# Patient Record
Sex: FEMALE | Race: WHITE | Employment: OTHER | URBAN - METROPOLITAN AREA
[De-identification: names, ages, dates, MRNs, and addresses within clinical notes are randomized per-mention and may not be internally consistent; named-entity substitution may affect disease eponyms.]

---

## 2017-01-04 DIAGNOSIS — I35.0 NONRHEUMATIC AORTIC VALVE STENOSIS: Primary | ICD-10-CM

## 2017-02-16 ENCOUNTER — HOSPITAL ENCOUNTER (OUTPATIENT)
Dept: LAB | Age: 77
Discharge: HOME | End: 2017-02-16
Attending: INTERNAL MEDICINE
Payer: MEDICARE

## 2017-02-16 DIAGNOSIS — I35.0 NONRHEUMATIC AORTIC VALVE STENOSIS: ICD-10-CM

## 2017-02-16 LAB
ANION GAP BLD CALC-SCNC: 7 MMOL/L
BUN SERPL-MCNC: 18 MG/DL (ref 8–23)
CALCIUM SERPL-MCNC: 9.3 MG/DL (ref 8.3–10.4)
CHLORIDE SERPL-SCNC: 102 MMOL/L (ref 98–107)
CO2 SERPL-SCNC: 30 MMOL/L (ref 23–32)
CREAT SERPL-MCNC: 0.8 MG/DL (ref 0.6–1)
ERYTHROCYTE [DISTWIDTH] IN BLOOD BY AUTOMATED COUNT: 12.1 % (ref 11.9–14.6)
GLUCOSE SERPL-MCNC: 245 MG/DL (ref 65–100)
HCT VFR BLD AUTO: 39.5 % (ref 35.8–46.3)
HGB BLD-MCNC: 13.2 G/DL (ref 11.7–15.4)
MCH RBC QN AUTO: 32.5 PG (ref 26.1–32.9)
MCHC RBC AUTO-ENTMCNC: 33.4 G/DL (ref 31.4–35)
MCV RBC AUTO: 97.3 FL (ref 79.6–97.8)
PLATELET # BLD AUTO: 220 K/UL (ref 150–450)
PMV BLD AUTO: 8.8 FL (ref 10.8–14.1)
POTASSIUM SERPL-SCNC: 4.6 MMOL/L (ref 3.5–5.1)
RBC # BLD AUTO: 4.06 M/UL (ref 4.05–5.25)
SODIUM SERPL-SCNC: 139 MMOL/L (ref 136–145)
WBC # BLD AUTO: 8.6 K/UL (ref 4.3–11.1)

## 2017-02-16 PROCEDURE — 85027 COMPLETE CBC AUTOMATED: CPT | Performed by: INTERNAL MEDICINE

## 2017-02-16 PROCEDURE — 80048 BASIC METABOLIC PNL TOTAL CA: CPT | Performed by: INTERNAL MEDICINE

## 2017-02-16 PROCEDURE — 36415 COLL VENOUS BLD VENIPUNCTURE: CPT | Performed by: INTERNAL MEDICINE

## 2017-12-05 ENCOUNTER — HOSPITAL ENCOUNTER (INPATIENT)
Age: 77
LOS: 2 days | Discharge: HOME HEALTH | DRG: 638 | End: 2017-12-07
Attending: INTERNAL MEDICINE | Admitting: FAMILY MEDICINE
Payer: MEDICARE

## 2017-12-05 ENCOUNTER — APPOINTMENT (OUTPATIENT)
Dept: GENERAL RADIOLOGY | Age: 77
DRG: 638 | End: 2017-12-05
Attending: FAMILY MEDICINE
Payer: MEDICARE

## 2017-12-05 PROBLEM — L97.509 FOOT ULCER (HCC): Status: ACTIVE | Noted: 2017-12-05

## 2017-12-05 LAB
ALBUMIN SERPL-MCNC: 2.9 G/DL (ref 3.2–4.6)
ALBUMIN/GLOB SERPL: 0.7 {RATIO} (ref 1.2–3.5)
ALP SERPL-CCNC: 95 U/L (ref 50–136)
ALT SERPL-CCNC: 20 U/L (ref 12–65)
ANION GAP SERPL CALC-SCNC: 10 MMOL/L (ref 7–16)
AST SERPL-CCNC: 18 U/L (ref 15–37)
BASOPHILS # BLD: 0 K/UL (ref 0–0.2)
BASOPHILS NFR BLD: 0 % (ref 0–2)
BILIRUB SERPL-MCNC: 0.5 MG/DL (ref 0.2–1.1)
BUN SERPL-MCNC: 19 MG/DL (ref 8–23)
CALCIUM SERPL-MCNC: 9 MG/DL (ref 8.3–10.4)
CHLORIDE SERPL-SCNC: 102 MMOL/L (ref 98–107)
CO2 SERPL-SCNC: 28 MMOL/L (ref 21–32)
CREAT SERPL-MCNC: 0.75 MG/DL (ref 0.6–1)
DIFFERENTIAL METHOD BLD: ABNORMAL
EOSINOPHIL # BLD: 0.1 K/UL (ref 0–0.8)
EOSINOPHIL NFR BLD: 1 % (ref 0.5–7.8)
ERYTHROCYTE [DISTWIDTH] IN BLOOD BY AUTOMATED COUNT: 12.9 % (ref 11.9–14.6)
GLOBULIN SER CALC-MCNC: 4.4 G/DL (ref 2.3–3.5)
GLUCOSE BLD STRIP.AUTO-MCNC: 326 MG/DL (ref 65–100)
GLUCOSE SERPL-MCNC: 271 MG/DL (ref 65–100)
HCT VFR BLD AUTO: 37.1 % (ref 35.8–46.3)
HGB BLD-MCNC: 12.5 G/DL (ref 11.7–15.4)
IMM GRANULOCYTES # BLD: 0 K/UL (ref 0–0.5)
IMM GRANULOCYTES NFR BLD AUTO: 0 % (ref 0–5)
INR PPP: 1.3
LYMPHOCYTES # BLD: 1.7 K/UL (ref 0.5–4.6)
LYMPHOCYTES NFR BLD: 17 % (ref 13–44)
MCH RBC QN AUTO: 32.9 PG (ref 26.1–32.9)
MCHC RBC AUTO-ENTMCNC: 33.7 G/DL (ref 31.4–35)
MCV RBC AUTO: 97.6 FL (ref 79.6–97.8)
MONOCYTES # BLD: 0.9 K/UL (ref 0.1–1.3)
MONOCYTES NFR BLD: 9 % (ref 4–12)
NEUTS SEG # BLD: 7.2 K/UL (ref 1.7–8.2)
NEUTS SEG NFR BLD: 73 % (ref 43–78)
PLATELET # BLD AUTO: 241 K/UL (ref 150–450)
PMV BLD AUTO: 9.3 FL (ref 10.8–14.1)
POTASSIUM SERPL-SCNC: 3.9 MMOL/L (ref 3.5–5.1)
PROT SERPL-MCNC: 7.3 G/DL (ref 6.3–8.2)
PROTHROMBIN TIME: 16 SEC (ref 11.5–14.5)
RBC # BLD AUTO: 3.8 M/UL (ref 4.05–5.25)
SODIUM SERPL-SCNC: 140 MMOL/L (ref 136–145)
WBC # BLD AUTO: 9.9 K/UL (ref 4.3–11.1)

## 2017-12-05 PROCEDURE — 36415 COLL VENOUS BLD VENIPUNCTURE: CPT | Performed by: FAMILY MEDICINE

## 2017-12-05 PROCEDURE — 74011250637 HC RX REV CODE- 250/637: Performed by: FAMILY MEDICINE

## 2017-12-05 PROCEDURE — 82962 GLUCOSE BLOOD TEST: CPT

## 2017-12-05 PROCEDURE — 85025 COMPLETE CBC W/AUTO DIFF WBC: CPT | Performed by: FAMILY MEDICINE

## 2017-12-05 PROCEDURE — 85610 PROTHROMBIN TIME: CPT | Performed by: FAMILY MEDICINE

## 2017-12-05 PROCEDURE — 65270000029 HC RM PRIVATE

## 2017-12-05 PROCEDURE — 74011250636 HC RX REV CODE- 250/636: Performed by: FAMILY MEDICINE

## 2017-12-05 PROCEDURE — 93005 ELECTROCARDIOGRAM TRACING: CPT | Performed by: FAMILY MEDICINE

## 2017-12-05 PROCEDURE — 77030027138 HC INCENT SPIROMETER -A

## 2017-12-05 PROCEDURE — 87040 BLOOD CULTURE FOR BACTERIA: CPT | Performed by: FAMILY MEDICINE

## 2017-12-05 PROCEDURE — 74011636637 HC RX REV CODE- 636/637: Performed by: FAMILY MEDICINE

## 2017-12-05 PROCEDURE — 77030032490 HC SLV COMPR SCD KNE COVD -B

## 2017-12-05 PROCEDURE — 80053 COMPREHEN METABOLIC PANEL: CPT | Performed by: FAMILY MEDICINE

## 2017-12-05 PROCEDURE — 74011000258 HC RX REV CODE- 258: Performed by: FAMILY MEDICINE

## 2017-12-05 PROCEDURE — 73630 X-RAY EXAM OF FOOT: CPT

## 2017-12-05 RX ORDER — LANOLIN ALCOHOL/MO/W.PET/CERES
500 CREAM (GRAM) TOPICAL
Status: DISCONTINUED | OUTPATIENT
Start: 2017-12-06 | End: 2017-12-07 | Stop reason: HOSPADM

## 2017-12-05 RX ORDER — MORPHINE SULFATE 2 MG/ML
1 INJECTION, SOLUTION INTRAMUSCULAR; INTRAVENOUS
Status: DISCONTINUED | OUTPATIENT
Start: 2017-12-05 | End: 2017-12-05 | Stop reason: SDUPTHER

## 2017-12-05 RX ORDER — METHIMAZOLE 5 MG/1
5 TABLET ORAL
Status: DISCONTINUED | OUTPATIENT
Start: 2017-12-06 | End: 2017-12-07 | Stop reason: HOSPADM

## 2017-12-05 RX ORDER — LANOLIN ALCOHOL/MO/W.PET/CERES
1 CREAM (GRAM) TOPICAL
Status: DISCONTINUED | OUTPATIENT
Start: 2017-12-06 | End: 2017-12-07 | Stop reason: HOSPADM

## 2017-12-05 RX ORDER — DIGOXIN 125 MCG
0.12 TABLET ORAL
Status: DISCONTINUED | OUTPATIENT
Start: 2017-12-06 | End: 2017-12-07 | Stop reason: HOSPADM

## 2017-12-05 RX ORDER — PANTOPRAZOLE SODIUM 40 MG/1
40 TABLET, DELAYED RELEASE ORAL
Status: DISCONTINUED | OUTPATIENT
Start: 2017-12-06 | End: 2017-12-07 | Stop reason: HOSPADM

## 2017-12-05 RX ORDER — ERGOCALCIFEROL 1.25 MG/1
50000 CAPSULE ORAL
Status: DISCONTINUED | OUTPATIENT
Start: 2017-12-12 | End: 2017-12-07 | Stop reason: HOSPADM

## 2017-12-05 RX ORDER — HYDROCODONE BITARTRATE AND ACETAMINOPHEN 5; 325 MG/1; MG/1
1 TABLET ORAL
Status: DISCONTINUED | OUTPATIENT
Start: 2017-12-05 | End: 2017-12-07 | Stop reason: HOSPADM

## 2017-12-05 RX ORDER — METOPROLOL TARTRATE 50 MG/1
50 TABLET ORAL 2 TIMES DAILY
Status: DISCONTINUED | OUTPATIENT
Start: 2017-12-05 | End: 2017-12-07 | Stop reason: HOSPADM

## 2017-12-05 RX ORDER — MORPHINE SULFATE 10 MG/ML
1 INJECTION, SOLUTION INTRAMUSCULAR; INTRAVENOUS
Status: DISCONTINUED | OUTPATIENT
Start: 2017-12-05 | End: 2017-12-07 | Stop reason: HOSPADM

## 2017-12-05 RX ORDER — CALCIUM CARBONATE 500(1250)
500 TABLET ORAL 2 TIMES DAILY WITH MEALS
Status: DISCONTINUED | OUTPATIENT
Start: 2017-12-05 | End: 2017-12-07 | Stop reason: HOSPADM

## 2017-12-05 RX ORDER — PREDNISONE 5 MG/1
5 TABLET ORAL
Status: DISCONTINUED | OUTPATIENT
Start: 2017-12-06 | End: 2017-12-07 | Stop reason: HOSPADM

## 2017-12-05 RX ORDER — TRAMADOL HYDROCHLORIDE 50 MG/1
100 TABLET ORAL
Status: DISCONTINUED | OUTPATIENT
Start: 2017-12-05 | End: 2017-12-07 | Stop reason: HOSPADM

## 2017-12-05 RX ORDER — SODIUM CHLORIDE 0.9 % (FLUSH) 0.9 %
5-10 SYRINGE (ML) INJECTION EVERY 8 HOURS
Status: DISCONTINUED | OUTPATIENT
Start: 2017-12-05 | End: 2017-12-07 | Stop reason: HOSPADM

## 2017-12-05 RX ORDER — INSULIN GLARGINE 100 [IU]/ML
9 INJECTION, SOLUTION SUBCUTANEOUS
Status: DISCONTINUED | OUTPATIENT
Start: 2017-12-05 | End: 2017-12-07

## 2017-12-05 RX ORDER — ASPIRIN 81 MG/1
81 TABLET ORAL
Status: DISCONTINUED | OUTPATIENT
Start: 2017-12-06 | End: 2017-12-07 | Stop reason: HOSPADM

## 2017-12-05 RX ORDER — SODIUM CHLORIDE 0.9 % (FLUSH) 0.9 %
5-10 SYRINGE (ML) INJECTION AS NEEDED
Status: DISCONTINUED | OUTPATIENT
Start: 2017-12-05 | End: 2017-12-07 | Stop reason: HOSPADM

## 2017-12-05 RX ORDER — LORAZEPAM 1 MG/1
1 TABLET ORAL 2 TIMES DAILY
Status: DISCONTINUED | OUTPATIENT
Start: 2017-12-05 | End: 2017-12-07 | Stop reason: HOSPADM

## 2017-12-05 RX ORDER — INSULIN LISPRO 100 [IU]/ML
INJECTION, SOLUTION INTRAVENOUS; SUBCUTANEOUS
Status: DISCONTINUED | OUTPATIENT
Start: 2017-12-05 | End: 2017-12-07 | Stop reason: HOSPADM

## 2017-12-05 RX ADMIN — PIPERACILLIN SODIUM,TAZOBACTAM SODIUM 3.38 G: 3; .375 INJECTION, POWDER, FOR SOLUTION INTRAVENOUS at 21:50

## 2017-12-05 RX ADMIN — Medication 10 ML: at 21:54

## 2017-12-05 RX ADMIN — TRAMADOL HYDROCHLORIDE 100 MG: 50 TABLET, FILM COATED ORAL at 21:45

## 2017-12-05 RX ADMIN — INSULIN LISPRO 8 UNITS: 100 INJECTION, SOLUTION INTRAVENOUS; SUBCUTANEOUS at 21:42

## 2017-12-05 RX ADMIN — HYDROCODONE BITARTRATE AND ACETAMINOPHEN 1 TABLET: 5; 325 TABLET ORAL at 20:40

## 2017-12-05 RX ADMIN — LORAZEPAM 1 MG: 1 TABLET ORAL at 21:44

## 2017-12-05 RX ADMIN — VANCOMYCIN HYDROCHLORIDE 1000 MG: 1 INJECTION, POWDER, LYOPHILIZED, FOR SOLUTION INTRAVENOUS at 21:54

## 2017-12-05 RX ADMIN — INSULIN GLARGINE 9 UNITS: 100 INJECTION, SOLUTION SUBCUTANEOUS at 21:41

## 2017-12-05 NOTE — H&P
GENERAL GENERIC H&P/CONSULT    Subjective:    HPI:  Ms. Bhargavi Macedo is a very pleasant 80yoF who presents as a direct admit for R foot cellulitis and plantar ulcer. Per patient report and caregiver, pt has had this ulcer for at least 2 months, getting wound care as an outpatient. She was doing well, but reports approximately 3 weeks ago, she had some sort of procedure done that seemed to make the ulceration worse. On Sunday, she noticed some R ankle swelling and pain, then yesterday, the dorsal aspect of her R foot near her toes became erythematous and warm. Her ulcer also seemed to be acutely worse. She saw an outpatient provider today, who told her that she needed to come to the hospital.  Per patient report, XR was performed, and they were told that there was \"no gas\" but also that \"the bone may need to come out. \"  I have not seen this report as of yet. Pt reports some intermittent \"chilliness\" but states this is chronic. She reports checking her temperature at home twice since the weekend and today in her doctor's office, and she has been afebrile. Denies any SOB or CP. She has been compliant with all her medications. States that her foot is painful, but thinks that her home medications should help.       Past Medical History:   Diagnosis Date    Acquired cyst of kidney     Anxiety     managed with medication     Aortic valve replaced     Atrial fibrillation, chronic (HCC)     managed with medication and pacemaker    CAD (coronary artery disease)     CABG x 5    Calculus of kidney     Carotid artery stenosis without cerebral infarction     Chronic pain     GENERALIZED FROM ARTHRITIS    Gangrene associated with diabetes mellitus ([address] 5/26/2016    left great toe    GERD (gastroesophageal reflux disease)     managed with medication     History of kidney stones     multiple with surgical interventions    Hypercholesterolemia     managed with medication     Hypothyroidism     managed with medication     Microscopic hematuria     Nausea & vomiting     after anesthesia    Osteoarthritis     managed with medication     Pacemaker     Metronic pacemaker only    PUD (peptic ulcer disease)     no recent episodes    PVD (peripheral vascular disease) ([facility]     left side x 1 stented    Rheumatoid arthritis ([facility]     managed with medication     Type 2 diabetes mellitus ([facility] Dx 2006    oral t and insulin/Avg / no s/s of low BS/ does not have a sensation / last A1C7.4    Vertigo     no treatment, happens occassionally      Past Surgical History:   Procedure Laterality Date    CABG, ARTERY-VEIN, FOUR  1993    states x 5    CARDIAC SURG PROCEDURE UNLIST      Cardiovert x 2-3 x last 2/4/2012    HX AMPUTATION Left 2016    great toe    HX AORTIC VALVE REPLACEMENT      HX APPENDECTOMY  1959    HX HEART CATHETERIZATION      HX HEENT      dental    HX HYSTERECTOMY  1988    HX LITHOTRIPSY      multiple    HX OPEN CHOLECYSTECTOMY      HX ORTHOPAEDIC Left     achilles tendon    HX ORTHOPAEDIC Left     \"toe surgery removing bones\"    HX PACEMAKER      HX PACEMAKER PLACEMENT      Metronic    HX UROLOGICAL Left 1980s    open L kidney removal stones    VASCULAR SURGERY PROCEDURE UNLIST Left 2-17-16    lower extremity arteriogram with stent x 1 placement      Prior to Admission medications    Medication Sig Start Date End Date Taking? Authorizing Provider   linagliptin (TRADJENTA) 5 mg tablet Take 5 mg by mouth daily. Yes Historical Provider   glipiZIDE (GLUCOTROL) 10 mg tablet Take 10 mg by mouth. Two tablets in the morning   Yes Historical Provider   CALCIUM CARBONATE (CALCIUM 300 PO) Take  by mouth. Yes Historical Provider   ergocalciferol (ERGOCALCIFEROL) 50,000 unit capsule Take 50,000 Units by mouth every month. 7/14/16  Yes Historical Provider   traMADol (ULTRAM) 50 mg tablet Take 100 mg by mouth nightly.    Yes Historical Provider   methimazole (TAPAZOLE) 5 mg tablet Take 5 mg by mouth every morning. 4/6/16  Yes Historical Provider   insulin glargine (LANTUS SOLOSTAR) 100 unit/mL (3 mL) pen 9 Units by SubCUTAneous route nightly. Yes Historical Provider   aspirin delayed-release 81 mg tablet Take 81 mg by mouth every morning. Yes Historical Provider   apixaban (ELIQUIS) 2.5 mg tablet Take 1 Tab by mouth every twelve (12) hours. 2/19/16  Yes Kevin Márquez MD   predniSONE (DELTASONE) 5 mg tablet Take 1 Tab by mouth daily (with breakfast). 2/19/16  Yes Kevin Márquez MD   HYDROcodone-acetaminophen (NORCO) 5-325 mg per tablet Take 1 Tab by mouth every six (6) hours as needed. Max Daily Amount: 4 Tabs. Patient taking differently: Take 1 Tab by mouth two (2) times a day. 2/19/16  Yes Kevin Márquez MD   metoprolol (LOPRESSOR) 50 mg tablet Take 1 Tab by mouth two (2) times a day. 2/19/16  Yes Kevin Márquez MD   digoxin (LANOXIN) 0.125 mg tablet Take 1 Tab by mouth daily. Patient taking differently: Take 0.125 mg by mouth every morning. 7/3/14  Yes Kesha Moncada PA-C   multivitamin (ONE A DAY) tablet Take 0.25 Tabs by mouth every morning. Yes Historical Provider   ferrous sulfate 325 mg (65 mg iron) tablet Take  by mouth Daily (before lunch). Yes Shaq Higginbotham MD   GLUC HCL/GLUC KENNY/AC-D-GLUCOS (GLUCOSAMINE COMPLEX PO) Take 1 Tab by mouth three (3) times daily. Yes Historical Provider   lorazepam (ATIVAN) 1 mg tablet Take 1 mg by mouth two (2) times a day. 8/5/10  Yes Historical Provider   DOCOSAHEXANOIC ACID/EPA (FISH OIL PO) Take 2 Tabs by mouth two (2) times a day. Yes Historical Provider   CYANOCOBALAMIN (VITAMIN B-12 PO) Take 1 Tab by mouth every morning. Yes Historical Provider   OMEPRAZOLE (PRILOSEC PO) Take 20 mg by mouth every morning.    Yes Historical Provider     Allergies   Allergen Reactions    Multaq [Dronedarone] Nausea Only    Other Medication Other (comments)     STATINS CAUSE MUSCLE WEAKNESS  Cholesterol medications    Statins-Hmg-Coa Reductase Inhibitors Myalgia      Social History   Substance Use Topics    Smoking status: Never Smoker    Smokeless tobacco: Never Used    Alcohol use No      Family History   Problem Relation Age of Onset    Heart Disease Father     Heart Attack Father     Diabetes Father     Hypertension Father     High Cholesterol Father     Asthma Father     Heart Disease Other     Heart Surgery Other     Diabetes Mother     Stroke Mother      TIAs    Hypertension Mother     Other Mother      kidney stone    Kidney Disease Mother     Heart Disease Mother     High Cholesterol Mother     Cancer Mother     Cancer Sister      multiple myeloma    Hypertension Sister     Hypertension Sister     Hypertension Brother     Diabetes Brother     Cancer Brother     Hypertension Sister     Heart Disease Sister     Hypertension Sister     Hypertension Sister       Review of Systems:  Negative except as mentioned in HPI    Objective:          Patient Vitals for the past 8 hrs:   BP Temp Pulse Resp SpO2   12/05/17 1804 142/82 98 °F (36.7 °C) 81 16 99 %     Physical Exam   GEN:  NAD, A&Ox4, pleasant and interactive  HEENT:  NCAT, PERRL, oropharynx is clear, moist mucus membranes  CV:  RRR, palpable heave over L chest  PULM:  CTAB, no wheezes, rhonchi, crackles  AB:  +BS, soft, NTND  MS:  2-3+/5 strength in all 4 extremities  SKIN:  Erythema of dorsal R foot, erythema of surrounding R plantar ulcer, mild pain on palpation of dorsal foot, no current edema  NEURO:  Weak, but symmetric strength in all 4 extremities, no focal neurologic deficits  PSYCH:  Pleasant, interactive, normal affect      Labs:  No results found for this or any previous visit (from the past 24 hour(s)).     ECG: EKG pending  Chest X-Ray: CXR pending    Assessment:  Active Problems:    Foot ulcer ([address] (12/5/2017)        Plan:  R Foot Ulcer/Cellulitis  -Plantar surface  -Ulcer is chronic, but worse, cellulitis is new  -Blood cultures, CBC pending  -Will start vanc and zosyn  -Check XR of foot  -Recheck labs in AM  -Unclear if this is vascular or diabetic ulcer in nature  -Wound Care consulted    Afib  -Rate controlled on exam  -Check EKG  -Continue home meds    CAD  -Significant heart history with CABG and stents  -No current chest pain  -Continue home meds    DM2  -Will continue insulin, but hold oral meds  -Cover with SSI    PVD  -Unsure if this wound is related to h/o PVD  -Pt has h/o L toe amputation as well  -Will consult with Vascular for their input as well    DISPO:  Admit to medical.  Follow up labs. Start abx after cultures obtained. Pending clinical course.       Signed:  Sharon Bassett MD 12/5/2017

## 2017-12-05 NOTE — IP AVS SNAPSHOT
Reece Jones 
 
 
 [address] [facility] [address] [facility] 
[phone] Patient: Ashley Sneed MRN: [id number] :1940 About your hospitalization You were admitted on:  2017 You last received care in the:  [facility] 2 SURGICAL You were discharged on:  2017 Why you were hospitalized Your primary diagnosis was:  Not on File Your diagnoses also included: Foot Ulcer (Hcc), Cellulitis Things You Need To Do (next 8 weeks) Follow up with Gladys Mariscal MD  
  
Phone:  [phone] Where:  [address], [address], [address] [place] [id number] Thursday Dec 21, 2017 Global Post Op with Tanner Gonzalez NP at 11:00 AM  
Where:  [facility] Follow up with Elsa Salgado MD  
f/u right plantar foot ulcer        11:00 WILL SEE LUIS DANIEL Palencia Phone:  [phone] Where:  [address], [facility], Vascular [facility], [address] [place] [id number] Wednesday Dec 27, 2017 ECHOCARDIOGRAM with GVLLE ECHO 26 at 11:30 AM  
Where:  [facility] ([address] [place] [address]) Wednesday Fito 10, 2018 Office Visit with Janell Bamberger, MD at 11:45 AM  
Where:  [facility] ([address] [place] [address]) Discharge Orders None A check jenn indicates which time of day the medication should be taken. My Medications TAKE these medications as instructed Instructions Each Dose to Equal  
 Morning Noon Evening Bedtime  
 apixaban 2.5 mg tablet Commonly known as:  Carlean Coast Take 1 Tab by mouth every twelve (12) hours. 2.5 mg  
    
  
   
   
   
  
  
 aspirin delayed-release 81 mg tablet Take 81 mg by mouth every morning. 81 mg  
    
  
   
   
   
  
 ATIVAN 1 mg tablet Generic drug:  LORazepam  
   
 Take 1 mg by mouth two (2) times a day. 1 mg CALCIUM 300 PO Take  by mouth. cephALEXin 500 mg capsule Commonly known as:  Nini Revels Take 1 Cap by mouth two (2) times a day. 500 mg  
    
  
   
   
   
  
  
 digoxin 0.125 mg tablet Commonly known as:  LANOXIN Take 1 Tab by mouth daily. 0.125 mg  
    
  
   
   
   
  
 ergocalciferol 50,000 unit capsule Commonly known as:  ERGOCALCIFEROL Notes to Patient:  Every month as directed Take 50,000 Units by mouth every month. [address]43 Units  
    
   
   
   
  
 ferrous sulfate 325 mg (65 mg iron) tablet Take  by mouth Daily (before lunch). FISH OIL PO Take 2 Tabs by mouth two (2) times a day. 2 Tab  
    
  
   
   
   
  
  
 glipiZIDE 10 mg tablet Commonly known as:  Aga Isles Take 10 mg by mouth. Two tablets in the morning 10 mg  
    
2 tabs GLUCOSAMINE COMPLEX PO Take 1 Tab by mouth three (3) times daily. 1 Tab HYDROcodone-acetaminophen 5-325 mg per tablet Commonly known as:  Jyoti Corbin Notes to Patient:  Take on as needed schedule Take 1 Tab by mouth every six (6) hours as needed. Max Daily Amount: 4 Tabs. 1 Tab  
    
   
   
   
  
 insulin glargine 100 unit/mL (3 mL) Inpn Commonly known as:  LANTUS,BASAGLAR  
   
 9 Units by SubCUTAneous route nightly. 9 Units  
    
   
   
   
  
 methIMAzole 5 mg tablet Commonly known as:  TAPAZOLE Take 5 mg by mouth every morning. 5 mg  
    
  
   
   
   
  
 metoprolol tartrate 50 mg tablet Commonly known as:  LOPRESSOR Take 1 Tab by mouth two (2) times a day. 50 mg  
    
  
   
   
  
   
  
 multivitamin tablet Commonly known as:  ONE A DAY Take 0.25 Tabs by mouth every morning. 0.25 Tab  
    
  
   
   
   
  
 predniSONE 5 mg tablet Commonly known as:  Alinda Caridad Take 1 Tab by mouth daily (with breakfast). 5 mg PRILOSEC PO Take 20 mg by mouth every morning. 20 mg  
    
  
   
   
   
  
 TRADJENTA 5 mg tablet Generic drug:  linagliptin Take 5 mg by mouth daily. 5 mg  
    
  
   
   
   
  
 traMADol 50 mg tablet Commonly known as:  ULTRAM  
   
 Take 100 mg by mouth nightly. 100 mg  
    
   
   
   
  
  
 VITAMIN B-12 PO Take 1 Tab by mouth every morning. 1 Tab Where to Get Your Medications These medications were sent to [place] [address] Phone:  [phone]  
  cephALEXin 500 mg capsule Discharge Instructions DISCHARGE SUMMARY from Nurse PATIENT INSTRUCTIONS: 
 
After general anesthesia or intravenous sedation, for 24 hours or while taking prescription Narcotics: · Limit your activities · Do not drive and operate hazardous machinery · Do not make important personal or business decisions · Do  not drink alcoholic beverages · If you have not urinated within 8 hours after discharge, please contact your surgeon on call. Report the following to your surgeon: 
· Excessive pain, swelling, redness or odor of or around the surgical area · Temperature over 100.5 · Nausea and vomiting lasting longer than 4 hours or if unable to take medications · Any signs of decreased circulation or nerve impairment to extremity: change in color, persistent  numbness, tingling, coldness or increase pain · Any questions What to do at Home: 
Recommended activity: Activity as tolerated, per MD instructions If you experience any of the following symptoms fever > 100.5, nausea, vomiting, pain, chest pain and/or shortness of breath go to ER please follow up with MD. 
 
*  Please give a list of your current medications to your Primary Care Provider. *  Please update this list whenever your medications are discontinued, doses are 
    changed, or new medications (including over-the-counter products) are added. *  Please carry medication information at all times in case of emergency situations. These are general instructions for a healthy lifestyle: No smoking/ No tobacco products/ Avoid exposure to second hand smoke Surgeon General's Warning:  Quitting smoking now greatly reduces serious risk to your health. Obesity, smoking, and sedentary lifestyle greatly increases your risk for illness A healthy diet, regular physical exercise & weight monitoring are important for maintaining a healthy lifestyle You may be retaining fluid if you have a history of heart failure or if you experience any of the following symptoms:  Weight gain of 3 pounds or more overnight or 5 pounds in a week, increased swelling in our hands or feet or shortness of breath while lying flat in bed. Please call your doctor as soon as you notice any of these symptoms; do not wait until your next office visit. Recognize signs and symptoms of STROKE: 
 
F-face looks uneven A-arms unable to move or move unevenly S-speech slurred or non-existent T-time-call 911 as soon as signs and symptoms begin-DO NOT go Back to bed or wait to see if you get better-TIME IS BRAIN. Warning Signs of HEART ATTACK Call 911 if you have these symptoms: 
? Chest discomfort. Most heart attacks involve discomfort in the center of the chest that lasts more than a few minutes, or that goes away and comes back. It can feel like uncomfortable pressure, squeezing, fullness, or pain. ? Discomfort in other areas of the upper body. Symptoms can include pain or discomfort in one or both arms, the back, neck, jaw, or stomach. ? Shortness of breath with or without chest discomfort. ? Other signs may include breaking out in a cold sweat, nausea, or lightheadedness. Don't wait more than five minutes to call [address]! Fast action can save your life. Calling 911 is almost always the fastest way to get lifesaving treatment. Emergency Medical Services staff can begin treatment when they arrive  up to an hour sooner than if someone gets to the hospital by car. The discharge information has been reviewed with the patient. The patient verbalized understanding. Discharge medications reviewed with the patient and appropriate educational materials and side effects teaching were provided. ___________________________________________________________________________________________________________________________________ Cellulitis: Care Instructions Your Care Instructions Cellulitis is a skin infection. It often occurs after a break in the skin from a scrape, cut, bite, or puncture, or after a rash. The doctor has checked you carefully, but problems can develop later. If you notice any problems or new symptoms, get medical treatment right away. Follow-up care is a key part of your treatment and safety. Be sure to make and go to all appointments, and call your doctor if you are having problems. It's also a good idea to know your test results and keep a list of the medicines you take. How can you care for yourself at home? · Take your antibiotics as directed. Do not stop taking them just because you feel better. You need to take the full course of antibiotics. · Prop up the infected area on pillows to reduce pain and swelling. Try to keep the area above the level of your heart as often as you can. · If your doctor told you how to care for your wound, follow your doctor's instructions. If you did not get instructions, follow this general advice: ¨ Wash the wound with clean water 2 times a day. Don't use hydrogen peroxide or alcohol, which can slow healing.  
¨ You may cover the wound with a thin layer of petroleum jelly, such as Vaseline, and a nonstick bandage. ¨ Apply more petroleum jelly and replace the bandage as needed. · Be safe with medicines. Take pain medicines exactly as directed. ¨ If the doctor gave you a prescription medicine for pain, take it as prescribed. ¨ If you are not taking a prescription pain medicine, ask your doctor if you can take an over-the-counter medicine. To prevent cellulitis in the future · Try to prevent cuts, scrapes, or other injuries to your skin. Cellulitis most often occurs where there is a break in the skin. · If you get a scrape, cut, mild burn, or bite, wash the wound with clean water as soon as you can to help avoid infection. Don't use hydrogen peroxide or alcohol, which can slow healing. · If you have swelling in your legs (edema), support stockings and good skin care may help prevent leg sores and cellulitis. · Take care of your feet, especially if you have diabetes or other conditions that increase the risk of infection. Wear shoes and socks. Do not go barefoot. If you have athlete's foot or other skin problems on your feet, talk to your doctor about how to treat them. When should you call for help? Call your doctor now or seek immediate medical care if: 
? · You have signs that your infection is getting worse, such as: 
¨ Increased pain, swelling, warmth, or redness. ¨ Red streaks leading from the area. ¨ Pus draining from the area. ¨ A fever. ? · You get a rash. ? Watch closely for changes in your health, and be sure to contact your doctor if: 
? · You are not getting better after 1 day (24 hours). ? · You do not get better as expected. Where can you learn more? Go to http://tae-bairon.info/. Sandi Up in the search box to learn more about \"Cellulitis: Care Instructions. \" Current as of: October 13, 2016 Content Version: 11.4 © 3988-8184 Healthwise, [employer].  Care instructions adapted under license by [address] (which disclaims liability or warranty for this information). If you have questions about a medical condition or this instruction, always ask your healthcare professional. [address] any warranty or liability for your use of this information. Introducing [facility]! Dylan Mathieu introduces [employer] patient portal. Now you can access parts of your medical record, email your doctor's office, and request medication refills online. 1. In your internet browser, go to [email or web address] 2. Click on the First Time User? Click Here link in the Sign In box. You will see the New Member Sign Up page. 3. Enter your [employer] Access Code exactly as it appears below. You will not need to use this code after youve completed the sign-up process. If you do not sign up before the expiration date, you must request a new code. · [employer] Access Code: [email or web address] Expires: 12/18/2017  2:11 PM 
 
4. Enter the last four digits of your Social Security Number (xxxx) and Date of Birth (mm/dd/yyyy) as indicated and click Submit. You will be taken to the next sign-up page. 5. Create a [employer] ID. This will be your [employer] login ID and cannot be changed, so think of one that is secure and easy to remember. 6. Create a [employer] password. You can change your password at any time. 7. Enter your Password Reset Question and Answer. This can be used at a later time if you forget your password. 8. Enter your e-mail address. You will receive e-mail notification when new information is available in [address] 9. Click Sign Up. You can now view and download portions of your medical record. 10. Click the Download Summary menu link to download a portable copy of your medical information. If you have questions, please visit the Frequently Asked Questions section of the [employer] website.  Remember, [employer] is NOT to be used for urgent needs. For medical emergencies, dial 911. Now available from your iPhone and Android! Unresulted Labs-Please follow up with your PCP about these lab tests Order Current Status CULTURE, BLOOD Preliminary result CULTURE, BLOOD Preliminary result Providers Seen During Your Hospitalization Provider Specialty Primary office phone Claude Keenan MD Internal Medicine [phone] Layla Salinas MD [facility] [phone] Your Primary Care Physician (PCP) Primary Care Physician Office Phone Office Fax Jani Solomonsert [phone] You are allergic to the following Allergen Reactions Multaq (Dronedarone) Nausea Only Other Medication Other (comments) STATINS CAUSE MUSCLE WEAKNESS Cholesterol medications Statins-Hmg-Coa Reductase Inhibitors Myalgia Recent Documentation Weight BMI OB Status Smoking Status 43.9 kg 16.6 kg/m2 Hysterectomy Never Smoker Emergency Contacts Name Discharge Info Relation Home Work Mobile Lary Rincon DISCHARGE CAREGIVER [3] Child [2] [phone] Ezequiel Rutledge DISCHARGE CAREGIVER [3] Child [2] [phone] aLry Joseph DISCHARGE CAREGIVER [3] Other Relative [6] [phone] Patient Belongings The following personal items are in your possession at time of discharge: 
  Dental Appliances: None         Home Medications: None   Jewelry: None  Clothing: With patient, Undergarments, Socks, Shirt, Pants, Pajamas, Footwear, Jacket/Coat, Bathrobe    Other Valuables: At bedside Please provide this summary of care documentation to your next provider. Signatures-by signing, you are acknowledging that this After Visit Summary has been reviewed with you and you have received a copy. Patient Signature:  ____________________________________________________________ Date:  ____________________________________________________________  
  
Burlene Elijah Provider Signature:  ____________________________________________________________ Date:  ____________________________________________________________

## 2017-12-06 ENCOUNTER — APPOINTMENT (OUTPATIENT)
Dept: ULTRASOUND IMAGING | Age: 77
DRG: 638 | End: 2017-12-06
Attending: FAMILY MEDICINE
Payer: MEDICARE

## 2017-12-06 ENCOUNTER — APPOINTMENT (OUTPATIENT)
Dept: ULTRASOUND IMAGING | Age: 77
DRG: 638 | End: 2017-12-06
Attending: SURGERY
Payer: MEDICARE

## 2017-12-06 PROBLEM — L03.90 CELLULITIS: Status: ACTIVE | Noted: 2017-12-06

## 2017-12-06 PROBLEM — L97.509 FOOT ULCER (HCC): Status: RESOLVED | Noted: 2017-12-05 | Resolved: 2017-12-06

## 2017-12-06 LAB
ANION GAP SERPL CALC-SCNC: 9 MMOL/L (ref 7–16)
ATRIAL RATE: 69 BPM
BASOPHILS # BLD: 0 K/UL (ref 0–0.2)
BASOPHILS NFR BLD: 0 % (ref 0–2)
BUN SERPL-MCNC: 18 MG/DL (ref 8–23)
CALCIUM SERPL-MCNC: 8.6 MG/DL (ref 8.3–10.4)
CALCULATED R AXIS, ECG10: 54 DEGREES
CALCULATED T AXIS, ECG11: -95 DEGREES
CHLORIDE SERPL-SCNC: 107 MMOL/L (ref 98–107)
CO2 SERPL-SCNC: 27 MMOL/L (ref 21–32)
CREAT SERPL-MCNC: 0.74 MG/DL (ref 0.6–1)
CRP SERPL-MCNC: 4.6 MG/DL (ref 0–0.9)
DIAGNOSIS, 93000: NORMAL
DIFFERENTIAL METHOD BLD: ABNORMAL
EOSINOPHIL # BLD: 0.1 K/UL (ref 0–0.8)
EOSINOPHIL NFR BLD: 1 % (ref 0.5–7.8)
ERYTHROCYTE [DISTWIDTH] IN BLOOD BY AUTOMATED COUNT: 13 % (ref 11.9–14.6)
ERYTHROCYTE [SEDIMENTATION RATE] IN BLOOD: 31 MM/HR (ref 0–30)
EST. AVERAGE GLUCOSE BLD GHB EST-MCNC: 232 MG/DL
GLUCOSE BLD STRIP.AUTO-MCNC: 144 MG/DL (ref 65–100)
GLUCOSE BLD STRIP.AUTO-MCNC: 169 MG/DL (ref 65–100)
GLUCOSE BLD STRIP.AUTO-MCNC: 200 MG/DL (ref 65–100)
GLUCOSE BLD STRIP.AUTO-MCNC: 281 MG/DL (ref 65–100)
GLUCOSE BLD STRIP.AUTO-MCNC: 66 MG/DL (ref 65–100)
GLUCOSE SERPL-MCNC: 146 MG/DL (ref 65–100)
HBA1C MFR BLD: 9.7 % (ref 4.8–6)
HCT VFR BLD AUTO: 32.8 % (ref 35.8–46.3)
HGB BLD-MCNC: 11 G/DL (ref 11.7–15.4)
IMM GRANULOCYTES # BLD: 0 K/UL (ref 0–0.5)
IMM GRANULOCYTES NFR BLD AUTO: 0 % (ref 0–5)
LYMPHOCYTES # BLD: 1.7 K/UL (ref 0.5–4.6)
LYMPHOCYTES NFR BLD: 20 % (ref 13–44)
MCH RBC QN AUTO: 32.7 PG (ref 26.1–32.9)
MCHC RBC AUTO-ENTMCNC: 33.5 G/DL (ref 31.4–35)
MCV RBC AUTO: 97.6 FL (ref 79.6–97.8)
MONOCYTES # BLD: 0.9 K/UL (ref 0.1–1.3)
MONOCYTES NFR BLD: 11 % (ref 4–12)
NEUTS SEG # BLD: 5.5 K/UL (ref 1.7–8.2)
NEUTS SEG NFR BLD: 68 % (ref 43–78)
PLATELET # BLD AUTO: 205 K/UL (ref 150–450)
PMV BLD AUTO: 9.1 FL (ref 10.8–14.1)
POTASSIUM SERPL-SCNC: 3.7 MMOL/L (ref 3.5–5.1)
Q-T INTERVAL, ECG07: 354 MS
QRS DURATION, ECG06: 82 MS
QTC CALCULATION (BEZET), ECG08: 379 MS
RBC # BLD AUTO: 3.36 M/UL (ref 4.05–5.25)
SODIUM SERPL-SCNC: 143 MMOL/L (ref 136–145)
VENTRICULAR RATE, ECG03: 69 BPM
WBC # BLD AUTO: 8.3 K/UL (ref 4.3–11.1)

## 2017-12-06 PROCEDURE — 74011250637 HC RX REV CODE- 250/637: Performed by: FAMILY MEDICINE

## 2017-12-06 PROCEDURE — 77030011256 HC DRSG MEPILEX <16IN NO BORD MOLN -A

## 2017-12-06 PROCEDURE — 85025 COMPLETE CBC W/AUTO DIFF WBC: CPT | Performed by: FAMILY MEDICINE

## 2017-12-06 PROCEDURE — 80048 BASIC METABOLIC PNL TOTAL CA: CPT | Performed by: FAMILY MEDICINE

## 2017-12-06 PROCEDURE — 74011636637 HC RX REV CODE- 636/637: Performed by: FAMILY MEDICINE

## 2017-12-06 PROCEDURE — 86140 C-REACTIVE PROTEIN: CPT | Performed by: HOSPITALIST

## 2017-12-06 PROCEDURE — 74011000258 HC RX REV CODE- 258: Performed by: FAMILY MEDICINE

## 2017-12-06 PROCEDURE — 82962 GLUCOSE BLOOD TEST: CPT

## 2017-12-06 PROCEDURE — 36415 COLL VENOUS BLD VENIPUNCTURE: CPT | Performed by: FAMILY MEDICINE

## 2017-12-06 PROCEDURE — 85652 RBC SED RATE AUTOMATED: CPT | Performed by: HOSPITALIST

## 2017-12-06 PROCEDURE — 74011250636 HC RX REV CODE- 250/636: Performed by: FAMILY MEDICINE

## 2017-12-06 PROCEDURE — 99221 1ST HOSP IP/OBS SF/LOW 40: CPT | Performed by: SURGERY

## 2017-12-06 PROCEDURE — 93922 UPR/L XTREMITY ART 2 LEVELS: CPT

## 2017-12-06 PROCEDURE — 97162 PT EVAL MOD COMPLEX 30 MIN: CPT

## 2017-12-06 PROCEDURE — 65270000029 HC RM PRIVATE

## 2017-12-06 PROCEDURE — 83036 HEMOGLOBIN GLYCOSYLATED A1C: CPT | Performed by: HOSPITALIST

## 2017-12-06 PROCEDURE — 97530 THERAPEUTIC ACTIVITIES: CPT

## 2017-12-06 RX ADMIN — APIXABAN 2.5 MG: 2.5 TABLET, FILM COATED ORAL at 21:24

## 2017-12-06 RX ADMIN — INSULIN LISPRO 2 UNITS: 100 INJECTION, SOLUTION INTRAVENOUS; SUBCUTANEOUS at 12:38

## 2017-12-06 RX ADMIN — Medication 10 ML: at 14:33

## 2017-12-06 RX ADMIN — APIXABAN 2.5 MG: 2.5 TABLET, FILM COATED ORAL at 09:10

## 2017-12-06 RX ADMIN — HYDROCODONE BITARTRATE AND ACETAMINOPHEN 1 TABLET: 5; 325 TABLET ORAL at 14:33

## 2017-12-06 RX ADMIN — FERROUS SULFATE TAB 325 MG (65 MG ELEMENTAL FE) 325 MG: 325 (65 FE) TAB at 12:32

## 2017-12-06 RX ADMIN — HYDROCODONE BITARTRATE AND ACETAMINOPHEN 1 TABLET: 5; 325 TABLET ORAL at 21:26

## 2017-12-06 RX ADMIN — PANTOPRAZOLE SODIUM 40 MG: 40 TABLET, DELAYED RELEASE ORAL at 06:38

## 2017-12-06 RX ADMIN — CYANOCOBALAMIN TAB 1000 MCG 500 MCG: 1000 TAB at 06:37

## 2017-12-06 RX ADMIN — INSULIN LISPRO 6 UNITS: 100 INJECTION, SOLUTION INTRAVENOUS; SUBCUTANEOUS at 21:51

## 2017-12-06 RX ADMIN — LORAZEPAM 1 MG: 1 TABLET ORAL at 17:22

## 2017-12-06 RX ADMIN — MULTIPLE VITAMINS W/ MINERALS TAB 1 TABLET: TAB at 09:10

## 2017-12-06 RX ADMIN — ASPIRIN 81 MG: 81 TABLET, COATED ORAL at 06:37

## 2017-12-06 RX ADMIN — INSULIN GLARGINE 9 UNITS: 100 INJECTION, SOLUTION SUBCUTANEOUS at 21:51

## 2017-12-06 RX ADMIN — INSULIN LISPRO 4 UNITS: 100 INJECTION, SOLUTION INTRAVENOUS; SUBCUTANEOUS at 17:22

## 2017-12-06 RX ADMIN — HYDROCODONE BITARTRATE AND ACETAMINOPHEN 1 TABLET: 5; 325 TABLET ORAL at 06:36

## 2017-12-06 RX ADMIN — Medication 10 ML: at 21:28

## 2017-12-06 RX ADMIN — METHIMAZOLE 5 MG: 5 TABLET ORAL at 06:37

## 2017-12-06 RX ADMIN — Medication 10 ML: at 05:48

## 2017-12-06 RX ADMIN — LORAZEPAM 1 MG: 1 TABLET ORAL at 09:11

## 2017-12-06 RX ADMIN — Medication 500 MG: at 17:22

## 2017-12-06 RX ADMIN — Medication 500 MG: at 07:42

## 2017-12-06 RX ADMIN — PIPERACILLIN SODIUM,TAZOBACTAM SODIUM 3.38 G: 3; .375 INJECTION, POWDER, FOR SOLUTION INTRAVENOUS at 05:48

## 2017-12-06 RX ADMIN — DIGOXIN 0.12 MG: 125 TABLET ORAL at 06:37

## 2017-12-06 RX ADMIN — PREDNISONE 5 MG: 5 TABLET ORAL at 07:41

## 2017-12-06 RX ADMIN — METOPROLOL TARTRATE 50 MG: 50 TABLET ORAL at 17:22

## 2017-12-06 RX ADMIN — METOPROLOL TARTRATE 50 MG: 50 TABLET ORAL at 09:10

## 2017-12-06 RX ADMIN — TRAMADOL HYDROCHLORIDE 100 MG: 50 TABLET, FILM COATED ORAL at 21:23

## 2017-12-06 NOTE — PROGRESS NOTES
Progress Note      Patient: Kendall Balbuena               Sex: female          MRN: [id number]           YOB: 1940      Age:  69 y.o. PCP:  Angélica Howard MD  Treatment Team: Attending Provider: Nadja Comer MD; Utilization Review: Reyna Rosado RN; Hospitalist: Xiomara Emery MD; Consulting Provider: Maxwell Sparks MD  Subjective:     C/o pain in the rt foot, worse with walking. No fever/chills. No Nausea/vomitings      Objective:   Physical Exam:   Visit Vitals    /58    Pulse 62    Temp 97.7 °F (36.5 °C)    Resp 18    Wt 45.4 kg (100 lb)    SpO2 99%    BMI 17.16 kg/m2      Temp (24hrs), Av.7 °F (36.5 °C), Min:97.4 °F (36.3 °C), Max:98 °F (36.7 °C)    Oxygen Therapy  O2 Sat (%): 99 % (17 0705)    Intake/Output Summary (Last 24 hours) at 17 1033  Last data filed at 17 0910   Gross per 24 hour   Intake              686 ml   Output             1150 ml   Net             -464 ml      General: Conscious, No acute distress  Eyes:  TUTU, No pallor/icterus    HENT:             Oral Mucosa is Moist, No sinus tenderness  Neck:               Supple, No JVD  Lungs:  CTA Bilaterally, No significant wheeze/rhonchi  Heart:  S1 S2 irregular  Abdomen: Soft, nromal bowel sounds, NTND, No guarding/rigidity/rebound tend. Extremities: No pedal edema. .. Neurologic:  AAOX3, No acute FND, Motor:  LUE: 5/5, LLE: 5/5, RUE: 5/5, RLE: 5/5  Skin:                Small ,1cm sized ulcer on the plantar aspect of base of 2nd toe, no drainage or any erythema. Mild tenderness+ no bone exposed.   Musculoskeletal: Has chronic deformities of feet/hands  Psych:             Appropriate mood, Thought process is normal    LAB  Recent Results (from the past 24 hour(s))   CULTURE, BLOOD    Collection Time: 17  7:05 PM   Result Value Ref Range    Special Requests: LEFT  HAND        Culture result: NO GROWTH AFTER 9 HOURS     CULTURE, BLOOD    Collection Time: 12/05/17  7:06 PM   Result Value Ref Range    Special Requests: LEFT  HAND        Culture result: NO GROWTH AFTER 9 HOURS     METABOLIC PANEL, COMPREHENSIVE    Collection Time: 12/05/17  7:40 PM   Result Value Ref Range    Sodium 140 136 - 145 mmol/L    Potassium 3.9 3.5 - 5.1 mmol/L    Chloride 102 98 - 107 mmol/L    CO2 28 21 - 32 mmol/L    Anion gap 10 7 - 16 mmol/L    Glucose 271 (H) 65 - 100 mg/dL    BUN 19 8 - 23 MG/DL    Creatinine 0.75 0.6 - 1.0 MG/DL    GFR est AA >60 >60 ml/min/1.73m2    GFR est non-AA >60 >60 ml/min/1.73m2    Calcium 9.0 8.3 - 10.4 MG/DL    Bilirubin, total 0.5 0.2 - 1.1 MG/DL    ALT (SGPT) 20 12 - 65 U/L    AST (SGOT) 18 15 - 37 U/L    Alk. phosphatase 95 50 - 136 U/L    Protein, total 7.3 6.3 - 8.2 g/dL    Albumin 2.9 (L) 3.2 - 4.6 g/dL    Globulin 4.4 (H) 2.3 - 3.5 g/dL    A-G Ratio 0.7 (L) 1.2 - 3.5     CBC WITH AUTOMATED DIFF    Collection Time: 12/05/17  7:40 PM   Result Value Ref Range    WBC 9.9 4.3 - 11.1 K/uL    RBC 3.80 (L) 4.05 - 5.25 M/uL    HGB 12.5 11.7 - 15.4 g/dL    HCT 37.1 35.8 - 46.3 %    MCV 97.6 79.6 - 97.8 FL    MCH 32.9 26.1 - 32.9 PG    MCHC 33.7 31.4 - 35.0 g/dL    RDW 12.9 11.9 - 14.6 %    PLATELET 992 028 - 112 K/uL    MPV 9.3 (L) 10.8 - 14.1 FL    DF AUTOMATED      NEUTROPHILS 73 43 - 78 %    LYMPHOCYTES 17 13 - 44 %    MONOCYTES 9 4.0 - 12.0 %    EOSINOPHILS 1 0.5 - 7.8 %    BASOPHILS 0 0.0 - 2.0 %    IMMATURE GRANULOCYTES 0 0.0 - 5.0 %    ABS. NEUTROPHILS 7.2 1.7 - 8.2 K/UL    ABS. LYMPHOCYTES 1.7 0.5 - 4.6 K/UL    ABS. MONOCYTES 0.9 0.1 - 1.3 K/UL    ABS. EOSINOPHILS 0.1 0.0 - 0.8 K/UL    ABS. BASOPHILS 0.0 0.0 - 0.2 K/UL    ABS. IMM.  GRANS. 0.0 0.0 - 0.5 K/UL   PROTHROMBIN TIME + INR    Collection Time: 12/05/17  7:40 PM   Result Value Ref Range    Prothrombin time 16.0 (H) 11.5 - 14.5 sec    INR 1.3     EKG, 12 LEAD, INITIAL    Collection Time: 12/05/17  8:46 PM   Result Value Ref Range    Ventricular Rate 69 BPM    Atrial Rate 69 BPM    QRS Duration 82 ms    Q-T Interval 354 ms    QTC Calculation (Bezet) 379 ms    Calculated R Axis 54 degrees    Calculated T Axis -95 degrees    Diagnosis       Demand pacemaker; interpretation is based on intrinsic rhythm  Undetermined rhythm  Marked ST abnormality, possible inferior subendocardial injury  Abnormal ECG  When compared with ECG of 08-FEB-2016 16:45,  Current undetermined rhythm precludes rhythm comparison, needs review  Right bundle branch block is no longer Present  Confirmed by LEON RIOS (), WISAM HUERTA ([id number]) on 12/6/2017 8:35:57 AM     GLUCOSE, POC    Collection Time: 12/05/17  8:53 PM   Result Value Ref Range    Glucose (POC) 326 (H) 65 - 100 mg/dL   GLUCOSE, POC    Collection Time: 12/06/17  5:41 AM   Result Value Ref Range    Glucose (POC) 66 65 - 100 mg/dL   CBC WITH AUTOMATED DIFF    Collection Time: 12/06/17  7:02 AM   Result Value Ref Range    WBC 8.3 4.3 - 11.1 K/uL    RBC 3.36 (L) 4.05 - 5.25 M/uL    HGB 11.0 (L) 11.7 - 15.4 g/dL    HCT 32.8 (L) 35.8 - 46.3 %    MCV 97.6 79.6 - 97.8 FL    MCH 32.7 26.1 - 32.9 PG    MCHC 33.5 31.4 - 35.0 g/dL    RDW 13.0 11.9 - 14.6 %    PLATELET 784 546 - 889 K/uL    MPV 9.1 (L) 10.8 - 14.1 FL    DF AUTOMATED      NEUTROPHILS 68 43 - 78 %    LYMPHOCYTES 20 13 - 44 %    MONOCYTES 11 4.0 - 12.0 %    EOSINOPHILS 1 0.5 - 7.8 %    BASOPHILS 0 0.0 - 2.0 %    IMMATURE GRANULOCYTES 0 0.0 - 5.0 %    ABS. NEUTROPHILS 5.5 1.7 - 8.2 K/UL    ABS. LYMPHOCYTES 1.7 0.5 - 4.6 K/UL    ABS. MONOCYTES 0.9 0.1 - 1.3 K/UL    ABS. EOSINOPHILS 0.1 0.0 - 0.8 K/UL    ABS. BASOPHILS 0.0 0.0 - 0.2 K/UL    ABS. IMM.  GRANS. 0.0 0.0 - 0.5 K/UL   METABOLIC PANEL, BASIC    Collection Time: 12/06/17  7:02 AM   Result Value Ref Range    Sodium 143 136 - 145 mmol/L    Potassium 3.7 3.5 - 5.1 mmol/L    Chloride 107 98 - 107 mmol/L    CO2 27 21 - 32 mmol/L    Anion gap 9 7 - 16 mmol/L    Glucose 146 (H) 65 - 100 mg/dL    BUN 18 8 - 23 MG/DL    Creatinine 0.74 0.6 - 1.0 MG/DL    GFR est AA >60 >60 ml/min/1.73m2 GFR est non-AA >60 >60 ml/min/1.73m2    Calcium 8.6 8.3 - 10.4 MG/DL   GLUCOSE, POC    Collection Time: 12/06/17  7:49 AM   Result Value Ref Range    Glucose (POC) 144 (H) 65 - 100 mg/dL       Xr Foot Rt Min 3 V    Result Date: 12/5/2017  THREE-VIEW RIGHT FOOT: CLINICAL HISTORY:  Diabetic with right plantar foot ulcer and possible osteomyelitis. COMPARISON:  None. FINDINGS:  No definite fracture, malalignment, or tony bone destruction is evident. No irregular periosteal reaction is seen. However, evaluation of fine bony detail is limited by severe diffuse osteopenia as well as hammertoe deformities. Marked hallux valgus/metatarsus varus deformity is also noted. IMPRESSION:  Severe osteopenia and marked bunion deformity with no definite radiographic evidence of osteomyelitis or other acute bony abnormality identified. Xr Foot Rt Min 3 V    Result Date: 12/5/2017  IMPRESSION:  Severe osteopenia and marked bunion deformity with no definite radiographic evidence of osteomyelitis or other acute bony abnormality identified. All Micro Results     Procedure Component Value Units Date/Time    CULTURE, BLOOD [[id number]] Collected:  12/05/17 1905    Order Status:  Completed Specimen:  Blood from Blood Updated:  12/06/17 0617     Special Requests: --        LEFT  HAND       Culture result: NO GROWTH AFTER 9 HOURS       CULTURE, BLOOD [[id number]] Collected:  12/05/17 1906    Order Status:  Completed Specimen:  Blood from Blood Updated:  12/06/17 0617     Special Requests: --        LEFT  HAND       Culture result: NO GROWTH AFTER 9 HOURS             Current Medications Reviewed      Assessment/Plan     1. Non-healing ulcer of Rt foot - no clear signs of cellulitis/osteo on exam. Most likely pressure ulcer sec. To arch abnormality. Will hold off abx, check ESR & CRP, consult wound care and ortho  2. ?PVD - vascualr surgery consult+f/u Arterial duplex  3.  DM type 2 - had hypoglycemia last night, keep her on lantus, hold PO meds, check A1C, moniter closely  4. Hyperthyroidism - ctn methimazole  5. Chr. A.fib - on ac with eliquis  6. HX CAD s/p CABG+stents   7.  RA    Moderate risk patient     Alis Marcelo MD  December 6, 2017

## 2017-12-06 NOTE — WOUND CARE
Patient seen for plantar right foot wound. Measured 1.3 x 1.0 x 0.3 cm. Clean and viable base. Close to bone. Hospitalist present and discussed with him as well. Being followed by vascular surgeon outpatient. They are consulted now for evaluation. Cleansed and placed silicone foam dressing to foot wound for now. Wound team will be available as needed.

## 2017-12-06 NOTE — ANTIMICROBIAL STEWARDSHIP
Pharmacokinetic Consult to Pharmacist    Margy Chandra is a 69 y.o. female being treated for Right foot cellulitis- plantar ulcer with   Vancomycin & Zosyn. Weight: 45.4 kg (100 lb)  Lab Results   Component Value Date/Time    BUN 19 12/05/2017 07:40 PM    Creatinine 0.75 12/05/2017 07:40 PM    WBC 9.9 12/05/2017 07:40 PM         Estimated Creatinine Clearance: 45.7 mL/min (based on Cr of 0.75). CULTURES:  Blood - in process    Day 1 of vancomycin. Goal trough is 15 - 20 . Vancomycin dose initiated at 1000 mg  Every 24 hours. Clinical staff will continue to monitor patient.        Thank you,  Jayden Melissa, PHARMD

## 2017-12-06 NOTE — CONSULTS
[address]   [address] . [facility] 125 FAX: [phone]    Vascular Surgery Consult    Subjective: Katerine Bronson is a 69 y.o. female who as a direct admit from Dr. Todd Reddy office on 12/5/17 for R foot cellulitis and plantar ulcer. Per patient report, pt has had this ulcer for 3 months, getting wound care as an outpatient and noting that the ulcer would heal up and then open back up again. On Sunday, she noticed some R ankle swelling and pain, then yesterday, the dorsal aspect of her R foot near her toes became erythematous and warm. Her ulcer also seemed to be acutely worse. Pt reports some intermittent \"chilliness\" but states this is chronic. She reports checking her temperature at home twice since the weekend and today in her doctor's office, and she has been afebrile. Denies any SOB or CP. She has been compliant with all her medications. States that her foot is painful, but thinks that her home medications should help. Patient was seen by Dr. Yolie Esqueda back in September of this year and recommended a one year follow up with ultrasound surveillance. At that time she did have an ulcer present. We are asked to see her for evaluation of the ulceration to the plantar surface of her right foot.      Past Medical History:   Diagnosis Date    Acquired cyst of kidney     Anxiety     managed with medication     Aortic valve replaced     Atrial fibrillation, chronic (HCC)     managed with medication and pacemaker    CAD (coronary artery disease)     CABG x 5    Calculus of kidney     Carotid artery stenosis without cerebral infarction     Chronic pain     GENERALIZED FROM ARTHRITIS    Gangrene associated with diabetes mellitus ([facility] [place] [address] 5/26/2016    left great toe    GERD (gastroesophageal reflux disease)     managed with medication     History of kidney stones     multiple with surgical interventions    Hypercholesterolemia     managed with medication     Hypothyroidism     managed with medication     Microscopic hematuria     Nausea & vomiting     after anesthesia    Osteoarthritis     managed with medication     Pacemaker     Metronic pacemaker only    PUD (peptic ulcer disease)     no recent episodes    PVD (peripheral vascular disease) ([facility] [address]     left side x 1 stented    Rheumatoid arthritis ([address]     managed with medication     Type 2 diabetes mellitus ([address] Dx 2006    oral t and insulin/Avg / no s/s of low BS/ does not have a sensation / last A1C7.4    Vertigo     no treatment, happens occassionally     Past Surgical History:   Procedure Laterality Date    CABG, ARTERY-VEIN, FOUR  1993    states x 5    CARDIAC SURG PROCEDURE UNLIST      Cardiovert x 2-3 x last 2/4/2012    HX AMPUTATION Left 2016    great toe    HX AORTIC VALVE REPLACEMENT      HX APPENDECTOMY  1959    HX HEART CATHETERIZATION      HX HEENT      dental    HX HYSTERECTOMY  1988    HX LITHOTRIPSY      multiple    HX OPEN CHOLECYSTECTOMY      HX ORTHOPAEDIC Left     achilles tendon    HX ORTHOPAEDIC Left     \"toe surgery removing bones\"    HX PACEMAKER      HX PACEMAKER PLACEMENT      Metronic    HX UROLOGICAL Left 1980s    open L kidney removal stones    VASCULAR SURGERY PROCEDURE UNLIST Left 2-17-16    lower extremity arteriogram with stent x 1 placement      Family History   Problem Relation Age of Onset    Heart Disease Father     Heart Attack Father     Diabetes Father     Hypertension Father     High Cholesterol Father     Asthma Father     Heart Disease Other     Heart Surgery Other     Diabetes Mother     Stroke Mother      TIAs    Hypertension Mother     Other Mother      kidney stone    Kidney Disease Mother     Heart Disease Mother     High Cholesterol Mother     Cancer Mother     Cancer Sister      multiple myeloma    Hypertension Sister     Hypertension Sister     Hypertension Brother     Diabetes Brother     Cancer Brother     Hypertension Sister     Heart Disease Sister     Hypertension Sister     Hypertension Sister      Social History     Social History    Marital status:      Spouse name: N/A    Number of children: N/A    Years of education: N/A     Social History Main Topics    Smoking status: Never Smoker    Smokeless tobacco: Never Used    Alcohol use No    Drug use: No    Sexual activity: Not on file     Other Topics Concern    Not on file     Social History Narrative      Current Facility-Administered Medications   Medication Dose Route Frequency    LORazepam (ATIVAN) tablet 1 mg  1 mg Oral BID    cyanocobalamin tablet 500 mcg  500 mcg Oral 7am    pantoprazole (PROTONIX) tablet 40 mg  40 mg Oral ACB    ferrous sulfate tablet 325 mg  1 Tab Oral ACL    multivitamin, tx-iron-ca-min (THERA-M w/ IRON) tablet 1 Tab  1 Tab Oral DAILY    digoxin (LANOXIN) tablet 0.125 mg  0.125 mg Oral 7am    apixaban (ELIQUIS) tablet 2.5 mg  2.5 mg Oral Q12H    predniSONE (DELTASONE) tablet 5 mg  5 mg Oral DAILY WITH BREAKFAST    HYDROcodone-acetaminophen (NORCO) 5-325 mg per tablet 1 Tab  1 Tab Oral Q6H PRN    metoprolol tartrate (LOPRESSOR) tablet 50 mg  50 mg Oral BID    insulin glargine (LANTUS) injection 9 Units  9 Units SubCUTAneous QHS    aspirin delayed-release tablet 81 mg  81 mg Oral 7am    methIMAzole (TAPAZOLE) tablet 5 mg  5 mg Oral 7am    traMADol (ULTRAM) tablet 100 mg  100 mg Oral QHS    [START ON 12/12/2017] ergocalciferol (ERGOCALCIFEROL) capsule 50,000 Units  50,000 Units Oral EVERY MONTH    calcium carbonate (OS-EVER) tablet 500 mg [elemental]  500 mg Oral BID WITH MEALS    sodium chloride (NS) flush 5-10 mL  5-10 mL IntraVENous Q8H    sodium chloride (NS) flush 5-10 mL  5-10 mL IntraVENous PRN    insulin lispro (HUMALOG) injection   SubCUTAneous AC&HS    morphine injection 1 mg  1 mg IntraVENous Q4H PRN      Allergies   Allergen Reactions    Multaq [Dronedarone] Nausea Only    Other Medication Other (comments)     STATINS CAUSE MUSCLE WEAKNESS  Cholesterol medications    Statins-Hmg-Coa Reductase Inhibitors Myalgia       Review of Systems:  A comprehensive review of systems was negative except for that written in the History of Present Illness. Objective:     Patient Vitals for the past 8 hrs:   BP Temp Pulse Resp SpO2   17 1512 145/68 97.8 °F (36.6 °C) 72 18 97 %     Temp (24hrs), Av.7 °F (36.5 °C), Min:97.4 °F (36.3 °C), Max:98 °F (36.7 °C)      Physical Exam:  GENERAL: alert, cooperative, no distress, appears stated age, LUNG: clear to auscultation bilaterally, HEART: regular rate and rhythm, S1, S2 normal, no murmur, click, rub or gallop, EXTREMITIES:  no edema, erythema to the dorsum of the right foot, unable to palpate a pulse. Ulceration with minimal bleeding to the plantar surface of the right foot, at the base of the 3rd toe. Beefy red tissue present with bone exposed. Assessment/Plan:      -Continue with wound care and antbx    Dr. Jo Jovel will review the ultrasound imaging and xray and provide further recommendations. Thank you for allowing us to participate in the care of Ms. Rutledge. We will continue to follow along with you. Signed By: Anette Camacho NP     2017       Elements of this note have been dictated using speech recognition software. As a result, errors of speech recognition may have occurred.

## 2017-12-06 NOTE — PROGRESS NOTES
Problem: Falls - Risk of  Goal: *Absence of Falls  Document Leda Fall Risk and appropriate interventions in the flowsheet.    Outcome: Progressing Towards Goal  Fall Risk Interventions:  Mobility Interventions: Communicate number of staff needed for ambulation/transfer, Patient to call before getting OOB, PT Consult for mobility concerns         Medication Interventions: Patient to call before getting OOB, Teach patient to arise slowly    Elimination Interventions: Call light in reach, Patient to call for help with toileting needs, Toilet paper/wipes in reach

## 2017-12-06 NOTE — PROGRESS NOTES
Problem: Mobility Impaired (Adult and Pediatric)  Goal: *Acute Goals and Plan of Care (Insert Text)  STG:  (1.)Ms. Edwin Manning will move from supine to sit and sit to supine , scoot up and down and roll side to side with SUPERVISION within 3 day(s). (2.)Ms. Edwin Manning will transfer from bed to chair and chair to bed with SUPERVISION using the least restrictive device within 3 day(s). (3.)Ms. Edwin Manning will ambulate with SUPERVISION for 50 feet with the least restrictive device within 3 day(s). (4.)Ms. Edwin Manning will perform LE exercises with 1 to 2 cues for form within 3 days to improve strength for functional transfers and ambulation. LTG:  (1.)Ms. Edwin Manning will move from supine to sit and sit to supine , scoot up and down and roll side to side in bed with MODIFIED INDEPENDENCE within 7 day(s). (2.)Ms. Edwin Manning will transfer from bed to chair and chair to bed with MODIFIED INDEPENDENCE using the least restrictive device within 7 day(s). (3.)Ms. Edwin Manning will ambulate with MODIFIED INDEPENDENCE for 250 feet with the least restrictive device within 7 day(s). (4.)Ms. Edwin Manning will perform standing static and dynamic balance activities x 23 minutes with SUPERVISION to improve safety within 7 day(s). ________________________________________________________________________________________________      PHYSICAL THERAPY: Initial Assessment, Daily Note, PM 12/6/2017  INPATIENT: Hospital Day: 2  [place] / [place] / Product Type: Medicare /      NAME/AGE/GENDER: Catrina Robertson is a 69 y.o. female   PRIMARY DIAGNOSIS: cellulititis  Foot ulcer ([address]  Foot ulcer ([address] <principal problem not specified> <principal problem not specified>        ICD-10: Treatment Diagnosis:   · Difficulty in walking, Not elsewhere classified (R26.2)   Precaution/Allergies:  Multaq [dronedarone];  Other medication; and Statins-hmg-coa reductase inhibitors      ASSESSMENT:     Ms. Edwin Manning is a 69 y.o. female with cellulitis and foot ulcer. Maintained heel weight bearing on RLE throughout mobility due to foot ulcer. She lives alone and typically ambulates with a rollator walker, has a caregiver 4 hours a day M-F. She is sitting on edge of bed and agreeable to therapy, wishes to take a bath. She transferred to standing with CGA to ambulate 15' into restroom with rollator walker. Good ability to maintain heel weight bearing on RLE during ambulation. Treatment initiated to include standing balance activities while performing ADLs and she required frequent rest breaks. Contact guard assistance and verbal cues required for safety during standing balance activities She was able to ambulate back to bed with CGA. She feels she is slightly below baseline, as she is typically able to ambulate further. Interested in receiving HHPT at d/c. Ruth Suresh is currently functioning below her baseline and would benefit from skilled PT during acute care stay to maximize safety and independence with functional mobility. This section established at most recent assessment   PROBLEM LIST (Impairments causing functional limitations):  1. Decreased Strength  2. Decreased ADL/Functional Activities  3. Decreased Transfer Abilities  4. Decreased Ambulation Ability/Technique  5. Decreased Balance  6. Increased Pain  7. Decreased [place] with Home Exercise Program   INTERVENTIONS PLANNED: (Benefits and precautions of physical therapy have been discussed with the patient.)  1. Balance Exercise  2. Bed Mobility  3. Family Education  4. Gait Training  5. Home Exercise Program (HEP)  6. Therapeutic Activites  7. Therapeutic Exercise/Strengthening  8. Transfer Training  9. Patient Education  10.  Group Therapy     TREATMENT PLAN: Frequency/Duration: 3 times a week for duration of hospital stay  Rehabilitation Potential For Stated Goals: Excellent     RECOMMENDED REHABILITATION/EQUIPMENT: (at time of discharge pending progress): Due to the probability of continued deficits (see above) this patient will likely need continued skilled physical therapy after discharge. Equipment:    None at this time              HISTORY:   History of Present Injury/Illness (Reason for Referral):  Per MD Note: \"Ms. Jesenia Grande is a very pleasant 80yoF who presents as a direct admit for R foot cellulitis and plantar ulcer. Per patient report and caregiver, pt has had this ulcer for at least 2 months, getting wound care as an outpatient. She was doing well, but reports approximately 3 weeks ago, she had some sort of procedure done that seemed to make the ulceration worse. On Sunday, she noticed some R ankle swelling and pain, then yesterday, the dorsal aspect of her R foot near her toes became erythematous and warm. Her ulcer also seemed to be acutely worse. She saw an outpatient provider today, who told her that she needed to come to the hospital.  Per patient report, XR was performed, and they were told that there was \"no gas\" but also that \"the bone may need to come out. \"  I have not seen this report as of yet.     Pt reports some intermittent \"chilliness\" but states this is chronic. She reports checking her temperature at home twice since the weekend and today in her doctor's office, and she has been afebrile. Denies any SOB or CP. She has been compliant with all her medications. States that her foot is painful, but thinks that her home medications should help. \"  Past Medical History/Comorbidities:   Ms. 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 She also has no past medical history of Adverse effect of anesthesia; Difficult intubation; Malignant hyperthermia due to anesthesia; or Pseudocholinesterase deficiency. Ms. Aaliyah Cueva  has a past surgical history that includes hysterectomy (1988); appendectomy (1959); pacemaker placement; open cholecystectomy; urological (Left, 1980s); lithotripsy; vascular surgery procedure unlist (Left, 2-17-16); pacemaker; orthopaedic (Left); orthopaedic (Left); amputation (Left, 2016); heent; cabg, artery-vein, four (1993); cardiac surg procedure unlist; heart catheterization; and aortic valve replacement. Social History/Living Environment:   Home Environment: Private residence  One/Two Story Residence: One story  Living Alone: Yes  Support Systems: Home care staff  Patient Expects to be Discharged to[de-identified] Private residence  Current DME Used/Available at Home: None  Prior Level of Function/Work/Activity:  Per MD Note: \"Ms. Aaliyah Cueva is a very pleasant 80yoF who presents as a direct admit for R foot cellulitis and plantar ulcer. Per patient report and caregiver, pt has had this ulcer for at least 2 months, getting wound care as an outpatient. She was doing well, but reports approximately 3 weeks ago, she had some sort of procedure done that seemed to make the ulceration worse. On Sunday, she noticed some R ankle swelling and pain, then yesterday, the dorsal aspect of her R foot near her toes became erythematous and warm. Her ulcer also seemed to be acutely worse. She saw an outpatient provider today, who told her that she needed to come to the hospital.  Per patient report, XR was performed, and they were told that there was \"no gas\" but also that \"the bone may need to come out. \"  I have not seen this report as of yet.     Pt reports some intermittent \"chilliness\" but states this is chronic. She reports checking her temperature at home twice since the weekend and today in her doctor's office, and she has been afebrile. Denies any SOB or CP. She has been compliant with all her medications. States that her foot is painful, but thinks that her home medications should help. \"     Number of Personal Factors/Comorbidities that affect the Plan of Care: 1-2: MODERATE COMPLEXITY   EXAMINATION:   Most Recent Physical Functioning:   Gross Assessment:  AROM: Generally decreased, functional  PROM: Generally decreased, functional  Strength: Generally decreased, functional  Coordination: Generally decreased, functional  Tone: Normal  Sensation: Impaired               Posture:  Posture (WDL): Exceptions to WDL  Posture Assessment: Forward head, Rounded shoulders  Balance:  Sitting: Intact  Standing: Impaired  Standing - Static: Fair  Standing - Dynamic : Fair Bed Mobility:  Rolling: Stand-by asssistance  Supine to Sit: Stand-by asssistance  Sit to Supine: Stand-by asssistance  Scooting: Stand-by asssistance  Wheelchair Mobility:     Transfers:  Sit to Stand: Contact guard assistance  Stand to Sit: Contact guard assistance  Gait:  Right Side Weight Bearing: Heel  Base of Support: Center of gravity altered;Narrowed  Speed/Kira: Slow;Shuffled;Pace decreased (<100 feet/min)  Step Length: Right shortened  Gait Abnormalities: Decreased step clearance;Trunk sway increased; Path deviations  Distance (ft): 15 Feet (ft)  Assistive Device: Walker, rollator  Ambulation - Level of Assistance: Contact guard assistance  Interventions: Manual cues; Safety awareness training;Verbal cues      Body Structures Involved:  1. Nerves  2. Bones  3. Joints  4. Muscles  5. Ligaments Body Functions Affected:  1. Sensory/Pain  2. Neuromusculoskeletal  3. Movement Related Activities and Participation Affected:  1. Mobility  2. Self Care  3. Domestic Life  4.  Interpersonal Interactions and Relationships  5. Community, Social and [place]   Number of elements that affect the Plan of Care: 4+: HIGH COMPLEXITY   CLINICAL PRESENTATION:   Presentation: Stable and uncomplicated: LOW COMPLEXITY   CLINICAL DECISION MAKIN [facility] Inpatient Short Form  How much difficulty does the patient currently have. .. Unable A Lot A Little None   1. Turning over in bed (including adjusting bedclothes, sheets and blankets)? [] 1   [] 2   [x] 3   [] 4   2. Sitting down on and standing up from a chair with arms ( e.g., wheelchair, bedside commode, etc.)   [] 1   [] 2   [x] 3   [] 4   3. Moving from lying on back to sitting on the side of the bed? [] 1   [] 2   [x] 3   [] 4   How much help from another person does the patient currently need. .. Total A Lot A Little None   4. Moving to and from a bed to a chair (including a wheelchair)? [] 1   [] 2   [x] 3   [] 4   5. Need to walk in hospital room? [] 1   [] 2   [x] 3   [] 4   6. Climbing 3-5 steps with a railing? [] 1   [x] 2   [] 3   [] 4   © , Trustees of [address] [place] [address] [address] [address], under license to [employer]. All rights reserved      Score:  Initial: 17 Most Recent: X (Date: -- )    Interpretation of Tool:  Represents activities that are increasingly more difficult (i.e. Bed mobility, Transfers, Gait). Score 24 23 22-20 19-15 14-10 9-7 6     Modifier CH CI CJ CK CL CM CN      ? Mobility - Walking and Moving Around:     - CURRENT STATUS: CK - 40%-59% impaired, limited or restricted    - GOAL STATUS: CJ - 20%-39% impaired, limited or restricted    - D/C STATUS:  ---------------To be determined---------------  [place] / [place] / Product Type: Medicare /      Medical Necessity:     · Patient demonstrates excellent rehab potential due to higher previous functional level.   Reason for Services/Other Comments:  · Patient continues to require modification of therapeutic interventions to increase complexity of exercises. Use of outcome tool(s) and clinical judgement create a POC that gives a: Clear prediction of patient's progress: LOW COMPLEXITY            TREATMENT:   (In addition to Assessment/Re-Assessment sessions the following treatments were rendered)   Pre-treatment Symptoms/Complaints:  none  Pain: Initial:   Pain Intensity 1: 0  Post Session:  0/10     Therapeutic Activity: (    20 minutes): Therapeutic activities including Ambulation on level ground and standing balance activities while performing ADLs to improve mobility, strength, balance and coordination. Required minimal Manual cues; Safety awareness training;Verbal cues to promote static and dynamic balance in standing. Braces/Orthotics/Lines/Etc:   · O2 Room Air  Treatment/Session Assessment:    · Response to Treatment:  Patient tolerated treatment well. · Interdisciplinary Collaboration:   o Physical Therapist  o Registered Nurse  · After treatment position/precautions:   o Supine in bed  o Bed/Chair-wheels locked  o Bed in low position  o Caregiver at bedside  o Call light within reach  o RN notified  o CNA at bedside   · Compliance with Program/Exercises: Will assess as treatment progresses. · Recommendations/Intent for next treatment session: \"Next visit will focus on advancements to more challenging activities and reduction in assistance provided\".   Total Treatment Duration:  PT Patient Time In/Time Out  Time In: 1340  Time Out: [address], DPT

## 2017-12-07 VITALS
TEMPERATURE: 97.2 F | RESPIRATION RATE: 18 BRPM | SYSTOLIC BLOOD PRESSURE: 129 MMHG | OXYGEN SATURATION: 97 % | BODY MASS INDEX: 16.6 KG/M2 | HEART RATE: 79 BPM | WEIGHT: 96.7 LBS | DIASTOLIC BLOOD PRESSURE: 74 MMHG

## 2017-12-07 LAB
ANION GAP SERPL CALC-SCNC: 7 MMOL/L (ref 7–16)
BASOPHILS # BLD: 0 K/UL (ref 0–0.2)
BASOPHILS NFR BLD: 0 % (ref 0–2)
BUN SERPL-MCNC: 14 MG/DL (ref 8–23)
CALCIUM SERPL-MCNC: 8.4 MG/DL (ref 8.3–10.4)
CHLORIDE SERPL-SCNC: 107 MMOL/L (ref 98–107)
CO2 SERPL-SCNC: 28 MMOL/L (ref 21–32)
CREAT SERPL-MCNC: 0.59 MG/DL (ref 0.6–1)
DIFFERENTIAL METHOD BLD: ABNORMAL
EOSINOPHIL # BLD: 0.2 K/UL (ref 0–0.8)
EOSINOPHIL NFR BLD: 3 % (ref 0.5–7.8)
ERYTHROCYTE [DISTWIDTH] IN BLOOD BY AUTOMATED COUNT: 13.3 % (ref 11.9–14.6)
GLUCOSE BLD STRIP.AUTO-MCNC: 126 MG/DL (ref 65–100)
GLUCOSE BLD STRIP.AUTO-MCNC: 238 MG/DL (ref 65–100)
GLUCOSE SERPL-MCNC: 114 MG/DL (ref 65–100)
HCT VFR BLD AUTO: 34.8 % (ref 35.8–46.3)
HGB BLD-MCNC: 11.4 G/DL (ref 11.7–15.4)
IMM GRANULOCYTES # BLD: 0 K/UL (ref 0–0.5)
IMM GRANULOCYTES NFR BLD AUTO: 0 % (ref 0–5)
LYMPHOCYTES # BLD: 2.2 K/UL (ref 0.5–4.6)
LYMPHOCYTES NFR BLD: 29 % (ref 13–44)
MCH RBC QN AUTO: 32.3 PG (ref 26.1–32.9)
MCHC RBC AUTO-ENTMCNC: 32.8 G/DL (ref 31.4–35)
MCV RBC AUTO: 98.6 FL (ref 79.6–97.8)
MONOCYTES # BLD: 0.9 K/UL (ref 0.1–1.3)
MONOCYTES NFR BLD: 12 % (ref 4–12)
NEUTS SEG # BLD: 4.3 K/UL (ref 1.7–8.2)
NEUTS SEG NFR BLD: 56 % (ref 43–78)
PLATELET # BLD AUTO: 230 K/UL (ref 150–450)
PMV BLD AUTO: 9.3 FL (ref 10.8–14.1)
POTASSIUM SERPL-SCNC: 3.8 MMOL/L (ref 3.5–5.1)
RBC # BLD AUTO: 3.53 M/UL (ref 4.05–5.25)
SODIUM SERPL-SCNC: 142 MMOL/L (ref 136–145)
WBC # BLD AUTO: 7.7 K/UL (ref 4.3–11.1)

## 2017-12-07 PROCEDURE — 82962 GLUCOSE BLOOD TEST: CPT

## 2017-12-07 PROCEDURE — 97166 OT EVAL MOD COMPLEX 45 MIN: CPT

## 2017-12-07 PROCEDURE — 74011250637 HC RX REV CODE- 250/637: Performed by: FAMILY MEDICINE

## 2017-12-07 PROCEDURE — 99232 SBSQ HOSP IP/OBS MODERATE 35: CPT | Performed by: SURGERY

## 2017-12-07 PROCEDURE — 80048 BASIC METABOLIC PNL TOTAL CA: CPT | Performed by: FAMILY MEDICINE

## 2017-12-07 PROCEDURE — 74011636637 HC RX REV CODE- 636/637: Performed by: FAMILY MEDICINE

## 2017-12-07 PROCEDURE — 85025 COMPLETE CBC W/AUTO DIFF WBC: CPT | Performed by: FAMILY MEDICINE

## 2017-12-07 PROCEDURE — 36415 COLL VENOUS BLD VENIPUNCTURE: CPT | Performed by: FAMILY MEDICINE

## 2017-12-07 PROCEDURE — 77030011256 HC DRSG MEPILEX <16IN NO BORD MOLN -A

## 2017-12-07 RX ORDER — CEPHALEXIN 500 MG/1
500 CAPSULE ORAL 2 TIMES DAILY
Qty: 20 CAP | Refills: 0 | Status: SHIPPED | OUTPATIENT
Start: 2017-12-07 | End: 2018-01-08 | Stop reason: ALTCHOICE

## 2017-12-07 RX ORDER — INSULIN GLARGINE 100 [IU]/ML
10 INJECTION, SOLUTION SUBCUTANEOUS
Status: DISCONTINUED | OUTPATIENT
Start: 2017-12-07 | End: 2017-12-07 | Stop reason: HOSPADM

## 2017-12-07 RX ADMIN — Medication 500 MG: at 07:55

## 2017-12-07 RX ADMIN — APIXABAN 2.5 MG: 2.5 TABLET, FILM COATED ORAL at 09:09

## 2017-12-07 RX ADMIN — METOPROLOL TARTRATE 50 MG: 50 TABLET ORAL at 09:09

## 2017-12-07 RX ADMIN — INSULIN LISPRO 4 UNITS: 100 INJECTION, SOLUTION INTRAVENOUS; SUBCUTANEOUS at 12:37

## 2017-12-07 RX ADMIN — MULTIPLE VITAMINS W/ MINERALS TAB 1 TABLET: TAB at 09:09

## 2017-12-07 RX ADMIN — METHIMAZOLE 5 MG: 5 TABLET ORAL at 07:56

## 2017-12-07 RX ADMIN — HYDROCODONE BITARTRATE AND ACETAMINOPHEN 1 TABLET: 5; 325 TABLET ORAL at 05:01

## 2017-12-07 RX ADMIN — CYANOCOBALAMIN TAB 1000 MCG 500 MCG: 1000 TAB at 05:00

## 2017-12-07 RX ADMIN — PANTOPRAZOLE SODIUM 40 MG: 40 TABLET, DELAYED RELEASE ORAL at 07:54

## 2017-12-07 RX ADMIN — PREDNISONE 5 MG: 5 TABLET ORAL at 07:56

## 2017-12-07 RX ADMIN — ASPIRIN 81 MG: 81 TABLET, COATED ORAL at 07:54

## 2017-12-07 RX ADMIN — FERROUS SULFATE TAB 325 MG (65 MG ELEMENTAL FE) 325 MG: 325 (65 FE) TAB at 12:29

## 2017-12-07 RX ADMIN — HYDROCODONE BITARTRATE AND ACETAMINOPHEN 1 TABLET: 5; 325 TABLET ORAL at 12:30

## 2017-12-07 RX ADMIN — DIGOXIN 0.12 MG: 125 TABLET ORAL at 07:56

## 2017-12-07 RX ADMIN — LORAZEPAM 1 MG: 1 TABLET ORAL at 09:09

## 2017-12-07 NOTE — WOUND CARE
Right foot wound follow up at request of nursing, plantar surface wound. Noted plans and notes from vascular surgery 12/7/17, bandage ordered by vascular surgery is to thick for offloading shoe the podiatrist has had made for patient, call to Ericka Cazares NP to clarify either smaller bandage or different offloading shoe would be needed. Decision made to use xeroform with band aid or foam, change every other day and as needed with offloading shoe from podiatry. Patient and family in room, updated and verbalize understanding. Available if needed, please call.

## 2017-12-07 NOTE — PROGRESS NOTES
SW spoke with [facility] (N/P with Vascular) who said pt would need home health nursing for wound care. PT also recommended continued PT. PT informed SW that she wanted to use [facility]. Referral sent and order entered. Chip notified.

## 2017-12-07 NOTE — DISCHARGE INSTRUCTIONS
DISCHARGE SUMMARY from Nurse    PATIENT INSTRUCTIONS:    After general anesthesia or intravenous sedation, for 24 hours or while taking prescription Narcotics:  · Limit your activities  · Do not drive and operate hazardous machinery  · Do not make important personal or business decisions  · Do  not drink alcoholic beverages  · If you have not urinated within 8 hours after discharge, please contact your surgeon on call. Report the following to your surgeon:  · Excessive pain, swelling, redness or odor of or around the surgical area  · Temperature over 100.5  · Nausea and vomiting lasting longer than 4 hours or if unable to take medications  · Any signs of decreased circulation or nerve impairment to extremity: change in color, persistent  numbness, tingling, coldness or increase pain  · Any questions    What to do at Home:  Recommended activity: Activity as tolerated, per MD instructions    If you experience any of the following symptoms fever > 100.5, nausea, vomiting, pain, chest pain and/or shortness of breath go to ER please follow up with MD.    *  Please give a list of your current medications to your Primary Care Provider. *  Please update this list whenever your medications are discontinued, doses are      changed, or new medications (including over-the-counter products) are added. *  Please carry medication information at all times in case of emergency situations. These are general instructions for a healthy lifestyle:    No smoking/ No tobacco products/ Avoid exposure to second hand smoke  Surgeon General's Warning:  Quitting smoking now greatly reduces serious risk to your health.     Obesity, smoking, and sedentary lifestyle greatly increases your risk for illness    A healthy diet, regular physical exercise & weight monitoring are important for maintaining a healthy lifestyle    You may be retaining fluid if you have a history of heart failure or if you experience any of the following symptoms:  Weight gain of 3 pounds or more overnight or 5 pounds in a week, increased swelling in our hands or feet or shortness of breath while lying flat in bed. Please call your doctor as soon as you notice any of these symptoms; do not wait until your next office visit. Recognize signs and symptoms of STROKE:    F-face looks uneven    A-arms unable to move or move unevenly    S-speech slurred or non-existent    T-time-call 911 as soon as signs and symptoms begin-DO NOT go       Back to bed or wait to see if you get better-TIME IS BRAIN. Warning Signs of HEART ATTACK     Call 911 if you have these symptoms:   Chest discomfort. Most heart attacks involve discomfort in the center of the chest that lasts more than a few minutes, or that goes away and comes back. It can feel like uncomfortable pressure, squeezing, fullness, or pain.  Discomfort in other areas of the upper body. Symptoms can include pain or discomfort in one or both arms, the back, neck, jaw, or stomach.  Shortness of breath with or without chest discomfort.  Other signs may include breaking out in a cold sweat, nausea, or lightheadedness. Don't wait more than five minutes to call 911 - MINUTES MATTER! Fast action can save your life. Calling 911 is almost always the fastest way to get lifesaving treatment. Emergency Medical Services staff can begin treatment when they arrive -- up to an hour sooner than if someone gets to the hospital by car. The discharge information has been reviewed with the patient. The patient verbalized understanding. Discharge medications reviewed with the patient and appropriate educational materials and side effects teaching were provided. ___________________________________________________________________________________________________________________________________             Cellulitis: Care Instructions  Your Care Instructions    Cellulitis is a skin infection.  It often occurs after a break in the skin from a scrape, cut, bite, or puncture, or after a rash. The doctor has checked you carefully, but problems can develop later. If you notice any problems or new symptoms, get medical treatment right away. Follow-up care is a key part of your treatment and safety. Be sure to make and go to all appointments, and call your doctor if you are having problems. It's also a good idea to know your test results and keep a list of the medicines you take. How can you care for yourself at home? · Take your antibiotics as directed. Do not stop taking them just because you feel better. You need to take the full course of antibiotics. · Prop up the infected area on pillows to reduce pain and swelling. Try to keep the area above the level of your heart as often as you can. · If your doctor told you how to care for your wound, follow your doctor's instructions. If you did not get instructions, follow this general advice:  ¨ Wash the wound with clean water 2 times a day. Don't use hydrogen peroxide or alcohol, which can slow healing. ¨ You may cover the wound with a thin layer of petroleum jelly, such as Vaseline, and a nonstick bandage. ¨ Apply more petroleum jelly and replace the bandage as needed. · Be safe with medicines. Take pain medicines exactly as directed. ¨ If the doctor gave you a prescription medicine for pain, take it as prescribed. ¨ If you are not taking a prescription pain medicine, ask your doctor if you can take an over-the-counter medicine. To prevent cellulitis in the future  · Try to prevent cuts, scrapes, or other injuries to your skin. Cellulitis most often occurs where there is a break in the skin. · If you get a scrape, cut, mild burn, or bite, wash the wound with clean water as soon as you can to help avoid infection. Don't use hydrogen peroxide or alcohol, which can slow healing.   · If you have swelling in your legs (edema), support stockings and good skin care may help prevent leg sores and cellulitis. · Take care of your feet, especially if you have diabetes or other conditions that increase the risk of infection. Wear shoes and socks. Do not go barefoot. If you have athlete's foot or other skin problems on your feet, talk to your doctor about how to treat them. When should you call for help? Call your doctor now or seek immediate medical care if:  ? · You have signs that your infection is getting worse, such as:  ¨ Increased pain, swelling, warmth, or redness. ¨ Red streaks leading from the area. ¨ Pus draining from the area. ¨ A fever. ? · You get a rash. ? Watch closely for changes in your health, and be sure to contact your doctor if:  ? · You are not getting better after 1 day (24 hours). ? · You do not get better as expected. Where can you learn more? Go to http://tae-bairon.info/. Elaina Ramirez in the search box to learn more about \"Cellulitis: Care Instructions. \"  Current as of: October 13, 2016  Content Version: 11.4  © 3664-3605 [employer]. Care instructions adapted under license by [employer] (which disclaims liability or warranty for this information). If you have questions about a medical condition or this instruction, always ask your healthcare professional. [address] any warranty or liability for your use of this information.

## 2017-12-07 NOTE — PROGRESS NOTES
Problem: Self Care Deficits Care Plan (Adult)  Goal: *Acute Goals and Plan of Care (Insert Text)    OCCUPATIONAL THERAPY: Initial Assessment and Discharge 12/7/2017  INPATIENT: Hospital Day: 3  [place] / Product Type: Medicare /      NAME/AGE/GENDER: Sujata Irwin is a 69 y.o. female   PRIMARY DIAGNOSIS:  cellulititis  Foot ulcer ([address]  Foot ulcer ([address] <principal problem not specified> <principal problem not specified>        ICD-10: Treatment Diagnosis:    · Localized edema (R60.1)   Precautions/Allergies:    Off loading shoe RLE   Multaq [dronedarone]; Other medication; and Statins-hmg-coa reductase inhibitors      ASSESSMENT:     Ms. Channing Tipton is a pleasant 69year old female admitted with RLE cellulitis and plantar ulcer. Per vascular surgery, she has offloading shoe for RLE and needs to elevate when not ambulating. At baseline patient lives alone but has caregivers and/or family with her during the day. She is alone at night. Family/ caregiver assist with errands, meal prep, medications, and bathing as needed. She uses a rollator for mobility. Patient supine in bed upon arrival, agreeable to OT evaluation. Reports no pain. Has RLE offloading shoe present. Demonstrates ability to complete bed mobility and functional mobility with SBA/ supervision. Patient has been toileting and bathing self while in hospital. Patient and caregiver at bedside feel she is functioning at her baseline. She will have needed assistance at home after discharge. No additional OT needs identified. This section established at most recent assessment   PROBLEM LIST (Impairments causing functional limitations):  1. None new    INTERVENTIONS PLANNED: (Benefits and precautions of occupational therapy have been discussed with the patient.)  1.  None          RECOMMENDED REHABILITATION/EQUIPMENT: (at time of discharge pending progress): Due to the probability of continued deficits (see above) this patient will not likely need continued skilled occupational therapy after discharge. Equipment:    None at this time              OCCUPATIONAL PROFILE AND HISTORY:   History of Present Injury/Illness (Reason for Referral):  See H&P  Past Medical History/Comorbidities:   Ms. Yanet Lopez  has a past medical history of Acquired cyst of kidney; Anxiety; Aortic valve replaced; Atrial fibrillation, chronic (HCC); CAD (coronary artery disease); Calculus of kidney; Carotid artery stenosis without cerebral infarction; Chronic pain; Gangrene associated with diabetes mellitus ([facility] (5/26/2016); GERD (gastroesophageal reflux disease); History of kidney stones; Hypercholesterolemia; Hypothyroidism; Microscopic hematuria; Nausea & vomiting; Osteoarthritis; Pacemaker; PUD (peptic ulcer disease); PVD (peripheral vascular disease) ([facility] Rheumatoid arthritis ([facility] Type 2 diabetes mellitus ([facility] (Dx 2006); and Vertigo. She also has no past medical history of Adverse effect of anesthesia; Difficult intubation; Malignant hyperthermia due to anesthesia; or Pseudocholinesterase deficiency. Ms. Yanet Lopez  has a past surgical history that includes hysterectomy (1988); appendectomy (1959); pacemaker placement; open cholecystectomy; urological (Left, 1980s); lithotripsy; vascular surgery procedure unlist (Left, 2-17-16); pacemaker; orthopaedic (Left); orthopaedic (Left); amputation (Left, 2016); heent; cabg, artery-vein, four (1993); cardiac surg procedure unlist; heart catheterization; and aortic valve replacement.   Social History/Living Environment:   Home Environment: Private residence  # Steps to Enter: 2  One/Two Story Residence: One story  Living Alone: Yes  Support Systems: Home care staff  Patient Expects to be Discharged to[de-identified] Private residence  Current DME Used/Available at Home: Shower chair, Grab bars, Raised toilet seat, Walker, rollator, Wheelchair  Prior Level of Function/Work/Activity:  Patient lives alone but has caregivers and/or family throughout the day. She gets assistance with errands, meal prep, medication management, and bathing as needed. She uses a rollator for ambulation. Number of Personal Factors/Comorbidities that affect the Plan of Care: Brief history (0):  LOW COMPLEXITY   ASSESSMENT OF OCCUPATIONAL PERFORMANCE[de-identified]   Activities of Daily Living:           Basic ADLs (From Assessment) Complex ADLs (From Assessment)   Basic ADL  Feeding: Independent  Oral Facial Hygiene/Grooming: Independent  Bathing: Minimum assistance  Upper Body Dressing: Independent  Lower Body Dressing: Minimum assistance  Toileting: Supervision Instrumental ADL  Meal Preparation: Maximum assistance  Homemaking: Maximum assistance  Medication Management: Maximum assistance  Financial Management: Maximum assistance   Grooming/Bathing/Dressing Activities of Daily Living     Cognitive Retraining  Safety/Judgement: Awareness of environment; Fall prevention                       Bed/Mat Mobility  Supine to Sit: Stand-by asssistance  Sit to Stand: Stand-by asssistance       Most Recent Physical Functioning:   Gross Assessment:  AROM: Generally decreased, functional (BUE)  Strength: Generally decreased, functional (BUE)               Posture:  Posture (WDL): Exceptions to WDL  Posture Assessment:  Forward head, Rounded shoulders  Balance:  Sitting: Intact  Standing: Impaired  Standing - Static: Good  Standing - Dynamic : Fair Bed Mobility:  Supine to Sit: Stand-by asssistance  Wheelchair Mobility:     Transfers:  Sit to Stand: Stand-by asssistance  Stand to Sit: Stand-by asssistance                Patient Vitals for the past 6 hrs:   BP SpO2 Pulse   12/07/17 1154 129/74 97 % 79       Mental Status  Neurologic State: Alert  Orientation Level: Oriented to person, Oriented to place, Oriented to situation  Cognition: Follows commands  Perception: Appears intact  Perseveration: No perseveration noted  Safety/Judgement: Awareness of environment, Fall prevention                          Physical Skills Involved:  1. Activity Tolerance  2. Skin Integrity Cognitive Skills Affected (resulting in the inability to perform in a timely and safe manner):  1. None Psychosocial Skills Affected:  1. Environmental Adaptation   Number of elements that affect the Plan of Care: 1-3:  LOW COMPLEXITY   CLINICAL DECISION MAKIN[address] [place] [address] AM-PAC 6 Clicks   Daily Activity Inpatient Short Form  How much help from another person does the patient currently need. .. Total A Lot A Little None   1. Putting on and taking off regular lower body clothing? [] 1   [] 2   [] 3   [x] 4   2. Bathing (including washing, rinsing, drying)? [] 1   [] 2   [] 3   [x] 4   3. Toileting, which includes using toilet, bedpan or urinal?   [] 1   [] 2   [] 3   [x] 4   4. Putting on and taking off regular upper body clothing? [] 1   [] 2   [] 3   [x] 4   5. Taking care of personal grooming such as brushing teeth? [] 1   [] 2   [] 3   [x] 4   6. Eating meals? [] 1   [] 2   [] 3   [x] 4   © , Trustees of [address] [place] [address], under license to [employer]. All rights reserved      Score:  Initial: 24 Most Recent: X (Date: -- )    Interpretation of Tool:  Represents activities that are increasingly more difficult (i.e. Bed mobility, Transfers, Gait). Score 24 23 22-20 19-15 14-10 9-7 6     Modifier CH CI CJ CK CL CM CN      ?  Self Care:     - CURRENT STATUS:  - 0% impaired, limited or restricted    - GOAL STATUS:  - 0% impaired, limited or restricted    - D/C STATUS:  [address] [place] [address] - 0% impaired, limited or restricted  [place] / [place] / Product Type: Medicare /         Use of outcome tool(s) and clinical judgement create a POC that gives a: LOW COMPLEXITY         TREATMENT:   (In addition to Assessment/Re-Assessment sessions the following treatments were rendered)     Pre-treatment Symptoms/Complaints:  \"I'm going home just about the same as when I came in.\"   Pain: Initial:   Pain Intensity 1: 0  Post Session:  None      Assessment/Reassessment only, no treatment provided today    Braces/Orthotics/Lines/Etc:   · RLE offloading shoe   · O2 Device: Room air  Treatment/Session Assessment:    · Response to Treatment:  Tolerated well   · Interdisciplinary Collaboration:   o Occupational Therapist  o Registered Nurse  o wound care RN Karmen  · After treatment position/precautions:   o sitting edge of bed wtih caregiver .   Total Treatment Duration:  OT Patient Time In/Time Out  Time In: 1340  Time Out: 107 [facility] [place], OTR/L

## 2017-12-07 NOTE — PROGRESS NOTES
Progress Note    Patient: Pavithra Dover MRN: [id number]  SSN: [id number]    YOB: 1940  Age: 69 y.o. Sex: female      Admit Date: 12/5/2017    LOS: 2 days     Subjective:     Patient seen in conjunction with Robert Torre MD, who discussed results of yesterday's duplex study with patient and family. Specifically, he noted small vessel disease that was not amenable to surgical intervention. Patient has been afebrile and without leukocytosis during admission, denies chills. Plain films of foot with no osteomyelitis. Objective:     Vitals:    12/07/17 0425 12/07/17 0435 12/07/17 0700 12/07/17 0756   BP: 128/69  127/73 140/62   Pulse: 75  63 70   Resp: 18  18    Temp: 97.7 °F (36.5 °C)  97.8 °F (36.6 °C)    SpO2: 95%  98%    Weight:  96 lb 11.2 oz (43.9 kg)          Intake and Output:  Current Shift: 12/07 0701 - 12/07 1900  In: -   Out: 300 [Urine:300]  Last three shifts: 12/05 1901 - 12/07 0700  In: 1166 [P.O.:900;  I.V.:266]  Out: 1350 [Urine:1350]    Physical Exam:   GENERAL: alert, cooperative, no distress, appears stated age  EYE: EOM intact  LUNG: clear to auscultation bilaterally  HEART: regular rate and rhythm  ABDOMEN: soft, NTND, +BS  EXTREMITIES: right foot with marked bunion deformity; on plantar aspect at base of 2nd toe there is a shallow defect without slough or purulent drainage; scant serous drainage on bandage; mild erythema to distal foot dorsum; PT and DP pulses prominent with Doppler  SKIN: cool and dry    Lab/Data Review:  CMP:   Lab Results   Component Value Date/Time     12/07/2017 06:55 AM    K 3.8 12/07/2017 06:55 AM     12/07/2017 06:55 AM    CO2 28 12/07/2017 06:55 AM    AGAP 7 12/07/2017 06:55 AM     (H) 12/07/2017 06:55 AM    BUN 14 12/07/2017 06:55 AM    CREA 0.59 (L) 12/07/2017 06:55 AM    GFRAA >60 12/07/2017 06:55 AM    GFRNA >60 12/07/2017 06:55 AM    CA 8.4 12/07/2017 06:55 AM     CBC:   Lab Results   Component Value Date/Time    WBC 7.7 12/07/2017 06:55 AM    HGB 11.4 (L) 12/07/2017 06:55 AM    HCT 34.8 (L) 12/07/2017 06:55 AM     12/07/2017 06:55 AM        Imaging:  Duplex lower extremity arteries with ABIs  History: Peripheral arterial disease. Foot ulcer. Cellulitis. FINDINGS:  Duplex Doppler arterial ultrasound was performed of the lower extremity arterial  system bilaterally. Color flow, grayscale, and spectral analysis was performed.     Triphasic and biphasic waveforms in the proximal right lower extremity with  monophasic waveforms at the tibial level. Triphasic waveforms in the left lower  extremity at the knee and above. Monophasic waveforms in the tibial vessels on  the left.     Resting ankle-brachial index on the right is noncompressible. Resting  ankle-brachial index on the left is noncompressible.     Toe index on the right 0.49. Toe index on the left 0.36. Pulse volume  recordings in the great toes reveals dampened waveforms.     Abdominal aorta 1.5 cm.     IMPRESSION:  Tibial artery predominant arterial disease bilaterally. No significant proximal  disease is identified. Assessment / Plan: Active Problems:    Cellulitis (12/6/2017)      Continue ABX  Continue wound care - Xeroform, 4x4, Kerlix  Elevate LE when not ambulating  Use off-loading shoe with ambulation  No vascular surgery intervention at this time  Follow up in office in 2wks      Signed By: Anni Rivera PA-C    December 7, 2017      Physician Assistant with Vascular Surgery Shruthi Garcia MD / Álvaro Cagle. Merlene Torres MD / Cyrus Montalvo MD      Addendum: Vasc lab images reviewed - RLE tibial PAD with TP of 68 mmHg. Likely is adequate for healing.

## 2017-12-07 NOTE — PROGRESS NOTES
NIKA assessed pt. due to high RRAT score. Pt lives alone and has two friends that come in daily to help her with food preparation and running errands. Her son comes over nightly to assist with her insulin medications, as well as sets up her medications in the daily pill nobles each week for her take. In addition to them, her daughter and grandchildren provide her social support. She dresses and clothes herself. She has diabetic testing supplies, shower chair, bedside commode, and rails on her commode in the bathroom. She lives in a one story that has two steps to enter and she uses her two rollators regularly and a wheelchair as needed for ambulation. Right now, her plan is to return home. However, she might need [address] [place] [address] for wound care and and PT. She has had [employer] before but can't remember the company. She will check with her daughter to find out the name of the company. NIKA will continue to follow along to see what recommendations the Vascular team makes. Care Management Interventions  PCP Verified by CM: Yes  Transition of Care Consult (CM Consult): Discharge Planning  Discharge Durable Medical Equipment: No  Physical Therapy Consult: Yes  Occupational Therapy Consult: Yes  Current Support Network: Lives Alone, Family Lives Nearby  Plan discussed with Pt/Family/Caregiver: Yes  Freedom of Choice Offered:  Yes

## 2017-12-07 NOTE — PROGRESS NOTES
Pt's D/C instructions completed. Verbalized understanding of all instructions including diet, activity, s/sx to alert MD, medications, wound care, and f/u appointment. Family at [facility].

## 2017-12-08 ENCOUNTER — HOME HEALTH ADMISSION (OUTPATIENT)
Dept: HOME HEALTH SERVICES | Facility: HOME HEALTH | Age: 77
End: 2017-12-08
Payer: MEDICARE

## 2017-12-08 ENCOUNTER — PATIENT OUTREACH (OUTPATIENT)
Dept: CASE MANAGEMENT | Age: 77
End: 2017-12-08

## 2017-12-08 NOTE — DISCHARGE SUMMARY
[address] SUMMARY       Name:  Erica Dakins   MR#:  [id number]   :  1940   Account #:  [de-identified]   Date of Adm:  2017       DATE OF DISCHARGE: 2017    CONSULTANTS: Vascular Surgery. PRINCIPAL DIAGNOSES ON ADMISSION. 1. Nonhealing infected ulcer of the right foot. 2. Possible peripheral vascular disease. 3. Type 2 diabetes mellitus. 4. Hypothyroidism. 5. Chronic atrial fibrillation. 6. Rheumatoid arthritis. 7. History of coronary artery disease. PRINCIPAL DIAGNOSES AT DISCHARGE:   1. Nonhealing ulcer of the right foot with possible surrounding   cellulitis. 2. Peripheral vascular disease. 3. Type 2 diabetes mellitus. 4. Hypothyroidism. 5. Chronic atrial fibrillation. 6. Coronary artery disease status post coronary artery bypass   grafting and stents. 7. Rheumatoid arthritis. HOSPITAL COURSE: This is a 81-year-old lady with history of   rheumatoid arthritis and chronic deformities of both feet, who   is suffering with a nonhealing ulcer on her right foot, came   into the hospital with increasing pain, redness. The patient was   initially started on IV vancomycin, which she received 1 dose. The patient did not have any fever, no leukocytosis. ESR and CRP   were slightly elevated, but that could be secondary to her   underlying inflammatory disorder associated with arthritis,   diabetes. The patient has no significant signs of drainage or   any significant cellulitis around the ulcer site. The patient   had ultrasound of the lower extremities which showed some   peripheral vascular disease for which Vascular Surgery was   consulted and he recommended antibiotics for possible cellulitis   and followup as an outpatient. The patient was evaluated by   Wound Care and recommended offloading of the right foot and   dressing changes to help with the pressure ulcer.  The patient's   blood sugars have been elevated and her insulin was adjusted   during the hospital stay. As the patient is clinically stable   and seems to be improving, she is being discharged home. She was   started on p.o. Keflex at the time of discharge as per Vascular   Surgery recommendations. PHYSICAL EXAMINATION: At the time of discharge. GENERAL: Conscious, comfortable. VITAL SIGNS: Temperature 97.2, pulse 79, respirations 18, blood   pressure 129/74, saturating 97% on room air. HEART: S1, S2, regular. LUNGS: Clear to auscultation bilaterally. ABDOMEN: Benign. FEET: There is a small 1 cm sized ulcer on the plantar aspect of   base of second toe with mild tenderness with no significant   drainage or any erythema. DISCHARGE MEDICATIONS:   1. Keflex 500 mg twice a day. 2. Linagliptin 5 mg daily. 3. Glipizide 10 mg daily. 4. Calcium carbonate 300 mg daily. 5. Ergocalciferol 50,000 units monthly. 6. Tramadol 100 mg by mouth at bedtime. 7. Methimazole 5 mg every morning. 8. Lantus 9 units subcu at night. 9. Aspirin 81 mg daily. 10. Eliquis 2.5 mg twice a day. 11. Prednisone 5 mg daily. 12. Hydrocodone/acetaminophen 1 tablet q.6h. p.r.n. for pain. 13. Metoprolol 50 mg twice a day. 14. Lanoxin 0.15 mg daily. 15. Multivitamin 1 tablet daily. 16. Ferrous sulfate 1 tablet daily. 17. Glucosamine complex daily. 18. Ativan 1 mg twice a day. 19. Fish oil daily. 20. Cyanocobalamin 1 tablet daily. 21. Omeprazole 20 mg by mouth every morning. DISCHARGE PLAN: The patient is being discharged home and advised   to followup with PCP in 1 week. She was also advised to followup   with the podiatrist in 5 to 7 days. She was also going to followup   with Vascular Surgery in 10-14 days. The patient was advised   that after 5 days, antibiotics could be stopped if there are no   signs of infection and the patient is doing better after seeing   a podiatrist.    Total time spent on discharge was less than 30 minutes.         PATRICA WATSON MD Pina Arriaza / KOREY   D:  12/07/2017   13:23   T:  12/08/2017   13:44   Job #:  [id number]

## 2017-12-08 NOTE — PROGRESS NOTES
This note will not be viewable in [address] Transition of Care Discharge Follow-up Questionnaire   Date/Time of Call:   December 8, 2017 1:40PM   What was the patient hospitalized for? Patient hospitalized for Cellulitis             Does the patient understand his/her diagnosis and/or treatment and what happened during the hospitalization? Patient states understanding of diagnosis and treatment during hospitalization. Did the patient receive discharge instructions? Yes     Review any discharge instructions (see notes in [facility]). Ask patient if they understand these. Do they have any questions? Patient states understanding of discharge instructions, patient states no questions. Were home services ordered (nursing, PT, OT, ST, etc.)? Yes, home health services ordered (PT/RN Nursing). If so, has the first visit occurred? If not, why? (Assist with coordination of services if necessary.) Patient states home health agency contacted her and informed her that someone would be calling her no later than 12/09/2017 for appointment date/time. Care Coordinator advised patient to call if assistance is needed. Care Coordinator provided contact information. Was any DME ordered? No durable medical equipment ordered. If so, has it been received? If not, why?  (Assist with coordination of arranging DME orders if necessary. ) NA         Complete a review of all medications (new, continued and discontinued meds per the D/C instructions and medication tab in [facility]). Care Coordinator reviewed all medications with patient per [facility] care, one new medication prescribed. cephALEXin (KEFLEX) 500 mg capsule   Take 1 Cap by mouth two (2) times a day.               Were all new prescriptions filled? If not, why?  (Assist with obtainment of medications if necessary.) Yes         Does the patient understand the purpose and dosing instructions for all medications?   (If patient has questions, provide explanation and education.) Patient states understanding of current medications and dosing instructions. Care Coordinator educated patient on the importance of medication compliance and reporting medication side effects to PCP/Specialist.     Does the patient have any problems in performing ADLs? (If patient is unable to perform ADLs  what is the limiting factor(s)? Do they have a support system that can assist? If no support system is present, discuss possible assistance that they may be able to obtain.) Patient states she requires assistance with ADL's and uses a Rollator to assist with ambulation. Patient states she is doing okay and states she is staying off right foot. Patient states right foot is healing well and looking better. Patient states her caregiver and family assist her as needed 24/7. Does the patient have all follow-up appointments scheduled? 7 day f/up with PCP?    7-14 day f/up with specialist?    If f/up has not been made  what actions has the care coordinator made to accomplish this? Has transportation been arranged? [facility] follow-up should be within 7 days of discharge; all others should have PCP follow-up within 7 days of discharge; follow-ups with other specialists should be within 7-14 days of discharge.) Care Coordinator educated patient on the importance of scheduling follow up appointment with PCP within 7 days of hospital discharge. Patient states she will schedule follow up with PCP and Podiatrist. Patient states PCP does not treat her issues with her foot. Patient declined assistance from Care Coordinator to obtain appointment with PCP. Yes      12/13/2017 9:45 AM Adrianna Jolley MD [facility]     1/10/2018 11:45 AM Ilana Ramires MD [facility] [place] [id number]        Any other questions or concerns expressed by the patient?      No further needs identified, patient instructed to call Care Coordinator if further questions or concerns arise. Patient informed of the importance of compliance with follow up appointments with PCP/Specialist.     Schedule next appointment with ROBERT RHODES Coordinator or refer to RN Case Manager/  per the workflow guidelines. When is care coordinators next follow-up call scheduled? If referred for CCM  what RN care manager was the referral assigned?  NA          NA        NA   [facility] Call Completed By: CYRIL Velazco ACO  Care Coordinator

## 2017-12-09 ENCOUNTER — HOME CARE VISIT (OUTPATIENT)
Dept: SCHEDULING | Facility: HOME HEALTH | Age: 77
End: 2017-12-09
Payer: MEDICARE

## 2017-12-09 VITALS
DIASTOLIC BLOOD PRESSURE: 68 MMHG | OXYGEN SATURATION: 96 % | RESPIRATION RATE: 16 BRPM | HEART RATE: 86 BPM | SYSTOLIC BLOOD PRESSURE: 118 MMHG

## 2017-12-09 PROCEDURE — G0299 HHS/HOSPICE OF RN EA 15 MIN: HCPCS

## 2017-12-09 PROCEDURE — 3331090001 HH PPS REVENUE CREDIT

## 2017-12-09 PROCEDURE — 400013 HH SOC

## 2017-12-09 PROCEDURE — 3331090002 HH PPS REVENUE DEBIT

## 2017-12-10 ENCOUNTER — HOME CARE VISIT (OUTPATIENT)
Dept: SCHEDULING | Facility: HOME HEALTH | Age: 77
End: 2017-12-10
Payer: MEDICARE

## 2017-12-10 VITALS
TEMPERATURE: 97.9 F | SYSTOLIC BLOOD PRESSURE: 131 MMHG | OXYGEN SATURATION: 99 % | DIASTOLIC BLOOD PRESSURE: 73 MMHG | HEART RATE: 80 BPM

## 2017-12-10 LAB
BACTERIA SPEC CULT: NORMAL
BACTERIA SPEC CULT: NORMAL
SERVICE CMNT-IMP: NORMAL
SERVICE CMNT-IMP: NORMAL

## 2017-12-10 PROCEDURE — 3331090002 HH PPS REVENUE DEBIT

## 2017-12-10 PROCEDURE — G0299 HHS/HOSPICE OF RN EA 15 MIN: HCPCS

## 2017-12-10 PROCEDURE — 3331090001 HH PPS REVENUE CREDIT

## 2017-12-11 ENCOUNTER — HOME CARE VISIT (OUTPATIENT)
Dept: SCHEDULING | Facility: HOME HEALTH | Age: 77
End: 2017-12-11
Payer: MEDICARE

## 2017-12-11 VITALS
RESPIRATION RATE: 20 BRPM | DIASTOLIC BLOOD PRESSURE: 80 MMHG | SYSTOLIC BLOOD PRESSURE: 152 MMHG | HEART RATE: 77 BPM | TEMPERATURE: 97.4 F

## 2017-12-11 PROCEDURE — A4216 STERILE WATER/SALINE, 10 ML: HCPCS

## 2017-12-11 PROCEDURE — A6222 GAUZE <=16 IN NO W/SAL W/O B: HCPCS

## 2017-12-11 PROCEDURE — G0299 HHS/HOSPICE OF RN EA 15 MIN: HCPCS

## 2017-12-11 PROCEDURE — A6212 FOAM DRG <=16 SQ IN W/BORDER: HCPCS

## 2017-12-11 PROCEDURE — 3331090001 HH PPS REVENUE CREDIT

## 2017-12-11 PROCEDURE — 3331090002 HH PPS REVENUE DEBIT

## 2017-12-12 ENCOUNTER — HOME CARE VISIT (OUTPATIENT)
Dept: HOME HEALTH SERVICES | Facility: HOME HEALTH | Age: 77
End: 2017-12-12
Payer: MEDICARE

## 2017-12-12 PROCEDURE — 3331090001 HH PPS REVENUE CREDIT

## 2017-12-12 PROCEDURE — 3331090002 HH PPS REVENUE DEBIT

## 2017-12-13 ENCOUNTER — HOSPITAL ENCOUNTER (OUTPATIENT)
Dept: SURGERY | Age: 77
Discharge: HOME | End: 2017-12-13
Payer: MEDICARE

## 2017-12-13 ENCOUNTER — HOME CARE VISIT (OUTPATIENT)
Dept: SCHEDULING | Facility: HOME HEALTH | Age: 77
End: 2017-12-13
Payer: MEDICARE

## 2017-12-13 VITALS
OXYGEN SATURATION: 97 % | BODY MASS INDEX: 17.08 KG/M2 | HEIGHT: 64 IN | WEIGHT: 100.06 LBS | SYSTOLIC BLOOD PRESSURE: 148 MMHG | HEART RATE: 87 BPM | TEMPERATURE: 97.6 F | RESPIRATION RATE: 16 BRPM | DIASTOLIC BLOOD PRESSURE: 72 MMHG

## 2017-12-13 LAB
GLUCOSE BLD STRIP.AUTO-MCNC: 332 MG/DL (ref 65–100)
HGB BLD-MCNC: 12.9 G/DL (ref 11.7–15.4)

## 2017-12-13 PROCEDURE — 3331090002 HH PPS REVENUE DEBIT

## 2017-12-13 PROCEDURE — 85018 HEMOGLOBIN: CPT | Performed by: ANESTHESIOLOGY

## 2017-12-13 PROCEDURE — 3331090001 HH PPS REVENUE CREDIT

## 2017-12-13 PROCEDURE — 82962 GLUCOSE BLOOD TEST: CPT

## 2017-12-13 NOTE — PERIOP NOTES
Dr Alvarez Morales:      Patient: Rand Delacruz, DOS 12-20-17    During a recent visit to the surgical preadmission testing center, the above mentioned patient was found to have a non-fasting blood glucose level of 332 mg/dL. This may indicate inadequate diabetic management and raises concerns that the patient is not medically optimized for surgery. It is our standard practice to postpone elective surgery for patients who present fasting blood glucose level >300 mg/dL on the day of their procedure. The patient has been advised of this policy and counseled on the importance of glucose control. We feel that this patient is at increased risk of cancellation; however, their blood glucose may be in acceptable range when they are NPO. Therefore, we will leave the decision to you whether to delay the surgery and refer the patient to their primary care provider or keep them as currently scheduled. Our goal is to prevent as many delays and cancellations as possible while ensuring patient safety.     Sincerely,    Chaya [facility]

## 2017-12-13 NOTE — PERIOP NOTES
Clint Vela reviewed the following  - biotronik pacer magnet documentation, device check 5-17-17, device check 8-18-17, Dr Kassandra Turner note 6-21-17, ECHO 2-16-17, EKG 12-5-17 and EKG 2-16-17.   Pt ok for surgery

## 2017-12-13 NOTE — PERIOP NOTES
Call to [facility] office to obtain permission from Dr Corwin Ziegler to hold Eliquis as Dr Eagle Vasquez orders ate. ANGELINE vaz returned call from Dr Eagle Vasquez office and stated Dr Home Kaminski instructed her that he spoke with Dr Corwin Ziegler himself on the phone and per mission was given orally from doctor to doctor to hold Eliquis for 3 days prior to surgery - Dr Home Kaminski wrote order on orders dated 12-8-17. Call to [facility] to see if pacer rep needed DOS.  No rep needed DOS    Call to OR scheduling to add pacer to posting  - spoke with Analilia Hastings     Pt DID NOT have pacer card with her could not make copy

## 2017-12-13 NOTE — PERIOP NOTES
Results for Perfecto Mcnair (MRN [id number]) as of 12/13/2017 16:45   Ref. Range 12/7/2017 06:55   WBC Latest Ref Range: 4.3 - 11.1 K/uL 7.7   RBC Latest Ref Range: 4.05 - 5.25 M/uL 3.53 (L)   HGB Latest Ref Range: 11.7 - 15.4 g/dL 11.4 (L)   HCT Latest Ref Range: 35.8 - 46.3 % 34.8 (L)   MCV Latest Ref Range: 79.6 - 97.8 FL 98.6 (H)   MCH Latest Ref Range: 26.1 - 32.9 PG 32.3   MCHC Latest Ref Range: 31.4 - 35.0 g/dL 32.8   RDW Latest Ref Range: 11.9 - 14.6 % 13.3   PLATELET Latest Ref Range: 150 - 450 K/uL 230   MPV Latest Ref Range: 10.8 - 14.1 FL 9.3 (L)   NEUTROPHILS Latest Ref Range: 43 - 78 % 56   LYMPHOCYTES Latest Ref Range: 13 - 44 % 29   MONOCYTES Latest Ref Range: 4.0 - 12.0 % 12   EOSINOPHILS Latest Ref Range: 0.5 - 7.8 % 3   BASOPHILS Latest Ref Range: 0.0 - 2.0 % 0   IMMATURE GRANULOCYTES Latest Ref Range: 0.0 - 5.0 % 0   DF Latest Units:   AUTOMATED   ABS. NEUTROPHILS Latest Ref Range: 1.7 - 8.2 K/UL 4.3   ABS. IMM. GRANS. Latest Ref Range: 0.0 - 0.5 K/UL 0.0   ABS. LYMPHOCYTES Latest Ref Range: 0.5 - 4.6 K/UL 2.2   ABS. MONOCYTES Latest Ref Range: 0.1 - 1.3 K/UL 0.9   ABS. EOSINOPHILS Latest Ref Range: 0.0 - 0.8 K/UL 0.2   ABS.  BASOPHILS Latest Ref Range: 0.0 - 0.2 K/UL 0.0   Sodium Latest Ref Range: 136 - 145 mmol/L 142   Potassium Latest Ref Range: 3.5 - 5.1 mmol/L 3.8   Chloride Latest Ref Range: 98 - 107 mmol/L 107   CO2 Latest Ref Range: 21 - 32 mmol/L 28   Anion gap Latest Ref Range: 7 - 16 mmol/L 7   Glucose Latest Ref Range: 65 - 100 mg/dL 114 (H)   BUN Latest Ref Range: 8 - 23 MG/DL 14   Creatinine Latest Ref Range: 0.6 - 1.0 MG/DL 0.59 (L)   Calcium Latest Ref Range: 8.3 - 10.4 MG/DL 8.4   GFR est non-AA Latest Ref Range: >60 ml/min/1.73m2 >60   GFR est AA Latest Ref Range: >60 ml/min/1.73m2 >60     Routed to [place]     Reviewed WNL

## 2017-12-13 NOTE — PERIOP NOTES
Patient verified name, , and surgery as listed in [facility]. Patient provided medical/health information and PTA medications to the best of their ability. TYPE  CASE:1B  Orders per surgeon: Received yesand dated yes. Labs per surgeon:CBC, BMP. LM with Kristian Avalos to see if labs from 17 will be excepted  Labs per anesthesia protocol: SQBS. EKG  :  Not needed  The folloiwng on chart - [employer] pacer magnet documentation, device check 17, device check 17, Dr Paloma Colbert note 17, ECHO 17, EKG 17 and EKG 17    Patient provided with and instructed on education handouts including Guide to Surgery, blood transfusions, pain management, and hand hygiene for the family and community, and [facility] brochure. One bar yasmine mist soap and instructions given per hospital policy. Instructed patient to continue previous medications as prescribed prior to surgery unless otherwise directed and to take the following medications the day of surgery according to anesthesia guidelines : ASA 81 mg, digoxin, norco, lorazepam, methimazole, metoprolol, omeprazole, prednisone . Instructed patient to hold  the following medications: Eliquis per Siachos and sajan 3 days, calcium, fish oil, vit d, glucosamine, multivitamin . Original medication prescription bottles NOT visualized during patient appointment. Patient teach back successful and patient demonstrates knowledge of instruction.

## 2017-12-14 ENCOUNTER — HOME CARE VISIT (OUTPATIENT)
Dept: SCHEDULING | Facility: HOME HEALTH | Age: 77
End: 2017-12-14
Payer: MEDICARE

## 2017-12-14 ENCOUNTER — HOME CARE VISIT (OUTPATIENT)
Dept: HOME HEALTH SERVICES | Facility: HOME HEALTH | Age: 77
End: 2017-12-14
Payer: MEDICARE

## 2017-12-14 VITALS
OXYGEN SATURATION: 96 % | HEART RATE: 83 BPM | RESPIRATION RATE: 16 BRPM | SYSTOLIC BLOOD PRESSURE: 110 MMHG | DIASTOLIC BLOOD PRESSURE: 58 MMHG | TEMPERATURE: 97.7 F

## 2017-12-14 PROCEDURE — G0151 HHCP-SERV OF PT,EA 15 MIN: HCPCS

## 2017-12-14 PROCEDURE — 3331090002 HH PPS REVENUE DEBIT

## 2017-12-14 PROCEDURE — 3331090001 HH PPS REVENUE CREDIT

## 2017-12-14 PROCEDURE — G0299 HHS/HOSPICE OF RN EA 15 MIN: HCPCS

## 2017-12-15 VITALS
TEMPERATURE: 97.7 F | RESPIRATION RATE: 16 BRPM | HEART RATE: 83 BPM | SYSTOLIC BLOOD PRESSURE: 110 MMHG | OXYGEN SATURATION: 96 % | DIASTOLIC BLOOD PRESSURE: 58 MMHG

## 2017-12-15 PROCEDURE — 3331090002 HH PPS REVENUE DEBIT

## 2017-12-15 PROCEDURE — 3331090001 HH PPS REVENUE CREDIT

## 2017-12-16 PROCEDURE — 3331090001 HH PPS REVENUE CREDIT

## 2017-12-16 PROCEDURE — 3331090002 HH PPS REVENUE DEBIT

## 2017-12-17 PROCEDURE — 3331090002 HH PPS REVENUE DEBIT

## 2017-12-17 PROCEDURE — 3331090001 HH PPS REVENUE CREDIT

## 2017-12-18 ENCOUNTER — HOME CARE VISIT (OUTPATIENT)
Dept: SCHEDULING | Facility: HOME HEALTH | Age: 77
End: 2017-12-18
Payer: MEDICARE

## 2017-12-18 VITALS
HEART RATE: 87 BPM | SYSTOLIC BLOOD PRESSURE: 108 MMHG | DIASTOLIC BLOOD PRESSURE: 60 MMHG | OXYGEN SATURATION: 97 % | TEMPERATURE: 97.7 F | RESPIRATION RATE: 16 BRPM

## 2017-12-18 PROCEDURE — 3331090001 HH PPS REVENUE CREDIT

## 2017-12-18 PROCEDURE — G0299 HHS/HOSPICE OF RN EA 15 MIN: HCPCS

## 2017-12-18 PROCEDURE — 3331090002 HH PPS REVENUE DEBIT

## 2017-12-19 ENCOUNTER — HOME CARE VISIT (OUTPATIENT)
Dept: SCHEDULING | Facility: HOME HEALTH | Age: 77
End: 2017-12-19
Payer: MEDICARE

## 2017-12-19 ENCOUNTER — ANESTHESIA EVENT (OUTPATIENT)
Dept: SURGERY | Age: 77
End: 2017-12-19
Payer: MEDICARE

## 2017-12-19 VITALS
DIASTOLIC BLOOD PRESSURE: 60 MMHG | HEART RATE: 70 BPM | SYSTOLIC BLOOD PRESSURE: 118 MMHG | OXYGEN SATURATION: 98 % | RESPIRATION RATE: 18 BRPM | TEMPERATURE: 97.1 F

## 2017-12-19 PROCEDURE — G0157 HHC PT ASSISTANT EA 15: HCPCS

## 2017-12-19 PROCEDURE — 3331090002 HH PPS REVENUE DEBIT

## 2017-12-19 PROCEDURE — 3331090001 HH PPS REVENUE CREDIT

## 2017-12-20 ENCOUNTER — HOSPITAL ENCOUNTER (OUTPATIENT)
Age: 77
Setting detail: OUTPATIENT SURGERY
Discharge: HOME | End: 2017-12-20
Attending: SURGERY | Admitting: SURGERY
Payer: MEDICARE

## 2017-12-20 ENCOUNTER — ANESTHESIA (OUTPATIENT)
Dept: SURGERY | Age: 77
End: 2017-12-20
Payer: MEDICARE

## 2017-12-20 VITALS
RESPIRATION RATE: 14 BRPM | HEART RATE: 49 BPM | HEIGHT: 64 IN | DIASTOLIC BLOOD PRESSURE: 65 MMHG | TEMPERATURE: 98.2 F | BODY MASS INDEX: 17.13 KG/M2 | WEIGHT: 100.3 LBS | OXYGEN SATURATION: 96 % | SYSTOLIC BLOOD PRESSURE: 140 MMHG

## 2017-12-20 DIAGNOSIS — L97.416 DIABETIC ULCER OF RIGHT MIDFOOT ASSOCIATED WITH TYPE 2 DIABETES MELLITUS, WITH BONE INVOLVEMENT WITHOUT EVIDENCE OF NECROSIS (HCC): Primary | ICD-10-CM

## 2017-12-20 DIAGNOSIS — E11.621 DIABETIC ULCER OF RIGHT MIDFOOT ASSOCIATED WITH TYPE 2 DIABETES MELLITUS, WITH BONE INVOLVEMENT WITHOUT EVIDENCE OF NECROSIS (HCC): Primary | ICD-10-CM

## 2017-12-20 LAB
ATRIAL RATE: 90 BPM
CALCULATED R AXIS, ECG10: 83 DEGREES
CALCULATED T AXIS, ECG11: -82 DEGREES
DIAGNOSIS, 93000: NORMAL
GLUCOSE BLD STRIP.AUTO-MCNC: 133 MG/DL (ref 65–100)
Q-T INTERVAL, ECG07: 410 MS
QRS DURATION, ECG06: 124 MS
QTC CALCULATION (BEZET), ECG08: 472 MS
VENTRICULAR RATE, ECG03: 80 BPM

## 2017-12-20 PROCEDURE — 77030020782 HC GWN BAIR PAWS FLX 3M -B: Performed by: NURSE ANESTHETIST, CERTIFIED REGISTERED

## 2017-12-20 PROCEDURE — 77030002996 HC SUT SLK J&J -A: Performed by: SURGERY

## 2017-12-20 PROCEDURE — 74011250637 HC RX REV CODE- 250/637: Performed by: ANESTHESIOLOGY

## 2017-12-20 PROCEDURE — 87205 SMEAR GRAM STAIN: CPT | Performed by: SURGERY

## 2017-12-20 PROCEDURE — 77030018836 HC SOL IRR NACL ICUM -A: Performed by: SURGERY

## 2017-12-20 PROCEDURE — 74011250636 HC RX REV CODE- 250/636

## 2017-12-20 PROCEDURE — 77030008703 HC TU ET UNCUF COVD -A: Performed by: NURSE ANESTHETIST, CERTIFIED REGISTERED

## 2017-12-20 PROCEDURE — 87106 FUNGI IDENTIFICATION YEAST: CPT | Performed by: SURGERY

## 2017-12-20 PROCEDURE — 76210000021 HC REC RM PH II 0.5 TO 1 HR: Performed by: SURGERY

## 2017-12-20 PROCEDURE — 74011000250 HC RX REV CODE- 250

## 2017-12-20 PROCEDURE — 77030031139 HC SUT VCRL2 J&J -A: Performed by: SURGERY

## 2017-12-20 PROCEDURE — 76060000033 HC ANESTHESIA 1 TO 1.5 HR: Performed by: SURGERY

## 2017-12-20 PROCEDURE — 87186 SC STD MICRODIL/AGAR DIL: CPT | Performed by: SURGERY

## 2017-12-20 PROCEDURE — 87077 CULTURE AEROBIC IDENTIFY: CPT | Performed by: SURGERY

## 2017-12-20 PROCEDURE — 77030006788 HC BLD SAW OSC STRY -B: Performed by: SURGERY

## 2017-12-20 PROCEDURE — 76010000112 HC CV SURG 0.5 TO 1 HR: Performed by: SURGERY

## 2017-12-20 PROCEDURE — 74011250636 HC RX REV CODE- 250/636: Performed by: SURGERY

## 2017-12-20 PROCEDURE — 3331090002 HH PPS REVENUE DEBIT

## 2017-12-20 PROCEDURE — 87176 TISSUE HOMOGENIZATION CULTR: CPT | Performed by: SURGERY

## 2017-12-20 PROCEDURE — 3331090001 HH PPS REVENUE CREDIT

## 2017-12-20 PROCEDURE — 82962 GLUCOSE BLOOD TEST: CPT

## 2017-12-20 PROCEDURE — 93005 ELECTROCARDIOGRAM TRACING: CPT | Performed by: ANESTHESIOLOGY

## 2017-12-20 PROCEDURE — 77030011640 HC PAD GRND REM COVD -A: Performed by: SURGERY

## 2017-12-20 PROCEDURE — 76210000006 HC OR PH I REC 0.5 TO 1 HR: Performed by: SURGERY

## 2017-12-20 PROCEDURE — 87075 CULTR BACTERIA EXCEPT BLOOD: CPT | Performed by: SURGERY

## 2017-12-20 PROCEDURE — 74011250636 HC RX REV CODE- 250/636: Performed by: ANESTHESIOLOGY

## 2017-12-20 RX ORDER — PROPOFOL 10 MG/ML
INJECTION, EMULSION INTRAVENOUS AS NEEDED
Status: DISCONTINUED | OUTPATIENT
Start: 2017-12-20 | End: 2017-12-20 | Stop reason: HOSPADM

## 2017-12-20 RX ORDER — EPHEDRINE SULFATE 50 MG/ML
INJECTION, SOLUTION INTRAVENOUS AS NEEDED
Status: DISCONTINUED | OUTPATIENT
Start: 2017-12-20 | End: 2017-12-20 | Stop reason: HOSPADM

## 2017-12-20 RX ORDER — MIDAZOLAM HYDROCHLORIDE 1 MG/ML
2 INJECTION, SOLUTION INTRAMUSCULAR; INTRAVENOUS
Status: DISCONTINUED | OUTPATIENT
Start: 2017-12-20 | End: 2017-12-20 | Stop reason: HOSPADM

## 2017-12-20 RX ORDER — LIDOCAINE HYDROCHLORIDE 20 MG/ML
INJECTION, SOLUTION EPIDURAL; INFILTRATION; INTRACAUDAL; PERINEURAL AS NEEDED
Status: DISCONTINUED | OUTPATIENT
Start: 2017-12-20 | End: 2017-12-20 | Stop reason: HOSPADM

## 2017-12-20 RX ORDER — HYDROMORPHONE HYDROCHLORIDE 2 MG/ML
0.5 INJECTION, SOLUTION INTRAMUSCULAR; INTRAVENOUS; SUBCUTANEOUS
Status: DISCONTINUED | OUTPATIENT
Start: 2017-12-20 | End: 2017-12-20 | Stop reason: HOSPADM

## 2017-12-20 RX ORDER — GLYCOPYRROLATE 0.2 MG/ML
INJECTION INTRAMUSCULAR; INTRAVENOUS AS NEEDED
Status: DISCONTINUED | OUTPATIENT
Start: 2017-12-20 | End: 2017-12-20 | Stop reason: HOSPADM

## 2017-12-20 RX ORDER — FENTANYL CITRATE 50 UG/ML
INJECTION, SOLUTION INTRAMUSCULAR; INTRAVENOUS AS NEEDED
Status: DISCONTINUED | OUTPATIENT
Start: 2017-12-20 | End: 2017-12-20 | Stop reason: HOSPADM

## 2017-12-20 RX ORDER — SODIUM CHLORIDE 0.9 % (FLUSH) 0.9 %
5-10 SYRINGE (ML) INJECTION AS NEEDED
Status: DISCONTINUED | OUTPATIENT
Start: 2017-12-20 | End: 2017-12-20 | Stop reason: HOSPADM

## 2017-12-20 RX ORDER — NEOSTIGMINE METHYLSULFATE 1 MG/ML
INJECTION INTRAVENOUS AS NEEDED
Status: DISCONTINUED | OUTPATIENT
Start: 2017-12-20 | End: 2017-12-20 | Stop reason: HOSPADM

## 2017-12-20 RX ORDER — ROCURONIUM BROMIDE 10 MG/ML
INJECTION, SOLUTION INTRAVENOUS AS NEEDED
Status: DISCONTINUED | OUTPATIENT
Start: 2017-12-20 | End: 2017-12-20 | Stop reason: HOSPADM

## 2017-12-20 RX ORDER — SODIUM CHLORIDE 0.9 % (FLUSH) 0.9 %
5-10 SYRINGE (ML) INJECTION EVERY 8 HOURS
Status: DISCONTINUED | OUTPATIENT
Start: 2017-12-20 | End: 2017-12-20 | Stop reason: HOSPADM

## 2017-12-20 RX ORDER — CEFAZOLIN SODIUM/WATER 2 G/20 ML
2 SYRINGE (ML) INTRAVENOUS
Status: COMPLETED | OUTPATIENT
Start: 2017-12-20 | End: 2017-12-20

## 2017-12-20 RX ORDER — SODIUM CHLORIDE 9 MG/ML
25 INJECTION, SOLUTION INTRAVENOUS CONTINUOUS
Status: DISCONTINUED | OUTPATIENT
Start: 2017-12-20 | End: 2017-12-20 | Stop reason: HOSPADM

## 2017-12-20 RX ORDER — FAMOTIDINE 20 MG/1
20 TABLET, FILM COATED ORAL ONCE
Status: COMPLETED | OUTPATIENT
Start: 2017-12-20 | End: 2017-12-20

## 2017-12-20 RX ORDER — HYDROCODONE BITARTRATE AND ACETAMINOPHEN 7.5; 325 MG/1; MG/1
1 TABLET ORAL AS NEEDED
Status: DISCONTINUED | OUTPATIENT
Start: 2017-12-20 | End: 2017-12-20 | Stop reason: HOSPADM

## 2017-12-20 RX ORDER — LIDOCAINE HYDROCHLORIDE 10 MG/ML
0.1 INJECTION INFILTRATION; PERINEURAL AS NEEDED
Status: DISCONTINUED | OUTPATIENT
Start: 2017-12-20 | End: 2017-12-20 | Stop reason: HOSPADM

## 2017-12-20 RX ORDER — OXYCODONE HYDROCHLORIDE 5 MG/1
5 TABLET ORAL
Status: DISCONTINUED | OUTPATIENT
Start: 2017-12-20 | End: 2017-12-20 | Stop reason: HOSPADM

## 2017-12-20 RX ORDER — NALOXONE HYDROCHLORIDE 0.4 MG/ML
0.1 INJECTION, SOLUTION INTRAMUSCULAR; INTRAVENOUS; SUBCUTANEOUS AS NEEDED
Status: DISCONTINUED | OUTPATIENT
Start: 2017-12-20 | End: 2017-12-20 | Stop reason: HOSPADM

## 2017-12-20 RX ORDER — SODIUM CHLORIDE, SODIUM LACTATE, POTASSIUM CHLORIDE, CALCIUM CHLORIDE 600; 310; 30; 20 MG/100ML; MG/100ML; MG/100ML; MG/100ML
100 INJECTION, SOLUTION INTRAVENOUS CONTINUOUS
Status: DISCONTINUED | OUTPATIENT
Start: 2017-12-20 | End: 2017-12-20 | Stop reason: HOSPADM

## 2017-12-20 RX ORDER — FENTANYL CITRATE 50 UG/ML
100 INJECTION, SOLUTION INTRAMUSCULAR; INTRAVENOUS ONCE
Status: DISCONTINUED | OUTPATIENT
Start: 2017-12-20 | End: 2017-12-20 | Stop reason: HOSPADM

## 2017-12-20 RX ORDER — HYDROCODONE BITARTRATE AND ACETAMINOPHEN 5; 325 MG/1; MG/1
1 TABLET ORAL
Qty: 12 TAB | Refills: 0 | Status: SHIPPED
Start: 2017-12-20 | End: 2018-01-08 | Stop reason: SDUPTHER

## 2017-12-20 RX ADMIN — GLYCOPYRROLATE 0.3 MG: 0.2 INJECTION INTRAMUSCULAR; INTRAVENOUS at 14:31

## 2017-12-20 RX ADMIN — NEOSTIGMINE METHYLSULFATE 2 MG: 1 INJECTION INTRAVENOUS at 14:31

## 2017-12-20 RX ADMIN — ROCURONIUM BROMIDE 30 MG: 10 INJECTION, SOLUTION INTRAVENOUS at 13:52

## 2017-12-20 RX ADMIN — FAMOTIDINE 20 MG: 20 TABLET, FILM COATED ORAL at 12:08

## 2017-12-20 RX ADMIN — SODIUM CHLORIDE, SODIUM LACTATE, POTASSIUM CHLORIDE, AND CALCIUM CHLORIDE 100 ML/HR: 600; 310; 30; 20 INJECTION, SOLUTION INTRAVENOUS at 12:08

## 2017-12-20 RX ADMIN — HYDROCODONE BITARTRATE AND ACETAMINOPHEN 1 TABLET: 7.5; 325 TABLET ORAL at 15:54

## 2017-12-20 RX ADMIN — FENTANYL CITRATE 25 MCG: 50 INJECTION, SOLUTION INTRAMUSCULAR; INTRAVENOUS at 13:52

## 2017-12-20 RX ADMIN — LIDOCAINE HYDROCHLORIDE 60 MG: 20 INJECTION, SOLUTION EPIDURAL; INFILTRATION; INTRACAUDAL; PERINEURAL at 13:52

## 2017-12-20 RX ADMIN — Medication 2 G: at 14:04

## 2017-12-20 RX ADMIN — PROPOFOL 80 MG: 10 INJECTION, EMULSION INTRAVENOUS at 13:52

## 2017-12-20 RX ADMIN — EPHEDRINE SULFATE 5 MG: 50 INJECTION, SOLUTION INTRAVENOUS at 13:59

## 2017-12-20 NOTE — ANESTHESIA POSTPROCEDURE EVALUATION
Post-Anesthesia Evaluation and Assessment    Patient: Vaibhav Felton MRN: [id number]  SSN: [id number]    YOB: 1940  Age: 69 y.o. Sex: female       Cardiovascular Function/Vital Signs  Visit Vitals    /65 (BP 1 Location: Left arm, BP Patient Position: At rest)    Pulse (!) 49    Temp 36.8 °C (98.2 °F)    Resp 14    Ht 5' 4\" (1.626 m)    Wt 45.5 kg (100 lb 4.8 oz)    SpO2 96%    BMI 17.22 kg/m2       Patient is status post general anesthesia for Procedure(s):  2ND RIGHT METATARSAL HEAD RESECTION   PT HAS PACEMAKER. Nausea/Vomiting: None    Postoperative hydration reviewed and adequate. Pain:  Pain Scale 1: Numeric (0 - 10) (12/20/17 1530)  Pain Intensity 1: 0 (12/20/17 1530)   Managed    Neurological Status:   Neuro (WDL): Within Defined Limits (12/20/17 1530)  Neuro  Neurologic State: Alert (12/20/17 1530)  Orientation Level: Oriented X4 (12/20/17 1530)  Cognition: Follows commands (12/20/17 1530)  Speech: Clear (12/20/17 1530)  Assessment L Pupil: Brisk;Round (12/20/17 1530)  Assessment R Pupil: Brisk;Round (12/20/17 1530)  LUE Motor Response: Purposeful (12/20/17 1530)  LLE Motor Response: Purposeful (12/20/17 1530)  RUE Motor Response: Purposeful (12/20/17 1530)  RLE Motor Response: Purposeful (12/20/17 1530)   At baseline    Mental Status and Level of Consciousness: Arousable    Pulmonary Status:   O2 Device: Room air (12/20/17 1530)   Adequate oxygenation and airway patent    Complications related to anesthesia: None    Post-anesthesia assessment completed.  No concerns    Signed By: Tasneem Lopez MD     December 20, 2017

## 2017-12-20 NOTE — IP AVS SNAPSHOT
[address] [address] 
[phone] Patient: Radha Rodríguez MRN: [id number] :1940 About your hospitalization You were admitted on:  2017 [facility] You were [facility] on:  2017 Why you were hospitalized Your primary diagnosis was:  Diabetic Ulcer Of Right Midfoot Associated With Type 2 Diabetes Mellitus, With Bone Involvement Without Evidence Of Necrosis (Hcc) Things You Need To Do (next 8 weeks) Friday Dec 22, 2017 ROUTINE with Frederic Robertson RN at 12:00 AM  
Where:  [address] [place] [address] Avenue ([facility]) PT ROUTINE with Henry Lee at  4:30 PM  
Where:  [address] [place] [address] ([address] [place] [address]) Tuesday Dec 26, 2017 PTA HOME VISIT with Alma Wlater PTA at 10:00 AM  
Where:  [address] [place] [address] ([address] [place] [address]) ROUTINE with Frederic Robertson RN at  6:00 PM  
Where:  [address] [place] [address] ([address] [place] [address]) Wednesday Dec 27, 2017 ECHOCARDIOGRAM with EDWINLLE ECHO 26 at 11:30 AM  
Where:  [address] ([address] [place] [address]) Thursday Dec 28, 2017 PTA HOME VISIT with Alma Walter PTA at  9:00 AM  
Where:  [address] [place] [address] ([address] [place] [address]) SKILLLED NURSING DISCHARGE with Frederic Robertson RN at 10:00 AM  
Where:  [address] [place] [address] ([address] [place] [address])  PT DISCIPLINE DISCHARGE with Henry Lee at  9:00 AM  
Where:  [address] [place] [address] ([address] [place] [address])  Global Post Op with Pedro Gonzalez NP at  2:15 PM  
Where:  [facility] Follow up with Zay Stiles MD  
2:15pm  
  
Phone:  [phone] Where:  [address], [address] [facility], Vascular [address] [place] [address] Wednesday Fito 10, 2018 Office Visit with Margareth Bahena MD at 11:45 AM  
Where:  [facility] ([address] [place] [address]) Discharge Orders None A check jenn indicates which time of day the medication should be taken. My Medications TAKE these medications as instructed Instructions Each Dose to Equal  
 Morning Noon Evening Bedtime  
 apixaban 2.5 mg tablet Commonly known as:  Tracy Scripture Your last dose was: Your next dose is: Take 1 Tab by mouth every twelve (12) hours. 2.5 mg  
    
   
   
   
  
 aspirin delayed-release 81 mg tablet Your last dose was: Your next dose is: Take 81 mg by mouth daily. Morning  Take day of surgery per anesthesia protocol. 81 mg  
    
   
   
   
  
 ATIVAN 1 mg tablet Generic drug:  LORazepam  
   
Your last dose was: Your next dose is: Take 1 mg by mouth two (2) times a day. Take day of surgery per anesthesia protocol. 1 mg CALCIUM 300 PO Your last dose was: Your next dose is: Take 1 Tab by mouth daily. Stop seven days prior to surgery per anesthesia protocol. 1 Tab  
    
   
   
   
  
 cephALEXin 500 mg capsule Commonly known as:  Evita Rota Your last dose was: Your next dose is: Take 1 Cap by mouth two (2) times a day. 500 mg  
    
   
   
   
  
 digoxin 0.125 mg tablet Commonly known as:  LANOXIN Your last dose was: Your next dose is: Take 1 Tab by mouth daily. 0.125 mg  
    
   
   
   
  
 ergocalciferol 50,000 unit capsule Commonly known as:  ERGOCALCIFEROL Your last dose was: Your next dose is: Take 50,000 Units by mouth every month. Stop seven days prior to surgery per anesthesia protocol. [address] Units  
    
   
   
   
  
 ferrous sulfate 325 mg (65 mg iron) tablet Your last dose was: Your next dose is: Take  by mouth Daily (before lunch). FISH OIL PO Your last dose was: Your next dose is: Take 2 Tabs by mouth two (2) times a day. Stop seven days prior to surgery per anesthesia protocol. 2 Tab  
    
   
   
   
  
 glipiZIDE 10 mg tablet Commonly known as:  Anayeli Luke Your last dose was: Your next dose is: Take 10 mg by mouth daily. Two tablets in the morning   Indications: type 2 diabetes mellitus 10 mg  
    
   
   
   
  
 GLUCOSAMINE COMPLEX PO Your last dose was: Your next dose is: Take 1 Tab by mouth three (3) times daily. Stop seven days prior to surgery per anesthesia protocol. 1 Tab  
    
   
   
   
  
 * HYDROcodone-acetaminophen 5-325 mg per tablet Commonly known as:  Thelbert Casselton Your last dose was: Your next dose is: Take 1 Tab by mouth every six (6) hours as needed. Max Daily Amount: 4 Tabs. 1 Tab  
    
   
   
   
  
 * HYDROcodone-acetaminophen 5-325 mg per tablet Commonly known as:  Thelbert Casselton Your last dose was: Your next dose is: Take 1 Tab by mouth every six (6) hours as needed for Pain. Max Daily Amount: 4 Tabs. Indications: Pain 1 Tab  
    
   
   
   
  
 insulin glargine 100 unit/mL (3 mL) Inpn Commonly known as:  Adrianna Coates Your last dose was: Your next dose is:    
   
   
 9 Units by SubCUTAneous route nightly. Pt to take 80% of nightly dose night prior to surgery. 7.2 units 9 Units  
    
   
   
   
  
 methIMAzole 5 mg tablet Commonly known as:  TAPAZOLE Your last dose was: Your next dose is: Take 5 mg by mouth daily. Take day of surgery per anesthesia protocol. Pt reports she alternates 1/12 tablets and two tablets every other day  Indications: hyperthyroidism, morning 5 mg  
    
   
   
   
  
 metoprolol tartrate 50 mg tablet Commonly known as:  LOPRESSOR Your last dose was: Your next dose is: Take 1 Tab by mouth two (2) times a day. 50 mg  
    
   
   
   
  
 multivitamin tablet Commonly known as:  ONE A DAY Your last dose was: Your next dose is: Take 1 Tab by mouth daily. Stop seven days prior to surgery per anesthesia protocol. 1 Tab  
    
   
   
   
  
 predniSONE 5 mg tablet Commonly known as:  Modesta Roers Your last dose was: Your next dose is: Take 1 Tab by mouth daily (with breakfast). 5 mg PRILOSEC PO Your last dose was: Your next dose is: Take 20 mg by mouth daily. .Take day of surgery per anesthesia protocol. 20 mg  
    
   
   
   
  
 TRADJENTA 5 mg tablet Generic drug:  linagliptin Your last dose was: Your next dose is: Take 5 mg by mouth daily. Indications: type 2 diabetes mellitus, morning 5 mg  
    
   
   
   
  
 traMADol 50 mg tablet Commonly known as:  ULTRAM  
   
Your last dose was: Your next dose is: Take 50 mg by mouth nightly. 50 mg  
    
   
   
   
  
 VITAMIN B-12 PO Your last dose was: Your next dose is: Take 1 Tab by mouth daily. Stop seven days prior to surgery per anesthesia protocol. 1 Tab * Notice: This list has 2 medication(s) that are the same as other medications prescribed for you. Read the directions carefully, and ask your doctor or other care provider to review them with you. Where to Get Your Medications Information on where to get these meds will be given to you by the nurse or doctor. ! Ask your nurse or doctor about these medications HYDROcodone-acetaminophen 5-325 mg per tablet Discharge Instructions ACTIVITY · As tolerated and as directed by your doctor. · You may shower in 24 hours. Do not take a bath until cleared by MD.  
 
DIET · Clear liquids until no nausea or vomiting; then light diet for the first day. · Advance to regular diet on second day, unless your doctor orders otherwise. · If nausea and vomiting continues, call your doctor. PAIN 
· Take pain medication as directed by your doctor. · Call your doctor if pain is NOT relieved by medication. · DO NOT take aspirin of blood thinners unless directed by your doctor. CALL YOUR DOCTOR IF  
· Excessive bleeding that does not stop after holding pressure over the area · Temperature of 101 degrees F or above · Excessive redness, swelling or bruising, and/ or green or yellow, smelly discharge from incision AFTER ANESTHESIA · For the first 24 hours: DO NOT Drive, Drink alcoholic beverages, or Make important decisions. · Be aware of dizziness following anesthesia and while taking pain medication. · Limit your activities · Do not operate hazardous machinery · If you have not urinated within 8 hours after discharge, please contact your surgeon on call. *  Please give a list of your current medications to your Primary Care Provider. *  Please update this list whenever your medications are discontinued, doses are 
    changed, or new medications (including over-the-counter products) are added. *  Please carry medication information at all times in case of emergency situations. These are general instructions for a healthy lifestyle: No smoking/ No tobacco products/ Avoid exposure to second hand smoke Surgeon General's Warning:  Quitting smoking now greatly reduces serious risk to your health.  
Obesity, smoking, and sedentary lifestyle greatly increases your risk for illness A healthy diet, regular physical exercise & weight monitoring are important for maintaining a healthy lifestyle You may be retaining fluid if you have a history of heart failure or if you experience any of the following symptoms:  Weight gain of 3 pounds or more overnight or 5 pounds in a week, increased swelling in our hands or feet or shortness of breath while lying flat in bed. Please call your doctor as soon as you notice any of these symptoms; do not wait until your next office visit. Recognize signs and symptoms of STROKE: 
 
F-face looks uneven A-arms unable to move or move unevenly S-speech slurred or non-existent T-time-call 911 as soon as signs and symptoms begin-DO NOT go Back to bed or wait to see if you get better-TIME IS BRAIN. Resume Elliquis in 2 days. Continue with Home Health for wound care. A prescription was written for this. OK to bear weight on the right heel, but not on the right forefoot for 6 weeks. Introducing [facility]! [facility] introduces [employer] patient portal. Now you can access parts of your medical record, email your doctor's office, and request medication refills online. 1. In your internet browser, go to [email or web address] 2. Click on the First Time User? Click Here link in the Sign In box. You will see the New Member Sign Up page. 3. Enter your [employer] Access Code exactly as it appears below. You will not need to use this code after youve completed the sign-up process. If you do not sign up before the expiration date, you must request a new code. · [employer] Access Code: [facility] Expires: 3/18/2018  2:34 PM 
 
4. Enter the last four digits of your Social Security Number (xxxx) and Date of Birth (mm/dd/yyyy) as indicated and click Submit. You will be taken to the next sign-up page. 5. Create a [employer] ID.  This will be your [employer] login ID and cannot be changed, so think of one that is secure and easy to remember. 6. Create a [employer] password. You can change your password at any time. 7. Enter your Password Reset Question and Answer. This can be used at a later time if you forget your password. 8. Enter your e-mail address. You will receive e-mail notification when new information is available in [address] 9. Click Sign Up. You can now view and download portions of your medical record. 10. Click the Download Summary menu link to download a portable copy of your medical information. If you have questions, please visit the Frequently Asked Questions section of the [employer] website. Remember, [employer] is NOT to be used for urgent needs. For medical emergencies, dial 911. Now available from your iPhone and Android! Unresulted Labs-Please follow up with your PCP about these lab tests Order Current Status CULTURE, ANAEROBIC In process CULTURE, ANAEROBIC In process CULTURE, ANAEROBIC In process CULTURE, ANAEROBIC In process CULTURE, TISSUE W GRAM STAIN In process CULTURE, WOUND W GRAM STAIN In process CULTURE, WOUND W GRAM STAIN In process CULTURE, WOUND W GRAM STAIN In process EKG, 12 LEAD, INITIAL Preliminary result Providers Seen During Your Hospitalization Provider Specialty Primary office phone Avery Godwin MD Vascular Surgery [phone] Your Primary Care Physician (PCP) Primary Care Physician Office Phone Office Fax Kassie Rhodes [phone] You are allergic to the following Allergen Reactions Multaq (Dronedarone) Nausea Only Other Medication Other (comments) STATINS CAUSE MUSCLE WEAKNESS Cholesterol medications Statins-Hmg-Coa Reductase Inhibitors Myalgia Recent Documentation Height Weight BMI OB Status Smoking Status 1.626 m 45.5 kg 17.22 kg/m2 Hysterectomy Never Smoker Emergency Contacts Name Discharge Info Relation Home Work Mobile Lary Rincon DISCHARGE CAREGIVER [3] Daughter [21] [phone] [facility] DISCHARGE CAREGIVER [3] Son [22] [phone] Lary Joseph DISCHARGE CAREGIVER [3] Other Relative [6] [phone] Patient Belongings The following personal items are in your possession at time of discharge: 
  Dental Appliances: None  Visual Aid: Glasses, At home      Home Medications: None   Jewelry: None  Clothing: Footwear, Jacket/Coat, Pants, Shirt, Undergarments    Other Valuables: Frutoso Lim Please provide this summary of care documentation to your next provider. Signatures-by signing, you are acknowledging that this After Visit Summary has been reviewed with you and you have received a copy. Patient Signature:  ____________________________________________________________ Date:  ____________________________________________________________  
  
Brigida Besto Provider Signature:  ____________________________________________________________ Date:  ____________________________________________________________

## 2017-12-20 NOTE — BRIEF OP NOTE
BRIEF OPERATIVE NOTE    Date of Procedure: 12/20/2017   Preoperative Diagnosis: Ulcer of right foot, unspecified ulcer stage ([facility] [L97.519]  Postoperative Diagnosis: Ulcer of right foot, unspecified ulcer stage ([facility] [L97.519]    Procedure(s):  2ND RIGHT METATARSAL HEAD RESECTION     Surgeon(s) and Role:     * Lindsey Vela MD - Primary       Surgical Staff:  Circ-1: Claudia Wadsworth Tech-1: Prosper Veloz  Scrub Tech-2: Sj Villagomez  Event Time In   Incision Start 1415   Incision Close 1435     Anesthesia: General   Estimated Blood Loss: < 10 mL  Specimens:   ID Type Source Tests Collected by Time Destination   1 : RIGHT FOOT ULCER Tissue Ulcer CULTURE, ANAEROBIC, CULTURE, TISSUE W GRAM STAIN Lindsey Vela MD 12/20/2017 1416 Microbiology   2 : RIGHT FOOT SECOND METATARSAL HEAD Wound Incision CULTURE, ANAEROBIC, CULTURE, WOUND Diana Reese MD 12/20/2017 1417 Microbiology   3 : RIGHT SECOND METATARSAL HEAD DEEP CULTURE Wound Incision CULTURE, ANAEROBIC, CULTURE, WOUND Diana Reese MD 12/20/2017 1422 Microbiology   4 : RIGHT SECOND METATARSAL HEAD Tissue Bone CULTURE, ANAEROBIC, CULTURE, TISSUE W Diana Reese MD 12/20/2017 1422 Microbiology      Findings: No purulence  Complications: none  Implants: * No implants in log *

## 2017-12-20 NOTE — PROGRESS NOTES
met with pt to provide pre-surgical prayer. Pt was alert and verbal.  No pain level was expressed or observed. Pts , son, and daughter-in-law were present. As  was speaking with pt. Her  arrived to visit and pray with the family.  provided spiritual care through presence and pastoral conversation.  welcomed the  and thank him for being there then gracefully exited.

## 2017-12-20 NOTE — H&P
[facility]   [facility] , [place]. [address] [place] [address]  Phone: [phone]  Fax: [phone]    Subjective: Shaheen Yeung is a 69 y.o. female who presents for resection of right second metatarsal head. She has a non-healing ulcer on the plantar aspect of the right foot with exposed bone. She has underlying PAD but toe pressures are adequate to predict healing.     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 toe    GERD (gastroesophageal reflux disease)     managed with medication     Heart failure ([address]     History of kidney stones     multiple with surgical interventions    Hypercholesterolemia     managed with medication     Hypertension     Hypothyroidism     managed with medication     Ill-defined condition     pt takes eliquis     Microscopic hematuria     Nausea & vomiting     after anesthesia    Osteoarthritis     managed with medication     Pacemaker     Metronic pacemaker only    PUD (peptic ulcer disease)     no recent episodes    PVD (peripheral vascular disease) ([address]     left side x 1 stented    Rheumatoid arthritis(714.0)     managed with medication     Toe amputation status ([address]     left great toe     Type 2 diabetes mellitus ([address] Dx 2006    oral t and insulin/Avg / no s/s of low BS/ does not have a sensation / last A1C7.4    Vertigo     no treatment, happens occassionally     Past Surgical History:   Procedure Laterality Date    CABG, ARTERY-VEIN, FOUR  1993    states x 5    CARDIAC SURG PROCEDURE UNLIST Cardiovert x 2-3 x last 2/4/2012    HX AMPUTATION Left 2016    great toe    HX AORTIC VALVE REPLACEMENT      HX APPENDECTOMY  1959    HX HEART CATHETERIZATION      HX HEENT      dental    HX HYSTERECTOMY  1988    HX LITHOTRIPSY      multiple    HX OPEN CHOLECYSTECTOMY      HX ORTHOPAEDIC Left     achilles tendon    HX ORTHOPAEDIC Left     \"toe surgery removing bones\"    HX PACEMAKER      HX PACEMAKER PLACEMENT      Metronic    HX UROLOGICAL Left 1980s    open L kidney removal stones    VASCULAR SURGERY PROCEDURE UNLIST Left 2-17-16    lower extremity arteriogram with stent x 1 placement      Family History   Problem Relation Age of Onset    Heart Disease Father     Heart Attack Father     Diabetes Father     Hypertension Father     High Cholesterol Father     Asthma Father     Heart Disease Other     Heart Surgery Other     Diabetes Mother     Stroke Mother      TIAs    Hypertension Mother     Other Mother      kidney stone    Kidney Disease Mother     Heart Disease Mother     High Cholesterol Mother     Cancer Mother     Cancer Sister      multiple myeloma    Hypertension Sister     Hypertension Sister     Hypertension Brother     Diabetes Brother     Cancer Brother     Hypertension Sister     Heart Disease Sister     Hypertension Sister     Hypertension Sister      Social History     Social History    Marital status:      Spouse name: N/A    Number of children: N/A    Years of education: N/A     Social History Main Topics    Smoking status: Never Smoker    Smokeless tobacco: Never Used    Alcohol use No    Drug use: No    Sexual activity: Not on file     Other Topics Concern    Not on file     Social History Narrative      No current facility-administered medications for this encounter. Current Outpatient Prescriptions   Medication Sig    cephALEXin (KEFLEX) 500 mg capsule Take 1 Cap by mouth two (2) times a day.     linagliptin (TRADJENTA) 5 mg tablet Take 5 mg by mouth daily. Indications: type 2 diabetes mellitus, morning    glipiZIDE (GLUCOTROL) 10 mg tablet Take 10 mg by mouth daily. Two tablets in the morning   Indications: type 2 diabetes mellitus    CALCIUM CARBONATE (CALCIUM 300 PO) Take 1 Tab by mouth daily. Stop seven days prior to surgery per anesthesia protocol.  ergocalciferol (ERGOCALCIFEROL) 50,000 unit capsule Take 50,000 Units by mouth every month. Stop seven days prior to surgery per anesthesia protocol.  traMADol (ULTRAM) 50 mg tablet Take 50 mg by mouth nightly.  methimazole (TAPAZOLE) 5 mg tablet Take 5 mg by mouth daily. Take day of surgery per anesthesia protocol. Pt reports she alternates 1/12 tablets and two tablets every other day  Indications: hyperthyroidism, morning    insulin glargine (LANTUS SOLOSTAR) 100 unit/mL (3 mL) pen 9 Units by SubCUTAneous route nightly. Pt to take 80% of nightly dose night prior to surgery. 7.2 units    aspirin delayed-release 81 mg tablet Take 81 mg by mouth daily. Morning   Take day of surgery per anesthesia protocol.  apixaban (ELIQUIS) 2.5 mg tablet Take 1 Tab by mouth every twelve (12) hours.  predniSONE (DELTASONE) 5 mg tablet Take 1 Tab by mouth daily (with breakfast). (Patient taking differently: Take 5 mg by mouth daily (with breakfast). Take day of surgery per anesthesia protocol.)    HYDROcodone-acetaminophen (NORCO) 5-325 mg per tablet Take 1 Tab by mouth every six (6) hours as needed. Max Daily Amount: 4 Tabs. (Patient taking differently: Take 1 Tab by mouth every six (6) hours as needed. Take day of surgery per anesthesia protocol. Indications: Pain)    metoprolol (LOPRESSOR) 50 mg tablet Take 1 Tab by mouth two (2) times a day. (Patient taking differently: Take 50 mg by mouth two (2) times a day. Take day of surgery per anesthesia protocol.)    digoxin (LANOXIN) 0.125 mg tablet Take 1 Tab by mouth daily. (Patient taking differently: Take 0.125 mg by mouth daily. Morning  Take day of surgery per anesthesia protocol.)    multivitamin (ONE A DAY) tablet Take 1 Tab by mouth daily. Stop seven days prior to surgery per anesthesia protocol.  ferrous sulfate 325 mg (65 mg iron) tablet Take  by mouth Daily (before lunch).  GLUC HCL/GLUC KENNY/AC-D-GLUCOS (GLUCOSAMINE COMPLEX PO) Take 1 Tab by mouth three (3) times daily. Stop seven days prior to surgery per anesthesia protocol.  lorazepam (ATIVAN) 1 mg tablet Take 1 mg by mouth two (2) times a day. Take day of surgery per anesthesia protocol.  DOCOSAHEXANOIC ACID/EPA (FISH OIL PO) Take 2 Tabs by mouth two (2) times a day. Stop seven days prior to surgery per anesthesia protocol.  CYANOCOBALAMIN (VITAMIN B-12 PO) Take 1 Tab by mouth daily. Stop seven days prior to surgery per anesthesia protocol.  OMEPRAZOLE (PRILOSEC PO) Take 20 mg by mouth daily. .Take day of surgery per anesthesia protocol. Allergies   Allergen Reactions    Multaq [Dronedarone] Nausea Only    Other Medication Other (comments)     STATINS CAUSE MUSCLE WEAKNESS  Cholesterol medications    Statins-Hmg-Coa Reductase Inhibitors Myalgia       Review of Systems:  A comprehensive review of systems was negative except for that written in the History of Present Illness. Objective:     No data found. No data recorded. Physical Exam:  There were no vitals taken for this visit. General appearance: alert, cooperative, no distress, appears stated age  Lungs: clear to auscultation bilaterally  Heart: regular rate and rhythm, S1, S2 normal, no murmur, click, rub or gallop  R foot plantar ulcer    Assessment:     Non-healing right foot plantar ulcer with exposed bone (R 2nd metatarsal)    Plan:     Resection of right 2nd metatarsal head. Need for surgery, risks, alternatives discussed. All questions answered. She understands and agrees to proceed.     Signed By: Kelly Wenier MD     December 20, 2017      Elements of this note have been dictated using speech recognition software. As a result, errors of speech recognition may have occurred.

## 2017-12-20 NOTE — ANESTHESIA PREPROCEDURE EVALUATION
Anesthetic History     PONV          Review of Systems / Medical History  Patient summary reviewed and pertinent labs reviewed    Pulmonary  Within defined limits                 Neuro/Psych   Within defined limits           Cardiovascular    Hypertension: well controlled        Dysrhythmias : atrial fibrillation  Pacemaker, CAD and CABG    Exercise tolerance: <4 METS     GI/Hepatic/Renal     GERD: well controlled      PUD     Endo/Other    Diabetes: well controlled, type 2, using insulin  Hypothyroidism  Arthritis    Comments: Hyperthyroidism controlled with tapazole Other Findings              Physical Exam    Airway  Mallampati: II  TM Distance: > 6 cm  Neck ROM: normal range of motion   Mouth opening: Normal     Cardiovascular    Rhythm: regular           Dental    Dentition: Caps/crowns     Pulmonary                 Abdominal  GI exam deferred       Other Findings            Anesthetic Plan    ASA: 3  Anesthesia type: general          Induction: Intravenous  Anesthetic plan and risks discussed with: Patient

## 2017-12-20 NOTE — DISCHARGE INSTRUCTIONS
ACTIVITY  · As tolerated and as directed by your doctor. · You may shower in 24 hours. Do not take a bath until cleared by MD.     DIET  · Clear liquids until no nausea or vomiting; then light diet for the first day. · Advance to regular diet on second day, unless your doctor orders otherwise. · If nausea and vomiting continues, call your doctor. PAIN  · Take pain medication as directed by your doctor. · Call your doctor if pain is NOT relieved by medication. · DO NOT take aspirin of blood thinners unless directed by your doctor. CALL YOUR DOCTOR IF   · Excessive bleeding that does not stop after holding pressure over the area  · Temperature of 101 degrees F or above  · Excessive redness, swelling or bruising, and/ or green or yellow, smelly discharge from incision    AFTER ANESTHESIA   · For the first 24 hours: DO NOT Drive, Drink alcoholic beverages, or Make important decisions. · Be aware of dizziness following anesthesia and while taking pain medication. · Limit your activities  · Do not operate hazardous machinery  · If you have not urinated within 8 hours after discharge, please contact your surgeon on call. *  Please give a list of your current medications to your Primary Care Provider. *  Please update this list whenever your medications are discontinued, doses are      changed, or new medications (including over-the-counter products) are added. *  Please carry medication information at all times in case of emergency situations. These are general instructions for a healthy lifestyle:  No smoking/ No tobacco products/ Avoid exposure to second hand smoke  Surgeon General's Warning:  Quitting smoking now greatly reduces serious risk to your health.   Obesity, smoking, and sedentary lifestyle greatly increases your risk for illness  A healthy diet, regular physical exercise & weight monitoring are important for maintaining a healthy lifestyle    You may be retaining fluid if you have a history of heart failure or if you experience any of the following symptoms:  Weight gain of 3 pounds or more overnight or 5 pounds in a week, increased swelling in our hands or feet or shortness of breath while lying flat in bed. Please call your doctor as soon as you notice any of these symptoms; do not wait until your next office visit. Recognize signs and symptoms of STROKE:    F-face looks uneven  A-arms unable to move or move unevenly  S-speech slurred or non-existent  T-time-call 911 as soon as signs and symptoms begin-DO NOT go       Back to bed or wait to see if you get better-TIME IS BRAIN. Resume Elliquis in 2 days. Continue with Home Health for wound care. A prescription was written for this. OK to bear weight on the right heel, but not on the right forefoot for 6 weeks.

## 2017-12-21 PROCEDURE — 3331090001 HH PPS REVENUE CREDIT

## 2017-12-21 PROCEDURE — 3331090002 HH PPS REVENUE DEBIT

## 2017-12-21 NOTE — OP NOTES
[address] [facility]  OPERATIVE REPORT    Sidney Mcdonald  MR#: [id number]  : 1940  ACCOUNT #: [de-identified]   DATE OF SERVICE: 2017    DATE OF SURGERY:  2017. SURGEON:  Rossi Romero MD.  PREOPERATIVE DIAGNOSIS:  Right foot diabetic foot ulcer. POSTOPERATIVE DIAGNOSIS:  Right foot diabetic foot ulcer. PROCEDURE:  Right second metatarsal head resection. ANESTHESIA:  General.  ESTIMATED BLOOD LOSS:  Less than 10 mL. SPECIMEN REMOVED:  Soft tissue and bone for culture and sensitivity. COMPLICATIONS:  None. STATEMENT OF MEDICAL NECESSITY:  The patient is a 76-year-old woman with the plantar aspect ulcer on the right foot that is in close proximity to the second metatarsal head, which appears to be protruding through the wound. She does have some evidence of healing, but there is still drainage from this area and the bone is visible through the small ulcer. PROCEDURE:  The patient was taken to the operating room and general anesthetic was administered. The right lower leg was prepped and draped in the usual sterile fashion. An elliptical incision was made over the ulcer at the plantar aspect of the right foot directly overlying the right second metatarsal head. The ulcer tissue was excised. There was a very small punctate opening and this was swabbed for culture and sensitivity. Once this was removed, the metatarsal head could be seen very superficial and essentially protruding up against the ulcer. There was no associated purulence in the soft tissue otherwise and this area appears to be healthy. The periosteum of the second metatarsal shaft was then elevated and then the distal aspect of the second metatarsal shaft was then divided with a [employer] saw. The right second metatarsal head was then extracted and in doing so, was very soft and had to be removed piecemeal with a rongeur.   The bony fragments were cultured with a swab and also sent as tissue culture. There was no gross purulence. Once the second metatarsal head was removed, the incision was thoroughly irrigated and all loose soft tissue was sharply debrided. The incision was thoroughly irrigated and then the edges were reapproximated with an interrupted 3-0 vertical mattress nylon on the proximal and distal aspect of the central portion was left open and then packed with iodoform gauze and then wrapped with a bulky sterile dressing. The patient tolerated the procedure well and went to recovery in stable condition.       MD SUZANNE Sandra / RN  D: 12/20/2017 14:55     T: 12/21/2017 05:51  JOB #: [id number]

## 2017-12-22 ENCOUNTER — HOME CARE VISIT (OUTPATIENT)
Dept: SCHEDULING | Facility: HOME HEALTH | Age: 77
End: 2017-12-22
Payer: MEDICARE

## 2017-12-22 VITALS
HEART RATE: 99 BPM | SYSTOLIC BLOOD PRESSURE: 124 MMHG | OXYGEN SATURATION: 98 % | RESPIRATION RATE: 16 BRPM | DIASTOLIC BLOOD PRESSURE: 60 MMHG | TEMPERATURE: 97.9 F

## 2017-12-22 LAB
BACTERIA SPEC CULT: NORMAL
GRAM STN SPEC: NORMAL
GRAM STN SPEC: NORMAL
SERVICE CMNT-IMP: NORMAL

## 2017-12-22 PROCEDURE — 3331090002 HH PPS REVENUE DEBIT

## 2017-12-22 PROCEDURE — G0299 HHS/HOSPICE OF RN EA 15 MIN: HCPCS

## 2017-12-22 PROCEDURE — 3331090001 HH PPS REVENUE CREDIT

## 2017-12-22 PROCEDURE — G0151 HHCP-SERV OF PT,EA 15 MIN: HCPCS

## 2017-12-23 PROCEDURE — 3331090002 HH PPS REVENUE DEBIT

## 2017-12-23 PROCEDURE — 3331090001 HH PPS REVENUE CREDIT

## 2017-12-24 VITALS
DIASTOLIC BLOOD PRESSURE: 66 MMHG | HEART RATE: 94 BPM | SYSTOLIC BLOOD PRESSURE: 124 MMHG | OXYGEN SATURATION: 99 % | RESPIRATION RATE: 18 BRPM | TEMPERATURE: 97.7 F

## 2017-12-24 LAB
BACTERIA SPEC CULT: ABNORMAL
GRAM STN SPEC: ABNORMAL
SERVICE CMNT-IMP: ABNORMAL

## 2017-12-24 PROCEDURE — 3331090002 HH PPS REVENUE DEBIT

## 2017-12-24 PROCEDURE — 3331090001 HH PPS REVENUE CREDIT

## 2017-12-25 PROCEDURE — 3331090002 HH PPS REVENUE DEBIT

## 2017-12-25 PROCEDURE — 3331090001 HH PPS REVENUE CREDIT

## 2017-12-26 ENCOUNTER — HOME CARE VISIT (OUTPATIENT)
Dept: SCHEDULING | Facility: HOME HEALTH | Age: 77
End: 2017-12-26
Payer: MEDICARE

## 2017-12-26 VITALS
OXYGEN SATURATION: 96 % | TEMPERATURE: 97.5 F | SYSTOLIC BLOOD PRESSURE: 110 MMHG | HEART RATE: 80 BPM | DIASTOLIC BLOOD PRESSURE: 60 MMHG | RESPIRATION RATE: 16 BRPM

## 2017-12-26 PROCEDURE — 3331090001 HH PPS REVENUE CREDIT

## 2017-12-26 PROCEDURE — 3331090002 HH PPS REVENUE DEBIT

## 2017-12-26 PROCEDURE — G0299 HHS/HOSPICE OF RN EA 15 MIN: HCPCS

## 2017-12-26 PROCEDURE — G0157 HHC PT ASSISTANT EA 15: HCPCS

## 2017-12-27 VITALS
HEART RATE: 71 BPM | RESPIRATION RATE: 18 BRPM | SYSTOLIC BLOOD PRESSURE: 128 MMHG | DIASTOLIC BLOOD PRESSURE: 70 MMHG | TEMPERATURE: 97.7 F | OXYGEN SATURATION: 97 %

## 2017-12-27 LAB
BACTERIA SPEC CULT: NORMAL
SERVICE CMNT-IMP: NORMAL

## 2017-12-27 PROCEDURE — 3331090001 HH PPS REVENUE CREDIT

## 2017-12-27 PROCEDURE — 3331090002 HH PPS REVENUE DEBIT

## 2017-12-28 ENCOUNTER — HOME CARE VISIT (OUTPATIENT)
Dept: SCHEDULING | Facility: HOME HEALTH | Age: 77
End: 2017-12-28
Payer: MEDICARE

## 2017-12-28 VITALS
TEMPERATURE: 97.6 F | DIASTOLIC BLOOD PRESSURE: 70 MMHG | HEART RATE: 72 BPM | SYSTOLIC BLOOD PRESSURE: 118 MMHG | RESPIRATION RATE: 19 BRPM | OXYGEN SATURATION: 96 %

## 2017-12-28 PROCEDURE — 3331090002 HH PPS REVENUE DEBIT

## 2017-12-28 PROCEDURE — 3331090001 HH PPS REVENUE CREDIT

## 2017-12-28 PROCEDURE — G0157 HHC PT ASSISTANT EA 15: HCPCS

## 2017-12-29 ENCOUNTER — HOME CARE VISIT (OUTPATIENT)
Dept: SCHEDULING | Facility: HOME HEALTH | Age: 77
End: 2017-12-29
Payer: MEDICARE

## 2017-12-29 VITALS
OXYGEN SATURATION: 98 % | HEART RATE: 87 BPM | DIASTOLIC BLOOD PRESSURE: 54 MMHG | SYSTOLIC BLOOD PRESSURE: 96 MMHG | RESPIRATION RATE: 16 BRPM | TEMPERATURE: 97.5 F

## 2017-12-29 PROCEDURE — 3331090001 HH PPS REVENUE CREDIT

## 2017-12-29 PROCEDURE — G0299 HHS/HOSPICE OF RN EA 15 MIN: HCPCS

## 2017-12-29 PROCEDURE — 3331090002 HH PPS REVENUE DEBIT

## 2017-12-30 PROCEDURE — 3331090002 HH PPS REVENUE DEBIT

## 2017-12-30 PROCEDURE — 3331090001 HH PPS REVENUE CREDIT

## 2017-12-31 PROCEDURE — 3331090001 HH PPS REVENUE CREDIT

## 2017-12-31 PROCEDURE — 3331090002 HH PPS REVENUE DEBIT

## 2018-01-01 ENCOUNTER — HOME CARE VISIT (OUTPATIENT)
Dept: SCHEDULING | Facility: HOME HEALTH | Age: 78
End: 2018-01-01
Payer: MEDICARE

## 2018-01-01 ENCOUNTER — APPOINTMENT (OUTPATIENT)
Dept: GENERAL RADIOLOGY | Age: 78
End: 2018-01-01
Attending: EMERGENCY MEDICINE
Payer: MEDICARE

## 2018-01-01 ENCOUNTER — HOME CARE VISIT (OUTPATIENT)
Dept: HOME HEALTH SERVICES | Facility: HOME HEALTH | Age: 78
End: 2018-01-01

## 2018-01-01 ENCOUNTER — HOME CARE VISIT (OUTPATIENT)
Dept: HOME HEALTH SERVICES | Facility: HOME HEALTH | Age: 78
End: 2018-01-01
Payer: MEDICARE

## 2018-01-01 ENCOUNTER — HOSPITAL ENCOUNTER (EMERGENCY)
Age: 78
Discharge: HOME | End: 2018-10-07
Attending: EMERGENCY MEDICINE
Payer: MEDICARE

## 2018-01-01 ENCOUNTER — HOSPITAL ENCOUNTER (OUTPATIENT)
Dept: LAB | Age: 78
Discharge: HOME | End: 2018-10-31
Payer: MEDICARE

## 2018-01-01 ENCOUNTER — HOSPITAL ENCOUNTER (OUTPATIENT)
Dept: LAB | Age: 78
Discharge: HOME | End: 2018-12-03
Payer: MEDICARE

## 2018-01-01 VITALS
RESPIRATION RATE: 16 BRPM | TEMPERATURE: 97.8 F | DIASTOLIC BLOOD PRESSURE: 70 MMHG | SYSTOLIC BLOOD PRESSURE: 108 MMHG | HEART RATE: 68 BPM | OXYGEN SATURATION: 97 %

## 2018-01-01 VITALS
DIASTOLIC BLOOD PRESSURE: 60 MMHG | RESPIRATION RATE: 16 BRPM | TEMPERATURE: 97.9 F | OXYGEN SATURATION: 97 % | HEART RATE: 62 BPM | SYSTOLIC BLOOD PRESSURE: 106 MMHG

## 2018-01-01 VITALS
OXYGEN SATURATION: 97 % | DIASTOLIC BLOOD PRESSURE: 58 MMHG | HEART RATE: 66 BPM | RESPIRATION RATE: 16 BRPM | TEMPERATURE: 97.8 F | SYSTOLIC BLOOD PRESSURE: 102 MMHG

## 2018-01-01 VITALS
RESPIRATION RATE: 18 BRPM | DIASTOLIC BLOOD PRESSURE: 71 MMHG | SYSTOLIC BLOOD PRESSURE: 119 MMHG | HEART RATE: 70 BPM | TEMPERATURE: 97.2 F

## 2018-01-01 VITALS
RESPIRATION RATE: 16 BRPM | OXYGEN SATURATION: 97 % | TEMPERATURE: 97.8 F | HEART RATE: 68 BPM | DIASTOLIC BLOOD PRESSURE: 60 MMHG | SYSTOLIC BLOOD PRESSURE: 110 MMHG

## 2018-01-01 VITALS
TEMPERATURE: 97.7 F | DIASTOLIC BLOOD PRESSURE: 66 MMHG | OXYGEN SATURATION: 97 % | SYSTOLIC BLOOD PRESSURE: 108 MMHG | RESPIRATION RATE: 18 BRPM | HEART RATE: 64 BPM

## 2018-01-01 VITALS
OXYGEN SATURATION: 97 % | HEART RATE: 78 BPM | RESPIRATION RATE: 16 BRPM | TEMPERATURE: 97.8 F | SYSTOLIC BLOOD PRESSURE: 110 MMHG | DIASTOLIC BLOOD PRESSURE: 64 MMHG

## 2018-01-01 VITALS
RESPIRATION RATE: 16 BRPM | SYSTOLIC BLOOD PRESSURE: 110 MMHG | DIASTOLIC BLOOD PRESSURE: 60 MMHG | TEMPERATURE: 97.7 F | OXYGEN SATURATION: 97 % | HEART RATE: 62 BPM | DIASTOLIC BLOOD PRESSURE: 72 MMHG | TEMPERATURE: 97.8 F | RESPIRATION RATE: 16 BRPM | SYSTOLIC BLOOD PRESSURE: 134 MMHG | HEART RATE: 80 BPM

## 2018-01-01 VITALS
TEMPERATURE: 97.5 F | OXYGEN SATURATION: 94 % | SYSTOLIC BLOOD PRESSURE: 116 MMHG | HEART RATE: 65 BPM | RESPIRATION RATE: 18 BRPM | DIASTOLIC BLOOD PRESSURE: 60 MMHG

## 2018-01-01 VITALS
SYSTOLIC BLOOD PRESSURE: 122 MMHG | OXYGEN SATURATION: 97 % | DIASTOLIC BLOOD PRESSURE: 72 MMHG | TEMPERATURE: 98.2 F | HEART RATE: 78 BPM | RESPIRATION RATE: 18 BRPM

## 2018-01-01 VITALS
TEMPERATURE: 97.8 F | HEART RATE: 76 BPM | RESPIRATION RATE: 16 BRPM | OXYGEN SATURATION: 99 % | SYSTOLIC BLOOD PRESSURE: 106 MMHG | DIASTOLIC BLOOD PRESSURE: 60 MMHG

## 2018-01-01 VITALS
HEART RATE: 64 BPM | DIASTOLIC BLOOD PRESSURE: 68 MMHG | RESPIRATION RATE: 16 BRPM | TEMPERATURE: 97.7 F | SYSTOLIC BLOOD PRESSURE: 100 MMHG

## 2018-01-01 VITALS
RESPIRATION RATE: 16 BRPM | HEART RATE: 72 BPM | OXYGEN SATURATION: 97 % | DIASTOLIC BLOOD PRESSURE: 62 MMHG | SYSTOLIC BLOOD PRESSURE: 122 MMHG | TEMPERATURE: 97.3 F

## 2018-01-01 VITALS
TEMPERATURE: 97.1 F | DIASTOLIC BLOOD PRESSURE: 68 MMHG | SYSTOLIC BLOOD PRESSURE: 116 MMHG | RESPIRATION RATE: 16 BRPM | HEART RATE: 68 BPM

## 2018-01-01 VITALS
RESPIRATION RATE: 16 BRPM | DIASTOLIC BLOOD PRESSURE: 60 MMHG | SYSTOLIC BLOOD PRESSURE: 102 MMHG | OXYGEN SATURATION: 97 % | TEMPERATURE: 97.8 F | HEART RATE: 62 BPM

## 2018-01-01 VITALS
SYSTOLIC BLOOD PRESSURE: 128 MMHG | HEART RATE: 82 BPM | DIASTOLIC BLOOD PRESSURE: 76 MMHG | TEMPERATURE: 97.3 F | RESPIRATION RATE: 18 BRPM

## 2018-01-01 VITALS
OXYGEN SATURATION: 98 % | TEMPERATURE: 97.7 F | SYSTOLIC BLOOD PRESSURE: 124 MMHG | HEART RATE: 76 BPM | DIASTOLIC BLOOD PRESSURE: 68 MMHG | RESPIRATION RATE: 18 BRPM

## 2018-01-01 VITALS
SYSTOLIC BLOOD PRESSURE: 128 MMHG | TEMPERATURE: 97.2 F | DIASTOLIC BLOOD PRESSURE: 82 MMHG | RESPIRATION RATE: 18 BRPM | HEART RATE: 72 BPM

## 2018-01-01 VITALS
DIASTOLIC BLOOD PRESSURE: 87 MMHG | TEMPERATURE: 97.1 F | RESPIRATION RATE: 18 BRPM | HEART RATE: 86 BPM | TEMPERATURE: 97.4 F | HEART RATE: 78 BPM | SYSTOLIC BLOOD PRESSURE: 121 MMHG | RESPIRATION RATE: 20 BRPM | SYSTOLIC BLOOD PRESSURE: 142 MMHG | DIASTOLIC BLOOD PRESSURE: 69 MMHG

## 2018-01-01 VITALS
DIASTOLIC BLOOD PRESSURE: 62 MMHG | HEART RATE: 64 BPM | RESPIRATION RATE: 16 BRPM | SYSTOLIC BLOOD PRESSURE: 116 MMHG | OXYGEN SATURATION: 97 % | TEMPERATURE: 98.3 F

## 2018-01-01 VITALS
RESPIRATION RATE: 16 BRPM | HEART RATE: 62 BPM | HEART RATE: 62 BPM | TEMPERATURE: 97.8 F | OXYGEN SATURATION: 97 % | RESPIRATION RATE: 16 BRPM | SYSTOLIC BLOOD PRESSURE: 120 MMHG | TEMPERATURE: 97.5 F | DIASTOLIC BLOOD PRESSURE: 60 MMHG | DIASTOLIC BLOOD PRESSURE: 60 MMHG | SYSTOLIC BLOOD PRESSURE: 122 MMHG | OXYGEN SATURATION: 97 %

## 2018-01-01 VITALS
DIASTOLIC BLOOD PRESSURE: 58 MMHG | TEMPERATURE: 97.8 F | SYSTOLIC BLOOD PRESSURE: 110 MMHG | OXYGEN SATURATION: 97 % | HEART RATE: 58 BPM | RESPIRATION RATE: 16 BRPM

## 2018-01-01 VITALS
RESPIRATION RATE: 14 BRPM | TEMPERATURE: 96.9 F | HEART RATE: 78 BPM | DIASTOLIC BLOOD PRESSURE: 60 MMHG | SYSTOLIC BLOOD PRESSURE: 110 MMHG

## 2018-01-01 VITALS
SYSTOLIC BLOOD PRESSURE: 100 MMHG | HEART RATE: 64 BPM | TEMPERATURE: 97.8 F | RESPIRATION RATE: 16 BRPM | DIASTOLIC BLOOD PRESSURE: 68 MMHG | OXYGEN SATURATION: 97 %

## 2018-01-01 VITALS
HEART RATE: 64 BPM | RESPIRATION RATE: 16 BRPM | SYSTOLIC BLOOD PRESSURE: 104 MMHG | TEMPERATURE: 97.8 F | OXYGEN SATURATION: 97 % | DIASTOLIC BLOOD PRESSURE: 66 MMHG

## 2018-01-01 VITALS
RESPIRATION RATE: 16 BRPM | OXYGEN SATURATION: 97 % | DIASTOLIC BLOOD PRESSURE: 60 MMHG | HEART RATE: 58 BPM | TEMPERATURE: 97.9 F | SYSTOLIC BLOOD PRESSURE: 100 MMHG

## 2018-01-01 VITALS
TEMPERATURE: 98.4 F | SYSTOLIC BLOOD PRESSURE: 118 MMHG | OXYGEN SATURATION: 92 % | RESPIRATION RATE: 18 BRPM | DIASTOLIC BLOOD PRESSURE: 70 MMHG | HEART RATE: 75 BPM

## 2018-01-01 VITALS
HEART RATE: 74 BPM | SYSTOLIC BLOOD PRESSURE: 132 MMHG | TEMPERATURE: 97.4 F | DIASTOLIC BLOOD PRESSURE: 87 MMHG | RESPIRATION RATE: 19 BRPM

## 2018-01-01 VITALS
DIASTOLIC BLOOD PRESSURE: 62 MMHG | RESPIRATION RATE: 16 BRPM | OXYGEN SATURATION: 97 % | SYSTOLIC BLOOD PRESSURE: 104 MMHG | HEART RATE: 74 BPM | TEMPERATURE: 97.5 F

## 2018-01-01 VITALS
SYSTOLIC BLOOD PRESSURE: 156 MMHG | OXYGEN SATURATION: 97 % | HEART RATE: 71 BPM | WEIGHT: 100 LBS | BODY MASS INDEX: 17.16 KG/M2 | TEMPERATURE: 98 F | DIASTOLIC BLOOD PRESSURE: 72 MMHG | RESPIRATION RATE: 18 BRPM

## 2018-01-01 VITALS
SYSTOLIC BLOOD PRESSURE: 100 MMHG | OXYGEN SATURATION: 97 % | RESPIRATION RATE: 16 BRPM | DIASTOLIC BLOOD PRESSURE: 56 MMHG | TEMPERATURE: 97.8 F | HEART RATE: 80 BPM

## 2018-01-01 VITALS
TEMPERATURE: 97.6 F | HEART RATE: 58 BPM | SYSTOLIC BLOOD PRESSURE: 102 MMHG | OXYGEN SATURATION: 97 % | RESPIRATION RATE: 16 BRPM | DIASTOLIC BLOOD PRESSURE: 56 MMHG

## 2018-01-01 VITALS
SYSTOLIC BLOOD PRESSURE: 112 MMHG | DIASTOLIC BLOOD PRESSURE: 72 MMHG | TEMPERATURE: 97.8 F | OXYGEN SATURATION: 97 % | RESPIRATION RATE: 16 BRPM | HEART RATE: 78 BPM

## 2018-01-01 VITALS
DIASTOLIC BLOOD PRESSURE: 58 MMHG | RESPIRATION RATE: 16 BRPM | HEART RATE: 60 BPM | TEMPERATURE: 97.3 F | OXYGEN SATURATION: 97 % | SYSTOLIC BLOOD PRESSURE: 116 MMHG

## 2018-01-01 VITALS
HEART RATE: 56 BPM | RESPIRATION RATE: 16 BRPM | SYSTOLIC BLOOD PRESSURE: 104 MMHG | DIASTOLIC BLOOD PRESSURE: 56 MMHG | TEMPERATURE: 98.4 F | OXYGEN SATURATION: 97 %

## 2018-01-01 VITALS
TEMPERATURE: 98.2 F | SYSTOLIC BLOOD PRESSURE: 118 MMHG | RESPIRATION RATE: 16 BRPM | OXYGEN SATURATION: 99 % | HEART RATE: 67 BPM | DIASTOLIC BLOOD PRESSURE: 60 MMHG

## 2018-01-01 VITALS
RESPIRATION RATE: 16 BRPM | SYSTOLIC BLOOD PRESSURE: 108 MMHG | OXYGEN SATURATION: 97 % | HEART RATE: 72 BPM | TEMPERATURE: 97.8 F | DIASTOLIC BLOOD PRESSURE: 64 MMHG

## 2018-01-01 VITALS
TEMPERATURE: 97.8 F | OXYGEN SATURATION: 97 % | DIASTOLIC BLOOD PRESSURE: 56 MMHG | HEART RATE: 58 BPM | SYSTOLIC BLOOD PRESSURE: 100 MMHG | RESPIRATION RATE: 16 BRPM

## 2018-01-01 VITALS
RESPIRATION RATE: 19 BRPM | SYSTOLIC BLOOD PRESSURE: 108 MMHG | TEMPERATURE: 97.9 F | HEART RATE: 69 BPM | DIASTOLIC BLOOD PRESSURE: 60 MMHG | OXYGEN SATURATION: 98 %

## 2018-01-01 VITALS
TEMPERATURE: 97.2 F | HEART RATE: 82 BPM | DIASTOLIC BLOOD PRESSURE: 78 MMHG | SYSTOLIC BLOOD PRESSURE: 132 MMHG | RESPIRATION RATE: 18 BRPM

## 2018-01-01 VITALS
SYSTOLIC BLOOD PRESSURE: 110 MMHG | OXYGEN SATURATION: 97 % | RESPIRATION RATE: 16 BRPM | HEART RATE: 62 BPM | TEMPERATURE: 98.3 F | DIASTOLIC BLOOD PRESSURE: 60 MMHG

## 2018-01-01 VITALS
SYSTOLIC BLOOD PRESSURE: 134 MMHG | DIASTOLIC BLOOD PRESSURE: 78 MMHG | TEMPERATURE: 97.2 F | RESPIRATION RATE: 18 BRPM | HEART RATE: 82 BPM

## 2018-01-01 VITALS
SYSTOLIC BLOOD PRESSURE: 102 MMHG | HEART RATE: 58 BPM | DIASTOLIC BLOOD PRESSURE: 60 MMHG | OXYGEN SATURATION: 97 % | TEMPERATURE: 97.8 F | RESPIRATION RATE: 16 BRPM

## 2018-01-01 VITALS
OXYGEN SATURATION: 98 % | SYSTOLIC BLOOD PRESSURE: 110 MMHG | HEART RATE: 69 BPM | DIASTOLIC BLOOD PRESSURE: 64 MMHG | RESPIRATION RATE: 16 BRPM

## 2018-01-01 VITALS
DIASTOLIC BLOOD PRESSURE: 62 MMHG | HEART RATE: 60 BPM | SYSTOLIC BLOOD PRESSURE: 102 MMHG | TEMPERATURE: 97.8 F | RESPIRATION RATE: 18 BRPM | OXYGEN SATURATION: 97 %

## 2018-01-01 VITALS
TEMPERATURE: 97.8 F | SYSTOLIC BLOOD PRESSURE: 100 MMHG | RESPIRATION RATE: 16 BRPM | OXYGEN SATURATION: 97 % | DIASTOLIC BLOOD PRESSURE: 58 MMHG | HEART RATE: 72 BPM

## 2018-01-01 VITALS
OXYGEN SATURATION: 97 % | SYSTOLIC BLOOD PRESSURE: 104 MMHG | DIASTOLIC BLOOD PRESSURE: 62 MMHG | RESPIRATION RATE: 16 BRPM | HEART RATE: 58 BPM | TEMPERATURE: 98 F

## 2018-01-01 VITALS
DIASTOLIC BLOOD PRESSURE: 87 MMHG | SYSTOLIC BLOOD PRESSURE: 142 MMHG | RESPIRATION RATE: 20 BRPM | HEART RATE: 82 BPM | TEMPERATURE: 97.4 F

## 2018-01-01 VITALS
SYSTOLIC BLOOD PRESSURE: 110 MMHG | TEMPERATURE: 96.2 F | DIASTOLIC BLOOD PRESSURE: 62 MMHG | RESPIRATION RATE: 19 BRPM | OXYGEN SATURATION: 98 % | HEART RATE: 70 BPM

## 2018-01-01 VITALS
HEART RATE: 72 BPM | RESPIRATION RATE: 16 BRPM | TEMPERATURE: 97.3 F | SYSTOLIC BLOOD PRESSURE: 126 MMHG | DIASTOLIC BLOOD PRESSURE: 70 MMHG

## 2018-01-01 VITALS
SYSTOLIC BLOOD PRESSURE: 126 MMHG | TEMPERATURE: 98.3 F | OXYGEN SATURATION: 97 % | RESPIRATION RATE: 16 BRPM | DIASTOLIC BLOOD PRESSURE: 72 MMHG | HEART RATE: 72 BPM

## 2018-01-01 VITALS
OXYGEN SATURATION: 97 % | HEART RATE: 64 BPM | RESPIRATION RATE: 16 BRPM | DIASTOLIC BLOOD PRESSURE: 62 MMHG | TEMPERATURE: 98.5 F | SYSTOLIC BLOOD PRESSURE: 112 MMHG

## 2018-01-01 VITALS
SYSTOLIC BLOOD PRESSURE: 102 MMHG | OXYGEN SATURATION: 97 % | HEART RATE: 56 BPM | DIASTOLIC BLOOD PRESSURE: 56 MMHG | TEMPERATURE: 97.3 F | RESPIRATION RATE: 16 BRPM

## 2018-01-01 VITALS
RESPIRATION RATE: 16 BRPM | OXYGEN SATURATION: 97 % | SYSTOLIC BLOOD PRESSURE: 102 MMHG | HEART RATE: 58 BPM | TEMPERATURE: 97.8 F | DIASTOLIC BLOOD PRESSURE: 56 MMHG

## 2018-01-01 VITALS
DIASTOLIC BLOOD PRESSURE: 62 MMHG | HEART RATE: 68 BPM | RESPIRATION RATE: 16 BRPM | OXYGEN SATURATION: 97 % | SYSTOLIC BLOOD PRESSURE: 108 MMHG | TEMPERATURE: 97.9 F

## 2018-01-01 DIAGNOSIS — R53.1 WEAKNESS: ICD-10-CM

## 2018-01-01 DIAGNOSIS — S39.012A LUMBAR STRAIN, INITIAL ENCOUNTER: Primary | ICD-10-CM

## 2018-01-01 LAB
ALBUMIN SERPL-MCNC: 2.6 G/DL (ref 3.2–4.6)
ALBUMIN SERPL-MCNC: 2.7 G/DL (ref 3.2–4.6)
ALBUMIN/GLOB SERPL: 0.6 {RATIO} (ref 1.2–3.5)
ALBUMIN/GLOB SERPL: 0.8 {RATIO} (ref 1.2–3.5)
ALP SERPL-CCNC: 105 U/L (ref 50–136)
ALP SERPL-CCNC: 93 U/L (ref 50–136)
ALT SERPL-CCNC: 29 U/L (ref 12–65)
ALT SERPL-CCNC: 39 U/L (ref 12–65)
ANION GAP SERPL CALC-SCNC: 6 MMOL/L (ref 7–16)
AST SERPL-CCNC: 23 U/L (ref 15–37)
AST SERPL-CCNC: 37 U/L (ref 15–37)
BACTERIA URNS QL MICRO: 0 /HPF
BASOPHILS # BLD: 0 K/UL (ref 0–0.2)
BASOPHILS # BLD: 0 K/UL (ref 0–0.2)
BASOPHILS # BLD: 0.1 K/UL (ref 0–0.2)
BASOPHILS NFR BLD: 0 % (ref 0–2)
BASOPHILS NFR BLD: 0 % (ref 0–2)
BASOPHILS NFR BLD: 1 % (ref 0–2)
BILIRUB DIRECT SERPL-MCNC: 0.1 MG/DL
BILIRUB SERPL-MCNC: 0.4 MG/DL (ref 0.2–1.1)
BILIRUB SERPL-MCNC: 0.6 MG/DL (ref 0.2–1.1)
BUN SERPL-MCNC: 23 MG/DL (ref 8–23)
CALCIUM SERPL-MCNC: 8.8 MG/DL (ref 8.3–10.4)
CASTS URNS QL MICRO: 0 /LPF
CHLORIDE SERPL-SCNC: 100 MMOL/L (ref 98–107)
CO2 SERPL-SCNC: 28 MMOL/L (ref 21–32)
CREAT SERPL-MCNC: 0.47 MG/DL (ref 0.6–1)
CREAT SERPL-MCNC: 0.77 MG/DL (ref 0.6–1)
CRP SERPL-MCNC: 0.6 MG/DL (ref 0–0.9)
CRP SERPL-MCNC: <0.3 MG/DL (ref 0–0.9)
DIFFERENTIAL METHOD BLD: ABNORMAL
DIGOXIN SERPL-MCNC: 1.2 NG/ML (ref 0.9–2.1)
EOSINOPHIL # BLD: 0 K/UL (ref 0–0.8)
EOSINOPHIL # BLD: 0 K/UL (ref 0–0.8)
EOSINOPHIL # BLD: 0.1 K/UL (ref 0–0.8)
EOSINOPHIL NFR BLD: 0 % (ref 0.5–7.8)
EOSINOPHIL NFR BLD: 0 % (ref 0.5–7.8)
EOSINOPHIL NFR BLD: 1 % (ref 0.5–7.8)
EPI CELLS #/AREA URNS HPF: ABNORMAL /HPF
ERYTHROCYTE [DISTWIDTH] IN BLOOD BY AUTOMATED COUNT: 13.1 %
ERYTHROCYTE [DISTWIDTH] IN BLOOD BY AUTOMATED COUNT: 13.9 % (ref 11.9–14.6)
ERYTHROCYTE [DISTWIDTH] IN BLOOD BY AUTOMATED COUNT: 14.2 %
ERYTHROCYTE [SEDIMENTATION RATE] IN BLOOD: 14 MM/HR (ref 0–30)
ERYTHROCYTE [SEDIMENTATION RATE] IN BLOOD: 22 MM/HR (ref 0–30)
GLOBULIN SER CALC-MCNC: 3.5 G/DL (ref 2.3–3.5)
GLOBULIN SER CALC-MCNC: 4.1 G/DL (ref 2.3–3.5)
GLUCOSE SERPL-MCNC: 285 MG/DL (ref 65–100)
HCT VFR BLD AUTO: 35 % (ref 35.8–46.3)
HCT VFR BLD AUTO: 36.3 % (ref 35.8–46.3)
HCT VFR BLD AUTO: 38.7 % (ref 35.8–46.3)
HGB BLD-MCNC: 11.4 G/DL (ref 11.7–15.4)
HGB BLD-MCNC: 11.9 G/DL (ref 11.7–15.4)
HGB BLD-MCNC: 13 G/DL (ref 11.7–15.4)
IMM GRANULOCYTES # BLD: 0 K/UL (ref 0–0.5)
IMM GRANULOCYTES # BLD: 0.1 K/UL (ref 0–0.5)
IMM GRANULOCYTES # BLD: 0.1 K/UL (ref 0–0.5)
IMM GRANULOCYTES NFR BLD AUTO: 1 % (ref 0–5)
LYMPHOCYTES # BLD: 1.2 K/UL (ref 0.5–4.6)
LYMPHOCYTES # BLD: 1.2 K/UL (ref 0.5–4.6)
LYMPHOCYTES # BLD: 1.3 K/UL (ref 0.5–4.6)
LYMPHOCYTES NFR BLD: 10 % (ref 13–44)
LYMPHOCYTES NFR BLD: 13 % (ref 13–44)
LYMPHOCYTES NFR BLD: 17 % (ref 13–44)
MCH RBC QN AUTO: 32.8 PG (ref 26.1–32.9)
MCH RBC QN AUTO: 32.9 PG (ref 26.1–32.9)
MCH RBC QN AUTO: 33.8 PG (ref 26.1–32.9)
MCHC RBC AUTO-ENTMCNC: 32.6 G/DL (ref 31.4–35)
MCHC RBC AUTO-ENTMCNC: 32.8 G/DL (ref 31.4–35)
MCHC RBC AUTO-ENTMCNC: 33.6 G/DL (ref 31.4–35)
MCV RBC AUTO: 100.3 FL (ref 79.6–97.8)
MCV RBC AUTO: 103.9 FL (ref 79.6–97.8)
MCV RBC AUTO: 97.7 FL (ref 79.6–97.8)
MONOCYTES # BLD: 0.6 K/UL (ref 0.1–1.3)
MONOCYTES # BLD: 0.7 K/UL (ref 0.1–1.3)
MONOCYTES # BLD: 1.1 K/UL (ref 0.1–1.3)
MONOCYTES NFR BLD: 7 % (ref 4–12)
MONOCYTES NFR BLD: 8 % (ref 4–12)
MONOCYTES NFR BLD: 8 % (ref 4–12)
NEUTS SEG # BLD: 10.8 K/UL (ref 1.7–8.2)
NEUTS SEG # BLD: 5.3 K/UL (ref 1.7–8.2)
NEUTS SEG # BLD: 7.3 K/UL (ref 1.7–8.2)
NEUTS SEG NFR BLD: 74 % (ref 43–78)
NEUTS SEG NFR BLD: 78 % (ref 43–78)
NEUTS SEG NFR BLD: 81 % (ref 43–78)
NRBC # BLD: 0 K/UL (ref 0–0.2)
PLATELET # BLD AUTO: 213 K/UL (ref 150–450)
PLATELET # BLD AUTO: 241 K/UL (ref 150–450)
PLATELET # BLD AUTO: 285 K/UL (ref 150–450)
PMV BLD AUTO: 8.9 FL (ref 9.4–12.3)
PMV BLD AUTO: 9.1 FL (ref 9.4–12.3)
PMV BLD AUTO: 9.2 FL (ref 9.4–12.3)
POTASSIUM SERPL-SCNC: 4.8 MMOL/L (ref 3.5–5.1)
PROT SERPL-MCNC: 6.2 G/DL (ref 6.3–8.2)
PROT SERPL-MCNC: 6.7 G/DL (ref 6.3–8.2)
RBC # BLD AUTO: 3.37 M/UL (ref 4.05–5.2)
RBC # BLD AUTO: 3.62 M/UL (ref 4.05–5.2)
RBC # BLD AUTO: 3.96 M/UL (ref 4.05–5.2)
RBC #/AREA URNS HPF: >100 /HPF
SODIUM SERPL-SCNC: 134 MMOL/L (ref 136–145)
WBC # BLD AUTO: 13.4 K/UL (ref 4.3–11.1)
WBC # BLD AUTO: 7.1 K/UL (ref 4.3–11.1)
WBC # BLD AUTO: 9.4 K/UL (ref 4.3–11.1)
WBC URNS QL MICRO: ABNORMAL /HPF

## 2018-01-01 PROCEDURE — 3331090001 HH PPS REVENUE CREDIT

## 2018-01-01 PROCEDURE — G0299 HHS/HOSPICE OF RN EA 15 MIN: HCPCS

## 2018-01-01 PROCEDURE — 3331090002 HH PPS REVENUE DEBIT

## 2018-01-01 PROCEDURE — A6209 FOAM DRSG <=16 SQ IN W/O BDR: HCPCS

## 2018-01-01 PROCEDURE — G0157 HHC PT ASSISTANT EA 15: HCPCS

## 2018-01-01 PROCEDURE — G0151 HHCP-SERV OF PT,EA 15 MIN: HCPCS

## 2018-01-01 PROCEDURE — 81003 URINALYSIS AUTO W/O SCOPE: CPT | Performed by: EMERGENCY MEDICINE

## 2018-01-01 PROCEDURE — A6260 WOUND CLEANSER ANY TYPE/SIZE: HCPCS

## 2018-01-01 PROCEDURE — 96360 HYDRATION IV INFUSION INIT: CPT | Performed by: EMERGENCY MEDICINE

## 2018-01-01 PROCEDURE — 85025 COMPLETE CBC W/AUTO DIFF WBC: CPT

## 2018-01-01 PROCEDURE — 85652 RBC SED RATE AUTOMATED: CPT

## 2018-01-01 PROCEDURE — 80076 HEPATIC FUNCTION PANEL: CPT

## 2018-01-01 PROCEDURE — 86140 C-REACTIVE PROTEIN: CPT

## 2018-01-01 PROCEDURE — 400014 HH F/U

## 2018-01-01 PROCEDURE — 74011250636 HC RX REV CODE- 250/636: Performed by: EMERGENCY MEDICINE

## 2018-01-01 PROCEDURE — A6222 GAUZE <=16 IN NO W/SAL W/O B: HCPCS

## 2018-01-01 PROCEDURE — 99284 EMERGENCY DEPT VISIT MOD MDM: CPT | Performed by: EMERGENCY MEDICINE

## 2018-01-01 PROCEDURE — 72100 X-RAY EXAM L-S SPINE 2/3 VWS: CPT

## 2018-01-01 PROCEDURE — A6212 FOAM DRG <=16 SQ IN W/BORDER: HCPCS

## 2018-01-01 PROCEDURE — 82565 ASSAY OF CREATININE: CPT

## 2018-01-01 PROCEDURE — 3331090003 HH PPS REVENUE ADJ

## 2018-01-01 PROCEDURE — 80162 ASSAY OF DIGOXIN TOTAL: CPT

## 2018-01-01 PROCEDURE — 81015 MICROSCOPIC EXAM OF URINE: CPT

## 2018-01-01 PROCEDURE — 80053 COMPREHEN METABOLIC PANEL: CPT

## 2018-01-01 RX ORDER — SODIUM CHLORIDE 0.9 % (FLUSH) 0.9 %
5-10 SYRINGE (ML) INJECTION AS NEEDED
Status: DISCONTINUED | OUTPATIENT
Start: 2018-01-01 | End: 2018-01-01 | Stop reason: HOSPADM

## 2018-01-01 RX ORDER — HYDROCODONE BITARTRATE AND ACETAMINOPHEN 5; 325 MG/1; MG/1
1 TABLET ORAL
Qty: 15 TAB | Refills: 0 | Status: ON HOLD | OUTPATIENT
Start: 2018-01-01 | End: 2019-01-01 | Stop reason: SDUPTHER

## 2018-01-01 RX ORDER — SODIUM CHLORIDE 0.9 % (FLUSH) 0.9 %
5-10 SYRINGE (ML) INJECTION EVERY 8 HOURS
Status: DISCONTINUED | OUTPATIENT
Start: 2018-01-01 | End: 2018-01-01 | Stop reason: HOSPADM

## 2018-01-01 RX ADMIN — SODIUM CHLORIDE 1000 ML: 900 INJECTION, SOLUTION INTRAVENOUS at 20:12

## 2018-01-02 ENCOUNTER — HOME CARE VISIT (OUTPATIENT)
Dept: SCHEDULING | Facility: HOME HEALTH | Age: 78
End: 2018-01-02
Payer: MEDICARE

## 2018-01-02 ENCOUNTER — HOME CARE VISIT (OUTPATIENT)
Dept: HOME HEALTH SERVICES | Facility: HOME HEALTH | Age: 78
End: 2018-01-02
Payer: MEDICARE

## 2018-01-02 VITALS
HEART RATE: 84 BPM | DIASTOLIC BLOOD PRESSURE: 72 MMHG | SYSTOLIC BLOOD PRESSURE: 124 MMHG | OXYGEN SATURATION: 97 % | RESPIRATION RATE: 16 BRPM | TEMPERATURE: 97.7 F

## 2018-01-02 PROCEDURE — G0151 HHCP-SERV OF PT,EA 15 MIN: HCPCS

## 2018-01-02 PROCEDURE — 3331090001 HH PPS REVENUE CREDIT

## 2018-01-02 PROCEDURE — 3331090002 HH PPS REVENUE DEBIT

## 2018-01-02 PROCEDURE — G0299 HHS/HOSPICE OF RN EA 15 MIN: HCPCS

## 2018-01-03 VITALS
HEART RATE: 86 BPM | TEMPERATURE: 98.6 F | SYSTOLIC BLOOD PRESSURE: 130 MMHG | DIASTOLIC BLOOD PRESSURE: 70 MMHG | OXYGEN SATURATION: 98 %

## 2018-01-03 PROCEDURE — 3331090002 HH PPS REVENUE DEBIT

## 2018-01-03 PROCEDURE — 3331090001 HH PPS REVENUE CREDIT

## 2018-01-04 ENCOUNTER — HOME CARE VISIT (OUTPATIENT)
Dept: SCHEDULING | Facility: HOME HEALTH | Age: 78
End: 2018-01-04
Payer: MEDICARE

## 2018-01-04 VITALS
HEART RATE: 76 BPM | SYSTOLIC BLOOD PRESSURE: 118 MMHG | RESPIRATION RATE: 18 BRPM | OXYGEN SATURATION: 98 % | DIASTOLIC BLOOD PRESSURE: 64 MMHG

## 2018-01-04 PROCEDURE — 3331090001 HH PPS REVENUE CREDIT

## 2018-01-04 PROCEDURE — 3331090002 HH PPS REVENUE DEBIT

## 2018-01-04 PROCEDURE — G0157 HHC PT ASSISTANT EA 15: HCPCS

## 2018-01-05 ENCOUNTER — HOME CARE VISIT (OUTPATIENT)
Dept: SCHEDULING | Facility: HOME HEALTH | Age: 78
End: 2018-01-05
Payer: MEDICARE

## 2018-01-05 VITALS
RESPIRATION RATE: 16 BRPM | OXYGEN SATURATION: 98 % | TEMPERATURE: 97.9 F | HEART RATE: 78 BPM | SYSTOLIC BLOOD PRESSURE: 124 MMHG | DIASTOLIC BLOOD PRESSURE: 62 MMHG

## 2018-01-05 PROCEDURE — G0299 HHS/HOSPICE OF RN EA 15 MIN: HCPCS

## 2018-01-05 PROCEDURE — 3331090002 HH PPS REVENUE DEBIT

## 2018-01-05 PROCEDURE — 3331090001 HH PPS REVENUE CREDIT

## 2018-01-06 PROCEDURE — 3331090001 HH PPS REVENUE CREDIT

## 2018-01-06 PROCEDURE — 3331090002 HH PPS REVENUE DEBIT

## 2018-01-07 PROCEDURE — 3331090002 HH PPS REVENUE DEBIT

## 2018-01-07 PROCEDURE — 3331090001 HH PPS REVENUE CREDIT

## 2018-01-08 PROCEDURE — 3331090001 HH PPS REVENUE CREDIT

## 2018-01-08 PROCEDURE — 3331090002 HH PPS REVENUE DEBIT

## 2018-01-09 ENCOUNTER — HOME CARE VISIT (OUTPATIENT)
Dept: SCHEDULING | Facility: HOME HEALTH | Age: 78
End: 2018-01-09
Payer: MEDICARE

## 2018-01-09 VITALS
RESPIRATION RATE: 18 BRPM | OXYGEN SATURATION: 98 % | SYSTOLIC BLOOD PRESSURE: 124 MMHG | TEMPERATURE: 97.2 F | DIASTOLIC BLOOD PRESSURE: 78 MMHG | HEART RATE: 78 BPM

## 2018-01-09 PROCEDURE — 3331090002 HH PPS REVENUE DEBIT

## 2018-01-09 PROCEDURE — G0157 HHC PT ASSISTANT EA 15: HCPCS

## 2018-01-09 PROCEDURE — 3331090001 HH PPS REVENUE CREDIT

## 2018-01-10 PROCEDURE — 3331090001 HH PPS REVENUE CREDIT

## 2018-01-10 PROCEDURE — 3331090002 HH PPS REVENUE DEBIT

## 2018-01-11 ENCOUNTER — HOME CARE VISIT (OUTPATIENT)
Dept: SCHEDULING | Facility: HOME HEALTH | Age: 78
End: 2018-01-11
Payer: MEDICARE

## 2018-01-11 VITALS
RESPIRATION RATE: 18 BRPM | DIASTOLIC BLOOD PRESSURE: 70 MMHG | TEMPERATURE: 97.8 F | SYSTOLIC BLOOD PRESSURE: 118 MMHG | HEART RATE: 84 BPM

## 2018-01-11 PROCEDURE — G0157 HHC PT ASSISTANT EA 15: HCPCS

## 2018-01-11 PROCEDURE — 3331090002 HH PPS REVENUE DEBIT

## 2018-01-11 PROCEDURE — 3331090001 HH PPS REVENUE CREDIT

## 2018-01-12 ENCOUNTER — HOME CARE VISIT (OUTPATIENT)
Dept: SCHEDULING | Facility: HOME HEALTH | Age: 78
End: 2018-01-12
Payer: MEDICARE

## 2018-01-12 VITALS
OXYGEN SATURATION: 99 % | HEART RATE: 66 BPM | DIASTOLIC BLOOD PRESSURE: 62 MMHG | TEMPERATURE: 97.7 F | SYSTOLIC BLOOD PRESSURE: 124 MMHG | RESPIRATION RATE: 16 BRPM

## 2018-01-12 PROCEDURE — G0299 HHS/HOSPICE OF RN EA 15 MIN: HCPCS

## 2018-01-12 PROCEDURE — 3331090002 HH PPS REVENUE DEBIT

## 2018-01-12 PROCEDURE — 3331090001 HH PPS REVENUE CREDIT

## 2018-01-13 PROCEDURE — 3331090001 HH PPS REVENUE CREDIT

## 2018-01-13 PROCEDURE — 3331090002 HH PPS REVENUE DEBIT

## 2018-01-14 PROCEDURE — 3331090001 HH PPS REVENUE CREDIT

## 2018-01-14 PROCEDURE — 3331090002 HH PPS REVENUE DEBIT

## 2018-01-15 PROCEDURE — 3331090002 HH PPS REVENUE DEBIT

## 2018-01-15 PROCEDURE — 3331090001 HH PPS REVENUE CREDIT

## 2018-01-16 ENCOUNTER — HOME CARE VISIT (OUTPATIENT)
Dept: SCHEDULING | Facility: HOME HEALTH | Age: 78
End: 2018-01-16
Payer: MEDICARE

## 2018-01-16 PROCEDURE — 3331090002 HH PPS REVENUE DEBIT

## 2018-01-16 PROCEDURE — 3331090001 HH PPS REVENUE CREDIT

## 2018-01-16 PROCEDURE — G0151 HHCP-SERV OF PT,EA 15 MIN: HCPCS

## 2018-01-17 ENCOUNTER — HOME CARE VISIT (OUTPATIENT)
Dept: SCHEDULING | Facility: HOME HEALTH | Age: 78
End: 2018-01-17
Payer: MEDICARE

## 2018-01-17 VITALS
RESPIRATION RATE: 16 BRPM | SYSTOLIC BLOOD PRESSURE: 118 MMHG | HEART RATE: 71 BPM | DIASTOLIC BLOOD PRESSURE: 60 MMHG | OXYGEN SATURATION: 98 % | TEMPERATURE: 97.7 F

## 2018-01-17 VITALS
TEMPERATURE: 98.8 F | OXYGEN SATURATION: 99 % | HEART RATE: 86 BPM | DIASTOLIC BLOOD PRESSURE: 64 MMHG | SYSTOLIC BLOOD PRESSURE: 124 MMHG

## 2018-01-17 PROCEDURE — 3331090001 HH PPS REVENUE CREDIT

## 2018-01-17 PROCEDURE — 3331090002 HH PPS REVENUE DEBIT

## 2018-01-17 PROCEDURE — G0299 HHS/HOSPICE OF RN EA 15 MIN: HCPCS

## 2018-01-18 PROCEDURE — 3331090001 HH PPS REVENUE CREDIT

## 2018-01-18 PROCEDURE — 3331090002 HH PPS REVENUE DEBIT

## 2018-01-19 PROCEDURE — 3331090002 HH PPS REVENUE DEBIT

## 2018-01-19 PROCEDURE — 3331090001 HH PPS REVENUE CREDIT

## 2018-01-20 PROCEDURE — 3331090002 HH PPS REVENUE DEBIT

## 2018-01-20 PROCEDURE — 3331090001 HH PPS REVENUE CREDIT

## 2018-01-21 PROCEDURE — 3331090001 HH PPS REVENUE CREDIT

## 2018-01-21 PROCEDURE — 3331090002 HH PPS REVENUE DEBIT

## 2018-01-22 PROCEDURE — 3331090001 HH PPS REVENUE CREDIT

## 2018-01-22 PROCEDURE — 3331090002 HH PPS REVENUE DEBIT

## 2018-01-23 PROCEDURE — 3331090001 HH PPS REVENUE CREDIT

## 2018-01-23 PROCEDURE — 3331090002 HH PPS REVENUE DEBIT

## 2018-01-24 PROCEDURE — 3331090001 HH PPS REVENUE CREDIT

## 2018-01-24 PROCEDURE — 3331090002 HH PPS REVENUE DEBIT

## 2018-01-25 PROCEDURE — 3331090002 HH PPS REVENUE DEBIT

## 2018-01-25 PROCEDURE — 3331090001 HH PPS REVENUE CREDIT

## 2018-01-26 ENCOUNTER — HOME CARE VISIT (OUTPATIENT)
Dept: SCHEDULING | Facility: HOME HEALTH | Age: 78
End: 2018-01-26
Payer: MEDICARE

## 2018-01-26 VITALS
SYSTOLIC BLOOD PRESSURE: 112 MMHG | OXYGEN SATURATION: 99 % | RESPIRATION RATE: 16 BRPM | HEART RATE: 87 BPM | TEMPERATURE: 97.7 F | DIASTOLIC BLOOD PRESSURE: 60 MMHG

## 2018-01-26 PROCEDURE — 3331090002 HH PPS REVENUE DEBIT

## 2018-01-26 PROCEDURE — G0299 HHS/HOSPICE OF RN EA 15 MIN: HCPCS

## 2018-01-26 PROCEDURE — 3331090001 HH PPS REVENUE CREDIT

## 2018-01-27 PROCEDURE — 3331090001 HH PPS REVENUE CREDIT

## 2018-01-27 PROCEDURE — 3331090002 HH PPS REVENUE DEBIT

## 2018-01-28 PROCEDURE — 3331090001 HH PPS REVENUE CREDIT

## 2018-01-28 PROCEDURE — 3331090002 HH PPS REVENUE DEBIT

## 2018-01-29 PROCEDURE — 3331090002 HH PPS REVENUE DEBIT

## 2018-01-29 PROCEDURE — 3331090001 HH PPS REVENUE CREDIT

## 2018-01-30 PROCEDURE — 3331090001 HH PPS REVENUE CREDIT

## 2018-01-30 PROCEDURE — 3331090002 HH PPS REVENUE DEBIT

## 2018-01-31 ENCOUNTER — HOME CARE VISIT (OUTPATIENT)
Dept: SCHEDULING | Facility: HOME HEALTH | Age: 78
End: 2018-01-31
Payer: MEDICARE

## 2018-01-31 VITALS
HEART RATE: 73 BPM | DIASTOLIC BLOOD PRESSURE: 62 MMHG | TEMPERATURE: 97.7 F | RESPIRATION RATE: 16 BRPM | SYSTOLIC BLOOD PRESSURE: 110 MMHG | OXYGEN SATURATION: 99 %

## 2018-01-31 PROCEDURE — G0299 HHS/HOSPICE OF RN EA 15 MIN: HCPCS

## 2018-01-31 PROCEDURE — 3331090001 HH PPS REVENUE CREDIT

## 2018-01-31 PROCEDURE — 3331090002 HH PPS REVENUE DEBIT

## 2018-02-01 PROCEDURE — 3331090001 HH PPS REVENUE CREDIT

## 2018-02-01 PROCEDURE — 3331090002 HH PPS REVENUE DEBIT

## 2018-02-02 PROCEDURE — 3331090001 HH PPS REVENUE CREDIT

## 2018-02-02 PROCEDURE — 3331090002 HH PPS REVENUE DEBIT

## 2018-02-03 PROCEDURE — 3331090001 HH PPS REVENUE CREDIT

## 2018-02-03 PROCEDURE — 3331090002 HH PPS REVENUE DEBIT

## 2018-02-04 PROCEDURE — 3331090001 HH PPS REVENUE CREDIT

## 2018-02-04 PROCEDURE — 3331090002 HH PPS REVENUE DEBIT

## 2018-02-05 PROCEDURE — 3331090001 HH PPS REVENUE CREDIT

## 2018-02-05 PROCEDURE — 3331090002 HH PPS REVENUE DEBIT

## 2018-02-06 ENCOUNTER — HOME CARE VISIT (OUTPATIENT)
Dept: SCHEDULING | Facility: HOME HEALTH | Age: 78
End: 2018-02-06
Payer: MEDICARE

## 2018-02-06 VITALS
HEART RATE: 77 BPM | TEMPERATURE: 97.9 F | DIASTOLIC BLOOD PRESSURE: 62 MMHG | OXYGEN SATURATION: 97 % | RESPIRATION RATE: 16 BRPM | SYSTOLIC BLOOD PRESSURE: 118 MMHG

## 2018-02-06 PROCEDURE — G0299 HHS/HOSPICE OF RN EA 15 MIN: HCPCS

## 2018-02-06 PROCEDURE — 3331090003 HH PPS REVENUE ADJ

## 2018-02-06 PROCEDURE — 3331090002 HH PPS REVENUE DEBIT

## 2018-02-06 PROCEDURE — 3331090001 HH PPS REVENUE CREDIT

## 2018-02-07 PROCEDURE — 3331090001 HH PPS REVENUE CREDIT

## 2018-02-07 PROCEDURE — 3331090002 HH PPS REVENUE DEBIT

## 2018-02-08 PROCEDURE — 3331090001 HH PPS REVENUE CREDIT

## 2018-02-08 PROCEDURE — 3331090002 HH PPS REVENUE DEBIT

## 2018-02-09 ENCOUNTER — HOME CARE VISIT (OUTPATIENT)
Dept: HOME HEALTH SERVICES | Facility: HOME HEALTH | Age: 78
End: 2018-02-09
Payer: MEDICARE

## 2018-02-09 PROCEDURE — 3331090001 HH PPS REVENUE CREDIT

## 2018-02-09 PROCEDURE — 3331090002 HH PPS REVENUE DEBIT

## 2018-02-10 PROCEDURE — 3331090002 HH PPS REVENUE DEBIT

## 2018-02-10 PROCEDURE — 3331090001 HH PPS REVENUE CREDIT

## 2018-02-11 PROCEDURE — 3331090001 HH PPS REVENUE CREDIT

## 2018-02-11 PROCEDURE — 3331090002 HH PPS REVENUE DEBIT

## 2018-02-12 ENCOUNTER — HOME CARE VISIT (OUTPATIENT)
Dept: HOME HEALTH SERVICES | Facility: HOME HEALTH | Age: 78
End: 2018-02-12
Payer: MEDICARE

## 2018-02-12 PROCEDURE — 3331090002 HH PPS REVENUE DEBIT

## 2018-02-12 PROCEDURE — 3331090001 HH PPS REVENUE CREDIT

## 2018-02-13 ENCOUNTER — HOME CARE VISIT (OUTPATIENT)
Dept: SCHEDULING | Facility: HOME HEALTH | Age: 78
End: 2018-02-13
Payer: MEDICARE

## 2018-02-13 ENCOUNTER — APPOINTMENT (OUTPATIENT)
Dept: GENERAL RADIOLOGY | Age: 78
DRG: 872 | End: 2018-02-13
Attending: EMERGENCY MEDICINE
Payer: MEDICARE

## 2018-02-13 ENCOUNTER — HOSPITAL ENCOUNTER (INPATIENT)
Age: 78
LOS: 6 days | Discharge: NURSING, REHAB OR LONG-TERM CARE | DRG: 872 | End: 2018-02-20
Attending: EMERGENCY MEDICINE | Admitting: FAMILY MEDICINE
Payer: MEDICARE

## 2018-02-13 VITALS
DIASTOLIC BLOOD PRESSURE: 66 MMHG | SYSTOLIC BLOOD PRESSURE: 126 MMHG | RESPIRATION RATE: 16 BRPM | OXYGEN SATURATION: 97 % | TEMPERATURE: 97.5 F | HEART RATE: 77 BPM

## 2018-02-13 DIAGNOSIS — R50.9 FEVER, UNSPECIFIED FEVER CAUSE: ICD-10-CM

## 2018-02-13 DIAGNOSIS — I73.9 PAD (PERIPHERAL ARTERY DISEASE) (HCC): ICD-10-CM

## 2018-02-13 DIAGNOSIS — I48.91 ATRIAL FIBRILLATION WITH RAPID VENTRICULAR RESPONSE (HCC): Primary | ICD-10-CM

## 2018-02-13 DIAGNOSIS — I48.91 ATRIAL FIBRILLATION WITH RVR (HCC): ICD-10-CM

## 2018-02-13 PROBLEM — D72.829 LEUKOCYTOSIS: Status: ACTIVE | Noted: 2018-02-13

## 2018-02-13 PROBLEM — L03.90 CELLULITIS: Status: RESOLVED | Noted: 2017-12-06 | Resolved: 2018-02-13

## 2018-02-13 LAB
ALBUMIN SERPL-MCNC: 3.2 G/DL (ref 3.2–4.6)
ALBUMIN/GLOB SERPL: 0.7 {RATIO} (ref 1.2–3.5)
ALP SERPL-CCNC: 129 U/L (ref 50–136)
ALT SERPL-CCNC: 30 U/L (ref 12–65)
ANION GAP SERPL CALC-SCNC: 12 MMOL/L (ref 7–16)
AST SERPL-CCNC: 45 U/L (ref 15–37)
BACTERIA URNS QL MICRO: ABNORMAL /HPF
BASOPHILS # BLD: 0 K/UL (ref 0–0.2)
BASOPHILS NFR BLD: 0 % (ref 0–2)
BILIRUB SERPL-MCNC: 1 MG/DL (ref 0.2–1.1)
BUN SERPL-MCNC: 21 MG/DL (ref 8–23)
CALCIUM SERPL-MCNC: 9.3 MG/DL (ref 8.3–10.4)
CASTS URNS QL MICRO: ABNORMAL /LPF
CHLORIDE SERPL-SCNC: 100 MMOL/L (ref 98–107)
CO2 SERPL-SCNC: 25 MMOL/L (ref 21–32)
CREAT SERPL-MCNC: 0.86 MG/DL (ref 0.6–1)
DIFFERENTIAL METHOD BLD: ABNORMAL
EOSINOPHIL # BLD: 0 K/UL (ref 0–0.8)
EOSINOPHIL NFR BLD: 0 % (ref 0.5–7.8)
EPI CELLS #/AREA URNS HPF: ABNORMAL /HPF
ERYTHROCYTE [DISTWIDTH] IN BLOOD BY AUTOMATED COUNT: 13.3 % (ref 11.9–14.6)
FLUAV AG NPH QL IA: NEGATIVE
FLUBV AG NPH QL IA: NEGATIVE
GLOBULIN SER CALC-MCNC: 4.8 G/DL (ref 2.3–3.5)
GLUCOSE BLD STRIP.AUTO-MCNC: 259 MG/DL (ref 65–100)
GLUCOSE SERPL-MCNC: 299 MG/DL (ref 65–100)
HCT VFR BLD AUTO: 40.3 % (ref 35.8–46.3)
HGB BLD-MCNC: 13.5 G/DL (ref 11.7–15.4)
IMM GRANULOCYTES # BLD: 0.1 K/UL (ref 0–0.5)
IMM GRANULOCYTES NFR BLD AUTO: 0 % (ref 0–5)
LACTATE BLD-SCNC: 2.1 MMOL/L (ref 0.5–1.9)
LYMPHOCYTES # BLD: 1.5 K/UL (ref 0.5–4.6)
LYMPHOCYTES NFR BLD: 8 % (ref 13–44)
MAGNESIUM SERPL-MCNC: 1.7 MG/DL (ref 1.8–2.4)
MCH RBC QN AUTO: 32.8 PG (ref 26.1–32.9)
MCHC RBC AUTO-ENTMCNC: 33.5 G/DL (ref 31.4–35)
MCV RBC AUTO: 97.8 FL (ref 79.6–97.8)
MONOCYTES # BLD: 1 K/UL (ref 0.1–1.3)
MONOCYTES NFR BLD: 5 % (ref 4–12)
NEUTS SEG # BLD: 16.9 K/UL (ref 1.7–8.2)
NEUTS SEG NFR BLD: 87 % (ref 43–78)
PLATELET # BLD AUTO: 224 K/UL (ref 150–450)
PMV BLD AUTO: 9.4 FL (ref 10.8–14.1)
POTASSIUM SERPL-SCNC: 4.1 MMOL/L (ref 3.5–5.1)
PROT SERPL-MCNC: 8 G/DL (ref 6.3–8.2)
RBC # BLD AUTO: 4.12 M/UL (ref 4.05–5.25)
RBC #/AREA URNS HPF: >100 /HPF
SODIUM SERPL-SCNC: 137 MMOL/L (ref 136–145)
WBC # BLD AUTO: 19.4 K/UL (ref 4.3–11.1)
WBC URNS QL MICRO: ABNORMAL /HPF

## 2018-02-13 PROCEDURE — 82962 GLUCOSE BLOOD TEST: CPT

## 2018-02-13 PROCEDURE — 80053 COMPREHEN METABOLIC PANEL: CPT | Performed by: EMERGENCY MEDICINE

## 2018-02-13 PROCEDURE — 93005 ELECTROCARDIOGRAM TRACING: CPT | Performed by: EMERGENCY MEDICINE

## 2018-02-13 PROCEDURE — 74011000250 HC RX REV CODE- 250: Performed by: EMERGENCY MEDICINE

## 2018-02-13 PROCEDURE — 96366 THER/PROPH/DIAG IV INF ADDON: CPT | Performed by: EMERGENCY MEDICINE

## 2018-02-13 PROCEDURE — 3331090002 HH PPS REVENUE DEBIT

## 2018-02-13 PROCEDURE — 87804 INFLUENZA ASSAY W/OPTIC: CPT | Performed by: EMERGENCY MEDICINE

## 2018-02-13 PROCEDURE — 85025 COMPLETE CBC W/AUTO DIFF WBC: CPT | Performed by: EMERGENCY MEDICINE

## 2018-02-13 PROCEDURE — 96367 TX/PROPH/DG ADDL SEQ IV INF: CPT | Performed by: EMERGENCY MEDICINE

## 2018-02-13 PROCEDURE — 96365 THER/PROPH/DIAG IV INF INIT: CPT | Performed by: EMERGENCY MEDICINE

## 2018-02-13 PROCEDURE — 99218 HC RM OBSERVATION: CPT

## 2018-02-13 PROCEDURE — 400014 HH F/U

## 2018-02-13 PROCEDURE — G0299 HHS/HOSPICE OF RN EA 15 MIN: HCPCS

## 2018-02-13 PROCEDURE — 74011250636 HC RX REV CODE- 250/636: Performed by: EMERGENCY MEDICINE

## 2018-02-13 PROCEDURE — 74011000258 HC RX REV CODE- 258: Performed by: EMERGENCY MEDICINE

## 2018-02-13 PROCEDURE — 99285 EMERGENCY DEPT VISIT HI MDM: CPT | Performed by: EMERGENCY MEDICINE

## 2018-02-13 PROCEDURE — 81003 URINALYSIS AUTO W/O SCOPE: CPT | Performed by: EMERGENCY MEDICINE

## 2018-02-13 PROCEDURE — 71045 X-RAY EXAM CHEST 1 VIEW: CPT

## 2018-02-13 PROCEDURE — 87040 BLOOD CULTURE FOR BACTERIA: CPT | Performed by: EMERGENCY MEDICINE

## 2018-02-13 PROCEDURE — 3331090001 HH PPS REVENUE CREDIT

## 2018-02-13 PROCEDURE — 83735 ASSAY OF MAGNESIUM: CPT | Performed by: EMERGENCY MEDICINE

## 2018-02-13 PROCEDURE — 83605 ASSAY OF LACTIC ACID: CPT

## 2018-02-13 PROCEDURE — 81015 MICROSCOPIC EXAM OF URINE: CPT | Performed by: EMERGENCY MEDICINE

## 2018-02-13 RX ORDER — SODIUM CHLORIDE 0.9 % (FLUSH) 0.9 %
5-10 SYRINGE (ML) INJECTION AS NEEDED
Status: DISCONTINUED | OUTPATIENT
Start: 2018-02-13 | End: 2018-02-20 | Stop reason: HOSPADM

## 2018-02-13 RX ORDER — MAGNESIUM SULFATE HEPTAHYDRATE 40 MG/ML
2 INJECTION, SOLUTION INTRAVENOUS
Status: COMPLETED | OUTPATIENT
Start: 2018-02-13 | End: 2018-02-13

## 2018-02-13 RX ORDER — ASPIRIN 81 MG/1
81 TABLET ORAL DAILY
Status: DISCONTINUED | OUTPATIENT
Start: 2018-02-14 | End: 2018-02-20 | Stop reason: HOSPADM

## 2018-02-13 RX ORDER — PANTOPRAZOLE SODIUM 40 MG/1
40 TABLET, DELAYED RELEASE ORAL
Status: DISCONTINUED | OUTPATIENT
Start: 2018-02-14 | End: 2018-02-20 | Stop reason: HOSPADM

## 2018-02-13 RX ORDER — FERROUS SULFATE, DRIED 160(50) MG
1 TABLET, EXTENDED RELEASE ORAL
Status: DISCONTINUED | OUTPATIENT
Start: 2018-02-14 | End: 2018-02-20 | Stop reason: HOSPADM

## 2018-02-13 RX ORDER — DIGOXIN 125 MCG
0.12 TABLET ORAL DAILY
Status: DISCONTINUED | OUTPATIENT
Start: 2018-02-14 | End: 2018-02-20 | Stop reason: HOSPADM

## 2018-02-13 RX ORDER — ERGOCALCIFEROL 1.25 MG/1
50000 CAPSULE ORAL
Status: DISCONTINUED | OUTPATIENT
Start: 2018-02-28 | End: 2018-02-20 | Stop reason: HOSPADM

## 2018-02-13 RX ORDER — INSULIN GLARGINE 100 [IU]/ML
9 INJECTION, SOLUTION SUBCUTANEOUS
Status: DISCONTINUED | OUTPATIENT
Start: 2018-02-13 | End: 2018-02-17

## 2018-02-13 RX ORDER — SODIUM CHLORIDE 0.9 % (FLUSH) 0.9 %
5-10 SYRINGE (ML) INJECTION EVERY 8 HOURS
Status: DISCONTINUED | OUTPATIENT
Start: 2018-02-13 | End: 2018-02-20 | Stop reason: HOSPADM

## 2018-02-13 RX ORDER — METOPROLOL TARTRATE 50 MG/1
50 TABLET ORAL 2 TIMES DAILY
Status: DISCONTINUED | OUTPATIENT
Start: 2018-02-14 | End: 2018-02-20 | Stop reason: HOSPADM

## 2018-02-13 RX ORDER — GLIPIZIDE 5 MG/1
10 TABLET ORAL DAILY
Status: DISCONTINUED | OUTPATIENT
Start: 2018-02-14 | End: 2018-02-17

## 2018-02-13 RX ORDER — INSULIN LISPRO 100 [IU]/ML
INJECTION, SOLUTION INTRAVENOUS; SUBCUTANEOUS
Status: DISCONTINUED | OUTPATIENT
Start: 2018-02-14 | End: 2018-02-20 | Stop reason: HOSPADM

## 2018-02-13 RX ORDER — OSELTAMIVIR PHOSPHATE 30 MG/1
30 CAPSULE ORAL 2 TIMES DAILY
Status: DISCONTINUED | OUTPATIENT
Start: 2018-02-14 | End: 2018-02-16

## 2018-02-13 RX ORDER — PREDNISONE 5 MG/1
5 TABLET ORAL
Status: DISCONTINUED | OUTPATIENT
Start: 2018-02-14 | End: 2018-02-20 | Stop reason: HOSPADM

## 2018-02-13 RX ORDER — LANOLIN ALCOHOL/MO/W.PET/CERES
1000 CREAM (GRAM) TOPICAL
Status: DISCONTINUED | OUTPATIENT
Start: 2018-02-14 | End: 2018-02-20 | Stop reason: HOSPADM

## 2018-02-13 RX ORDER — ACETAMINOPHEN 325 MG/1
650 TABLET ORAL
Status: DISCONTINUED | OUTPATIENT
Start: 2018-02-13 | End: 2018-02-20 | Stop reason: HOSPADM

## 2018-02-13 RX ORDER — OSELTAMIVIR PHOSPHATE 75 MG/1
75 CAPSULE ORAL 2 TIMES DAILY
Status: DISCONTINUED | OUTPATIENT
Start: 2018-02-14 | End: 2018-02-13 | Stop reason: DRUGHIGH

## 2018-02-13 RX ORDER — METHIMAZOLE 5 MG/1
5 TABLET ORAL DAILY
Status: DISCONTINUED | OUTPATIENT
Start: 2018-02-14 | End: 2018-02-20 | Stop reason: HOSPADM

## 2018-02-13 RX ORDER — LANOLIN ALCOHOL/MO/W.PET/CERES
1 CREAM (GRAM) TOPICAL
Status: DISCONTINUED | OUTPATIENT
Start: 2018-02-14 | End: 2018-02-20 | Stop reason: HOSPADM

## 2018-02-13 RX ORDER — HYDROCODONE BITARTRATE AND ACETAMINOPHEN 5; 325 MG/1; MG/1
1 TABLET ORAL
Status: DISCONTINUED | OUTPATIENT
Start: 2018-02-13 | End: 2018-02-20 | Stop reason: HOSPADM

## 2018-02-13 RX ORDER — DILTIAZEM HYDROCHLORIDE 5 MG/ML
15 INJECTION INTRAVENOUS ONCE
Status: COMPLETED | OUTPATIENT
Start: 2018-02-13 | End: 2018-02-13

## 2018-02-13 RX ORDER — TRAMADOL HYDROCHLORIDE 50 MG/1
50 TABLET ORAL
Status: DISCONTINUED | OUTPATIENT
Start: 2018-02-13 | End: 2018-02-20 | Stop reason: HOSPADM

## 2018-02-13 RX ORDER — LORAZEPAM 1 MG/1
1 TABLET ORAL 2 TIMES DAILY
Status: DISCONTINUED | OUTPATIENT
Start: 2018-02-14 | End: 2018-02-14

## 2018-02-13 RX ADMIN — MAGNESIUM SULFATE HEPTAHYDRATE 2 G: 40 INJECTION, SOLUTION INTRAVENOUS at 21:11

## 2018-02-13 RX ADMIN — SODIUM CHLORIDE 5 MG/HR: 900 INJECTION, SOLUTION INTRAVENOUS at 20:02

## 2018-02-13 RX ADMIN — CEFTRIAXONE SODIUM 1 G: 1 INJECTION, POWDER, FOR SOLUTION INTRAMUSCULAR; INTRAVENOUS at 20:20

## 2018-02-13 RX ADMIN — SODIUM CHLORIDE 1000 ML: 900 INJECTION, SOLUTION INTRAVENOUS at 20:02

## 2018-02-13 RX ADMIN — DILTIAZEM HYDROCHLORIDE 15 MG: 5 INJECTION INTRAVENOUS at 20:03

## 2018-02-13 NOTE — IP AVS SNAPSHOT
303 [facility] 
 
 
 [address] 
[phone] Patient: Deepali Youngblood MRN: [id number] :1940 About your hospitalization You were admitted on:  2018 You last received care in the:  [facility] 3 TELEMETRY You were discharged on:  2018 Why you were hospitalized Your primary diagnosis was:  Atrial Fibrillation With Rapid Ventricular Response (Hcc) Your diagnoses also included:  Hypomagnesemia, Essential Hypertension, Benign, Hyperglycemia, Pad (Peripheral Artery Disease) (Hcc), Dyslipidemia, Diabetes Mellitus Type 2, Controlled (Hcc), Chf (Congestive Heart Failure) (Hcc), Atrial Fibrillation (Hcc), Cad (Coronary Artery Disease), Leukocytosis, Atrial Fibrillation With Rvr (Hcc), Sepsis (Hcc) Follow-up Information Follow up With Details Comments Contact Info Asif Abdi MD Go to when discharged from SNF, for diabetes follow up 1 [facility] [address] [facility] [address] [facility] [address] 
[phone] Marilyn Lea MD On 3/5/2018 Monday, March 5th at 10:55am  [facility] [address] [facility] [address] 
[phone] [facility]   [address] [facility] [address] 
[phone] Your Scheduled Appointments 2018  2:30 PM EST  
REMOTE DEVICE CHECK ORDERS ONLY ENCOUNTER with GVLLE REMOTE PACER 34 [facility] CARDIOLOGY [place] OFFICE ([address] [place] [address]) 2 [facility] [address]  
[phone] Please remember THIS REMOTE CHECK IS COMPLETED FROM HOME - YOU WILL NOT COME TO THE OFFICE FOR THIS APPOINTMENT. In preparation for this check, please ensure your monitor box is working appropriately.   If your monitor requires you to send a transmission, please make sure it is sent by 11:00AM on this day so we can have it processed and resulted to your doctor without delay. If you have a question or problem with the monitor box, please contact your respective company:   [facility]/[place] - [phone]  [employer] - [phone]  [employer] - [phone]  [employer]/[facility] - [phone]  If you have any further questions or need to move this appointment, we are happy to help and can be reached at [phone]. Discharge Orders None A check jenn indicates which time of day the medication should be taken. My Medications START taking these medications Instructions Each Dose to Equal  
 Morning Noon Evening Bedtime  
 cephALEXin 500 mg capsule Commonly known as:  Morna Settler Take 1 Cap by mouth three (3) times daily for 7 days. 500 mg  
    
  
   
  
   
   
  
  
 doxycycline 100 mg capsule Commonly known as:  VIBRAMYCIN Take 1 Cap by mouth every twelve (12) hours for 7 days. 100 mg CHANGE how you take these medications Instructions Each Dose to Equal  
 Morning Noon Evening Bedtime  
 apixaban 5 mg tablet Commonly known as:  Caesar Po What changed:   
- medication strength 
- how much to take Take 1 Tab by mouth every twelve (12) hours. 5 mg  
    
  
   
   
   
  
  
 digoxin 0.125 mg tablet Commonly known as:  LANOXIN What changed:  additional instructions Take 1 Tab by mouth daily. 0.125 mg  
    
  
   
   
   
  
 metoprolol tartrate 50 mg tablet Commonly known as:  LOPRESSOR What changed:  additional instructions Take 1 Tab by mouth two (2) times a day. 50 mg  
    
  
   
   
   
  
  
 predniSONE 5 mg tablet Commonly known as:  Bradd Sonal What changed:  additional instructions Take 1 Tab by mouth daily (with breakfast). 5 mg  
    
  
   
   
   
  
 traMADol 50 mg tablet Commonly known as:  ULTRAM  
What changed:   
- when to take this - reasons to take this Your next dose is:  As needed Take 1 Tab by mouth every eight (8) hours as needed for Pain. Max Daily Amount: 150 mg.  
 50 mg CONTINUE taking these medications Instructions Each Dose to Equal  
 Morning Noon Evening Bedtime  
 aspirin delayed-release 81 mg tablet Take 81 mg by mouth daily. Morning  Take day of surgery per anesthesia protocol. 81 mg  
    
  
   
   
   
  
 ATIVAN 1 mg tablet Generic drug:  LORazepam  
   
 Take 1 mg by mouth two (2) times a day. Take day of surgery per anesthesia protocol. 1 mg CALCIUM 300 PO Take 1 Tab by mouth daily. Stop seven days prior to surgery per anesthesia protocol. 1 Tab  
    
  
   
   
   
  
 ergocalciferol 50,000 unit capsule Commonly known as:  ERGOCALCIFEROL Your next dose is:  AS DIRECTED. Take 50,000 Units by mouth every month. Stop seven days prior to surgery per anesthesia protocol. 10419 Units  
    
   
   
   
  
 ferrous sulfate 325 mg (65 mg iron) tablet Take  by mouth Daily (before lunch). FISH OIL PO Take 2 Tabs by mouth two (2) times a day. Stop seven days prior to surgery per anesthesia protocol. 2 Tab  
    
  
   
   
   
  
  
 glipiZIDE 10 mg tablet Commonly known as:  Louana [place] Take 10 mg by mouth daily. Two tablets in the morning   Indications: type 2 diabetes mellitus 10 mg  
    
  
   
   
   
  
 GLUCOSAMINE COMPLEX PO Take 1 Tab by mouth three (3) times daily. Stop seven days prior to surgery per anesthesia protocol. 1 Tab  
    
  
   
  
   
   
  
  
 methIMAzole 5 mg tablet Commonly known as:  TAPAZOLE Take 5 mg by mouth daily. Take day of surgery per anesthesia protocol. Pt reports she alternates 1/12 tablets and two tablets every other day  Indications: hyperthyroidism, morning 5 mg multivitamin tablet Commonly known as:  ONE A DAY Take 1 Tab by mouth daily. Stop seven days prior to surgery per anesthesia protocol. 1 Tab PRILOSEC PO Take 20 mg by mouth daily. .Take day of surgery per anesthesia protocol. 20 mg  
    
  
   
   
   
  
 TRADJENTA 5 mg tablet Generic drug:  linagliptin Take 5 mg by mouth daily. Indications: type 2 diabetes mellitus, morning 5 mg VITAMIN B-12 PO Take 1 Tab by mouth daily. Stop seven days prior to surgery per anesthesia protocol. 1 Tab STOP taking these medications HYDROcodone-acetaminophen 5-325 mg per tablet Commonly known as:  NORCO  
   
  
 insulin glargine 100 unit/mL (3 mL) Inpn Commonly known as:  Jonathon Solomon Where to Get Your Medications These medications were sent to [place] [address] Phone:  [phone]  
  apixaban 5 mg tablet  
 cephALEXin 500 mg capsule  
 doxycycline 100 mg capsule Information on where to get these meds will be given to you by the nurse or doctor. ! Ask your nurse or doctor about these medications  
  traMADol 50 mg tablet Discharge Instructions Atrial Fibrillation: Care Instructions Your Care Instructions Atrial fibrillation is an irregular and often fast heartbeat. Treating this condition is important for several reasons. It can cause blood clots, which can travel from your heart to your brain and cause a stroke. If you have a fast heartbeat, you may feel lightheaded, dizzy, and weak. An irregular heartbeat can also increase your risk for heart failure. Atrial fibrillation is often the result of another heart condition, such as high blood pressure or coronary artery disease.  Making changes to improve your heart condition will help you stay healthy and active. Follow-up care is a key part of your treatment and safety. Be sure to make and go to all appointments, and call your doctor if you are having problems. It's also a good idea to know your test results and keep a list of the medicines you take. How can you care for yourself at home? Medicines ? · Take your medicines exactly as prescribed. Call your doctor if you think you are having a problem with your medicine. You will get more details on the specific medicines your doctor prescribes. ? · If your doctor has given you a blood thinner to prevent a stroke, be sure you get instructions about how to take your medicine safely. Blood thinners can cause serious bleeding problems. ? · Do not take any vitamins, over-the-counter drugs, or herbal products without talking to your doctor first. ? Lifestyle changes ? · Do not smoke. Smoking can increase your chance of a stroke and heart attack. If you need help quitting, talk to your doctor about stop-smoking programs and medicines. These can increase your chances of quitting for good. ? · Eat a heart-healthy diet. ? · Stay at a healthy weight. Lose weight if you need to.  
? · Limit alcohol to 2 drinks a day for men and 1 drink a day for women. Too much alcohol can cause health problems. ? · Avoid colds and flu. Get a pneumococcal vaccine shot. If you have had one before, ask your doctor whether you need another dose. Get a flu shot every year. If you must be around people with colds or flu, wash your hands often. Activity ? · If your doctor recommends it, get more exercise. Walking is a good choice. Bit by bit, increase the amount you walk every day. Try for at least 30 minutes on most days of the week. You also may want to swim, bike, or do other activities.  Your doctor may suggest that you join a cardiac rehabilitation program so that you can have help increasing your physical activity safely. ? · Start light exercise if your doctor says it is okay. Even a small amount will help you get stronger, have more energy, and manage stress. Walking is an easy way to get exercise. Start out by walking a little more than you did in the hospital. Gradually increase the amount you walk. ? · When you exercise, watch for signs that your heart is working too hard. You are pushing too hard if you cannot talk while you are exercising. If you become short of breath or dizzy or have chest pain, sit down and rest immediately. ? · Check your pulse regularly. Place two fingers on the artery at the palm side of your wrist, in line with your thumb. If your heartbeat seems uneven or fast, talk to your doctor. When should you call for help? Call 911 anytime you think you may need emergency care. For example, call if: 
? · You have symptoms of a heart attack. These may include: ¨ Chest pain or pressure, or a strange feeling in the chest. 
¨ Sweating. ¨ Shortness of breath. ¨ Nausea or vomiting. ¨ Pain, pressure, or a strange feeling in the back, neck, jaw, or upper belly or in one or both shoulders or arms. ¨ Lightheadedness or sudden weakness. ¨ A fast or irregular heartbeat. After you call 911, the  may tell you to chew 1 adult-strength or 2 to 4 low-dose aspirin. Wait for an ambulance. Do not try to drive yourself. ? · You have symptoms of a stroke. These may include: 
¨ Sudden numbness, tingling, weakness, or loss of movement in your face, arm, or leg, especially on only one side of your body. ¨ Sudden vision changes. ¨ Sudden trouble speaking. ¨ Sudden confusion or trouble understanding simple statements. ¨ Sudden problems with walking or balance. ¨ A sudden, severe headache that is different from past headaches. ? · You passed out (lost consciousness). ?Call your doctor now or seek immediate medical care if: 
? · You have new or increased shortness of breath. ? · You feel dizzy or lightheaded, or you feel like you may faint. ? · Your heart rate becomes irregular. ? · You can feel your heart flutter in your chest or skip heartbeats. Tell your doctor if these symptoms are new or worse. ? Watch closely for changes in your health, and be sure to contact your doctor if you have any problems. Where can you learn more? Go to http://tae-bairon.info/. Enter U020 in the search box to learn more about \"Atrial Fibrillation: Care Instructions. \" Current as of: September 21, 2016 Content Version: 11.4 © 0106-1619 [employer]. Care instructions adapted under license by [employer] (which disclaims liability or warranty for this information). If you have questions about a medical condition or this instruction, always ask your healthcare professional. [address] any warranty or liability for your use of this information. Bunions: Care Instructions Your Care Instructions A bunion is a bump on the outside of the joint at the bottom of your big toe. It can cause pain and swelling in the toe. A bunion forms when bone or tissue around the joint becomes swollen from too much pressure. You also can have a bunionette, or tailor's bunion, which forms on the joint of the little toe. Sometimes, a bunion on the big toe turns the toe in toward the second toe. This is called displacement. It can lead to problems with the other toes. You can get a bunion from having an unusual walking style, having flatfeet, or wearing tight-fitting shoes. You can treat most bunions at home with a few simple steps. If you have a lot of pain, your doctor may inject medicine into the bunion to reduce swelling for a while. If you still have pain, you may need to have surgery. Follow-up care is a key part of your treatment and safety.  Be sure to make and go to all appointments, and call your doctor if you are having problems. It's also a good idea to know your test results and keep a list of the medicines you take. How can you care for yourself at home? · Ask your doctor if you can take an over-the-counter pain medicine, such as acetaminophen (Tylenol), ibuprofen (Advil, Motrin), or naproxen (Aleve). Be safe with medicines. Read and follow all instructions on the label. · Wear shoes that have a wide and deep space for the toes. Also, wear shoes that have low or flat heels and good arch supports. Do not wear tight, narrow, or high-heeled shoes. · Try bunion pads, arch supports, toe spacers, or shoe inserts. They can help shift your weight when you walk to take pressure off your big toe. · Put moleskin or another type of cushion on or around the bunion to keep it from rubbing against your shoe. · Put ice or a cold pack on the area for 10 to 20 minutes at a time as needed. Put a thin cloth between the ice and your skin. · Prop up your foot on a pillow when you ice your toe or anytime you sit or lie down. Try to keep it above the level of your heart. This will help reduce swelling. When should you call for help? Call your doctor now or seek immediate medical care if: 
? · You have severe pain. ? · Your toe is cool or pale or changes color. ? · You have tingling, weakness, or numbness in the toe. ? Watch closely for changes in your health, and be sure to contact your doctor if: 
? · Pain and swelling get worse. ? · You do not get better as expected. Where can you learn more? Go to http://tae-bairon.info/. Enter H210 in the search box to learn more about \"Bunions: Care Instructions. \" Current as of: March 21, 2017 Content Version: 11.4 © 1839-2688 [employer]. Care instructions adapted under license by "[employer] (which disclaims liability or warranty for this information).  If you have questions about a medical condition or this instruction, always ask your healthcare professional. [address] any warranty or liability for your use of this information. Heart-Healthy Diet: Care Instructions Your Care Instructions A heart-healthy diet has lots of vegetables, fruits, nuts, beans, and whole grains, and is low in salt. It limits foods that are high in saturated fat, such as meats, cheeses, and fried foods. It may be hard to change your diet, but even small changes can lower your risk of heart attack and heart disease. Follow-up care is a key part of your treatment and safety. Be sure to make and go to all appointments, and call your doctor if you are having problems. It's also a good idea to know your test results and keep a list of the medicines you take. How can you care for yourself at home? Watch your portions · Learn what a serving is. A \"serving\" and a \"portion\" are not always the same thing. Make sure that you are not eating larger portions than are recommended. For example, a serving of pasta is ½ cup. A serving size of meat is 2 to 3 ounces. A 3-ounce serving is about the size of a deck of cards. Measure serving sizes until you are good at Frio" them. Keep in mind that restaurants often serve portions that are 2 or 3 times the size of one serving. · To keep your energy level up and keep you from feeling hungry, eat often but in smaller portions. · Eat only the number of calories you need to stay at a healthy weight. If you need to lose weight, eat fewer calories than your body burns (through exercise and other physical activity). Eat more fruits and vegetables · Eat a variety of fruit and vegetables every day. Dark green, deep orange, red, or yellow fruits and vegetables are especially good for you. Examples include spinach, carrots, peaches, and berries. · Keep carrots, celery, and other veggies handy for snacks.  Buy fruit that is in season and store it where you can see it so that you will be tempted to eat it. · Cook dishes that have a lot of veggies in them, such as stir-fries and soups. Limit saturated and trans fat · Read food labels, and try to avoid saturated and trans fats. They increase your risk of heart disease. Trans fat is found in many processed foods such as cookies and crackers. · Use olive or canola oil when you cook. Try cholesterol-lowering spreads, such as Benecol or Take Control. · Bake, broil, grill, or steam foods instead of frying them. · Choose lean meats instead of high-fat meats such as hot dogs and sausages. Cut off all visible fat when you prepare meat. · Eat fish, skinless poultry, and meat alternatives such as soy products instead of high-fat meats. Soy products, such as tofu, may be especially good for your heart. · Choose low-fat or fat-free milk and dairy products. Eat fish · Eat at least two servings of fish a week. Certain fish, such as salmon and tuna, contain omega-3 fatty acids, which may help reduce your risk of heart attack. Eat foods high in fiber · Eat a variety of grain products every day. Include whole-grain foods that have lots of fiber and nutrients. Examples of whole-grain foods include oats, whole wheat bread, and brown rice. · Buy whole-grain breads and cereals, instead of white bread or pastries. Limit salt and sodium · Limit how much salt and sodium you eat to help lower your blood pressure. · Taste food before you salt it. Add only a little salt when you think you need it. With time, your taste buds will adjust to less salt. · Eat fewer snack items, fast foods, and other high-salt, processed foods. Check food labels for the amount of sodium in packaged foods. · Choose low-sodium versions of canned goods (such as soups, vegetables, and beans). Limit sugar · Limit drinks and foods with added sugar. These include candy, desserts, and soda pop. Limit alcohol · Limit alcohol to no more than 2 drinks a day for men and 1 drink a day for women. Too much alcohol can cause health problems. When should you call for help? Watch closely for changes in your health, and be sure to contact your doctor if: 
? · You would like help planning heart-healthy meals. Where can you learn more? Go to http://tae-bairon.info/. Enter V137 in the search box to learn more about \"Heart-Healthy Diet: Care Instructions. \" Current as of: September 21, 2016 Content Version: 11.4 © 4998-4874 [employer]. Care instructions adapted under license by [employer] (which disclaims liability or warranty for this information). If you have questions about a medical condition or this instruction, always ask your healthcare professional. [address] any warranty or liability for your use of this information. DISCHARGE SUMMARY from Nurse PATIENT INSTRUCTIONS: 
 
 
F-face looks uneven A-arms unable to move or move unevenly S-speech slurred or non-existent T-time-call 911 as soon as signs and symptoms begin-DO NOT go Back to bed or wait to see if you get better-TIME IS BRAIN. Warning Signs of HEART ATTACK Call 911 if you have these symptoms: 
? Chest discomfort. Most heart attacks involve discomfort in the center of the chest that lasts more than a few minutes, or that goes away and comes back. It can feel like uncomfortable pressure, squeezing, fullness, or pain. ? Discomfort in other areas of the upper body. Symptoms can include pain or discomfort in one or both arms, the back, neck, jaw, or stomach. ? Shortness of breath with or without chest discomfort. ? Other signs may include breaking out in a cold sweat, nausea, or lightheadedness. Don't wait more than five minutes to call [address]! Fast action can save your life. Calling 911 is almost always the fastest way to get lifesaving treatment. Emergency Medical Services staff can begin treatment when they arrive  up to an hour sooner than if someone gets to the hospital by car. The discharge information has been reviewed with the patient. The patient verbalized understanding. Discharge medications reviewed with the patient and appropriate educational materials and side effects teaching were provided. ___________________________________________________________________________________________________________________________________ News Republichart Announcement We are excited to announce that we are making your provider's discharge notes available to you in [employer]. You will see these notes when they are completed and signed by the physician that discharged you from your recent hospital stay. If you have any questions or concerns about any information you see in [employer], please call the Health Information Department where you were seen or reach out to your Primary Care Provider for more information about your plan of care. Introducing [facility]! [employer] introduces [employer] patient portal. Now you can access parts of your medical record, email your doctor's office, and request medication refills online. 1. In your internet browser, go to [email or web address] [email or web address] Review [email or web address]. [email or web address] 2. Click on the First Time User? Click Here link in the Sign In box. You will see the New Member Sign Up page. 3. Enter your [employer] Access Code exactly as it appears below. You will not need to use this code after youve completed the sign-up process. If you do not sign up before the expiration date, you must request a new code.  
 
· [employer]p [employer] Access Code: [facility] 
 Expires: 3/18/2018  2:34 PM 
 
4. Enter the last four digits of your Social Security Number (xxxx) and Date of Birth (mm/dd/yyyy) as indicated and click Submit. You will be taken to the next sign-up page. 5. Create a Picovicot ID. This will be your Gatekeeper System login ID and cannot be changed, so think of one that is secure and easy to remember. 6. Create a Gatekeeper System password. You can change your password at any time. 7. Enter your Password Reset Question and Answer. This can be used at a later time if you forget your password. 8. Enter your e-mail address. You will receive e-mail notification when new information is available in [address] 9. Click Sign Up. You can now view and download portions of your medical record. 10. Click the Download Summary menu link to download a portable copy of your medical information. If you have questions, please visit the Frequently Asked Questions section of the Gatekeeper System website. Remember, Gatekeeper System is NOT to be used for urgent needs. For medical emergencies, dial 911. Now available from your iPhone and Android! Providers Seen During Your Hospitalization Provider Specialty Primary office phone Xin Arango MD Emergency Medicine [phone] Dinesh Zarate MD Family Practice [phone] Immunizations Administered for This Admission Name Date  
 TB Skin Test (PPD) Intradermal  Deferred (), 2/17/2018 Your Primary Care Physician (PCP) Primary Care Physician Office Phone Office Fax Meng Query [phone] You are allergic to the following Allergen Reactions Multaq (Dronedarone) Nausea Only Other Medication Other (comments) STATINS CAUSE MUSCLE WEAKNESS Cholesterol medications Statins-Hmg-Coa Reductase Inhibitors Myalgia Recent Documentation Height Weight Breastfeeding? BMI OB Status Smoking Status 1.626 m 46.6 kg No 17.63 kg/m2 Hysterectomy Never Smoker Emergency Contacts Name Discharge Info Relation Home Work Mobile Lary Rincon DISCHARGE CAREGIVER [3] Daughter [21] [phone] [facility] DISCHARGE CAREGIVER [3] Son [22] [phone] OpalLary Zaldivar DISCHARGE CAREGIVER [3] Other Relative [6] [phone] Patient Belongings The following personal items are in your possession at time of discharge: 
  Dental Appliances: None  Visual Aid: Glasses, With patient      Home Medications: None   Jewelry: None  Clothing: With patient, Shirt, Pants, Undergarments, Socks, Jacket/Coat, Footwear    Other Valuables: Cell Phone, Splint, With patient, Other (comment) (rollator) Please provide this summary of care documentation to your next provider. Signatures-by signing, you are acknowledging that this After Visit Summary has been reviewed with you and you have received a copy. Patient Signature:  ____________________________________________________________ Date:  ____________________________________________________________  
  
Prisca Banda Provider Signature:  ____________________________________________________________ Date:  ____________________________________________________________

## 2018-02-13 NOTE — IP AVS SNAPSHOT
[address] 
[phone] Patient: Kirill Irizarry MRN: [id number] :1940 A check jenn indicates which time of day the medication should be taken. My Medications START taking these medications Instructions Each Dose to Equal  
 Morning Noon Evening Bedtime  
 cephALEXin 500 mg capsule Commonly known as:  Francesca Caldera Take 1 Cap by mouth three (3) times daily for 7 days. 500 mg  
    
  
   
  
   
   
  
  
 doxycycline 100 mg capsule Commonly known as:  VIBRAMYCIN Take 1 Cap by mouth every twelve (12) hours for 7 days. 100 mg CHANGE how you take these medications Instructions Each Dose to Equal  
 Morning Noon Evening Bedtime  
 apixaban 5 mg tablet Commonly known as:  Ramona Spine What changed:   
- medication strength 
- how much to take Take 1 Tab by mouth every twelve (12) hours. 5 mg  
    
  
   
   
   
  
  
 digoxin 0.125 mg tablet Commonly known as:  LANOXIN What changed:  additional instructions Take 1 Tab by mouth daily. 0.125 mg  
    
  
   
   
   
  
 metoprolol tartrate 50 mg tablet Commonly known as:  LOPRESSOR What changed:  additional instructions Take 1 Tab by mouth two (2) times a day. 50 mg  
    
  
   
   
   
  
  
 predniSONE 5 mg tablet Commonly known as:  Betsy Loudoun What changed:  additional instructions Take 1 Tab by mouth daily (with breakfast). 5 mg  
    
  
   
   
   
  
 traMADol 50 mg tablet Commonly known as:  ULTRAM  
What changed:   
- when to take this 
- reasons to take this Your next dose is:  As needed Take 1 Tab by mouth every eight (8) hours as needed for Pain. Max Daily Amount: 150 mg.  
 50 mg CONTINUE taking these medications Instructions Each Dose to Equal  
 Morning Noon Evening Bedtime aspirin delayed-release 81 mg tablet Take 81 mg by mouth daily. Morning  Take day of surgery per anesthesia protocol. 81 mg  
    
  
   
   
   
  
 ATIVAN 1 mg tablet Generic drug:  LORazepam  
   
 Take 1 mg by mouth two (2) times a day. Take day of surgery per anesthesia protocol. 1 mg CALCIUM 300 PO Take 1 Tab by mouth daily. Stop seven days prior to surgery per anesthesia protocol. 1 Tab  
    
  
   
   
   
  
 ergocalciferol 50,000 unit capsule Commonly known as:  ERGOCALCIFEROL Your next dose is:  AS DIRECTED. Take 50,000 Units by mouth every month. Stop seven days prior to surgery per anesthesia protocol. [address]18 Units  
    
   
   
   
  
 ferrous sulfate 325 mg (65 mg iron) tablet Take  by mouth Daily (before lunch). FISH OIL PO Take 2 Tabs by mouth two (2) times a day. Stop seven days prior to surgery per anesthesia protocol. 2 Tab  
    
  
   
   
   
  
  
 glipiZIDE 10 mg tablet Commonly known as:  Yash Barbone Take 10 mg by mouth daily. Two tablets in the morning   Indications: type 2 diabetes mellitus 10 mg  
    
  
   
   
   
  
 GLUCOSAMINE COMPLEX PO Take 1 Tab by mouth three (3) times daily. Stop seven days prior to surgery per anesthesia protocol. 1 Tab  
    
  
   
  
   
   
  
  
 methIMAzole 5 mg tablet Commonly known as:  TAPAZOLE Take 5 mg by mouth daily. Take day of surgery per anesthesia protocol. Pt reports she alternates 1/12 tablets and two tablets every other day  Indications: hyperthyroidism, morning 5 mg  
    
  
   
   
   
  
 multivitamin tablet Commonly known as:  ONE A DAY Take 1 Tab by mouth daily. Stop seven days prior to surgery per anesthesia protocol. 1 Tab PRILOSEC PO Take 20 mg by mouth daily. .Take day of surgery per anesthesia protocol.   
 20 mg  
    
  
   
   
   
  
 TRADJENTA 5 mg tablet Generic drug:  linagliptin Take 5 mg by mouth daily. Indications: type 2 diabetes mellitus, morning 5 mg VITAMIN B-12 PO Take 1 Tab by mouth daily. Stop seven days prior to surgery per anesthesia protocol. 1 Tab STOP taking these medications HYDROcodone-acetaminophen 5-325 mg per tablet Commonly known as:  NORCO  
   
  
 insulin glargine 100 unit/mL (3 mL) Inpn Commonly known as:  Livia Fajardo Where to Get Your Medications These medications were sent to [place] [address] Phone:  [phone]  
  apixaban 5 mg tablet  
 cephALEXin 500 mg capsule  
 doxycycline 100 mg capsule Information on where to get these meds will be given to you by the nurse or doctor. ! Ask your nurse or doctor about these medications  
  traMADol 50 mg tablet

## 2018-02-14 ENCOUNTER — HOME CARE VISIT (OUTPATIENT)
Dept: HOME HEALTH SERVICES | Facility: HOME HEALTH | Age: 78
End: 2018-02-14
Payer: MEDICARE

## 2018-02-14 PROBLEM — I48.91 ATRIAL FIBRILLATION WITH RVR (HCC): Status: ACTIVE | Noted: 2018-02-14

## 2018-02-14 LAB
ANION GAP SERPL CALC-SCNC: 12 MMOL/L (ref 7–16)
ATRIAL RATE: 170 BPM
BASOPHILS # BLD: 0 K/UL (ref 0–0.2)
BASOPHILS NFR BLD: 0 % (ref 0–2)
BUN SERPL-MCNC: 23 MG/DL (ref 8–23)
CALCIUM SERPL-MCNC: 8.5 MG/DL (ref 8.3–10.4)
CALCULATED R AXIS, ECG10: 87 DEGREES
CALCULATED T AXIS, ECG11: -75 DEGREES
CHLORIDE SERPL-SCNC: 102 MMOL/L (ref 98–107)
CO2 SERPL-SCNC: 23 MMOL/L (ref 21–32)
CREAT SERPL-MCNC: 0.75 MG/DL (ref 0.6–1)
DIAGNOSIS, 93000: NORMAL
DIFFERENTIAL METHOD BLD: ABNORMAL
EOSINOPHIL # BLD: 0 K/UL (ref 0–0.8)
EOSINOPHIL NFR BLD: 0 % (ref 0.5–7.8)
ERYTHROCYTE [DISTWIDTH] IN BLOOD BY AUTOMATED COUNT: 13.8 % (ref 11.9–14.6)
GLUCOSE BLD STRIP.AUTO-MCNC: 281 MG/DL (ref 65–100)
GLUCOSE BLD STRIP.AUTO-MCNC: 289 MG/DL (ref 65–100)
GLUCOSE BLD STRIP.AUTO-MCNC: 320 MG/DL (ref 65–100)
GLUCOSE BLD STRIP.AUTO-MCNC: 341 MG/DL (ref 65–100)
GLUCOSE SERPL-MCNC: 283 MG/DL (ref 65–100)
HCT VFR BLD AUTO: 34.5 % (ref 35.8–46.3)
HGB BLD-MCNC: 11.5 G/DL (ref 11.7–15.4)
IMM GRANULOCYTES # BLD: 0.1 K/UL (ref 0–0.5)
IMM GRANULOCYTES NFR BLD AUTO: 1 % (ref 0–5)
LACTATE SERPL-SCNC: 1.4 MMOL/L (ref 0.4–2)
LYMPHOCYTES # BLD: 0.8 K/UL (ref 0.5–4.6)
LYMPHOCYTES NFR BLD: 3 % (ref 13–44)
MCH RBC QN AUTO: 32.9 PG (ref 26.1–32.9)
MCHC RBC AUTO-ENTMCNC: 33.3 G/DL (ref 31.4–35)
MCV RBC AUTO: 98.6 FL (ref 79.6–97.8)
MONOCYTES # BLD: 1 K/UL (ref 0.1–1.3)
MONOCYTES NFR BLD: 4 % (ref 4–12)
NEUTS SEG # BLD: 21.8 K/UL (ref 1.7–8.2)
NEUTS SEG NFR BLD: 92 % (ref 43–78)
PLATELET # BLD AUTO: 190 K/UL (ref 150–450)
PMV BLD AUTO: 9.8 FL (ref 10.8–14.1)
POTASSIUM SERPL-SCNC: 3.8 MMOL/L (ref 3.5–5.1)
Q-T INTERVAL, ECG07: 264 MS
QRS DURATION, ECG06: 118 MS
QTC CALCULATION (BEZET), ECG08: 422 MS
RBC # BLD AUTO: 3.5 M/UL (ref 4.05–5.25)
SODIUM SERPL-SCNC: 137 MMOL/L (ref 136–145)
VENTRICULAR RATE, ECG03: 154 BPM
WBC # BLD AUTO: 23.7 K/UL (ref 4.3–11.1)

## 2018-02-14 PROCEDURE — 80048 BASIC METABOLIC PNL TOTAL CA: CPT | Performed by: FAMILY MEDICINE

## 2018-02-14 PROCEDURE — 74011250637 HC RX REV CODE- 250/637: Performed by: FAMILY MEDICINE

## 2018-02-14 PROCEDURE — 36415 COLL VENOUS BLD VENIPUNCTURE: CPT | Performed by: FAMILY MEDICINE

## 2018-02-14 PROCEDURE — 3331090002 HH PPS REVENUE DEBIT

## 2018-02-14 PROCEDURE — 74011250637 HC RX REV CODE- 250/637: Performed by: EMERGENCY MEDICINE

## 2018-02-14 PROCEDURE — 74011250636 HC RX REV CODE- 250/636: Performed by: INTERNAL MEDICINE

## 2018-02-14 PROCEDURE — 82962 GLUCOSE BLOOD TEST: CPT

## 2018-02-14 PROCEDURE — 99218 HC RM OBSERVATION: CPT

## 2018-02-14 PROCEDURE — 74011000258 HC RX REV CODE- 258: Performed by: EMERGENCY MEDICINE

## 2018-02-14 PROCEDURE — 83605 ASSAY OF LACTIC ACID: CPT | Performed by: FAMILY MEDICINE

## 2018-02-14 PROCEDURE — 85025 COMPLETE CBC W/AUTO DIFF WBC: CPT | Performed by: FAMILY MEDICINE

## 2018-02-14 PROCEDURE — 3331090001 HH PPS REVENUE CREDIT

## 2018-02-14 PROCEDURE — 77030019895 HC PCKNG STRP IODO -A

## 2018-02-14 PROCEDURE — 74011000250 HC RX REV CODE- 250: Performed by: EMERGENCY MEDICINE

## 2018-02-14 PROCEDURE — A9270 NON-COVERED ITEM OR SERVICE: HCPCS | Performed by: FAMILY MEDICINE

## 2018-02-14 PROCEDURE — 74011636637 HC RX REV CODE- 636/637: Performed by: FAMILY MEDICINE

## 2018-02-14 PROCEDURE — 65660000000 HC RM CCU STEPDOWN

## 2018-02-14 PROCEDURE — 77030019605

## 2018-02-14 RX ORDER — LORAZEPAM 1 MG/1
1 TABLET ORAL EVERY 12 HOURS
Status: DISCONTINUED | OUTPATIENT
Start: 2018-02-14 | End: 2018-02-20 | Stop reason: HOSPADM

## 2018-02-14 RX ADMIN — INSULIN LISPRO 6 UNITS: 100 INJECTION, SOLUTION INTRAVENOUS; SUBCUTANEOUS at 08:08

## 2018-02-14 RX ADMIN — VANCOMYCIN HYDROCHLORIDE 1000 MG: 1 INJECTION, POWDER, LYOPHILIZED, FOR SOLUTION INTRAVENOUS at 11:42

## 2018-02-14 RX ADMIN — LORAZEPAM 1 MG: 1 TABLET ORAL at 08:56

## 2018-02-14 RX ADMIN — INSULIN LISPRO 8 UNITS: 100 INJECTION, SOLUTION INTRAVENOUS; SUBCUTANEOUS at 11:42

## 2018-02-14 RX ADMIN — INSULIN LISPRO 8 UNITS: 100 INJECTION, SOLUTION INTRAVENOUS; SUBCUTANEOUS at 17:50

## 2018-02-14 RX ADMIN — TRAMADOL HYDROCHLORIDE 50 MG: 50 TABLET, FILM COATED ORAL at 21:34

## 2018-02-14 RX ADMIN — Medication 10 ML: at 06:12

## 2018-02-14 RX ADMIN — SODIUM CHLORIDE 5 MG/HR: 900 INJECTION, SOLUTION INTRAVENOUS at 06:17

## 2018-02-14 RX ADMIN — GLIPIZIDE 10 MG: 5 TABLET ORAL at 08:56

## 2018-02-14 RX ADMIN — HYDROCODONE BITARTRATE AND ACETAMINOPHEN 1 TABLET: 5; 325 TABLET ORAL at 21:33

## 2018-02-14 RX ADMIN — METOPROLOL TARTRATE 50 MG: 50 TABLET ORAL at 17:50

## 2018-02-14 RX ADMIN — APIXABAN 2.5 MG: 2.5 TABLET, FILM COATED ORAL at 08:56

## 2018-02-14 RX ADMIN — HYDROCODONE BITARTRATE AND ACETAMINOPHEN 1 TABLET: 5; 325 TABLET ORAL at 01:01

## 2018-02-14 RX ADMIN — Medication 10 ML: at 21:44

## 2018-02-14 RX ADMIN — INSULIN LISPRO 8 UNITS: 100 INJECTION, SOLUTION INTRAVENOUS; SUBCUTANEOUS at 21:38

## 2018-02-14 RX ADMIN — FERROUS SULFATE TAB 325 MG (65 MG ELEMENTAL FE) 325 MG: 325 (65 FE) TAB at 11:42

## 2018-02-14 RX ADMIN — METHIMAZOLE 5 MG: 5 TABLET ORAL at 08:56

## 2018-02-14 RX ADMIN — LORAZEPAM 1 MG: 1 TABLET ORAL at 01:01

## 2018-02-14 RX ADMIN — INSULIN GLARGINE 9 UNITS: 100 INJECTION, SOLUTION SUBCUTANEOUS at 21:39

## 2018-02-14 RX ADMIN — Medication 10 ML: at 13:37

## 2018-02-14 RX ADMIN — CYANOCOBALAMIN TAB 1000 MCG 1000 MCG: 1000 TAB at 13:36

## 2018-02-14 RX ADMIN — ASPIRIN 81 MG: 81 TABLET, COATED ORAL at 08:56

## 2018-02-14 RX ADMIN — PREDNISONE 5 MG: 5 TABLET ORAL at 08:08

## 2018-02-14 RX ADMIN — PANTOPRAZOLE SODIUM 40 MG: 40 TABLET, DELAYED RELEASE ORAL at 17:50

## 2018-02-14 RX ADMIN — MULTIPLE VITAMINS W/ MINERALS TAB 1 TABLET: TAB at 08:56

## 2018-02-14 RX ADMIN — LORAZEPAM 1 MG: 1 TABLET ORAL at 21:34

## 2018-02-14 RX ADMIN — TRAMADOL HYDROCHLORIDE 50 MG: 50 TABLET, FILM COATED ORAL at 00:05

## 2018-02-14 RX ADMIN — DIGOXIN 0.12 MG: 125 TABLET ORAL at 08:56

## 2018-02-14 RX ADMIN — OSELTAMIVIR PHOSPHATE 30 MG: 30 CAPSULE ORAL at 08:56

## 2018-02-14 RX ADMIN — INSULIN GLARGINE 9 UNITS: 100 INJECTION, SOLUTION SUBCUTANEOUS at 00:05

## 2018-02-14 RX ADMIN — METOPROLOL TARTRATE 50 MG: 50 TABLET ORAL at 09:41

## 2018-02-14 RX ADMIN — APIXABAN 2.5 MG: 2.5 TABLET, FILM COATED ORAL at 00:05

## 2018-02-14 RX ADMIN — OSELTAMIVIR PHOSPHATE 30 MG: 30 CAPSULE ORAL at 17:50

## 2018-02-14 RX ADMIN — Medication 10 ML: at 00:42

## 2018-02-14 RX ADMIN — CALCIUM CARBONATE 500 MG (1,250 MG)-VITAMIN D3 200 UNIT TABLET 1 TABLET: at 13:36

## 2018-02-14 RX ADMIN — APIXABAN 5 MG: 5 TABLET, FILM COATED ORAL at 21:34

## 2018-02-14 NOTE — PROGRESS NOTES
Spoke to Dr. Kareem Phoenix about patient blood pressure running 90s over 60s on cardizem at 2.5 mg/hr and patient having metoprolol 50 mg due. Verbal order received to stop Cardizem and give metoprolol. Verbal order also received to order wound consult.

## 2018-02-14 NOTE — PROGRESS NOTES
Pharmacokinetic Consult to Pharmacist    Sha Kadie is a 69 y.o. female being treated for SSTI with vancomycin. Height: 5' 4\" (162.6 cm)  Weight: 46.7 kg (103 lb)  Lab Results   Component Value Date/Time    BUN 23 02/14/2018 03:58 AM    Creatinine 0.75 02/14/2018 03:58 AM    WBC 23.7 (H) 02/14/2018 03:58 AM    Procalcitonin 0.1 02/06/2016 05:58 PM    Lactic acid 1.4 02/14/2018 03:58 AM    Lactic acid 2.1 (H) 02/06/2016 08:15 PM    Lactic Acid (POC) 2.1 (H) 02/13/2018 08:26 PM      Estimated Creatinine Clearance: 47 mL/min (based on Cr of 0.75). Day 1 of vancomycin. Goal trough is 12-18. Dosing started 1g x 1 and the 750 mg q24h  Will continue to follow patient. Thank you,  Darlene Rajan, Pharm. D.   Clinical Pharmacist  [phone]

## 2018-02-14 NOTE — PROGRESS NOTES
Hospitalist Progress Note    2018  Admit Date: 2018  7:04 PM   NAME: Charlene Slaughter   :     MRN:  [id number]   Attending: Jerry Chatman MD  PCP:  Devonte Wynn MD    SUBJECTIVE:     Charlene Slaughter is a 39JPV with AFib on eliquis, CAD, AS s/p TAVR, PAD with chronic R foot wound, HTN, DM2 who was admitted overnight with rigors/chills, in AFib with RVR. Rapid flu was neg, but given symptoms suspect that it might be falsely negative. Also with more drainage and change in color of her chronic foot wound. Review of Systems negative with exception of pertinent positives noted above      PHYSICAL EXAM       Visit Vitals    BP 95/47    Pulse 84    Temp 99.4 °F (37.4 °C)    Resp 28    Ht 5' 4\" (1.626 m)    Wt 46.7 kg (103 lb)    SpO2 94%    Breastfeeding No    BMI 17.68 kg/m2      Temp (24hrs), Av.9 °F (37.2 °C), Min:97.5 °F (36.4 °C), Max:100.3 °F (37.9 °C)    Oxygen Therapy  O2 Sat (%): 94 % (18 0539)  Pulse via Oximetry: 91 beats per minute (18 2131)  O2 Device: Room air (18 0412)    Intake/Output Summary (Last 24 hours) at 18 1001  Last data filed at 18 0005   Gross per 24 hour   Intake              135 ml   Output                0 ml   Net              135 ml          General: No acute distress. Head:  Atraumatic Normocephalic. Lungs:  CTA Bilaterally. Normal resp effort on RA  CVS:  irreg irreg in 100s, 2/6 FEI, no BLE edema  Abdomen: Soft, NTTP  MSK:  R foot bandaged in boot  Neurologic:  AOx3.  No focal deficits  Psychiatry:      No anxiety/Depression      Recent Results (from the past 24 hour(s))   INFLUENZA A & B AG (RAPID TEST)    Collection Time: 18  6:42 PM   Result Value Ref Range    Influenza A Ag NEGATIVE  NEG      Influenza B Ag NEGATIVE  NEG     CBC WITH AUTOMATED DIFF    Collection Time: 18  6:45 PM   Result Value Ref Range    WBC 19.4 (H) 4.3 - 11.1 K/uL    RBC 4.12 4.05 - 5.25 M/uL    HGB 13.5 11.7 - 15.4 g/dL    HCT 40.3 35.8 - 46.3 %    MCV 97.8 79.6 - 97.8 FL    MCH 32.8 26.1 - 32.9 PG    MCHC 33.5 31.4 - 35.0 g/dL    RDW 13.3 11.9 - 14.6 %    PLATELET 093 551 - 769 K/uL    MPV 9.4 (L) 10.8 - 14.1 FL    DF AUTOMATED      NEUTROPHILS 87 (H) 43 - 78 %    LYMPHOCYTES 8 (L) 13 - 44 %    MONOCYTES 5 4.0 - 12.0 %    EOSINOPHILS 0 (L) 0.5 - 7.8 %    BASOPHILS 0 0.0 - 2.0 %    IMMATURE GRANULOCYTES 0 0.0 - 5.0 %    ABS. NEUTROPHILS 16.9 (H) 1.7 - 8.2 K/UL    ABS. LYMPHOCYTES 1.5 0.5 - 4.6 K/UL    ABS. MONOCYTES 1.0 0.1 - 1.3 K/UL    ABS. EOSINOPHILS 0.0 0.0 - 0.8 K/UL    ABS. BASOPHILS 0.0 0.0 - 0.2 K/UL    ABS. IMM. GRANS. 0.1 0.0 - 0.5 K/UL   METABOLIC PANEL, COMPREHENSIVE    Collection Time: 02/13/18  6:45 PM   Result Value Ref Range    Sodium 137 136 - 145 mmol/L    Potassium 4.1 3.5 - 5.1 mmol/L    Chloride 100 98 - 107 mmol/L    CO2 25 21 - 32 mmol/L    Anion gap 12 7 - 16 mmol/L    Glucose 299 (H) 65 - 100 mg/dL    BUN 21 8 - 23 MG/DL    Creatinine 0.86 0.6 - 1.0 MG/DL    GFR est AA >60 >60 ml/min/1.73m2    GFR est non-AA >60 >60 ml/min/1.73m2    Calcium 9.3 8.3 - 10.4 MG/DL    Bilirubin, total 1.0 0.2 - 1.1 MG/DL    ALT (SGPT) 30 12 - 65 U/L    AST (SGOT) 45 (H) 15 - 37 U/L    Alk.  phosphatase 129 50 - 136 U/L    Protein, total 8.0 6.3 - 8.2 g/dL    Albumin 3.2 3.2 - 4.6 g/dL    Globulin 4.8 (H) 2.3 - 3.5 g/dL    A-G Ratio 0.7 (L) 1.2 - 3.5     MAGNESIUM    Collection Time: 02/13/18  6:45 PM   Result Value Ref Range    Magnesium 1.7 (L) 1.8 - 2.4 mg/dL   EKG, 12 LEAD, INITIAL    Collection Time: 02/13/18  7:26 PM   Result Value Ref Range    Ventricular Rate 154 BPM    Atrial Rate 170 BPM    QRS Duration 118 ms    Q-T Interval 264 ms    QTC Calculation (Bezet) 422 ms    Calculated R Axis 87 degrees    Calculated T Axis -75 degrees    Diagnosis       Atrial fibrillation with rapid ventricular response with premature   ventricular or aberrantly conducted complexes  Right bundle branch block  Septal infarct (cited on or before 20-DEC-2017)  T wave abnormality, consider inferior ischemia  Abnormal ECG  When compared with ECG of 20-DEC-2017 14:50,  Vent.  rate has increased BY  74 BPM  Questionable change in initial forces of Septal leads  ST more depressed Lateral leads  T wave inversion no longer evident in Lateral leads  Confirmed by LEON RIOS (), Viky Melvin ([id number]) on 2/14/2018 6:17:15 AM     CULTURE, BLOOD    Collection Time: 02/13/18  8:18 PM   Result Value Ref Range    Special Requests: RIGHT  Antecubital        Culture result: NO GROWTH AFTER 9 HOURS     CULTURE, BLOOD    Collection Time: 02/13/18  8:18 PM   Result Value Ref Range    Special Requests: LEFT  ARM        Culture result: NO GROWTH AFTER 9 HOURS     POC LACTIC ACID    Collection Time: 02/13/18  8:26 PM   Result Value Ref Range    Lactic Acid (POC) 2.1 (H) 0.5 - 1.9 mmol/L   URINE MICROSCOPIC    Collection Time: 02/13/18  8:51 PM   Result Value Ref Range    WBC 5-10 0 /hpf    RBC >100 (H) 0 /hpf    Epithelial cells 10-20 0 /hpf    Bacteria TRACE 0 /hpf    Casts 0-3 0 /lpf   GLUCOSE, POC    Collection Time: 02/13/18 11:26 PM   Result Value Ref Range    Glucose (POC) 259 (H) 65 - 458 mg/dL   METABOLIC PANEL, BASIC    Collection Time: 02/14/18  3:58 AM   Result Value Ref Range    Sodium 137 136 - 145 mmol/L    Potassium 3.8 3.5 - 5.1 mmol/L    Chloride 102 98 - 107 mmol/L    CO2 23 21 - 32 mmol/L    Anion gap 12 7 - 16 mmol/L    Glucose 283 (H) 65 - 100 mg/dL    BUN 23 8 - 23 MG/DL    Creatinine 0.75 0.6 - 1.0 MG/DL    GFR est AA >60 >60 ml/min/1.73m2    GFR est non-AA >60 >60 ml/min/1.73m2    Calcium 8.5 8.3 - 10.4 MG/DL   CBC WITH AUTOMATED DIFF    Collection Time: 02/14/18  3:58 AM   Result Value Ref Range    WBC 23.7 (H) 4.3 - 11.1 K/uL    RBC 3.50 (L) 4.05 - 5.25 M/uL    HGB 11.5 (L) 11.7 - 15.4 g/dL    HCT 34.5 (L) 35.8 - 46.3 %    MCV 98.6 (H) 79.6 - 97.8 FL    MCH 32.9 26.1 - 32.9 PG    MCHC 33.3 31.4 - 35.0 g/dL    RDW 13.8 11.9 - 14.6 %    PLATELET 503 257 - 450 K/uL    MPV 9.8 (L) 10.8 - 14.1 FL    DF AUTOMATED      NEUTROPHILS 92 (H) 43 - 78 %    LYMPHOCYTES 3 (L) 13 - 44 %    MONOCYTES 4 4.0 - 12.0 %    EOSINOPHILS 0 (L) 0.5 - 7.8 %    BASOPHILS 0 0.0 - 2.0 %    IMMATURE GRANULOCYTES 1 0.0 - 5.0 %    ABS. NEUTROPHILS 21.8 (H) 1.7 - 8.2 K/UL    ABS. LYMPHOCYTES 0.8 0.5 - 4.6 K/UL    ABS. MONOCYTES 1.0 0.1 - 1.3 K/UL    ABS. EOSINOPHILS 0.0 0.0 - 0.8 K/UL    ABS. BASOPHILS 0.0 0.0 - 0.2 K/UL    ABS. IMM. GRANS. 0.1 0.0 - 0.5 K/UL   LACTIC ACID    Collection Time: 02/14/18  3:58 AM   Result Value Ref Range    Lactic acid 1.4 0.4 - 2.0 MMOL/L   GLUCOSE, POC    Collection Time: 02/14/18  6:55 AM   Result Value Ref Range    Glucose (POC) 289 (H) 65 - 100 mg/dL         Imaging /Procedures /Studies   XR CHEST SNGL V   Final Result   IMPRESSION: No consolidation.             ASSESSMENT      Hospital Problems as of 2/14/2018  Date Reviewed: 2/5/2018          Codes Class Noted - Resolved POA    * (Principal)Atrial fibrillation with rapid ventricular response ([facility] ICD-10-CM: I48.91  ICD-9-CM: 427.31  2/13/2018 - Present Yes        Leukocytosis ICD-10-CM: R86.770  ICD-9-CM: 288.60  2/13/2018 - Present Yes        PAD (peripheral artery disease) (HCC) ICD-10-CM: I73.9  ICD-9-CM: 443.9  2/12/2016 - Present Yes    Overview Addendum 3/4/2016  1:47 PM by David Lyons NP     2/17/16 (Dr Juwan Rosa) A/o, BLR Angiogram, L PT PTA (), L SFA PTA/Stent (6x 100 [place] PTX)              CHF (congestive heart failure) ([facility] ICD-10-CM: I50.9  ICD-9-CM: 428.0  6/23/2014 - Present Yes        Diabetes mellitus type 2, controlled ([facility] ICD-10-CM: E11.9  ICD-9-CM: 250.00  3/20/2014 - Present Yes        Hypomagnesemia ICD-10-CM: E83.42  ICD-9-CM: 275.2  3/20/2014 - Present Yes        Hyperglycemia ICD-10-CM: R73.9  ICD-9-CM: 790.29  3/20/2014 - Present Yes        Atrial fibrillation ([facility] ICD-10-CM: I48.91  ICD-9-CM: 427.31  3/15/2014 - Present Yes        Dyslipidemia (Chronic) ICD-10-CM: E78.5  ICD-9-CM: 272.4  4/29/2013 - Present Yes        Essential hypertension, benign (Chronic) ICD-10-CM: I10  ICD-9-CM: 401.1  4/29/2013 - Present Yes        CAD (coronary artery disease) ICD-10-CM: I25.10  ICD-9-CM: 414.00  Unknown - Present Yes    Overview Signed 8/11/2010 12:47 PM by Tracee Mohamud MD     HX CABG 1993, EF 55%                       Plan:  - Atrial Fibrillation with RVR  Convert off dilt gtt to home po metop and dig  Continue Eliquis     - Flu-like symptoms  Treating with tamiflu given classic flu-like symptoms  Tylenol for fevers/mild pain     - Leukocytosis/R foot wound  Worsened today  Blood cx pending  CXR and UA neg  Will start vanc for possible R foot wound  Wound care consult  If concerning, will consult vascular (patient sees Rachael Birmingham as outpatient)    - DM2 with hyperglycemia  Continue home meds  SSI ACHS while inpatient     - H/O CHF  Not in exacerbation  Continue home meds     - H/O CAD  No chest pain  Continue home meds     - Hypomagnesemia  Mg 1.7 in ER, given 2g  Recheck Mg tomorrow    DVT Prophylaxis: Eliquis  Dispo: likely home in 1-2 days once medically improved    Fito Fabian MD

## 2018-02-14 NOTE — PROGRESS NOTES
CM Specialist Naz Chawla met with patient and discussed admission status. Medicare Outpatient Observation Notice provided to the patient. Oral explanation was provided and all questions answered. Signed document placed in the medical chart. Copy provided to patient.

## 2018-02-14 NOTE — PROGRESS NOTES
Bedside and Verbal shift change report given to self (oncoming nurse) by Jaun Porter RN (offgoing nurse). Report included the following information SBAR, Kardex, Intake/Output, MAR and Recent Results.

## 2018-02-14 NOTE — PROGRESS NOTES
Care Management Interventions  PCP Verified by CM: Yes (6 weeks ago )  Transition of Care Consult (CM Consult): Discharge Planning  Current Support Network: Lives Alone  Confirm Follow Up Transport: Family  Plan discussed with Pt/Family/Caregiver: Yes  Freedom of Choice Offered: Yes  Met with patient for d/c planning. Patient alert and oriented x 3, independent of ADL's (recently requiring more help) and lives alone. She has a care giver that is with her in the evening to assist with meals preparation and her bath. She states her son and daughter in law helps her as needed. Her son set up her medications for her for the week and daughter in law follows up with her at lunch time. During the weekend her niece stays with her to help out. She states she is current with [facility]  for wound care on her foot and PT. Patient has a rollator, shower chair at home. She no longer drives and her family transports her to appointments. [facility]. CM following.

## 2018-02-14 NOTE — ED PROVIDER NOTES
HPI Comments: Patient is a 60-year-old female who is coming in with fevers that started today. She states she suddenly began to chill  All over and her family came to check on her she's had some cough but not many other symptoms. She reports a little bit of difficulty urinating over the last several weeks. She does have history of atrial fibrillation and follows with cardiology for this. She denies any vomiting diarrhea or any current pain. Patient is a 69 y.o. female presenting with flu symptoms. The history is provided by the patient. Flu   Pertinent negatives include no chest pain, no chills, no shortness of breath, no wheezing, no nausea and no vomiting.         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 toe    GERD (gastroesophageal reflux disease)     managed with medication     Heart failure ([address]     History of kidney stones     multiple with surgical interventions    Hypercholesterolemia     managed with medication     Hypertension     Hypothyroidism     managed with medication     Ill-defined condition     pt takes eliquis     Microscopic hematuria     Nausea & vomiting     after anesthesia    Osteoarthritis     managed with medication     Pacemaker     Metronic pacemaker only    PUD (peptic ulcer disease)     no recent episodes    PVD (peripheral vascular disease) ([address]     left side x 1 stented    Rheumatoid arthritis(714.0)     managed with medication     Toe amputation status ([address]     left great toe     Type 2 diabetes mellitus ([address] Dx 2006    oral t and insulin/Avg / no s/s of low BS/ does not have a sensation / last A1C7.4    Vertigo     no treatment, happens occassionally       Past Surgical History:   Procedure Laterality Date    CABG, ARTERY-VEIN, FOUR  1993    states x 5    CARDIAC SURG PROCEDURE UNLIST      Cardiovert x 2-3 x last 2/4/2012    HX AMPUTATION Left 2016    great toe    HX AORTIC VALVE REPLACEMENT      HX APPENDECTOMY  1959    HX HEART CATHETERIZATION      HX HEENT      dental    HX HYSTERECTOMY  1988    HX LITHOTRIPSY      multiple    HX OPEN CHOLECYSTECTOMY      HX ORTHOPAEDIC Left     achilles tendon    HX ORTHOPAEDIC Left     \"toe surgery removing bones\"    HX PACEMAKER      HX PACEMAKER PLACEMENT      Metronic    HX UROLOGICAL Left 1980s    open L kidney removal stones    VASCULAR SURGERY PROCEDURE UNLIST Left 2-17-16    lower extremity arteriogram with stent x 1 placement    VASCULAR SURGERY PROCEDURE UNLIST Right 12/20/2017    2nd toe- resection metatarsal head         Family History:   Problem Relation Age of Onset    Heart Disease Father     Heart Attack Father     Diabetes Father     Hypertension Father     High Cholesterol Father     Asthma Father     Heart Disease Other     Heart Surgery Other     Diabetes Mother     Stroke Mother      TIAs    Hypertension Mother     Other Mother      kidney stone    Kidney Disease Mother     Heart Disease Mother     High Cholesterol Mother     Cancer Mother     Cancer Sister      multiple myeloma    Hypertension Sister     Hypertension Sister     Hypertension Brother     Diabetes Brother     Cancer Brother     Hypertension Sister     Heart Disease Sister     Hypertension Sister     Hypertension Sister        Social History     Social History    Marital status:      Spouse name: N/A    Number of children: N/A    Years of education: N/A     Occupational History    Not on file.      Social History Main Topics    Smoking status: Never Smoker  Smokeless tobacco: Never Used    Alcohol use No    Drug use: No    Sexual activity: Not on file     Other Topics Concern    Not on file     Social History Narrative         ALLERGIES: Multaq [dronedarone]; Other medication; and Statins-hmg-coa reductase inhibitors    Review of Systems   Constitutional: Negative for chills and fever. Respiratory: Negative for chest tightness, shortness of breath, wheezing and stridor. Cardiovascular: Negative for chest pain and palpitations. Gastrointestinal: Negative for abdominal pain, diarrhea, nausea and vomiting. Skin: Negative. Negative for color change, pallor, rash and wound. All other systems reviewed and are negative. Vitals:    02/13/18 1915   BP: 120/90   Pulse: (!) 154   Resp: 18   Temp: 100.3 °F (37.9 °C)   SpO2: 94%            Physical Exam   Constitutional: She is oriented to person, place, and time. She appears well-developed and well-nourished. No distress. HENT:   Head: Normocephalic and atraumatic. Eyes: Conjunctivae are normal. No scleral icterus. Neck: Normal range of motion. Neck supple. Cardiovascular: Intact distal pulses. Irregular tachycardiac rhythm   Pulmonary/Chest: Effort normal and breath sounds normal. No stridor. No respiratory distress. She has no wheezes. She has no rales. She exhibits no tenderness. Abdominal: Soft. Bowel sounds are normal. She exhibits no distension. There is no tenderness. There is no rebound and no guarding. Neurological: She is alert and oriented to person, place, and time. No cranial nerve deficit. She exhibits normal muscle tone. Coordination normal.   No focal weakness   Skin: Skin is warm and dry. No rash noted. She is not diaphoretic. No erythema. Chronic wound on the right foot appear to be healing well. Psychiatric: She has a normal mood and affect. Her behavior is normal.   Nursing note and vitals reviewed.        MDM  Number of Diagnoses or Management Options  Atrial fibrillation with rapid ventricular response ([facility]):   Fever, unspecified fever cause:   Diagnosis management comments: Patient went into atrial fibrillation with rapid rate she is responding well to Cardizem. She also had a fever at home I went ahead and gave her Rocephin and IV fluids. She is starting to do somewhat better. I have paged hospitalist for admission. Sayra Adhikari MD; 2/13/2018 @8:41 PM Voice dictation software was used during the making of this note. This software is not perfect and grammatical and other typographical errors may be present.   This note has not been proofread for errors.  ===================================================================        Amount and/or Complexity of Data Reviewed  Clinical lab tests: ordered and reviewed (Results for orders placed or performed during the hospital encounter of 02/13/18  -INFLUENZA A & B AG (RAPID TEST)       Result                                            Value                         Ref Range                       Influenza A Ag                                    NEGATIVE                      NEG                             Influenza B Ag                                    NEGATIVE                      NEG                        -CBC WITH AUTOMATED DIFF       Result                                            Value                         Ref Range                       WBC                                               19.4 (H)                      4.3 - 11.1 K/uL                 RBC                                               4.12                          4.05 - 5.25 M/uL                HGB                                               13.5                          11.7 - 15.4 g/dL                HCT                                               40.3                          35.8 - 46.3 %                   MCV                                               97.8                          79.6 - 97.8 FL                  MCH 32.8                          26.1 - 32.9 PG                  MCHC                                              33.5                          31.4 - 35.0 g/dL                RDW                                               13.3                          11.9 - 14.6 %                   PLATELET                                          224                           150 - 450 K/uL                  MPV                                               9.4 (L)                       10.8 - 14.1 FL                  DF                                                AUTOMATED                                                     NEUTROPHILS                                       87 (H)                        43 - 78 %                       LYMPHOCYTES                                       8 (L)                         13 - 44 %                       MONOCYTES                                         5                             4.0 - 12.0 %                    EOSINOPHILS                                       0 (L)                         0.5 - 7.8 %                     BASOPHILS                                         0                             0.0 - 2.0 %                     IMMATURE GRANULOCYTES                             0                             0.0 - 5.0 %                     ABS. NEUTROPHILS                                  16.9 (H)                      1.7 - 8.2 K/UL                  ABS. LYMPHOCYTES                                  1.5                           0.5 - 4.6 K/UL                  ABS. MONOCYTES                                    1.0                           0.1 - 1.3 K/UL                  ABS. EOSINOPHILS                                  0.0                           0.0 - 0.8 K/UL                  ABS. BASOPHILS                                    0.0                           0.0 - 0.2 K/UL                  ABS. IMM.  GRANS.                                  0.1 0.0 - 0.5 K/UL             -METABOLIC PANEL, COMPREHENSIVE       Result                                            Value                         Ref Range                       Sodium                                            137                           136 - 145 mmol/L                Potassium                                         4.1                           3.5 - 5.1 mmol/L                Chloride                                          100                           98 - 107 mmol/L                 CO2                                               25                            21 - 32 mmol/L                  Anion gap                                         12                            7 - 16 mmol/L                   Glucose                                           299 (H)                       65 - 100 mg/dL                  BUN                                               21                            8 - 23 MG/DL                    Creatinine                                        0.86                          0.6 - 1.0 MG/DL                 GFR est AA                                        >60                           >60 ml/min/1.73m2               GFR est non-AA                                    >60                           >60 ml/min/1.73m2               Calcium                                           9.3                           8.3 - 10.4 MG/DL                Bilirubin, total                                  1.0                           0.2 - 1.1 MG/DL                 ALT (SGPT)                                        30                            12 - 65 U/L                     AST (SGOT)                                        45 (H)                        15 - 37 U/L                     Alk. phosphatase                                  129                           50 - 136 U/L                    Protein, total                                    8.0 6.3 - 8.2 g/dL                  Albumin                                           3.2                           3.2 - 4.6 g/dL                  Globulin                                          4.8 (H)                       2.3 - 3.5 g/dL                  A-G Ratio                                         0.7 (L)                       1.2 - 3.5                  -MAGNESIUM       Result                                            Value                         Ref Range                       Magnesium                                         1.7 (L)                       1.8 - 2.4 mg/dL            -POC LACTIC ACID       Result                                            Value                         Ref Range                       Lactic Acid (POC)                                 2.1 (H)                       0.5 - 1.9 mmol/L          )  Tests in the radiology section of CPT®: ordered and reviewed (Xr Chest Sngl V    Result Date: 2/13/2018  AP PORTABLE CHEST X-RAY HISTORY: Fever and cough. Body aches. COMPARISON: 2/8/2016 FINDINGS: Median sternotomy wires and a percutaneously replaced aortic valve are present. A cardiac pacemaker device is present. There is no lobar consolidation, pleural effusions, or pulmonary edema.      IMPRESSION: No consolidation.   )  Independent visualization of images, tracings, or specimens: yes (afib rapid rate with lvh)    Critical Care  Total time providing critical care: 30-74 minutes (35 minutes)        ED Course       Procedures

## 2018-02-14 NOTE — WOUND CARE
Patient seen for right foot wounds. Noted Dr Laya Garnett has been following plantar wound. Plantar wound 0.7x0. 3x0.7cm. Packed with 1/4 inch iodoform gauze strip as per surgeon note. Tomás has friction ulcer 1x1 x0cmcm that needs protection. Cleansed and placed bandaid at this time. Updated patient and answered her questions. Her daughter does her dressings at home. Wound team will monitor and assist as needed.

## 2018-02-14 NOTE — PROGRESS NOTES
TRANSFER - IN REPORT:    Verbal report received from Sharon RN (name) on Tai Roberson  being received from [facility] ED (unit) for routine progression of care      Report consisted of patients Situation, Background, Assessment and   Recommendations(SBAR). Information from the following report(s) SBAR, Kardex, ED Summary, Intake/Output, MAR, Recent Results and Cardiac Rhythm a-fib 90-100s was reviewed with the receiving nurse. Opportunity for questions and clarification was provided.

## 2018-02-14 NOTE — PROGRESS NOTES
Problem: Falls - Risk of  Goal: *Absence of Falls  Document Leda Fall Risk and appropriate interventions in the flowsheet.    Outcome: Progressing Towards Goal  Fall Risk Interventions:  Mobility Interventions: Communicate number of staff needed for ambulation/transfer, Patient to call before getting OOB         Medication Interventions: Patient to call before getting OOB, Teach patient to arise slowly

## 2018-02-14 NOTE — ED NOTES
TRANSFER - OUT REPORT:    Verbal report given to TEJ Conteh (name) on Emanuel Forte  being transferred to [facility] (unit) for routine progression of care       Report consisted of patients Situation, Background, Assessment and   Recommendations(SBAR). Information from the following report(s) SBAR, ED Summary, Intake/Output, MAR and Recent Results was reviewed with the receiving nurse. Lines:   Peripheral IV 02/13/18 Left Forearm (Active)       Peripheral IV 02/13/18 Right Antecubital (Active)   Site Assessment Clean, dry, & intact 2/13/2018  8:00 PM   Phlebitis Assessment 0 2/13/2018  8:00 PM   Infiltration Assessment 0 2/13/2018  8:00 PM   Dressing Status Clean, dry, & intact 2/13/2018  8:00 PM   Dressing Type Transparent 2/13/2018  8:00 PM        Opportunity for questions and clarification was provided.       Patient transported with:   Monitor  Registered Nurse

## 2018-02-14 NOTE — PROGRESS NOTES
Dual skin assessment completed on patient's arrival to floor with Anjelica Ledezma RN. Overall patient's skin is warm, dry, intact and appropriate for ethnicity. Exceptions include a wound to the bottom of her right foot and on the bunion of her right foot. Dressing changes are performed regularly per patient's daughter by either family members or the nursing aid that comes to the patient's house. Last dressing change was earlier today and patient's daughter reports is comprised of guaze, a Band-Aid, and then the ace wrap. Dressing is C/D/I. The right leg currently appears to be more pink in coloration than the left, but the patient's daughter states that the coloration and warmth comes and goes and varies throughout the day. Pedal pulses are palpable and equal in strength. Bilateral heels are intact. The patient has a toughened circular area of skin on the bottom of her left foot in the middle just below the toes, that appears to be a callous, but the daughter reports it has been an open wound in the past. The left hallux has previously been amputated. Sacrum visualized and without impairment.

## 2018-02-14 NOTE — PHYSICIAN ADVISORY
Letter of Determination: Upgrade from Observation to Inpatient Status    This patient was originally hospitalized as Outpatient Status with Observation Services on 2/13/2018 for atrial fibrillation with rapid ventricular response. This patient now meets for Inpatient Admission in accordance with CMS regulation Section 43 .3. Specifically, patient's stay is now over Two Midnights and was medically necessary. The patient's stay was medically necessary based on laboratory studies significant for white blood cell count of 23,700, with 92% neutrophils, and vital signs with pulse to 178 beats per minute, with treatment plan including intravenous diltiazem and concern for infectious etiolgoy. Consistent with CMS guidelines, patient meets for inpatient status. It is our recommendation that this patient's hospitalization status should be upgraded from OBSERVATION to INPATIENT status.      The final decision regarding the patient's hospitalization status depends on the attending physician's judgement.     Nikky Napoles MD, REJI,   Physician Fredy Wang.

## 2018-02-14 NOTE — PROGRESS NOTES
Bedside and Verbal shift change report given to Elex Rinne, RN (oncoming nurse) by self Edward Yo nurse). Report included the following information SBAR, Kardex, ED Summary, Procedure Summary, Intake/Output, MAR, Recent Results and Cardiac Rhythm A-fib and pacing on demand.

## 2018-02-15 PROBLEM — A41.9 SEPSIS (HCC): Status: ACTIVE | Noted: 2018-02-15

## 2018-02-15 LAB
ANION GAP SERPL CALC-SCNC: 8 MMOL/L (ref 7–16)
BASOPHILS # BLD: 0 K/UL (ref 0–0.2)
BASOPHILS NFR BLD: 0 % (ref 0–2)
BUN SERPL-MCNC: 22 MG/DL (ref 8–23)
CALCIUM SERPL-MCNC: 8.6 MG/DL (ref 8.3–10.4)
CHLORIDE SERPL-SCNC: 105 MMOL/L (ref 98–107)
CO2 SERPL-SCNC: 25 MMOL/L (ref 21–32)
CREAT SERPL-MCNC: 0.73 MG/DL (ref 0.6–1)
DIFFERENTIAL METHOD BLD: ABNORMAL
EOSINOPHIL # BLD: 0 K/UL (ref 0–0.8)
EOSINOPHIL NFR BLD: 0 % (ref 0.5–7.8)
ERYTHROCYTE [DISTWIDTH] IN BLOOD BY AUTOMATED COUNT: 14 % (ref 11.9–14.6)
GLUCOSE BLD STRIP.AUTO-MCNC: 249 MG/DL (ref 65–100)
GLUCOSE BLD STRIP.AUTO-MCNC: 260 MG/DL (ref 65–100)
GLUCOSE BLD STRIP.AUTO-MCNC: 294 MG/DL (ref 65–100)
GLUCOSE BLD STRIP.AUTO-MCNC: 388 MG/DL (ref 65–100)
GLUCOSE BLD STRIP.AUTO-MCNC: 61 MG/DL (ref 65–100)
GLUCOSE BLD STRIP.AUTO-MCNC: 74 MG/DL (ref 65–100)
GLUCOSE SERPL-MCNC: 237 MG/DL (ref 65–100)
HCT VFR BLD AUTO: 33 % (ref 35.8–46.3)
HGB BLD-MCNC: 10.9 G/DL (ref 11.7–15.4)
IMM GRANULOCYTES # BLD: 0 K/UL (ref 0–0.5)
IMM GRANULOCYTES NFR BLD AUTO: 0 % (ref 0–5)
LYMPHOCYTES # BLD: 0.9 K/UL (ref 0.5–4.6)
LYMPHOCYTES NFR BLD: 8 % (ref 13–44)
MAGNESIUM SERPL-MCNC: 1.8 MG/DL (ref 1.8–2.4)
MCH RBC QN AUTO: 32.3 PG (ref 26.1–32.9)
MCHC RBC AUTO-ENTMCNC: 33 G/DL (ref 31.4–35)
MCV RBC AUTO: 97.9 FL (ref 79.6–97.8)
MONOCYTES # BLD: 1 K/UL (ref 0.1–1.3)
MONOCYTES NFR BLD: 9 % (ref 4–12)
NEUTS SEG # BLD: 9.8 K/UL (ref 1.7–8.2)
NEUTS SEG NFR BLD: 83 % (ref 43–78)
PLATELET # BLD AUTO: 162 K/UL (ref 150–450)
PMV BLD AUTO: 10 FL (ref 10.8–14.1)
POTASSIUM SERPL-SCNC: 3.6 MMOL/L (ref 3.5–5.1)
RBC # BLD AUTO: 3.37 M/UL (ref 4.05–5.25)
SODIUM SERPL-SCNC: 138 MMOL/L (ref 136–145)
WBC # BLD AUTO: 11.8 K/UL (ref 4.3–11.1)

## 2018-02-15 PROCEDURE — 74011250637 HC RX REV CODE- 250/637: Performed by: FAMILY MEDICINE

## 2018-02-15 PROCEDURE — 36415 COLL VENOUS BLD VENIPUNCTURE: CPT | Performed by: FAMILY MEDICINE

## 2018-02-15 PROCEDURE — 80048 BASIC METABOLIC PNL TOTAL CA: CPT | Performed by: FAMILY MEDICINE

## 2018-02-15 PROCEDURE — 74011250636 HC RX REV CODE- 250/636: Performed by: INTERNAL MEDICINE

## 2018-02-15 PROCEDURE — 3331090001 HH PPS REVENUE CREDIT

## 2018-02-15 PROCEDURE — 74011250637 HC RX REV CODE- 250/637: Performed by: EMERGENCY MEDICINE

## 2018-02-15 PROCEDURE — 65660000000 HC RM CCU STEPDOWN

## 2018-02-15 PROCEDURE — 74011636637 HC RX REV CODE- 636/637: Performed by: FAMILY MEDICINE

## 2018-02-15 PROCEDURE — 82962 GLUCOSE BLOOD TEST: CPT

## 2018-02-15 PROCEDURE — 3331090002 HH PPS REVENUE DEBIT

## 2018-02-15 PROCEDURE — 83735 ASSAY OF MAGNESIUM: CPT | Performed by: FAMILY MEDICINE

## 2018-02-15 PROCEDURE — 85025 COMPLETE CBC W/AUTO DIFF WBC: CPT | Performed by: FAMILY MEDICINE

## 2018-02-15 RX ADMIN — INSULIN LISPRO 10 UNITS: 100 INJECTION, SOLUTION INTRAVENOUS; SUBCUTANEOUS at 12:21

## 2018-02-15 RX ADMIN — LORAZEPAM 1 MG: 1 TABLET ORAL at 08:32

## 2018-02-15 RX ADMIN — OSELTAMIVIR PHOSPHATE 30 MG: 30 CAPSULE ORAL at 17:49

## 2018-02-15 RX ADMIN — LORAZEPAM 1 MG: 1 TABLET ORAL at 21:59

## 2018-02-15 RX ADMIN — Medication 5 ML: at 22:07

## 2018-02-15 RX ADMIN — Medication 5 ML: at 14:00

## 2018-02-15 RX ADMIN — OSELTAMIVIR PHOSPHATE 30 MG: 30 CAPSULE ORAL at 08:32

## 2018-02-15 RX ADMIN — MULTIPLE VITAMINS W/ MINERALS TAB 1 TABLET: TAB at 08:32

## 2018-02-15 RX ADMIN — HYDROCODONE BITARTRATE AND ACETAMINOPHEN 1 TABLET: 5; 325 TABLET ORAL at 23:37

## 2018-02-15 RX ADMIN — Medication 5 ML: at 06:08

## 2018-02-15 RX ADMIN — FERROUS SULFATE TAB 325 MG (65 MG ELEMENTAL FE) 325 MG: 325 (65 FE) TAB at 12:21

## 2018-02-15 RX ADMIN — PREDNISONE 5 MG: 5 TABLET ORAL at 08:32

## 2018-02-15 RX ADMIN — INSULIN GLARGINE 9 UNITS: 100 INJECTION, SOLUTION SUBCUTANEOUS at 22:01

## 2018-02-15 RX ADMIN — APIXABAN 5 MG: 5 TABLET, FILM COATED ORAL at 08:32

## 2018-02-15 RX ADMIN — APIXABAN 5 MG: 5 TABLET, FILM COATED ORAL at 21:58

## 2018-02-15 RX ADMIN — INSULIN LISPRO 6 UNITS: 100 INJECTION, SOLUTION INTRAVENOUS; SUBCUTANEOUS at 08:33

## 2018-02-15 RX ADMIN — CALCIUM CARBONATE 500 MG (1,250 MG)-VITAMIN D3 200 UNIT TABLET 1 TABLET: at 12:21

## 2018-02-15 RX ADMIN — INSULIN LISPRO 6 UNITS: 100 INJECTION, SOLUTION INTRAVENOUS; SUBCUTANEOUS at 17:48

## 2018-02-15 RX ADMIN — CYANOCOBALAMIN TAB 1000 MCG 1000 MCG: 1000 TAB at 12:21

## 2018-02-15 RX ADMIN — GLIPIZIDE 10 MG: 5 TABLET ORAL at 08:32

## 2018-02-15 RX ADMIN — VANCOMYCIN HYDROCHLORIDE 750 MG: 10 INJECTION, POWDER, LYOPHILIZED, FOR SOLUTION INTRAVENOUS at 12:21

## 2018-02-15 RX ADMIN — METOPROLOL TARTRATE 50 MG: 50 TABLET ORAL at 08:32

## 2018-02-15 RX ADMIN — PANTOPRAZOLE SODIUM 40 MG: 40 TABLET, DELAYED RELEASE ORAL at 17:49

## 2018-02-15 RX ADMIN — DIGOXIN 0.12 MG: 125 TABLET ORAL at 08:32

## 2018-02-15 RX ADMIN — METHIMAZOLE 5 MG: 5 TABLET ORAL at 08:32

## 2018-02-15 RX ADMIN — TRAMADOL HYDROCHLORIDE 50 MG: 50 TABLET, FILM COATED ORAL at 21:59

## 2018-02-15 RX ADMIN — ASPIRIN 81 MG: 81 TABLET, COATED ORAL at 08:32

## 2018-02-15 RX ADMIN — METOPROLOL TARTRATE 50 MG: 50 TABLET ORAL at 17:49

## 2018-02-15 NOTE — PROGRESS NOTES
Verbal bedside report received from [address]ung, [facility]. Assumed care of patient.

## 2018-02-15 NOTE — PROGRESS NOTES
Bedside and Verbal shift change report given to Rimma Boyce RN and Vasiliy Obrien RN (oncoming nurse) by self Lisa sifuentes). Report included the following information SBAR, Kardex, Intake/Output, MAR and Recent Results.

## 2018-02-15 NOTE — PROGRESS NOTES
Bedside and Verbal shift change report given to Eiatn Mata RN (oncoming nurse) by self and Lora Suazo RN (offgoing nurse). Report included the following information SBAR, Kardex, ED Summary, Intake/Output, MAR, Recent Results and Cardiac Rhythm A-fib;paced.

## 2018-02-15 NOTE — PROGRESS NOTES
Bedside and Verbal shift change report given to self (oncoming nurse) by [place] (offgoing nurse). Report included the following information SBAR, Kardex and MAR.

## 2018-02-15 NOTE — PROGRESS NOTES
Hospitalist Progress Note    2/15/2018  Admit Date: 2018  7:04 PM   NAME: Dolly Merlin   :     MRN:  [id number]   Attending: Edgardo Magallon MD  PCP:  Lalita Tenorio MD    SUBJECTIVE:     Dolly Merlin is a 57GFR with AFib on eliquis, CAD, AS s/p TAVR, PAD with chronic R foot wound, HTN, DM2 who was admitted  with rigors/chills, in AFib with RVR. Rapid flu was neg, but given symptoms suspect that it might be falsely negative. Also with more drainage and change in color of her chronic foot wound. 2/15: feeling a little worse today. Reports less SOB and cough. However, more pain in R foot, especially when trying to walk. Requesting that Dr. Raj Cruz evaluate her foot as he has done surgery on her foot previously. Review of Systems negative with exception of pertinent positives noted above      PHYSICAL EXAM       Visit Vitals    /89 (BP 1 Location: Left arm, BP Patient Position: Head of bed elevated (Comment degrees))    Pulse 80    Temp 97.5 °F (36.4 °C)    Resp 20    Ht 5' 4\" (1.626 m)    Wt 47.6 kg (105 lb)    SpO2 93%    Breastfeeding No    BMI 18.02 kg/m2      Temp (24hrs), Av.6 °F (36.4 °C), Min:96 °F (35.6 °C), Max:98.4 °F (36.9 °C)    Oxygen Therapy  O2 Sat (%): 93 % (02/15/18 1304)  Pulse via Oximetry: 91 beats per minute (18 2131)  O2 Device: Room air (02/15/18 1304)    Intake/Output Summary (Last 24 hours) at 02/15/18 1614  Last data filed at 02/15/18 1423   Gross per 24 hour   Intake             1110 ml   Output              450 ml   Net              660 ml          General: No acute distress. Head:  Atraumatic Normocephalic. Lungs:  CTA Bilaterally. Normal resp effort on RA  CVS:  RRR, 2/6 FEI, no BLE edema  Abdomen: Soft, NTTP  MSK:  R foot bandaged in boot  Neurologic:  AOx3.  No focal deficits  Psychiatry:      No anxiety/Depression      Recent Results (from the past 24 hour(s))   GLUCOSE, POC    Collection Time: 18  5:40 PM Result Value Ref Range    Glucose (POC) 341 (H) 65 - 100 mg/dL   GLUCOSE, POC    Collection Time: 02/14/18  9:53 PM   Result Value Ref Range    Glucose (POC) 281 (H) 65 - 100 mg/dL   GLUCOSE, POC    Collection Time: 02/15/18  3:02 AM   Result Value Ref Range    Glucose (POC) 61 (L) 65 - 100 mg/dL   GLUCOSE, POC    Collection Time: 02/15/18  3:23 AM   Result Value Ref Range    Glucose (POC) 74 65 - 393 mg/dL   METABOLIC PANEL, BASIC    Collection Time: 02/15/18  4:59 AM   Result Value Ref Range    Sodium 138 136 - 145 mmol/L    Potassium 3.6 3.5 - 5.1 mmol/L    Chloride 105 98 - 107 mmol/L    CO2 25 21 - 32 mmol/L    Anion gap 8 7 - 16 mmol/L    Glucose 237 (H) 65 - 100 mg/dL    BUN 22 8 - 23 MG/DL    Creatinine 0.73 0.6 - 1.0 MG/DL    GFR est AA >60 >60 ml/min/1.73m2    GFR est non-AA >60 >60 ml/min/1.73m2    Calcium 8.6 8.3 - 10.4 MG/DL   CBC WITH AUTOMATED DIFF    Collection Time: 02/15/18  4:59 AM   Result Value Ref Range    WBC 11.8 (H) 4.3 - 11.1 K/uL    RBC 3.37 (L) 4.05 - 5.25 M/uL    HGB 10.9 (L) 11.7 - 15.4 g/dL    HCT 33.0 (L) 35.8 - 46.3 %    MCV 97.9 (H) 79.6 - 97.8 FL    MCH 32.3 26.1 - 32.9 PG    MCHC 33.0 31.4 - 35.0 g/dL    RDW 14.0 11.9 - 14.6 %    PLATELET 818 357 - 450 K/uL    MPV 10.0 (L) 10.8 - 14.1 FL    DF AUTOMATED      NEUTROPHILS 83 (H) 43 - 78 %    LYMPHOCYTES 8 (L) 13 - 44 %    MONOCYTES 9 4.0 - 12.0 %    EOSINOPHILS 0 (L) 0.5 - 7.8 %    BASOPHILS 0 0.0 - 2.0 %    IMMATURE GRANULOCYTES 0 0.0 - 5.0 %    ABS. NEUTROPHILS 9.8 (H) 1.7 - 8.2 K/UL    ABS. LYMPHOCYTES 0.9 0.5 - 4.6 K/UL    ABS. MONOCYTES 1.0 0.1 - 1.3 K/UL    ABS. EOSINOPHILS 0.0 0.0 - 0.8 K/UL    ABS. BASOPHILS 0.0 0.0 - 0.2 K/UL    ABS. IMM.  GRANS. 0.0 0.0 - 0.5 K/UL   MAGNESIUM    Collection Time: 02/15/18  4:59 AM   Result Value Ref Range    Magnesium 1.8 1.8 - 2.4 mg/dL   GLUCOSE, POC    Collection Time: 02/15/18  6:25 AM   Result Value Ref Range    Glucose (POC) 294 (H) 65 - 100 mg/dL   GLUCOSE, POC    Collection Time: 02/15/18 10:57 AM   Result Value Ref Range    Glucose (POC) 388 (H) 65 - 100 mg/dL   GLUCOSE, POC    Collection Time: 02/15/18  3:48 PM   Result Value Ref Range    Glucose (POC) 260 (H) 65 - 100 mg/dL         Imaging /Procedures /Studies   XR CHEST SNGL V   Final Result   IMPRESSION: No consolidation.             ASSESSMENT      Hospital Problems as of 2/15/2018  Date Reviewed: 2/5/2018          Codes Class Noted - Resolved POA    Sepsis ([facility] ICD-10-CM: A41.9  ICD-9-CM: 038.9, 995.91  2/15/2018 - Present Yes        Atrial fibrillation with RVR (HCC) ICD-10-CM: I48.91  ICD-9-CM: 427.31  2/14/2018 - Present Unknown        * (Principal)Atrial fibrillation with rapid ventricular response ([facility] ICD-10-CM: I48.91  ICD-9-CM: 427.31  2/13/2018 - Present Yes        Leukocytosis ICD-10-CM: O76.008  ICD-9-CM: 288.60  2/13/2018 - Present Yes        PAD (peripheral artery disease) (HCC) ICD-10-CM: I73.9  ICD-9-CM: 443.9  2/12/2016 - Present Yes    Overview Addendum 3/4/2016  1:47 PM by Katelynn Carrillo NP     2/17/16 (Dr Dania Lutz) A/o, BLR Angiogram, L PT PTA (), L SFA PTA/Stent (6x 100 Zilver PTX)              CHF (congestive heart failure) ([facility] ICD-10-CM: I50.9  ICD-9-CM: 428.0  6/23/2014 - Present Yes        Diabetes mellitus type 2, controlled ([facility] ICD-10-CM: E11.9  ICD-9-CM: 250.00  3/20/2014 - Present Yes        Hypomagnesemia ICD-10-CM: E83.42  ICD-9-CM: 275.2  3/20/2014 - Present Yes        Hyperglycemia ICD-10-CM: R73.9  ICD-9-CM: 790.29  3/20/2014 - Present Yes        Atrial fibrillation ([address] ICD-10-CM: I48.91  ICD-9-CM: 427.31  3/15/2014 - Present Yes        Dyslipidemia (Chronic) ICD-10-CM: E78.5  ICD-9-CM: 272.4  4/29/2013 - Present Yes        Essential hypertension, benign (Chronic) ICD-10-CM: I10  ICD-9-CM: 401.1  4/29/2013 - Present Yes        CAD (coronary artery disease) ICD-10-CM: I25.10  ICD-9-CM: 414.00  Unknown - Present Yes    Overview Signed 8/11/2010 12:47 PM by Radames Chacon MD     HX CABG 1993, EF 55%                       Plan:  - Atrial Fibrillation with RVR  Converted off dilt gtt to po metop and dig  Continue Eliquis     - Flu-like symptoms  Treating with tamiflu given classic flu-like symptoms  Tylenol for fevers/mild pain     - Leukocytosis/R foot wound  Improved after abx started  Blood cx neg at 2 days  CXR and UA neg  Continue vanc for possible R foot wound  Wound care following  Will consult vascular (patient sees Aleksey as outpatient) for further eval per patient request    - DM2 with hyperglycemia  Continue home meds  SSI ACHS while inpatient     - H/O CHF  Not in exacerbation  Continue home meds     - H/O CAD  No chest pain  Continue home meds     - Hypomagnesemia  Resolved with repletion    DVT Prophylaxis: Eliquis  Dispo: likely home in 1-2 days once medically improved    Rizwan Pleitez MD

## 2018-02-15 NOTE — PROGRESS NOTES
Bedside and Verbal shift change report given to Betsey Quach RN (oncoming nurse) by Vernadine Canavan, RN (offgoing nurse). Report included the following information SBAR, Kardex, Intake/Output, MAR, Recent Results and Cardiac Rhythm Afib.

## 2018-02-15 NOTE — PROGRESS NOTES
Dr. Shanelle Olivas contacted about pt's afternoon blood sugar, ordered to just give 10 units of Insulin. Will continue to monitor.

## 2018-02-15 NOTE — PROGRESS NOTES
Problem: Falls - Risk of  Goal: *Absence of Falls  Document Leda Fall Risk and appropriate interventions in the flowsheet. Outcome: Progressing Towards Goal  Patient progressing towards goal with no falls on current admission. Patient without confusion, agitation, or sensory perception deficits. Patient has steady gait on ambulation. Personal belongings are within reach. Bed is in the low and locked position with side rails up x2. Yellow gripper socks to bilateral feet. Call light within reach and patient verbalizes understanding of use.

## 2018-02-15 NOTE — CDMP QUERY
Please clarify if this patient is being treated/managed for:    **Sepsis in the setting of chronic right foot wound, classic flu like symptoms being treated with Tamiflu, IV Vanc, wound care consult, consider vascular consult. =>Other Explanation of clinical findings  =>Unable to Determine (no explanation of clinical findings)    The medical record reflects the following:    Risk Factors: chronic right foot wound, classic flu like symptoms    Clinical Indicators: chills, rigors, home temp-101.7, WBC-19.4, HR-154, LA-2.1     Treatment: Tamiflu, IV Vanc, wound care consult, consider vascular consult    Please clarify and document your clinical opinion in the progress notes and discharge summary including the definitive and/or presumptive diagnosis, (suspected or probable), related to the above clinical findings. Please include clinical findings supporting your diagnosis.     Thanks,  Karla Sepulveda RN, CDS  [employer] Management Program  [phone]

## 2018-02-16 LAB
BASOPHILS # BLD: 0 K/UL (ref 0–0.2)
BASOPHILS NFR BLD: 0 % (ref 0–2)
DIFFERENTIAL METHOD BLD: ABNORMAL
EOSINOPHIL # BLD: 0.1 K/UL (ref 0–0.8)
EOSINOPHIL NFR BLD: 1 % (ref 0.5–7.8)
ERYTHROCYTE [DISTWIDTH] IN BLOOD BY AUTOMATED COUNT: 13.8 % (ref 11.9–14.6)
GLUCOSE BLD STRIP.AUTO-MCNC: 193 MG/DL (ref 65–100)
GLUCOSE BLD STRIP.AUTO-MCNC: 201 MG/DL (ref 65–100)
GLUCOSE BLD STRIP.AUTO-MCNC: 262 MG/DL (ref 65–100)
GLUCOSE BLD STRIP.AUTO-MCNC: 276 MG/DL (ref 65–100)
HCT VFR BLD AUTO: 30.4 % (ref 35.8–46.3)
HGB BLD-MCNC: 10 G/DL (ref 11.7–15.4)
IMM GRANULOCYTES # BLD: 0 K/UL (ref 0–0.5)
IMM GRANULOCYTES NFR BLD AUTO: 0 % (ref 0–5)
LYMPHOCYTES # BLD: 1.4 K/UL (ref 0.5–4.6)
LYMPHOCYTES NFR BLD: 20 % (ref 13–44)
MCH RBC QN AUTO: 31.8 PG (ref 26.1–32.9)
MCHC RBC AUTO-ENTMCNC: 32.9 G/DL (ref 31.4–35)
MCV RBC AUTO: 96.8 FL (ref 79.6–97.8)
MONOCYTES # BLD: 0.8 K/UL (ref 0.1–1.3)
MONOCYTES NFR BLD: 11 % (ref 4–12)
NEUTS SEG # BLD: 4.8 K/UL (ref 1.7–8.2)
NEUTS SEG NFR BLD: 68 % (ref 43–78)
PLATELET # BLD AUTO: 160 K/UL (ref 150–450)
PMV BLD AUTO: 9.3 FL (ref 10.8–14.1)
RBC # BLD AUTO: 3.14 M/UL (ref 4.05–5.25)
WBC # BLD AUTO: 7.1 K/UL (ref 4.3–11.1)

## 2018-02-16 PROCEDURE — 74011636637 HC RX REV CODE- 636/637: Performed by: FAMILY MEDICINE

## 2018-02-16 PROCEDURE — 65660000000 HC RM CCU STEPDOWN

## 2018-02-16 PROCEDURE — 99232 SBSQ HOSP IP/OBS MODERATE 35: CPT | Performed by: SURGERY

## 2018-02-16 PROCEDURE — 97162 PT EVAL MOD COMPLEX 30 MIN: CPT

## 2018-02-16 PROCEDURE — 3331090001 HH PPS REVENUE CREDIT

## 2018-02-16 PROCEDURE — 74011250636 HC RX REV CODE- 250/636: Performed by: INTERNAL MEDICINE

## 2018-02-16 PROCEDURE — 36415 COLL VENOUS BLD VENIPUNCTURE: CPT | Performed by: FAMILY MEDICINE

## 2018-02-16 PROCEDURE — 85025 COMPLETE CBC W/AUTO DIFF WBC: CPT | Performed by: FAMILY MEDICINE

## 2018-02-16 PROCEDURE — 74011250637 HC RX REV CODE- 250/637: Performed by: EMERGENCY MEDICINE

## 2018-02-16 PROCEDURE — 74011250637 HC RX REV CODE- 250/637: Performed by: FAMILY MEDICINE

## 2018-02-16 PROCEDURE — 3331090002 HH PPS REVENUE DEBIT

## 2018-02-16 PROCEDURE — 82962 GLUCOSE BLOOD TEST: CPT

## 2018-02-16 RX ADMIN — INSULIN LISPRO 6 UNITS: 100 INJECTION, SOLUTION INTRAVENOUS; SUBCUTANEOUS at 16:48

## 2018-02-16 RX ADMIN — PANTOPRAZOLE SODIUM 40 MG: 40 TABLET, DELAYED RELEASE ORAL at 16:47

## 2018-02-16 RX ADMIN — INSULIN GLARGINE 9 UNITS: 100 INJECTION, SOLUTION SUBCUTANEOUS at 22:20

## 2018-02-16 RX ADMIN — TRAMADOL HYDROCHLORIDE 50 MG: 50 TABLET, FILM COATED ORAL at 22:21

## 2018-02-16 RX ADMIN — METHIMAZOLE 5 MG: 5 TABLET ORAL at 08:40

## 2018-02-16 RX ADMIN — Medication 5 ML: at 05:33

## 2018-02-16 RX ADMIN — Medication 10 ML: at 22:25

## 2018-02-16 RX ADMIN — METOPROLOL TARTRATE 50 MG: 50 TABLET ORAL at 17:28

## 2018-02-16 RX ADMIN — INSULIN LISPRO 4 UNITS: 100 INJECTION, SOLUTION INTRAVENOUS; SUBCUTANEOUS at 22:18

## 2018-02-16 RX ADMIN — APIXABAN 5 MG: 5 TABLET, FILM COATED ORAL at 08:39

## 2018-02-16 RX ADMIN — LORAZEPAM 1 MG: 1 TABLET ORAL at 08:41

## 2018-02-16 RX ADMIN — OSELTAMIVIR PHOSPHATE 30 MG: 30 CAPSULE ORAL at 08:40

## 2018-02-16 RX ADMIN — PREDNISONE 5 MG: 5 TABLET ORAL at 07:41

## 2018-02-16 RX ADMIN — INSULIN LISPRO 2 UNITS: 100 INJECTION, SOLUTION INTRAVENOUS; SUBCUTANEOUS at 07:41

## 2018-02-16 RX ADMIN — CALCIUM CARBONATE 500 MG (1,250 MG)-VITAMIN D3 200 UNIT TABLET 1 TABLET: at 13:26

## 2018-02-16 RX ADMIN — HYDROCODONE BITARTRATE AND ACETAMINOPHEN 1 TABLET: 5; 325 TABLET ORAL at 22:22

## 2018-02-16 RX ADMIN — APIXABAN 5 MG: 5 TABLET, FILM COATED ORAL at 22:21

## 2018-02-16 RX ADMIN — ASPIRIN 81 MG: 81 TABLET, COATED ORAL at 08:40

## 2018-02-16 RX ADMIN — CYANOCOBALAMIN TAB 1000 MCG 1000 MCG: 1000 TAB at 13:26

## 2018-02-16 RX ADMIN — INSULIN LISPRO 6 UNITS: 100 INJECTION, SOLUTION INTRAVENOUS; SUBCUTANEOUS at 11:38

## 2018-02-16 RX ADMIN — DIGOXIN 0.12 MG: 125 TABLET ORAL at 08:39

## 2018-02-16 RX ADMIN — Medication 5 ML: at 13:26

## 2018-02-16 RX ADMIN — GLIPIZIDE 10 MG: 5 TABLET ORAL at 08:39

## 2018-02-16 RX ADMIN — FERROUS SULFATE TAB 325 MG (65 MG ELEMENTAL FE) 325 MG: 325 (65 FE) TAB at 11:38

## 2018-02-16 RX ADMIN — VANCOMYCIN HYDROCHLORIDE 750 MG: 10 INJECTION, POWDER, LYOPHILIZED, FOR SOLUTION INTRAVENOUS at 11:38

## 2018-02-16 RX ADMIN — MULTIPLE VITAMINS W/ MINERALS TAB 1 TABLET: TAB at 08:39

## 2018-02-16 RX ADMIN — LORAZEPAM 1 MG: 1 TABLET ORAL at 22:21

## 2018-02-16 RX ADMIN — METOPROLOL TARTRATE 50 MG: 50 TABLET ORAL at 08:39

## 2018-02-16 RX ADMIN — HYDROCODONE BITARTRATE AND ACETAMINOPHEN 1 TABLET: 5; 325 TABLET ORAL at 08:42

## 2018-02-16 NOTE — PROGRESS NOTES
Verbal bedside report given to oncoming RNIsabel. Patient's situation, background, assessment and recommendations provided. Opportunity for questions provided. Oncoming RN assumed care of patient.

## 2018-02-16 NOTE — PROGRESS NOTES
Hospitalist Progress Note    2018  Admit Date: 2018  7:04 PM   NAME: Deepali Youngblood   :     MRN:  [id number]   Attending: Adalberto Nayak MD  PCP:  Asif Abdi MD    SUBJECTIVE:     Deepali Youngblood is a 7ZTT with AFib on eliquis, CAD, AS s/p TAVR, PAD with chronic R foot wound, HTN, DM2 who was admitted  with rigors/chills, in AFib with RVR. Rapid flu was neg, but given symptoms suspect that it might be falsely negative. Also with more drainage and change in color of her chronic foot wound. Improvement once started on Vanc. Foot has been evaluated by Dr. Siomara Lo, who will collaborate with Dr. Cindy Lovett as an outpatient to decide if she needs any further surgical intervention    : seen with daughter at bedside. They had many questions that were thoroughly answered. Patient is feeling better but complaining of pain in her R calf where she has been resting her leg on a pillow while offloading her heels. She is having no SOB, CP, abd pain, nausea. She is hesitant about her ability to walk at home given the discomfort she is having in her foot. We agreed that after one more PT session tomorrow morning, we would assess whether she was appropriate to go home or if she needed STR.      Review of Systems negative with exception of pertinent positives noted above      PHYSICAL EXAM       Visit Vitals    /67 (BP 1 Location: Left arm, BP Patient Position: At rest)    Pulse 78    Temp 98.4 °F (36.9 °C)    Resp 18    Ht 5' 4\" (1.626 m)    Wt 48 kg (105 lb 14.4 oz)    SpO2 97%    Breastfeeding No    BMI 18.18 kg/m2      Temp (24hrs), Av.7 °F (36.5 °C), Min:96.7 °F (35.9 °C), Max:98.4 °F (36.9 °C)    Oxygen Therapy  O2 Sat (%): 97 % (18 1301)  Pulse via Oximetry: 91 beats per minute (18 2131)  O2 Device: Room air (18 1215)    Intake/Output Summary (Last 24 hours) at 18 1639  Last data filed at 18 5507   Gross per 24 hour   Intake 120 ml   Output              950 ml   Net             -830 ml          General: No acute distress. Head:  Atraumatic Normocephalic. Lungs:  CTA Bilaterally. Normal resp effort on RA  CVS:  RRR, 2/6 FEI, no BLE edema  Abdomen: Soft, NTTP  MSK:  R foot bandaged, extremities warm  Neurologic:  AOx3. No focal deficits  Psychiatry:      anxious      Recent Results (from the past 24 hour(s))   GLUCOSE, POC    Collection Time: 02/15/18  9:29 PM   Result Value Ref Range    Glucose (POC) 249 (H) 65 - 100 mg/dL   CBC WITH AUTOMATED DIFF    Collection Time: 02/16/18  4:28 AM   Result Value Ref Range    WBC 7.1 4.3 - 11.1 K/uL    RBC 3.14 (L) 4.05 - 5.25 M/uL    HGB 10.0 (L) 11.7 - 15.4 g/dL    HCT 30.4 (L) 35.8 - 46.3 %    MCV 96.8 79.6 - 97.8 FL    MCH 31.8 26.1 - 32.9 PG    MCHC 32.9 31.4 - 35.0 g/dL    RDW 13.8 11.9 - 14.6 %    PLATELET 782 302 - 173 K/uL    MPV 9.3 (L) 10.8 - 14.1 FL    DF AUTOMATED      NEUTROPHILS 68 43 - 78 %    LYMPHOCYTES 20 13 - 44 %    MONOCYTES 11 4.0 - 12.0 %    EOSINOPHILS 1 0.5 - 7.8 %    BASOPHILS 0 0.0 - 2.0 %    IMMATURE GRANULOCYTES 0 0.0 - 5.0 %    ABS. NEUTROPHILS 4.8 1.7 - 8.2 K/UL    ABS. LYMPHOCYTES 1.4 0.5 - 4.6 K/UL    ABS. MONOCYTES 0.8 0.1 - 1.3 K/UL    ABS. EOSINOPHILS 0.1 0.0 - 0.8 K/UL    ABS. BASOPHILS 0.0 0.0 - 0.2 K/UL    ABS. IMM. GRANS. 0.0 0.0 - 0.5 K/UL   GLUCOSE, POC    Collection Time: 02/16/18  5:57 AM   Result Value Ref Range    Glucose (POC) 193 (H) 65 - 100 mg/dL   GLUCOSE, POC    Collection Time: 02/16/18 11:10 AM   Result Value Ref Range    Glucose (POC) 276 (H) 65 - 100 mg/dL         Imaging /Procedures /Studies   XR CHEST SNGL V   Final Result   IMPRESSION: No consolidation.             ASSESSMENT      Hospital Problems as of 2/16/2018  Date Reviewed: 2/5/2018          Codes Class Noted - Resolved POA    Sepsis ([facility] ICD-10-CM: A41.9  ICD-9-CM: 038.9, 995.91  2/15/2018 - Present Yes        Atrial fibrillation with RVR ([facility] ICD-10-CM: I48.91  ICD-9-CM: 427.31 2/14/2018 - Present Unknown        * (Principal)Atrial fibrillation with rapid ventricular response (HCC) ICD-10-CM: I48.91  ICD-9-CM: 427.31  2/13/2018 - Present Yes        Leukocytosis ICD-10-CM: Q56.905  ICD-9-CM: 288.60  2/13/2018 - Present Yes        PAD (peripheral artery disease) (HCC) ICD-10-CM: I73.9  ICD-9-CM: 443.9  2/12/2016 - Present Yes    Overview Addendum 3/4/2016  1:47 PM by Aroldo Gonzales NP     2/17/16 (Dr Scott Paige) A/o, BLR Angiogram, L PT PTA (), L SFA PTA/Stent (6x 100 Zilver PTX)              CHF (congestive heart failure) ([facility] ICD-10-CM: I50.9  ICD-9-CM: 428.0  6/23/2014 - Present Yes        Diabetes mellitus type 2, controlled ([facility] ICD-10-CM: E11.9  ICD-9-CM: 250.00  3/20/2014 - Present Yes        Hypomagnesemia ICD-10-CM: E83.42  ICD-9-CM: 275.2  3/20/2014 - Present Yes        Hyperglycemia ICD-10-CM: R73.9  ICD-9-CM: 790.29  3/20/2014 - Present Yes        Atrial fibrillation ([facility] ICD-10-CM: I48.91  ICD-9-CM: 427.31  3/15/2014 - Present Yes        Dyslipidemia (Chronic) ICD-10-CM: E78.5  ICD-9-CM: 272.4  4/29/2013 - Present Yes        Essential hypertension, benign (Chronic) ICD-10-CM: I10  ICD-9-CM: 401.1  4/29/2013 - Present Yes        CAD (coronary artery disease) ICD-10-CM: I25.10  ICD-9-CM: 414.00  Unknown - Present Yes    Overview Signed 8/11/2010 12:47 PM by Thuan Tay MD     HX CABG 1993, EF 55%                       Plan:  - Atrial Fibrillation with RVR  Converted off dilt gtt  Stable on home dose metop and dig for > 48hrs  Continue Eliquis     - Leukocytosis/R foot wound  Improved after abx started  Blood cx neg at 2 days  CXR and UA neg  Continue vanc for R foot wound, convert to keflex for discharge  Wound care following  Appreciate Dr. Emilia Belcher recommendations; will need outpatient followup with Dr. Cindy Lovett for further evaluation once infection clears    - DM2 with hyperglycemia  Continue home meds  SSI ACHS while inpatient     - H/O CHF  Not in exacerbation  Continue home meds     - H/O CAD  No chest pain  Continue home meds     - Hypomagnesemia  Resolved with repletion    DVT Prophylaxis: Eliquis  Dispo: likely home tomorrow if PT thinks that she is safe for home    Rosalba Alicea MD

## 2018-02-16 NOTE — PROGRESS NOTES
Verbal  And bedside shift change report given to self (oncoming nurse) by Gerhardt Bidding (offgoing nurse). Report included the following information SBAR, Kardex and MAR.

## 2018-02-16 NOTE — PROGRESS NOTES
I spoke with Dr. Chantal Howell of orthopedic foot surgery regarding Mrs. Rutledge. He knows her well from his practice. We discussed her care and her presenting problems. Our plan is for Mrs. Rutledge to be discharged once her admitting physicians feel that her infection is well controlled which I suspect will be fairly soon. Regarding the ulcer over the right foot bunion, Dr. Aditya Gibbs will evaluate that as an outpatient in his office. Together the two of us will continue to manage her care as it relates to her foot ulcerations and vascular disease. If there are any questions regarding this please do not hesitate to call me.

## 2018-02-16 NOTE — CONSULTS
[facility]   [address] [place] [address] Daniel Mix. [address] [facility] [address]  Phone: [phone]  Fax: [phone]    Subjective: Pieter Palacios is a 69 y.o. female who presents for evaluation of an erythematous lesion of the right foot at the first metatarsal head. Patient is known to me from previous treatment of her PAD as well as recent surgery. On December 20, 2017 I performed a right fourth toe metatarsal head resection due to protrusion of the metatarsal head and associated ulcer. This lesion is actually healing very well. She presented to the hospital recently was admitted with foot pain and there is a large bunion at the first metatarsal head with erosions over the bony prominence that appear to be infected. She has seen Dr. Lydia Sharma for similar problems in the past with her left great toe. Patient has known severe tibial level PAD. An arteriogram 2 years ago she has only one patent runoff vessel to the foot which is the anterior tibial artery and the right leg and it is also severely diseased distally. Recent vascular studies performed by the hospital radiology department in December show a toe pressure on the right of 68 on the left of 50.       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 toe    GERD (gastroesophageal reflux disease)     managed with medication     Heart failure ([address]     History of kidney stones     multiple with surgical interventions    Hypercholesterolemia     managed with medication     Hypertension     Hypothyroidism     managed with medication     Ill-defined condition     pt takes eliquis     Microscopic hematuria     Nausea & vomiting     after anesthesia    Osteoarthritis     managed with medication     Pacemaker     Metronic pacemaker only    PUD (peptic ulcer disease)     no recent episodes    PVD (peripheral vascular disease) ([address]     left side x 1 stented    Rheumatoid arthritis(714.0)     managed with medication     Toe amputation status ([facility]     left great toe     Type 2 diabetes mellitus ([facility] Dx 2006    oral t and insulin/Avg / no s/s of low BS/ does not have a sensation / last A1C7.4    Vertigo     no treatment, happens occassionally     Past Surgical History:   Procedure Laterality Date    CABG, ARTERY-VEIN, FOUR  1993    states x 5    CARDIAC SURG PROCEDURE UNLIST      Cardiovert x 2-3 x last 2/4/2012    HX AMPUTATION Left 2016    great toe    HX AORTIC VALVE REPLACEMENT      HX APPENDECTOMY  1959    HX HEART CATHETERIZATION      HX HEENT      dental    HX HYSTERECTOMY  1988    HX LITHOTRIPSY      multiple    HX OPEN CHOLECYSTECTOMY      HX ORTHOPAEDIC Left     achilles tendon    HX ORTHOPAEDIC Left     \"toe surgery removing bones\"    HX PACEMAKER      HX PACEMAKER PLACEMENT      Metronic    HX UROLOGICAL Left 1980s    open L kidney removal stones    VASCULAR SURGERY PROCEDURE UNLIST Left 2-17-16    lower extremity arteriogram with stent x 1 placement    VASCULAR SURGERY PROCEDURE UNLIST Right 12/20/2017    2nd toe- resection metatarsal head      Family History   Problem Relation Age of Onset    Heart Disease Father     Heart Attack Father     Diabetes Father     Hypertension Father     High Cholesterol Father     Asthma Father     Heart Disease Other     Heart Surgery Other     Diabetes Mother     Stroke Mother      TIAs    Hypertension Mother     Other Mother      kidney stone    Kidney Disease Mother     Heart Disease Mother     High Cholesterol Mother    Dimitrios Jewell Mother     Cancer Sister      multiple myeloma    Hypertension Sister     Hypertension Sister     Hypertension Brother     Diabetes Brother     Cancer Brother     Hypertension Sister     Heart Disease Sister     Hypertension Sister     Hypertension Sister      Social History     Social History    Marital status:      Spouse name: N/A    Number of children: N/A    Years of education: N/A     Social History Main Topics    Smoking status: Never Smoker    Smokeless tobacco: Never Used    Alcohol use No    Drug use: No    Sexual activity: Not Asked     Other Topics Concern    None     Social History Narrative      Current Facility-Administered Medications   Medication Dose Route Frequency    [START ON 2/17/2018] Vancomycin trough reminder   Other ONCE    LORazepam (ATIVAN) tablet 1 mg  1 mg Oral Q12H    vancomycin (VANCOCIN) 750 mg in  ml infusion  750 mg IntraVENous Q24H    apixaban (ELIQUIS) tablet 5 mg  5 mg Oral Q12H    cyanocobalamin tablet 1,000 mcg  1,000 mcg Oral PCL    pantoprazole (PROTONIX) tablet 40 mg  40 mg Oral DAILY WITH DINNER    ferrous sulfate tablet 325 mg  1 Tab Oral ACL    multivitamin, tx-iron-ca-min (THERA-M w/ IRON) tablet 1 Tab  1 Tab Oral DAILY    digoxin (LANOXIN) tablet 0.125 mg  0.125 mg Oral DAILY    predniSONE (DELTASONE) tablet 5 mg  5 mg Oral DAILY WITH BREAKFAST    HYDROcodone-acetaminophen (NORCO) 5-325 mg per tablet 1 Tab  1 Tab Oral Q6H PRN    metoprolol tartrate (LOPRESSOR) tablet 50 mg  50 mg Oral BID    insulin glargine (LANTUS) injection 9 Units  9 Units SubCUTAneous QHS    aspirin delayed-release tablet 81 mg  81 mg Oral DAILY    methIMAzole (TAPAZOLE) tablet 5 mg  5 mg Oral DAILY    traMADol (ULTRAM) tablet 50 mg  50 mg Oral QHS    [START ON 2/28/2018] ergocalciferol (ERGOCALCIFEROL) capsule 50,000 Units  50,000 Units Oral EVERY MONTH    glipiZIDE (GLUCOTROL) tablet 10 mg  10 mg Oral DAILY    calcium-vitamin D (OS-EVER) 500 mg-200 unit tablet  1 Tab Oral PCL    linagliptin (TRADJENTA) tablet 5 mg (Patient Supplied)  5 mg Oral DAILY    sodium chloride (NS) flush 5-10 mL  5-10 mL IntraVENous Q8H    sodium chloride (NS) flush 5-10 mL  5-10 mL IntraVENous PRN    acetaminophen (TYLENOL) tablet 650 mg  650 mg Oral Q4H PRN    oseltamivir (TAMIFLU) capsule 30 mg  30 mg Oral BID    insulin lispro (HUMALOG) injection   SubCUTAneous AC&HS      Allergies   Allergen Reactions    Multaq [Dronedarone] Nausea Only    Other Medication Other (comments)     STATINS CAUSE MUSCLE WEAKNESS  Cholesterol medications    Statins-Hmg-Coa Reductase Inhibitors Myalgia       Review of Systems:  A comprehensive review of systems was negative except for that written in the History of Present Illness. Objective:     Patient Vitals for the past 8 hrs:   BP Temp Pulse Resp SpO2   18 0833 117/54 97.8 °F (36.6 °C) 77 18 99 %     Temp (24hrs), Av.6 °F (36.4 °C), Min:96.7 °F (35.9 °C), Max:98.3 °F (36.8 °C)      Physical Exam:  Visit Vitals    /54 (BP 1 Location: Right arm, BP Patient Position: At rest)    Pulse 77    Temp 97.8 °F (36.6 °C)    Resp 18    Ht 5' 4\" (1.626 m)    Wt 105 lb 14.4 oz (48 kg)    SpO2 99%    Breastfeeding No    BMI 18.18 kg/m2     General appearance: alert, cooperative, no distress, appears stated age  Head: Normocephalic, without obvious abnormality, atraumatic  Extremities: extremities normal, atraumatic, no cyanosis or edema -except the right foot where the recent surgical wound on the plantar aspect has almost completely healed and shows no signs of infection or ischemia. There is no drainage or erythema. However, there is a large bunion on the right first metatarsal head area and there is erythema associated with this. There is no fluctuance, tenderness, erythema or crepitus of the right forefoot. No ischemic issues on left foot.     Assessment:     Severe tibial disease of the right lower extremity. Recent surgical site is healing normally without issues or problems. The problem with the right foot now is a new issue specifically related to the bunion at the first metatarsal head. Plan:     I will defer treatment of this bunion area to a foot surgeon. This is really not in my scope of practice. Regarding the PAD, the toe pressure of 68 on the right leg is generally considered adequate for healing in the recent metatarsal head resection is healing. I would defer any needed surgery of the bunion area to Dr. Antonieta Sherman. Signed By: Ana Orellana MD     February 16, 2018      Elements of this note have been dictated using speech recognition software. As a result, errors of speech recognition may have occurred.

## 2018-02-16 NOTE — PROGRESS NOTES
Problem: Mobility Impaired (Adult and Pediatric)  Goal: *Acute Goals and Plan of Care (Insert Text)  LTG:  (1.)Ms. Carlos A Balbuena will move from supine to sit and sit to supine , scoot up and down and roll side to side in bed with MODIFIED INDEPENDENCE within 7 treatment day(s). (2.)Ms. Carlos A Balbuena will transfer from bed to chair and chair to bed with MODIFIED INDEPENDENCE using the least restrictive device within 7 treatment day(s). (3.)Ms. Carlos A Balbuena will ambulate with MODIFIED INDEPENDENCE for 50+ feet with the least restrictive device within 7 treatment day(s). ________________________________________________________________________________________________      PHYSICAL THERAPY: Initial Assessment, AM 2/16/2018  INPATIENT: Hospital Day: 4  [place] / Product Type: Medicare /   Walking shoe R    NAME/AGE/GENDER: Ramu Levine is a 69 y.o. female   PRIMARY DIAGNOSIS: Atrial fibrillation with rapid ventricular response (HCC)  Atrial fibrillation with RVR (HCC) Atrial fibrillation with rapid ventricular response (HCC) Atrial fibrillation with rapid ventricular response ([facility]        ICD-10: Treatment Diagnosis:   · Difficulty in walking, Not elsewhere classified (R26.2)   Precaution/Allergies:  Multaq [dronedarone]; Other medication; and Statins-hmg-coa reductase inhibitors      ASSESSMENT:     Ms. Carlos A Balbuena presents supine in bed, very pleasant and agreeable for PT assessment. She has ulcer on R foot which she wears a special shoe, states that she doesn't need to be heel WBing anymore. Patient also has RA with joint deformities B hands/feet and generalized pain. She transitioned to sit with supervision. Donned her shoes with assist.  Stood with CGA to RW and ambulated few feet to chair at bedside, slightly shakey and fatigued after ambulation. Instructed patient that using a RW would allow her to decrease WBing on foot better than a rollator which she normally uses.   Ms. Freida Kayser would benefit from skilled physical therapy (medically necessary) to address her deficits and maximize her function. This section established at most recent assessment   PROBLEM LIST (Impairments causing functional limitations):  1. Decreased ADL/Functional Activities  2. Decreased Transfer Abilities  3. Decreased Ambulation Ability/Technique  4. Decreased Balance  5. Increased Pain  6. Decreased Activity Tolerance   INTERVENTIONS PLANNED: (Benefits and precautions of physical therapy have been discussed with the patient.)  1. Balance Exercise  2. Bed Mobility  3. Gait Training  4. Therapeutic Activites  5. Therapeutic Exercise/Strengthening  6. Transfer Training  7. education  8. Group Therapy     TREATMENT PLAN: Frequency/Duration: 3 times a week for duration of hospital stay  Rehabilitation Potential For Stated Goals: Good     RECOMMENDED REHABILITATION/EQUIPMENT: (at time of discharge pending progress): Due to the probability of continued deficits (see above) this patient will likely need continued skilled physical therapy after discharge. Equipment:    RW              HISTORY:   History of Present Injury/Illness (Reason for Referral):  Per MD note, \"Patient is a pleasant 80yoF with PMHx significant for afib on eliquis, as well as CAD, CHF, HTN who presents to the hospital with a 4 hour history of cough, fevers/chills, general malaise, weakness. She had been in her typical state of health until this afternoon when she began experiencing rigors. She tells me that she feels as though she \"cannot get warm. \"  Her daughter went to check on her after she found out by phone that she was feeling ill, and on measurement of temperature, she had a reported fever of 101.7. This prompted family to bring her to the ER for further evaluation.     Patient was found to have a leukocytosis of 19. Given her flu-like symptoms, a rapid flu was obtained which was reportedly negative.   On cardiac monitoring, patient was found to be in afib with RVR with HR around 130s. She did not miss a dose of her home meds, which include metoprolol and digoxin as well as Eliquis, and denies any chest pain. She does not have SOB, but does report a weak cough which started this afternoon as well. No extremity edema of recent either.     Of note, patient has a h/o PAD with R foot wound treated in hospital in December 2017 with subsequent surgery. She has been following up with outpatient wound care and MD without difficulty, but patient's family does mention that her R medial bunion has been hurting a little more, although it does not appear to be infected per their inspection of recent. \"  Past Medical History/Comorbidities:   Ms. Palumbo  has a past medical history of Acquired cyst of kidney; Anxiety; Aortic valve replaced; Atrial fibrillation, chronic (HCC); CAD (coronary artery disease); Calculus of kidney; Carotid artery stenosis without cerebral infarction; Chronic pain; Gangrene associated with diabetes mellitus ([facility] (5/26/2016); GERD (gastroesophageal reflux disease); Heart failure ([address] History of kidney stones; Hypercholesterolemia; Hypertension; Hypothyroidism; Ill-defined condition; Microscopic hematuria; Nausea & vomiting; Osteoarthritis; Pacemaker; PUD (peptic ulcer disease); PVD (peripheral vascular disease) ([address] Rheumatoid arthritis(714.0); Toe amputation status ([address] Type 2 diabetes mellitus ([facility] (Dx 2006); and Vertigo. She also has no past medical history of Adverse effect of anesthesia; Difficult intubation; Malignant hyperthermia due to anesthesia; or Pseudocholinesterase deficiency.   Ms. Palumbo  has a past surgical history that includes hx hysterectomy (1988); hx appendectomy (1959); hx pacemaker placement; hx open cholecystectomy; hx urological (Left, 1980s); hx lithotripsy; hx pacemaker; hx orthopaedic (Left); hx orthopaedic (Left); hx amputation (Left, 2016); hx heent; pr cabg, artery-vein, four (1993); pr cardiac surg procedure unlist; hx heart catheterization; vascular surgery procedure unlist (Left, 2-17-16); hx aortic valve replacement; and vascular surgery procedure unlist (Right, 12/20/2017). Social History/Living Environment:   Home Environment: Private residence  # Steps to Enter: 2  One/Two Story Residence: One story  Living Alone: Yes  Support Systems: Family member(s)  Patient Expects to be Discharged to[de-identified] Private residence  Current DME Used/Available at Home: Jeff Prince, rollator, Cane, straight, Walker  Prior Level of Function/Work/Activity:  Lives alone with frequent help from caregivers and family. Ambulates short distances with rollator independently. Wears walking shoe R due to recent surgery on her foot. Number of Personal Factors/Comorbidities that affect the Plan of Care: 3+: HIGH COMPLEXITY   EXAMINATION:   Most Recent Physical Functioning:   Gross Assessment:  AROM: Generally decreased, functional  Strength: Generally decreased, functional  Sensation: Impaired               Posture:  Posture Assessment: Forward head, Rounded shoulders  Balance:  Sitting: Intact  Standing: Impaired  Standing - Static: Fair  Standing - Dynamic : Poor Bed Mobility:  Supine to Sit: Supervision  Scooting: Supervision  Wheelchair Mobility:     Transfers:  Sit to Stand: Contact guard assistance  Stand to Sit: Contact guard assistance  Bed to Chair: Contact guard assistance;Minimum assistance  Gait:     Base of Support: Center of gravity altered  Speed/Kira: Slow  Step Length: Left shortened;Right shortened  Gait Abnormalities: Decreased step clearance  Distance (ft): 3 Feet (ft)  Assistive Device: Gait belt;Walker, rolling  Ambulation - Level of Assistance: Contact guard assistance;Minimal assistance      Body Structures Involved:  1. Heart  2. Bones  3. Joints Body Functions Affected:  1. Sensory/Pain  2. Cardio  3. Movement Related Activities and Participation Affected:  1. Mobility  2. Self Care  3.  Domestic Life   Number of elements that affect the Plan of Care: 4+: HIGH COMPLEXITY   CLINICAL PRESENTATION:   Presentation: Evolving clinical presentation with changing clinical characteristics: MODERATE COMPLEXITY   CLINICAL DECISION MAKIN [facility] Mobility Inpatient Short Form  How much difficulty does the patient currently have. .. Unable A Lot A Little None   1. Turning over in bed (including adjusting bedclothes, sheets and blankets)? [] 1   [] 2   [x] 3   [] 4   2. Sitting down on and standing up from a chair with arms ( e.g., wheelchair, bedside commode, etc.)   [] 1   [] 2   [x] 3   [] 4   3. Moving from lying on back to sitting on the side of the bed? [] 1   [] 2   [x] 3   [] 4   How much help from another person does the patient currently need. .. Total A Lot A Little None   4. Moving to and from a bed to a chair (including a wheelchair)? [] 1   [] 2   [x] 3   [] 4   5. Need to walk in hospital room? [] 1   [] 2   [x] 3   [] 4   6. Climbing 3-5 steps with a railing? [] 1   [] 2   [x] 3   [] 4   © , Trustees of [address] [place] [address], under license to [employer]. All rights reserved      Score:  Initial: 18 Most Recent: X (Date: -- )    Interpretation of Tool:  Represents activities that are increasingly more difficult (i.e. Bed mobility, Transfers, Gait). Score 24 23 22-20 19-15 14-10 9-7 6     Modifier CH CI CJ CK CL CM CN      ?  Mobility - Walking and Moving Around:     - CURRENT STATUS: CK - 40%-59% impaired, limited or restricted    - GOAL STATUS: CJ - 20%-39% impaired, limited or restricted    - D/C STATUS:  ---------------To be determined---------------  [place] / Product Type: Medicare /      Medical Necessity:     · Patient is expected to demonstrate progress in strength, range of motion, balance, coordination and functional technique to increase independence with   and improve safety during all functional mobiilty. Reason for Services/Other Comments:  · Patient continues to require skilled intervention due to medical complications and patient unable to attend/participate in therapy as expected. Use of outcome tool(s) and clinical judgement create a POC that gives a: Questionable prediction of patient's progress: MODERATE COMPLEXITY            TREATMENT:   (In addition to Assessment/Re-Assessment sessions the following treatments were rendered)   Pre-treatment Symptoms/Complaints:    Pain: Initial:   Pain Intensity 1: 8  Pain Location 1: Generalized  Pain Intervention(s) 1: Repositioned  Post Session:  Unchanged. Assessment/Reassessment only, no treatment provided today    Braces/Orthotics/Lines/Etc:   · O2 Device: Room air  Treatment/Session Assessment:    · Response to Treatment:  See above. · Interdisciplinary Collaboration:   o Physical Therapist  o Registered Nurse  · After treatment position/precautions:   o Up in chair  o Bed/Chair-wheels locked  o Call light within reach  o RN notified   · Compliance with Program/Exercises: compliant all of the time. · Recommendations/Intent for next treatment session: \"Next visit will focus on advancements to more challenging activities and reduction in assistance provided\".   Total Treatment Duration:  PT Patient Time In/Time Out  Time In: 0950  Time Out: [address], PT, DPT

## 2018-02-16 NOTE — PROGRESS NOTES
Bedside and Verbal shift change report given to self (oncoming nurse) by Reun Hou RN (offgoing nurse). Report included the following information SBAR, Kardex, MAR and Recent Results.

## 2018-02-17 LAB
GLUCOSE BLD STRIP.AUTO-MCNC: 143 MG/DL (ref 65–100)
GLUCOSE BLD STRIP.AUTO-MCNC: 192 MG/DL (ref 65–100)
GLUCOSE BLD STRIP.AUTO-MCNC: 265 MG/DL (ref 65–100)
GLUCOSE BLD STRIP.AUTO-MCNC: 322 MG/DL (ref 65–100)
GLUCOSE BLD STRIP.AUTO-MCNC: 69 MG/DL (ref 65–100)
GLUCOSE BLD STRIP.AUTO-MCNC: 86 MG/DL (ref 65–100)
VANCOMYCIN TROUGH SERPL-MCNC: 5 UG/ML (ref 5–20)

## 2018-02-17 PROCEDURE — 97530 THERAPEUTIC ACTIVITIES: CPT

## 2018-02-17 PROCEDURE — 74011636637 HC RX REV CODE- 636/637: Performed by: FAMILY MEDICINE

## 2018-02-17 PROCEDURE — 74011250637 HC RX REV CODE- 250/637: Performed by: FAMILY MEDICINE

## 2018-02-17 PROCEDURE — 74011000302 HC RX REV CODE- 302: Performed by: FAMILY MEDICINE

## 2018-02-17 PROCEDURE — 65660000000 HC RM CCU STEPDOWN

## 2018-02-17 PROCEDURE — 74011250637 HC RX REV CODE- 250/637: Performed by: INTERNAL MEDICINE

## 2018-02-17 PROCEDURE — 97165 OT EVAL LOW COMPLEX 30 MIN: CPT

## 2018-02-17 PROCEDURE — 82962 GLUCOSE BLOOD TEST: CPT

## 2018-02-17 PROCEDURE — 80202 ASSAY OF VANCOMYCIN: CPT | Performed by: FAMILY MEDICINE

## 2018-02-17 PROCEDURE — 36415 COLL VENOUS BLD VENIPUNCTURE: CPT | Performed by: FAMILY MEDICINE

## 2018-02-17 PROCEDURE — 74011250636 HC RX REV CODE- 250/636: Performed by: FAMILY MEDICINE

## 2018-02-17 PROCEDURE — 3331090002 HH PPS REVENUE DEBIT

## 2018-02-17 PROCEDURE — 86580 TB INTRADERMAL TEST: CPT | Performed by: FAMILY MEDICINE

## 2018-02-17 PROCEDURE — 3331090001 HH PPS REVENUE CREDIT

## 2018-02-17 RX ORDER — CEPHALEXIN 500 MG/1
500 CAPSULE ORAL 3 TIMES DAILY
Status: DISCONTINUED | OUTPATIENT
Start: 2018-02-17 | End: 2018-02-20 | Stop reason: HOSPADM

## 2018-02-17 RX ORDER — AMOXICILLIN 500 MG/1
500 CAPSULE ORAL EVERY 12 HOURS
Status: DISCONTINUED | OUTPATIENT
Start: 2018-02-17 | End: 2018-02-17

## 2018-02-17 RX ORDER — DOXYCYCLINE 100 MG/1
100 CAPSULE ORAL EVERY 12 HOURS
Status: DISCONTINUED | OUTPATIENT
Start: 2018-02-17 | End: 2018-02-17

## 2018-02-17 RX ADMIN — INSULIN LISPRO 2 UNITS: 100 INJECTION, SOLUTION INTRAVENOUS; SUBCUTANEOUS at 21:49

## 2018-02-17 RX ADMIN — DIGOXIN 0.12 MG: 125 TABLET ORAL at 08:12

## 2018-02-17 RX ADMIN — METHIMAZOLE 5 MG: 5 TABLET ORAL at 08:11

## 2018-02-17 RX ADMIN — APIXABAN 5 MG: 5 TABLET, FILM COATED ORAL at 08:11

## 2018-02-17 RX ADMIN — LORAZEPAM 1 MG: 1 TABLET ORAL at 08:11

## 2018-02-17 RX ADMIN — PANTOPRAZOLE SODIUM 40 MG: 40 TABLET, DELAYED RELEASE ORAL at 16:20

## 2018-02-17 RX ADMIN — APIXABAN 5 MG: 5 TABLET, FILM COATED ORAL at 21:45

## 2018-02-17 RX ADMIN — INSULIN LISPRO 8 UNITS: 100 INJECTION, SOLUTION INTRAVENOUS; SUBCUTANEOUS at 11:53

## 2018-02-17 RX ADMIN — TRAMADOL HYDROCHLORIDE 50 MG: 50 TABLET, FILM COATED ORAL at 21:45

## 2018-02-17 RX ADMIN — LORAZEPAM 1 MG: 1 TABLET ORAL at 21:45

## 2018-02-17 RX ADMIN — ASPIRIN 81 MG: 81 TABLET, COATED ORAL at 08:12

## 2018-02-17 RX ADMIN — PREDNISONE 5 MG: 5 TABLET ORAL at 08:12

## 2018-02-17 RX ADMIN — Medication 5 ML: at 13:09

## 2018-02-17 RX ADMIN — INSULIN LISPRO 6 UNITS: 100 INJECTION, SOLUTION INTRAVENOUS; SUBCUTANEOUS at 17:24

## 2018-02-17 RX ADMIN — METOPROLOL TARTRATE 50 MG: 50 TABLET ORAL at 18:17

## 2018-02-17 RX ADMIN — HYDROCODONE BITARTRATE AND ACETAMINOPHEN 1 TABLET: 5; 325 TABLET ORAL at 08:50

## 2018-02-17 RX ADMIN — MULTIPLE VITAMINS W/ MINERALS TAB 1 TABLET: TAB at 08:12

## 2018-02-17 RX ADMIN — CEPHALEXIN 500 MG: 500 CAPSULE ORAL at 16:20

## 2018-02-17 RX ADMIN — FERROUS SULFATE TAB 325 MG (65 MG ELEMENTAL FE) 325 MG: 325 (65 FE) TAB at 11:53

## 2018-02-17 RX ADMIN — Medication 10 ML: at 21:50

## 2018-02-17 RX ADMIN — METOPROLOL TARTRATE 50 MG: 50 TABLET ORAL at 08:12

## 2018-02-17 RX ADMIN — CEPHALEXIN 500 MG: 500 CAPSULE ORAL at 21:45

## 2018-02-17 RX ADMIN — Medication 5 ML: at 05:35

## 2018-02-17 RX ADMIN — TUBERCULIN PURIFIED PROTEIN DERIVATIVE 5 UNITS: 5 INJECTION, SOLUTION INTRADERMAL at 21:46

## 2018-02-17 RX ADMIN — CYANOCOBALAMIN TAB 1000 MCG 1000 MCG: 1000 TAB at 13:09

## 2018-02-17 RX ADMIN — CALCIUM CARBONATE 500 MG (1,250 MG)-VITAMIN D3 200 UNIT TABLET 1 TABLET: at 13:09

## 2018-02-17 RX ADMIN — VANCOMYCIN HYDROCHLORIDE 750 MG: 10 INJECTION, POWDER, LYOPHILIZED, FOR SOLUTION INTRAVENOUS at 12:34

## 2018-02-17 RX ADMIN — GLIPIZIDE 10 MG: 5 TABLET ORAL at 08:11

## 2018-02-17 NOTE — PROGRESS NOTES
Problem: Self Care Deficits Care Plan (Adult)  Goal: *Acute Goals and Plan of Care (Insert Text)  1. Patient will complete lower body bathing and dressing with supervision and adaptive equipment as needed. 2. Patient will complete toileting with modified independence. 3. Patient will tolerate 25 minutes of OT treatment with 2-3 rest breaks to increase activity tolerance for ADLs. 4. Patient will complete functional transfers with modified independence and adaptive equipment as needed. Timeframe: 7 visits       OCCUPATIONAL THERAPY: Initial Assessment, Treatment Day: 1st and AM 2/17/2018  INPATIENT: Hospital Day: 5  [place] / [place] / Product Type: Medicare /      NAME/AGE/GENDER: Deepali Youngblood is a 69 y.o. female   PRIMARY DIAGNOSIS:  Atrial fibrillation with rapid ventricular response (HCC)  Atrial fibrillation with RVR ([address] Atrial fibrillation with rapid ventricular response (HCC) Atrial fibrillation with rapid ventricular response ([address]        ICD-10: Treatment Diagnosis:    · Generalized Muscle Weakness (M62.81)  · Other lack of cordination (R27.8)   Precautions/Allergies:     Multaq [dronedarone]; Other medication; and Statins-hmg-coa reductase inhibitors      ASSESSMENT:     Ms. Red Wright presents to the hospital with a-fib. Pt was supine in bed upon arrival with orthotic shoe placed on R LE. Pt reports hx of recent surgery and treatment for wound on R foot. Pt lives alone but states she has aide come for 4 hours M-Thurs and has multiple family members checking in on her. Pt was supine in bed and needed CGA to sit to edge of bed. Pt had no major complaints edge of bed. Pt reports hx of RA with B hands with deformities present in the fingers. Pt usually uses a rollator but today we used a 2-wheel walker and worked on taking steps and pressing through UEs to help decrease pain in R foot.  Pt completed functional mobility around the bed and to the chair with CGA and additional time. Pt's son arrived and stated that typically she is moving a little faster than she is currently. Pt is very pleasant but tends to repeat herself and her prior hx throughout the session. Pt states she felt [facility], and family members checking in on her was working well for her. Pt reports no falls over the past year. Pt is currently functioning below baseline for ADL/functional transfers and will benefit from OT services to address stated goals and plan of care. This section established at most recent assessment   PROBLEM LIST (Impairments causing functional limitations):  1. Decreased Strength  2. Decreased ADL/Functional Activities  3. Decreased Transfer Abilities  4. Decreased Ambulation Ability/Technique  5. Decreased Balance  6. Increased Pain  7. Decreased Activity Tolerance  8. Decreased Flexibility/Joint Mobility  9. Decreased Skin Integrity/Hygeine  10. Decreased Moody with Home Exercise Program  11. Decreased Cognition   INTERVENTIONS PLANNED: (Benefits and precautions of occupational therapy have been discussed with the patient.)  1. Activities of daily living training  2. Adaptive equipment training  3. Balance training  4. Clothing management  5. Cognitive training  6. Donning&doffing training  7. Group therapy  8. Neuromuscular re-eduation  9. Therapeutic activity  10. Therapeutic exercise     TREATMENT PLAN: Frequency/Duration: Follow patient 3 times per week to address above goals. Rehabilitation Potential For Stated Goals: Excellent     RECOMMENDED REHABILITATION/EQUIPMENT: (at time of discharge pending progress): Due to the probability of continued deficits (see above) this patient will likely need continued skilled occupational therapy after discharge.   Equipment:    TBD              OCCUPATIONAL PROFILE AND HISTORY:   History of Present Injury/Illness (Reason for Referral):  See H&P  Past Medical History/Comorbidities:   Ms. 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 She also has no past medical history of Adverse effect of anesthesia; Difficult intubation; Malignant hyperthermia due to anesthesia; or Pseudocholinesterase deficiency. Ms. Aleksey Gonsalez  has a past surgical history that includes hx hysterectomy (1988); hx appendectomy (1959); hx pacemaker placement; hx open cholecystectomy; hx urological (Left, 1980s); hx lithotripsy; hx pacemaker; hx orthopaedic (Left); hx orthopaedic (Left); hx amputation (Left, 2016); hx heent; pr cabg, artery-vein, four (1993); pr cardiac surg procedure unlist; hx heart catheterization; vascular surgery procedure unlist (Left, 2-17-16); hx aortic valve replacement; and vascular surgery procedure unlist (Right, 12/20/2017). Social History/Living Environment:   Home Environment: Private residence  # Steps to Enter: 2  One/Two Story Residence: One story  Living Alone: Yes  Support Systems: Family member(s), Home care staff  Patient Expects to be Discharged to[de-identified] Private residence  Current DME Used/Available at Home: Grab bars, Shower chair, Walker, rollator, Walker, rolling  Tub or Shower Type: Tub/Shower combination  Prior Level of Function/Work/Activity:  Pt lives alone. Pt has aide come Mon-Thurs to assist some with ADL such as bathing.  Her aide prepares lunch and dinner for her. Pt states she is able to prepare her own breakfast. Pt's family check in on her in the evenings and help with cleaning tasks. Pt has life alert and cellphone when alone. Pt has had no reported falls this past year. Pt completes functional mobility with a rollator. Personal Factors: Other factors that influence how disability is experienced by the patient:  Multiple co-morbidities (see above)   Number of Personal Factors/Comorbidities that affect the Plan of Care: Extensive review of physical, cognitive, and psychosocial performance (3+):  HIGH COMPLEXITY   ASSESSMENT OF OCCUPATIONAL PERFORMANCE[de-identified]   Activities of Daily Living:           Basic ADLs (From Assessment) Complex ADLs (From Assessment)   Basic ADL  Feeding: Stand-by assistance  Oral Facial Hygiene/Grooming: Minimum assistance  Bathing: Moderate assistance  Upper Body Dressing: Minimum assistance  Lower Body Dressing: Moderate assistance  Toileting: Minimum assistance Instrumental ADL  Meal Preparation: Maximum assistance  Homemaking: Total assistance   Grooming/Bathing/Dressing Activities of Daily Living     Cognitive Retraining  Safety/Judgement: Awareness of environment;Home safety                 Functional Transfers  Toilet Transfer : Minimum assistance  Tub Transfer: Moderate assistance  Shower Transfer: Minimum assistance     Bed/Mat Mobility  Supine to Sit: Contact guard assistance  Sit to Stand: Contact guard assistance  Bed to Chair: Contact guard assistance  Scooting: Supervision       Most Recent Physical Functioning:   Gross Assessment:  AROM: Generally decreased, functional (RA in B hands with deformities in the fingers)  Strength: Generally decreased, functional               Posture:  Posture Assessment:  Forward head, Rounded shoulders  Balance:  Sitting: Intact  Standing: Impaired  Standing - Static: Fair  Standing - Dynamic : Fair Bed Mobility:  Supine to Sit: Contact guard assistance  Scooting: Supervision  Wheelchair Mobility:     Transfers:  Sit to Stand: Contact guard assistance  Stand to Sit: Contact guard assistance  Bed to Chair: Contact guard assistance            Patient Vitals for the past 6 hrs:   BP BP Patient Position SpO2 Pulse   18 0945 120/73 At rest 99 % 79       Mental Status  Neurologic State: Alert  Orientation Level: Oriented to person, Oriented to place  Cognition: Follows commands  Perception: Appears intact  Perseveration: Perseverates during conversation  Safety/Judgement: Awareness of environment, Home safety                          Physical Skills Involved:  1. Range of Motion  2. Balance  3. Strength  4. Activity Tolerance  5. Pain (acute)  6. Pain (Chronic)  7. Skin Integrity Cognitive Skills Affected (resulting in the inability to perform in a timely and safe manner):  1. Executive Function Psychosocial Skills Affected:  1. Habits/Routines   Number of elements that affect the Plan of Care: 5+:  HIGH COMPLEXITY   CLINICAL DECISION MAKIN[address] [place] [id number] AM-PAC 6 Clicks   Daily Activity Inpatient Short Form  How much help from another person does the patient currently need. .. Total A Lot A Little None   1. Putting on and taking off regular lower body clothing? [] 1   [x] 2   [] 3   [] 4   2. Bathing (including washing, rinsing, drying)? [] 1   [x] 2   [] 3   [] 4   3. Toileting, which includes using toilet, bedpan or urinal?   [] 1   [] 2   [x] 3   [] 4   4. Putting on and taking off regular upper body clothing? [] 1   [] 2   [x] 3   [] 4   5. Taking care of personal grooming such as brushing teeth? [] 1   [] 2   [x] 3   [] 4   6. Eating meals? [] 1   [] 2   [x] 3   [] 4   © , Trustees of [address] [place] [id number], under license to [employer]. All rights reserved      Score:  Initial: 16 Most Recent: X (Date: -- )    Interpretation of Tool:  Represents activities that are increasingly more difficult (i.e. Bed mobility, Transfers, Gait).    Score 24 23 22-20 19-15 14-10 9-7 6     Modifier CH CI CJ CK CL CM CN      ? Self Care:     - CURRENT STATUS: CK - 40%-59% impaired, limited or restricted    - GOAL STATUS: CJ - 20%-39% impaired, limited or restricted    - D/C STATUS:  ---------------To be determined---------------  [place] / [place] / Product Type: Medicare /      Medical Necessity:     · Patient demonstrates excellent rehab potential due to higher previous functional level. Reason for Services/Other Comments:  · Patient continues to require skilled intervention due to decreased independence with ADL/functional transfers. Use of outcome tool(s) and clinical judgement create a POC that gives a: LOW COMPLEXITY         TREATMENT:   (In addition to Assessment/Re-Assessment sessions the following treatments were rendered)     Pre-treatment Symptoms/Complaints:    Pain: Initial:   Pain Intensity 1:  (unable rate pain)  Pain Location 1: Foot  Pain Orientation 1: Right  Pain Intervention(s) 1: Repositioned  Post Session:  same     Therapeutic Activity: (    10): Therapeutic activities including Bed transfers and Ambulation on level ground to improve mobility, strength, balance and coordination. Required minimal verbal/visual/tactile cues   to promote static and dynamic balance in standing.     n/a    Braces/Orthotics/Lines/Etc:   · O2 Device: Room air  Treatment/Session Assessment:    · Response to Treatment:  Pt tolerated well with additional time. · Interdisciplinary Collaboration:   o Occupational Therapist  o Registered Nurse  · After treatment position/precautions:   o Up in chair  o Bed/Chair-wheels locked  o Call light within reach  o RN notified  o Family at bedside   · Compliance with Program/Exercises: Will assess as treatment progresses. · Recommendations/Intent for next treatment session:   \"Next visit will focus on advancements to more challenging activities and reduction in assistance provided\".   Total Treatment Duration:  OT Patient Time In/Time Out  Time In: 1030  Time Out: 4545 [facility]

## 2018-02-17 NOTE — PROGRESS NOTES
Bedside and Verbal shift change report given to Keith Cast RN and Imtiaz Jameson LPN (oncoming nurse) by self (offgoing nurse). Report included the following information SBAR, Kardex, MAR and Recent Results.

## 2018-02-17 NOTE — PROGRESS NOTES
Hospitalist Progress Note    2018  Admit Date: 2018  7:04 PM   NAME: Zeke Vaca   :     MRN:  [id number]   Attending: Read Cranker, MD  PCP:  Shiraz Ontiveros MD    SUBJECTIVE:     Zeke Vaca is a 10ERS with AFib on eliquis, CAD, AS s/p TAVR, PAD with chronic R foot wound, HTN, DM2 who was admitted  with rigors/chills, in AFib with RVR. Rapid flu was neg, but given symptoms suspect that it might be falsely negative. Also with more drainage and change in color of her chronic foot wound. Improvement once started on Vanc. Foot has been evaluated by Dr. Rachael Birmingham, who will collaborate with Dr. Kassie Ac as an outpatient to decide if she needs any further surgical intervention  : seen with daughter at bedside. They had many questions that were thoroughly answered. Patient is feeling better but complaining of pain in her R calf where she has been resting her leg on a pillow while offloading her heels. She is having no SOB, CP, abd pain, nausea. She is hesitant about her ability to walk at home given the discomfort she is having in her foot. We agreed that after one more PT session tomorrow morning, we would assess whether she was appropriate to go home or if she needed STR.     - pt feeling good this morning. No complaints. PT is recommending SNF reportedly.   No CP, SOB       PHYSICAL EXAM       Visit Vitals    /73 (BP 1 Location: Right arm, BP Patient Position: At rest)    Pulse 79    Temp 97.8 °F (36.6 °C)    Resp 18    Ht 5' 4\" (1.626 m)    Wt 47.8 kg (105 lb 6.4 oz)    SpO2 99%    Breastfeeding No    BMI 18.09 kg/m2      Temp (24hrs), Av.7 °F (36.5 °C), Min:97.3 °F (36.3 °C), Max:98.4 °F (36.9 °C)    Oxygen Therapy  O2 Sat (%): 99 % (18 0945)  Pulse via Oximetry: 91 beats per minute (18 2131)  O2 Device: Room air (18 0850)    Intake/Output Summary (Last 24 hours) at 18 1303  Last data filed at 18 6437   Gross per 24 hour   Intake              300 ml   Output             2000 ml   Net            -1700 ml          General: No acute distress. Head:  Atraumatic Normocephalic. Lungs:  CTA Bilaterally. Normal resp effort on RA  CVS:  RRR, 2/6 FEI, no BLE edema  MSK:  R foot bandaged, extremities warm  Neurologic:  AOx3. No focal deficits  Psychiatry:      anxious      Recent Results (from the past 24 hour(s))   GLUCOSE, POC    Collection Time: 02/16/18  4:26 PM   Result Value Ref Range    Glucose (POC) 262 (H) 65 - 100 mg/dL   GLUCOSE, POC    Collection Time: 02/16/18  9:24 PM   Result Value Ref Range    Glucose (POC) 201 (H) 65 - 100 mg/dL   GLUCOSE, POC    Collection Time: 02/17/18  5:13 AM   Result Value Ref Range    Glucose (POC) 69 65 - 100 mg/dL   GLUCOSE, POC    Collection Time: 02/17/18  5:38 AM   Result Value Ref Range    Glucose (POC) 86 65 - 100 mg/dL   GLUCOSE, POC    Collection Time: 02/17/18  6:33 AM   Result Value Ref Range    Glucose (POC) 143 (H) 65 - 100 mg/dL   VANCOMYCIN, TROUGH    Collection Time: 02/17/18 10:44 AM   Result Value Ref Range    Vancomycin,trough 5.0 5 - 20 ug/mL   GLUCOSE, POC    Collection Time: 02/17/18 11:06 AM   Result Value Ref Range    Glucose (POC) 322 (H) 65 - 100 mg/dL         Imaging /Procedures /Studies   XR CHEST SNGL V   Final Result   IMPRESSION: No consolidation.             ASSESSMENT      Hospital Problems as of 2/17/2018  Date Reviewed: 2/5/2018          Codes Class Noted - Resolved POA    Sepsis ([address] ICD-10-CM: A41.9  ICD-9-CM: 038.9, 995.91  2/15/2018 - Present Yes        Atrial fibrillation with RVR (HCC) ICD-10-CM: I48.91  ICD-9-CM: 427.31  2/14/2018 - Present Unknown        * (Principal)Atrial fibrillation with rapid ventricular response (HCC) ICD-10-CM: I48.91  ICD-9-CM: 427.31  2/13/2018 - Present Yes        Leukocytosis ICD-10-CM: Y30.851  ICD-9-CM: 288.60  2/13/2018 - Present Yes        PAD (peripheral artery disease) ([address] ICD-10-CM: I73.9  ICD-9-CM: 443.9  2/12/2016 - Present Yes    Overview Addendum 3/4/2016  1:47 PM by David Lyons NP     2/17/16 (Dr Juwan Rosa) A/o, BLR Angiogram, L PT PTA (), L SFA PTA/Stent (6x 100 [place] PTX)              CHF (congestive heart failure) ([facility] ICD-10-CM: I50.9  ICD-9-CM: 428.0  6/23/2014 - Present Yes        Diabetes mellitus type 2, controlled ([facility] ICD-10-CM: E11.9  ICD-9-CM: 250.00  3/20/2014 - Present Yes        Hypomagnesemia ICD-10-CM: E83.42  ICD-9-CM: 275.2  3/20/2014 - Present Yes        Hyperglycemia ICD-10-CM: R73.9  ICD-9-CM: 790.29  3/20/2014 - Present Yes        Atrial fibrillation ([facility] ICD-10-CM: I48.91  ICD-9-CM: 427.31  3/15/2014 - Present Yes        Dyslipidemia (Chronic) ICD-10-CM: E78.5  ICD-9-CM: 272.4  4/29/2013 - Present Yes        Essential hypertension, benign (Chronic) ICD-10-CM: I10  ICD-9-CM: 401.1  4/29/2013 - Present Yes        CAD (coronary artery disease) ICD-10-CM: I25.10  ICD-9-CM: 414.00  Unknown - Present Yes    Overview Signed 8/11/2010 12:47 PM by Macrina Alvarado MD     HX CABG 1993, EF 55%                       Plan:  - Atrial Fibrillation with RVR  Converted off dilt gtt  Stable on home dose metop and dig for > 48hrs  Continue Eliquis     - Leukocytosis/R foot wound  Improved after abx started.   Will switch to keflex based on previous culture  Blood cx neg   CXR and UA neg  Continue vanc for R foot wound  Wound care following  Appreciate Dr. Tari Mane recommendations; will need outpatient followup with Dr. Bains for further evaluation once infection clears    - DM2 with hyperglycemia  Continue home meds  SSI ACHS while inpatient     - H/O CHF  Not in exacerbation  Continue home meds     - H/O CAD  No chest pain  Continue home meds     - Hypomagnesemia  Resolved with repletion    DVT Prophylaxis: Eliquis  Dispo: likely home tomorrow if PT thinks that she is safe for home    Jos Iyer MD

## 2018-02-17 NOTE — PROGRESS NOTES
Bedside and Verbal shift change report given to Sandeep Byers RN and Steve Guadalupe LPN (oncoming nurse) by self (offgoing nurse). Report included the following information SBAR, Kardex, MAR and Recent Results.

## 2018-02-17 NOTE — PROGRESS NOTES
Bedside and Verbal shift change report given to self (oncoming nurse) by Kd Emery and Neena Hicks LPN (offgoing nurse). Report included the following information SBAR, Kardex, MAR and Recent Results.

## 2018-02-17 NOTE — PROGRESS NOTES
8217 patient a little confused, disorient to place, blood sugar 69, 4 oz orange juice given  0535 blood sugar 86, patient is alert x 3.

## 2018-02-18 LAB
BACTERIA SPEC CULT: NORMAL
BACTERIA SPEC CULT: NORMAL
GLUCOSE BLD STRIP.AUTO-MCNC: 220 MG/DL (ref 65–100)
GLUCOSE BLD STRIP.AUTO-MCNC: 277 MG/DL (ref 65–100)
GLUCOSE BLD STRIP.AUTO-MCNC: 312 MG/DL (ref 65–100)
GLUCOSE BLD STRIP.AUTO-MCNC: 99 MG/DL (ref 65–100)
MM INDURATION POC: 0 MM (ref 0–5)
PPD POC: NORMAL NEGATIVE
SERVICE CMNT-IMP: NORMAL
SERVICE CMNT-IMP: NORMAL

## 2018-02-18 PROCEDURE — 74011250637 HC RX REV CODE- 250/637: Performed by: FAMILY MEDICINE

## 2018-02-18 PROCEDURE — 3331090001 HH PPS REVENUE CREDIT

## 2018-02-18 PROCEDURE — 65660000000 HC RM CCU STEPDOWN

## 2018-02-18 PROCEDURE — 74011636637 HC RX REV CODE- 636/637: Performed by: FAMILY MEDICINE

## 2018-02-18 PROCEDURE — 3331090002 HH PPS REVENUE DEBIT

## 2018-02-18 PROCEDURE — 82962 GLUCOSE BLOOD TEST: CPT

## 2018-02-18 PROCEDURE — 74011250637 HC RX REV CODE- 250/637: Performed by: INTERNAL MEDICINE

## 2018-02-18 RX ORDER — DOXYCYCLINE 100 MG/1
100 CAPSULE ORAL EVERY 12 HOURS
Status: DISCONTINUED | OUTPATIENT
Start: 2018-02-18 | End: 2018-02-20 | Stop reason: HOSPADM

## 2018-02-18 RX ADMIN — MULTIPLE VITAMINS W/ MINERALS TAB 1 TABLET: TAB at 09:10

## 2018-02-18 RX ADMIN — INSULIN LISPRO 4 UNITS: 100 INJECTION, SOLUTION INTRAVENOUS; SUBCUTANEOUS at 22:00

## 2018-02-18 RX ADMIN — APIXABAN 5 MG: 5 TABLET, FILM COATED ORAL at 20:45

## 2018-02-18 RX ADMIN — FERROUS SULFATE TAB 325 MG (65 MG ELEMENTAL FE) 325 MG: 325 (65 FE) TAB at 12:30

## 2018-02-18 RX ADMIN — CEPHALEXIN 500 MG: 500 CAPSULE ORAL at 16:27

## 2018-02-18 RX ADMIN — CALCIUM CARBONATE 500 MG (1,250 MG)-VITAMIN D3 200 UNIT TABLET 1 TABLET: at 12:30

## 2018-02-18 RX ADMIN — METOPROLOL TARTRATE 50 MG: 50 TABLET ORAL at 09:10

## 2018-02-18 RX ADMIN — CEPHALEXIN 500 MG: 500 CAPSULE ORAL at 21:04

## 2018-02-18 RX ADMIN — HYDROCODONE BITARTRATE AND ACETAMINOPHEN 1 TABLET: 5; 325 TABLET ORAL at 09:21

## 2018-02-18 RX ADMIN — METOPROLOL TARTRATE 50 MG: 50 TABLET ORAL at 17:38

## 2018-02-18 RX ADMIN — INSULIN LISPRO 8 UNITS: 100 INJECTION, SOLUTION INTRAVENOUS; SUBCUTANEOUS at 12:30

## 2018-02-18 RX ADMIN — PREDNISONE 5 MG: 5 TABLET ORAL at 09:10

## 2018-02-18 RX ADMIN — INSULIN LISPRO 6 UNITS: 100 INJECTION, SOLUTION INTRAVENOUS; SUBCUTANEOUS at 17:37

## 2018-02-18 RX ADMIN — LORAZEPAM 1 MG: 1 TABLET ORAL at 09:09

## 2018-02-18 RX ADMIN — DIGOXIN 0.12 MG: 125 TABLET ORAL at 09:09

## 2018-02-18 RX ADMIN — CYANOCOBALAMIN TAB 1000 MCG 1000 MCG: 1000 TAB at 12:30

## 2018-02-18 RX ADMIN — Medication 5 ML: at 16:27

## 2018-02-18 RX ADMIN — Medication 10 ML: at 05:09

## 2018-02-18 RX ADMIN — TRAMADOL HYDROCHLORIDE 50 MG: 50 TABLET, FILM COATED ORAL at 20:45

## 2018-02-18 RX ADMIN — PANTOPRAZOLE SODIUM 40 MG: 40 TABLET, DELAYED RELEASE ORAL at 16:27

## 2018-02-18 RX ADMIN — DOXYCYCLINE HYCLATE 100 MG: 100 CAPSULE ORAL at 20:45

## 2018-02-18 RX ADMIN — ASPIRIN 81 MG: 81 TABLET, COATED ORAL at 09:09

## 2018-02-18 RX ADMIN — HYDROCODONE BITARTRATE AND ACETAMINOPHEN 1 TABLET: 5; 325 TABLET ORAL at 02:25

## 2018-02-18 RX ADMIN — CEPHALEXIN 500 MG: 500 CAPSULE ORAL at 09:10

## 2018-02-18 RX ADMIN — LORAZEPAM 1 MG: 1 TABLET ORAL at 20:45

## 2018-02-18 RX ADMIN — DOXYCYCLINE HYCLATE 100 MG: 100 CAPSULE ORAL at 09:10

## 2018-02-18 RX ADMIN — METHIMAZOLE 5 MG: 5 TABLET ORAL at 09:09

## 2018-02-18 RX ADMIN — Medication 5 ML: at 20:45

## 2018-02-18 RX ADMIN — APIXABAN 5 MG: 5 TABLET, FILM COATED ORAL at 09:09

## 2018-02-18 NOTE — PROGRESS NOTES
Bedside and Verbal shift change report given to self (oncoming nurse) by Anurag Sosa (offgoing nurse). Report included the following information SBAR, Kardex, MAR and Recent Results.

## 2018-02-18 NOTE — PROGRESS NOTES
Bedside and Verbal shift change report given to Florencio Villa RN (oncoming nurse) by self Cherylene Berliner nurse). Report included the following information SBAR, Kardex, MAR and Recent Results.

## 2018-02-18 NOTE — PROGRESS NOTES
Verbal bedside report given to oncoming RN, Andreas Hendrickson. Patient's situation, background, assessment and recommendations provided. Opportunity for questions provided. Oncoming RN assumed care of patient.

## 2018-02-18 NOTE — PROGRESS NOTES
Bedside and Verbal shift change report received from Kimberli Santos RN (offgoing nurse). Report included the following information SBAR, Kardex, MAR and Recent Results.

## 2018-02-18 NOTE — PROGRESS NOTES
Bedside and Verbal shift change report given to self (oncoming nurse) by Ernesto Dahl RN (offgoing nurse). Report included the following information SBAR, Kardex, MAR and Recent Results.

## 2018-02-18 NOTE — PROGRESS NOTES
Hospitalist Progress Note    2018  Admit Date: 2018  7:04 PM   NAME: Jamir Martin   :     MRN:  [id number]   Attending: Daria Maddox MD  PCP:  Estefani Barakat MD    SUBJECTIVE:     Jamir Martin is a 2NTD with AFib on eliquis, CAD, AS s/p TAVR, PAD with chronic R foot wound, HTN, DM2 who was admitted  with rigors/chills, in AFib with RVR. Rapid flu was neg, but given symptoms suspect that it might be falsely negative. Also with more drainage and change in color of her chronic foot wound. Improvement once started on Vanc. Foot has been evaluated by Dr. Nena Cain, who will collaborate with Dr. Svetlana Ferreira as an outpatient to decide if she needs any further surgical intervention  : seen with daughter at bedside. They had many questions that were thoroughly answered. Patient is feeling better but complaining of pain in her R calf where she has been resting her leg on a pillow while offloading her heels. She is having no SOB, CP, abd pain, nausea. She is hesitant about her ability to walk at home given the discomfort she is having in her foot. We agreed that after one more PT session tomorrow morning, we would assess whether she was appropriate to go home or if she needed STR.   - pt feeling good this morning. No complaints. PT is recommending SNF reportedly. No CP, SOB      - pt reports leg pain controlled. No complaints. Son thinks leg erythema may be worse. No fevers, chills.       PHYSICAL EXAM     Patient Vitals for the past 24 hrs:   Temp Pulse Resp BP SpO2   18 0437 97.6 °F (36.4 °C) 75 16 124/70 95 %   18 0036 97.8 °F (36.6 °C) 79 18 148/70 94 %   18 2035 97.7 °F (36.5 °C) 81 18 159/78 99 %   18 1655 97.7 °F (36.5 °C) 72 18 136/67 100 %   18 1302 97.1 °F (36.2 °C) 77 18 138/80 97 %   18 0945 97.8 °F (36.6 °C) 79 18 120/73 99 %        Temp (24hrs), Av.6 °F (36.4 °C), Min:97.1 °F (36.2 °C), Max:97.8 °F (36.6 °C)    Oxygen Therapy  O2 Sat (%): 95 % (02/18/18 0437)  Pulse via Oximetry: 91 beats per minute (02/13/18 2131)  O2 Device: Room air (02/18/18 0511)    Intake/Output Summary (Last 24 hours) at 02/18/18 0733  Last data filed at 02/18/18 0448   Gross per 24 hour   Intake              240 ml   Output             1950 ml   Net            -1710 ml          General: No acute distress. Head:  Atraumatic Normocephalic. Lungs:  CTA Bilaterally. Normal resp effort on RA  CVS:  RRR, 2/6 FEI, no BLE edema  MSK:  R foot bandaged with erythema on shin, extremities warm  Neurologic:  AOx3. No focal deficits  Psychiatry:      anxious      Recent Results (from the past 24 hour(s))   VANCOMYCIN, TROUGH    Collection Time: 02/17/18 10:44 AM   Result Value Ref Range    Vancomycin,trough 5.0 5 - 20 ug/mL   GLUCOSE, POC    Collection Time: 02/17/18 11:06 AM   Result Value Ref Range    Glucose (POC) 322 (H) 65 - 100 mg/dL   GLUCOSE, POC    Collection Time: 02/17/18  4:58 PM   Result Value Ref Range    Glucose (POC) 265 (H) 65 - 100 mg/dL   GLUCOSE, POC    Collection Time: 02/17/18  9:27 PM   Result Value Ref Range    Glucose (POC) 192 (H) 65 - 100 mg/dL   GLUCOSE, POC    Collection Time: 02/18/18  6:10 AM   Result Value Ref Range    Glucose (POC) 99 65 - 100 mg/dL         Imaging /Procedures /Studies   XR CHEST SNGL V   Final Result   IMPRESSION: No consolidation.             ASSESSMENT      Hospital Problems as of 2/18/2018  Date Reviewed: 2/5/2018          Codes Class Noted - Resolved POA    Sepsis ([facility] ICD-10-CM: A41.9  ICD-9-CM: 038.9, 995.91  2/15/2018 - Present Yes        Atrial fibrillation with RVR (HCC) ICD-10-CM: I48.91  ICD-9-CM: 427.31  2/14/2018 - Present Unknown        * (Principal)Atrial fibrillation with rapid ventricular response (HCC) ICD-10-CM: I48.91  ICD-9-CM: 427.31  2/13/2018 - Present Yes        Leukocytosis ICD-10-CM: N49.152  ICD-9-CM: 288.60  2/13/2018 - Present Yes        PAD (peripheral artery disease) [facility]) ICD-10-CM: I73.9  ICD-9-CM: 443.9  2/12/2016 - Present Yes    Overview Addendum 3/4/2016  1:47 PM by Margret Garcia NP     2/17/16 (Dr Azra León) A/o, BLR Angiogram, L PT PTA (), L SFA PTA/Stent (6x 100 [place] PTX)              CHF (congestive heart failure) ([facility] ICD-10-CM: I50.9  ICD-9-CM: 428.0  6/23/2014 - Present Yes        Diabetes mellitus type 2, controlled ([facility] ICD-10-CM: E11.9  ICD-9-CM: 250.00  3/20/2014 - Present Yes        Hypomagnesemia ICD-10-CM: E83.42  ICD-9-CM: 275.2  3/20/2014 - Present Yes        Hyperglycemia ICD-10-CM: R73.9  ICD-9-CM: 790.29  3/20/2014 - Present Yes        Atrial fibrillation ([facility] ICD-10-CM: I48.91  ICD-9-CM: 427.31  3/15/2014 - Present Yes        Dyslipidemia (Chronic) ICD-10-CM: E78.5  ICD-9-CM: 272.4  4/29/2013 - Present Yes        Essential hypertension, benign (Chronic) ICD-10-CM: I10  ICD-9-CM: 401.1  4/29/2013 - Present Yes        CAD (coronary artery disease) ICD-10-CM: I25.10  ICD-9-CM: 414.00  Unknown - Present Yes    Overview Signed 8/11/2010 12:47 PM by Rosemary Oconnell MD     HX CABG 1993, EF 55%                       Plan:  - sepsis from right foot wound  Will cont keflex based on previous culture but add doxycyline for stronger MRSA coverage  Blood cx neg   CXR and UA neg  Continue vanc for R foot wound  Wound care following  Appreciate Dr. Diana Dover recommendations; will need outpatient followup with Dr. Katty Vazquez for further evaluation once infection clears    - Atrial Fibrillation with RVR  Converted off dilt gtt  Stable on home dose metop and dig for > 48hrs  Continue Eliquis     - DM2 with hyperglycemia  Continue home meds  SSI ACHS while inpatient     - H/O CHF  Not in exacerbation  Continue home meds    DVT Prophylaxis: Eliquis  Dispo: PT recommending SNF. SW consulted.     Lneka Plata MD

## 2018-02-18 NOTE — PROGRESS NOTES
Problem: Falls - Risk of  Goal: *Absence of Falls  Document Leda Fall Risk and appropriate interventions in the flowsheet. Outcome: Progressing Towards Goal  Fall Risk Interventions:  Mobility Interventions: Bed/chair exit alarm, Patient to call before getting OOB         Medication Interventions: Bed/chair exit alarm, Patient to call before getting OOB    Elimination Interventions: Bed/chair exit alarm, Patient to call for help with toileting needs    History of Falls Interventions: Bed/chair exit alarm, Evaluate medications/consider consulting pharmacy        Problem: Patient Education: Go to Patient Education Activity  Goal: Patient/Family Education  Outcome: Progressing Towards Goal  Fall precautions in place. Yellow socks on. Instructed to only get OOB with assistance. Voiced understanding. Call light in reach.

## 2018-02-19 LAB
GLUCOSE BLD STRIP.AUTO-MCNC: 117 MG/DL (ref 65–100)
GLUCOSE BLD STRIP.AUTO-MCNC: 226 MG/DL (ref 65–100)
GLUCOSE BLD STRIP.AUTO-MCNC: 382 MG/DL (ref 65–100)
GLUCOSE BLD STRIP.AUTO-MCNC: 79 MG/DL (ref 65–100)
MM INDURATION POC: 0 MM (ref 0–5)
PPD POC: NEGATIVE NEGATIVE

## 2018-02-19 PROCEDURE — 74011250637 HC RX REV CODE- 250/637: Performed by: INTERNAL MEDICINE

## 2018-02-19 PROCEDURE — 3331090002 HH PPS REVENUE DEBIT

## 2018-02-19 PROCEDURE — 97110 THERAPEUTIC EXERCISES: CPT

## 2018-02-19 PROCEDURE — 74011250637 HC RX REV CODE- 250/637: Performed by: FAMILY MEDICINE

## 2018-02-19 PROCEDURE — 97530 THERAPEUTIC ACTIVITIES: CPT

## 2018-02-19 PROCEDURE — 3331090001 HH PPS REVENUE CREDIT

## 2018-02-19 PROCEDURE — 65660000000 HC RM CCU STEPDOWN

## 2018-02-19 PROCEDURE — 74011636637 HC RX REV CODE- 636/637: Performed by: FAMILY MEDICINE

## 2018-02-19 PROCEDURE — 82962 GLUCOSE BLOOD TEST: CPT

## 2018-02-19 PROCEDURE — 74011636637 HC RX REV CODE- 636/637: Performed by: INTERNAL MEDICINE

## 2018-02-19 RX ORDER — INSULIN GLARGINE 100 [IU]/ML
10 INJECTION, SOLUTION SUBCUTANEOUS
Status: DISCONTINUED | OUTPATIENT
Start: 2018-02-19 | End: 2018-02-19

## 2018-02-19 RX ORDER — INSULIN LISPRO 100 [IU]/ML
10 INJECTION, SOLUTION INTRAVENOUS; SUBCUTANEOUS
Status: DISCONTINUED | OUTPATIENT
Start: 2018-02-19 | End: 2018-02-20 | Stop reason: HOSPADM

## 2018-02-19 RX ADMIN — CEPHALEXIN 500 MG: 500 CAPSULE ORAL at 08:23

## 2018-02-19 RX ADMIN — METHIMAZOLE 5 MG: 5 TABLET ORAL at 08:23

## 2018-02-19 RX ADMIN — INSULIN LISPRO 10 UNITS: 100 INJECTION, SOLUTION INTRAVENOUS; SUBCUTANEOUS at 17:13

## 2018-02-19 RX ADMIN — TRAMADOL HYDROCHLORIDE 50 MG: 50 TABLET, FILM COATED ORAL at 22:09

## 2018-02-19 RX ADMIN — Medication 5 ML: at 05:01

## 2018-02-19 RX ADMIN — METOPROLOL TARTRATE 50 MG: 50 TABLET ORAL at 17:54

## 2018-02-19 RX ADMIN — PREDNISONE 5 MG: 5 TABLET ORAL at 08:23

## 2018-02-19 RX ADMIN — ASPIRIN 81 MG: 81 TABLET, COATED ORAL at 08:24

## 2018-02-19 RX ADMIN — MULTIPLE VITAMINS W/ MINERALS TAB 1 TABLET: TAB at 08:23

## 2018-02-19 RX ADMIN — Medication 10 ML: at 22:12

## 2018-02-19 RX ADMIN — APIXABAN 5 MG: 5 TABLET, FILM COATED ORAL at 08:23

## 2018-02-19 RX ADMIN — CEPHALEXIN 500 MG: 500 CAPSULE ORAL at 16:32

## 2018-02-19 RX ADMIN — METOPROLOL TARTRATE 50 MG: 50 TABLET ORAL at 08:23

## 2018-02-19 RX ADMIN — Medication 10 ML: at 13:47

## 2018-02-19 RX ADMIN — DOXYCYCLINE HYCLATE 100 MG: 100 CAPSULE ORAL at 08:24

## 2018-02-19 RX ADMIN — INSULIN LISPRO 4 UNITS: 100 INJECTION, SOLUTION INTRAVENOUS; SUBCUTANEOUS at 17:14

## 2018-02-19 RX ADMIN — HYDROCODONE BITARTRATE AND ACETAMINOPHEN 1 TABLET: 5; 325 TABLET ORAL at 06:59

## 2018-02-19 RX ADMIN — DIGOXIN 0.12 MG: 125 TABLET ORAL at 08:24

## 2018-02-19 RX ADMIN — DOXYCYCLINE HYCLATE 100 MG: 100 CAPSULE ORAL at 22:10

## 2018-02-19 RX ADMIN — PANTOPRAZOLE SODIUM 40 MG: 40 TABLET, DELAYED RELEASE ORAL at 16:32

## 2018-02-19 RX ADMIN — CALCIUM CARBONATE 500 MG (1,250 MG)-VITAMIN D3 200 UNIT TABLET 1 TABLET: at 13:47

## 2018-02-19 RX ADMIN — INSULIN LISPRO 15 UNITS: 100 INJECTION, SOLUTION INTRAVENOUS; SUBCUTANEOUS at 11:14

## 2018-02-19 RX ADMIN — LORAZEPAM 1 MG: 1 TABLET ORAL at 22:10

## 2018-02-19 RX ADMIN — CYANOCOBALAMIN TAB 1000 MCG 1000 MCG: 1000 TAB at 13:47

## 2018-02-19 RX ADMIN — LORAZEPAM 1 MG: 1 TABLET ORAL at 08:24

## 2018-02-19 RX ADMIN — FERROUS SULFATE TAB 325 MG (65 MG ELEMENTAL FE) 325 MG: 325 (65 FE) TAB at 11:16

## 2018-02-19 RX ADMIN — APIXABAN 5 MG: 5 TABLET, FILM COATED ORAL at 22:10

## 2018-02-19 RX ADMIN — CEPHALEXIN 500 MG: 500 CAPSULE ORAL at 22:10

## 2018-02-19 RX ADMIN — HYDROCODONE BITARTRATE AND ACETAMINOPHEN 1 TABLET: 5; 325 TABLET ORAL at 00:06

## 2018-02-19 NOTE — PROGRESS NOTES
Bedside and Verbal shift change report given to Adin Griffith RN (oncoming nurse) by self Wolf sifuentes). Report included the following information SBAR, Kardex, MAR and Recent Results. Bed alarm on for safety concerns.

## 2018-02-19 NOTE — PROGRESS NOTES
Problem: Mobility Impaired (Adult and Pediatric)  Goal: *Acute Goals and Plan of Care (Insert Text)  LTG:  (1.)Ms. Nona Mccloud will move from supine to sit and sit to supine , scoot up and down and roll side to side in bed with MODIFIED INDEPENDENCE within 7 treatment day(s). (2.)Ms. Nona Mccloud will transfer from bed to chair and chair to bed with MODIFIED INDEPENDENCE using the least restrictive device within 7 treatment day(s). (3.)Ms. Nona Mccloud will ambulate with MODIFIED INDEPENDENCE for 50+ feet with the least restrictive device within 7 treatment day(s). ________________________________________________________________________________________________      PHYSICAL THERAPY: Daily Note, Treatment Day: 1st, PM 2/19/2018  INPATIENT: Hospital Day: 7  [place] / [place] / Product Type: Medicare /   Walking shoe R    NAME/AGE/GENDER: Abdiel Steel is a 69 y.o. female   PRIMARY DIAGNOSIS: Atrial fibrillation with rapid ventricular response (HCC)  Atrial fibrillation with RVR ([address] Atrial fibrillation with rapid ventricular response ([address] Atrial fibrillation with rapid ventricular response ([address]        ICD-10: Treatment Diagnosis:   · Difficulty in walking, Not elsewhere classified (R26.2)   Precaution/Allergies:  Multaq [dronedarone]; Other medication; and Statins-hmg-coa reductase inhibitors      ASSESSMENT:     Ms. Nona Mccloud presents supine in bed, very pleasant and agreeable for PT, daughter at bedside. She has ulcer on R foot which she wears a special shoe. She transitioned to sit with supervision. Performed exercises in sitting. Donned her shoes with assist.  Stood with CGA to RW and ambulated 16' around bed to chair at bedside. Instructed patient that using a RW would allow her to decrease WBing on foot better than a rollator which she normally uses. Patient required less cueing today, her speed of moving increased, and she ambulated further-progressing well.    Ms. Nona Mccloud would benefit from skilled physical therapy (medically necessary) to address her deficits and maximize her function. This section established at most recent assessment   PROBLEM LIST (Impairments causing functional limitations):  1. Decreased ADL/Functional Activities  2. Decreased Transfer Abilities  3. Decreased Ambulation Ability/Technique  4. Decreased Balance  5. Increased Pain  6. Decreased Activity Tolerance   INTERVENTIONS PLANNED: (Benefits and precautions of physical therapy have been discussed with the patient.)  1. Balance Exercise  2. Bed Mobility  3. Gait Training  4. Therapeutic Activites  5. Therapeutic Exercise/Strengthening  6. Transfer Training  7. education  8. Group Therapy     TREATMENT PLAN: Frequency/Duration: 3 times a week for duration of hospital stay  Rehabilitation Potential For Stated Goals: Good     RECOMMENDED REHABILITATION/EQUIPMENT: (at time of discharge pending progress): Due to the probability of continued deficits (see above) this patient will likely need continued skilled physical therapy after discharge. Equipment:    RW              HISTORY:   History of Present Injury/Illness (Reason for Referral):  Per MD note, \"Patient is a pleasant 80yoF with PMHx significant for afib on eliquis, as well as CAD, CHF, HTN who presents to the hospital with a 4 hour history of cough, fevers/chills, general malaise, weakness. She had been in her typical state of health until this afternoon when she began experiencing rigors. She tells me that she feels as though she \"cannot get warm. \"  Her daughter went to check on her after she found out by phone that she was feeling ill, and on measurement of temperature, she had a reported fever of 101.7. This prompted family to bring her to the ER for further evaluation.     Patient was found to have a leukocytosis of 19. Given her flu-like symptoms, a rapid flu was obtained which was reportedly negative.   On cardiac monitoring, patient was found to be in afib with RVR with HR around 130s. She did not miss a dose of her home meds, which include metoprolol and digoxin as well as Eliquis, and denies any chest pain. She does not have SOB, but does report a weak cough which started this afternoon as well. No extremity edema of recent either.     Of note, patient has a h/o PAD with R foot wound treated in hospital in December 2017 with subsequent surgery. She has been following up with outpatient wound care and MD without difficulty, but patient's family does mention that her R medial bunion has been hurting a little more, although it does not appear to be infected per their inspection of recent. \"  Past Medical History/Comorbidities:   Ms. Earleen Moritz  has a past medical history of Acquired cyst of kidney; Anxiety; Aortic valve replaced; Atrial fibrillation, chronic (HCC); CAD (coronary artery disease); Calculus of kidney; Carotid artery stenosis without cerebral infarction; Chronic pain; Gangrene associated with diabetes mellitus ([facility] (5/26/2016); GERD (gastroesophageal reflux disease); Heart failure ([address] History of kidney stones; Hypercholesterolemia; Hypertension; Hypothyroidism; Ill-defined condition; Microscopic hematuria; Nausea & vomiting; Osteoarthritis; Pacemaker; PUD (peptic ulcer disease); PVD (peripheral vascular disease) ([address] Rheumatoid arthritis(714.0); Toe amputation status ([address] Type 2 diabetes mellitus ([facility] (Dx 2006); and Vertigo. She also has no past medical history of Adverse effect of anesthesia; Difficult intubation; Malignant hyperthermia due to anesthesia; or Pseudocholinesterase deficiency.   Ms. Earleen Moritz  has a past surgical history that includes hx hysterectomy (1988); hx appendectomy (1959); hx pacemaker placement; hx open cholecystectomy; hx urological (Left, 1980s); hx lithotripsy; hx pacemaker; hx orthopaedic (Left); hx orthopaedic (Left); hx amputation (Left, 2016); hx heent; pr cabg, artery-vein, four (1993); pr cardiac surg procedure unlist; hx heart catheterization; vascular surgery procedure unlist (Left, 2-17-16); hx aortic valve replacement; and vascular surgery procedure unlist (Right, 12/20/2017). Social History/Living Environment:   Home Environment: Private residence  # Steps to Enter: 2  One/Two Story Residence: One story  Living Alone: Yes  Support Systems: Family member(s), Home care staff  Patient Expects to be Discharged to[de-identified] Private residence  Current DME Used/Available at Home: Grab bars, Shower chair, Walker, rollator, Walker, rolling  Tub or Shower Type: Tub/Shower combination  Prior Level of Function/Work/Activity:  Lives alone with frequent help from caregivers and family. Ambulates short distances with rollator independently. Wears walking shoe R due to recent surgery on her foot. Number of Personal Factors/Comorbidities that affect the Plan of Care: 3+: HIGH COMPLEXITY   EXAMINATION:   Most Recent Physical Functioning:   Gross Assessment:  AROM: Generally decreased, functional  Strength: Generally decreased, functional  Sensation: Impaired               Posture:  Posture Assessment: Forward head, Rounded shoulders  Balance:  Sitting: Intact  Standing: Impaired  Standing - Static: Fair  Standing - Dynamic : Poor Bed Mobility:  Rolling: Supervision  Supine to Sit: Supervision  Scooting: Supervision  Wheelchair Mobility:     Transfers:  Sit to Stand: Contact guard assistance  Stand to Sit: Contact guard assistance  Duration: 15 Minutes  Gait:     Base of Support: Center of gravity altered  Speed/Kira: Slow  Step Length: Right shortened;Left shortened  Gait Abnormalities: Decreased step clearance  Distance (ft): 16 Feet (ft)  Assistive Device: Walker, rolling;Gait belt  Ambulation - Level of Assistance: Contact guard assistance  Interventions: Safety awareness training;Verbal cues      Body Structures Involved:  1. Heart  2. Bones  3.  Joints Body Functions Affected:  1. Sensory/Pain  2. Cardio  3. Movement Related Activities and Participation Affected:  1. Mobility  2. Self Care  3. Domestic Life   Number of elements that affect the Plan of Care: 4+: HIGH COMPLEXITY   CLINICAL PRESENTATION:   Presentation: Evolving clinical presentation with changing clinical characteristics: MODERATE COMPLEXITY   CLINICAL DECISION MAKIN [facility] Mobility Inpatient Short Form  How much difficulty does the patient currently have. .. Unable A Lot A Little None   1. Turning over in bed (including adjusting bedclothes, sheets and blankets)? [] 1   [] 2   [x] 3   [] 4   2. Sitting down on and standing up from a chair with arms ( e.g., wheelchair, bedside commode, etc.)   [] 1   [] 2   [x] 3   [] 4   3. Moving from lying on back to sitting on the side of the bed? [] 1   [] 2   [x] 3   [] 4   How much help from another person does the patient currently need. .. Total A Lot A Little None   4. Moving to and from a bed to a chair (including a wheelchair)? [] 1   [] 2   [x] 3   [] 4   5. Need to walk in hospital room? [] 1   [] 2   [x] 3   [] 4   6. Climbing 3-5 steps with a railing? [] 1   [] 2   [x] 3   [] 4   © , Trustees of [address] [place] [address] [address] [address], under license to [employer]. All rights reserved      Score:  Initial: 18 Most Recent: X (Date: -- )    Interpretation of Tool:  Represents activities that are increasingly more difficult (i.e. Bed mobility, Transfers, Gait). Score 24 23 22-20 19-15 14-10 9-7 6     Modifier CH CI CJ CK CL CM CN      ?  Mobility - Walking and Moving Around:     - CURRENT STATUS: CK - 40%-59% impaired, limited or restricted    - GOAL STATUS: CJ - 20%-39% impaired, limited or restricted    - D/C STATUS:  ---------------To be determined---------------  [place] / [place] / Product Type: Medicare /      Medical Necessity:     · Patient is expected to demonstrate progress in strength, range of motion, balance, coordination and functional technique to increase independence with   and improve safety during all functional mobiilty. Reason for Services/Other Comments:  · Patient continues to require skilled intervention due to medical complications and patient unable to attend/participate in therapy as expected. Use of outcome tool(s) and clinical judgement create a POC that gives a: Questionable prediction of patient's progress: MODERATE COMPLEXITY            TREATMENT:   (In addition to Assessment/Re-Assessment sessions the following treatments were rendered)   Pre-treatment Symptoms/Complaints:    Pain: Initial:   Pain Intensity 1: 8  Pain Location 1: Foot, Leg  Pain Orientation 1: Right  Pain Intervention(s) 1: Repositioned  Post Session:  Unchanged. Therapeutic Activity: (  15 Minutes ):  Therapeutic activities including Bed transfers, Chair transfers and Ambulation on level ground to improve mobility, strength, balance and coordination. Required minimal Safety awareness training;Verbal cues to promote sequencing of mobility. Therapeutic Exercise: (  8 minutes):  Exercises per grid below to improve mobility, strength, balance and coordination. Required minimal visual and verbal cues to promote proper body alignment. Progressed complexity of movement as indicated. DATE: 2/19/18       Ambulation        Hip Flexion x10 AB       Long Arc Quads x10 AB       Knee Squeezes        Ankle DF/PF                                         Key:  A=active, AA=active assisted, P=passive, B=bilaterally, R=right, L=left   DF=dorsiflexion, PF=plantarflexion      Braces/Orthotics/Lines/Etc:   · O2 Device: Room air  Treatment/Session Assessment:    · Response to Treatment:  See above.   · Interdisciplinary Collaboration:   o Physical Therapist  o Registered Nurse  · After treatment position/precautions:   o Up in chair  o Bed/Chair-wheels locked  o Call light within reach  o RN notified  o Family at bedside   · Compliance with Program/Exercises: compliant all of the time. · Recommendations/Intent for next treatment session: \"Next visit will focus on advancements to more challenging activities and reduction in assistance provided\".   Total Treatment Duration:  PT Patient Time In/Time Out  Time In: 1607  Time Out: [address], PT, DPT

## 2018-02-19 NOTE — PROGRESS NOTES
Spoke with Dr. Maxine Nickerson regarding blood sugar of 382. Received verbal order for 15 units of humalog.

## 2018-02-19 NOTE — PROGRESS NOTES
Hospitalist Progress Note    2018  Admit Date: 2018  7:04 PM   NAME: Blaier Tubbs   :     MRN:  [id number]   Attending: Jazmine Robledo MD  PCP:  Carmina Candelaria MD    SUBJECTIVE:     Blaire Tubbs is a 43TOI with AFib on eliquis, CAD, AS s/p TAVR, PAD with chronic R foot wound, HTN, DM2 who was admitted  with rigors/chills, in AFib with RVR. Rapid flu was neg, but given symptoms suspect that it might be falsely negative. Also with more drainage and change in color of her chronic foot wound. Improvement once started on Vanc. Foot has been evaluated by Dr. Shari Paul, who will collaborate with Dr. Deandre King as an outpatient to decide if she needs any further surgical intervention  : seen with daughter at bedside. They had many questions that were thoroughly answered. Patient is feeling better but complaining of pain in her R calf where she has been resting her leg on a pillow while offloading her heels. She is having no SOB, CP, abd pain, nausea. She is hesitant about her ability to walk at home given the discomfort she is having in her foot. We agreed that after one more PT session tomorrow morning, we would assess whether she was appropriate to go home or if she needed STR.   - pt feeling good this morning. No complaints. PT is recommending SNF reportedly. No CP, SOB    - pt reports leg pain controlled. No complaints. Son thinks leg erythema may be worse. No fevers, chills.  - pt concerned about her leg but it actually looks better than yesterday. Seems overly anxious. Erythema and warmth improved. Still some tenderness. Most of erythema confined to streaky areas of calf now, not the foot.     PHYSICAL EXAM     Patient Vitals for the past 24 hrs:   Temp Pulse Resp BP SpO2   18 0950 98.2 °F (36.8 °C) 93 20 112/51 96 %   18 0823 - 87 - 121/65 -   18 0454 97.4 °F (36.3 °C) 80 18 142/75 99 %   18 0101 97.2 °F (36.2 °C) 81 18 139/72 99 %   18 97.7 °F (36.5 °C) 87 16 136/68 100 %   18 1653 97.6 °F (36.4 °C) 85 18 134/78 97 %        Temp (24hrs), Av.6 °F (36.4 °C), Min:97.2 °F (36.2 °C), Max:98.2 °F (36.8 °C)    Oxygen Therapy  O2 Sat (%): 96 % (18 0950)  Pulse via Oximetry: 91 beats per minute (18 2131)  O2 Device: Room air (18 1150)    Intake/Output Summary (Last 24 hours) at 18 1325  Last data filed at 18 0951   Gross per 24 hour   Intake              980 ml   Output             1750 ml   Net             -770 ml          General: No acute distress. Head:  Atraumatic Normocephalic. Lungs:  CTA Bilaterally. Normal resp effort on RA  CVS:  RRR, 2/6 FEI, no BLE edema  MSK:  Small amount of erythema distal dorsal part of R foot. Larger amounts of streaky erythema on R calf. Neurologic:  AOx3. No focal deficits  Psychiatry:      anxious      Recent Results (from the past 24 hour(s))   GLUCOSE, POC    Collection Time: 18  4:55 PM   Result Value Ref Range    Glucose (POC) 277 (H) 65 - 100 mg/dL   PLEASE READ & DOCUMENT PPD TEST IN 24 HRS    Collection Time: 18  8:48 PM   Result Value Ref Range    PPD neg Negative    mm Induration 0 mm   GLUCOSE, POC    Collection Time: 18  9:03 PM   Result Value Ref Range    Glucose (POC) 220 (H) 65 - 100 mg/dL   GLUCOSE, POC    Collection Time: 18  6:21 AM   Result Value Ref Range    Glucose (POC) 117 (H) 65 - 100 mg/dL   GLUCOSE, POC    Collection Time: 18 10:33 AM   Result Value Ref Range    Glucose (POC) 382 (H) 65 - 100 mg/dL         Imaging /Procedures /Studies   XR CHEST SNGL V   Final Result   IMPRESSION: No consolidation.             ASSESSMENT      Hospital Problems as of 2018  Date Reviewed: 2018          Codes Class Noted - Resolved POA    Sepsis ([facility] ICD-10-CM: A41.9  ICD-9-CM: 038.9, 995.91  2/15/2018 - Present Yes        Atrial fibrillation with RVR ([facility] ICD-10-CM: I48.91  ICD-9-CM: 427.31  2018 - Present Unknown        * (Principal)Atrial fibrillation with rapid ventricular response (HCC) ICD-10-CM: I48.91  ICD-9-CM: 427.31  2/13/2018 - Present Yes        Leukocytosis ICD-10-CM: Q59.502  ICD-9-CM: 288.60  2/13/2018 - Present Yes        PAD (peripheral artery disease) (HCC) ICD-10-CM: I73.9  ICD-9-CM: 443.9  2/12/2016 - Present Yes    Overview Addendum 3/4/2016  1:47 PM by Aroldo Gonzales NP     2/17/16 (Dr Scott Paige) A/o, BLR Angiogram, L PT PTA (), L SFA PTA/Stent (6x 100 [place] PTX)              CHF (congestive heart failure) ([facility] ICD-10-CM: I50.9  ICD-9-CM: 428.0  6/23/2014 - Present Yes        Diabetes mellitus type 2, controlled ([facility] ICD-10-CM: E11.9  ICD-9-CM: 250.00  3/20/2014 - Present Yes        Hypomagnesemia ICD-10-CM: E83.42  ICD-9-CM: 275.2  3/20/2014 - Present Yes        Hyperglycemia ICD-10-CM: R73.9  ICD-9-CM: 790.29  3/20/2014 - Present Yes        Atrial fibrillation ([facility] ICD-10-CM: I48.91  ICD-9-CM: 427.31  3/15/2014 - Present Yes        Dyslipidemia (Chronic) ICD-10-CM: E78.5  ICD-9-CM: 272.4  4/29/2013 - Present Yes        Essential hypertension, benign (Chronic) ICD-10-CM: I10  ICD-9-CM: 401.1  4/29/2013 - Present Yes        CAD (coronary artery disease) ICD-10-CM: I25.10  ICD-9-CM: 414.00  Unknown - Present Yes    Overview Signed 8/11/2010 12:47 PM by Thuan Tay MD     HX CABG 1993, EF 55%                       Plan:  - sepsis from right foot wound and RLE cellulitis  Cont keflex and doxy  Blood cx neg   CXR and UA neg  Continue vanc for R foot wound  Wound care following  Appreciate Dr. Nieto Novel recommendations; will need outpatient followup with Dr. Cindy Lovett for further evaluation once infection clears    - Atrial Fibrillation with RVR  Stable on home dose metop and dig for > 48hrs  Continue Eliquis     - DM2 with hyperglycemia  Continue home meds  SSI ACHS while inpatient     - H/O CHF  Not in exacerbation  Continue home meds    DVT Prophylaxis: Eliquis  Dispo: PT recommending SNF. SW consulted.     Antonio Tee MD

## 2018-02-19 NOTE — PROGRESS NOTES
Problem: Falls - Risk of  Goal: *Absence of Falls  Document Leda Fall Risk and appropriate interventions in the flowsheet.    Outcome: Progressing Towards Goal  Fall Risk Interventions:  Mobility Interventions: Bed/chair exit alarm         Medication Interventions: Bed/chair exit alarm    Elimination Interventions: Bed/chair exit alarm, Call light in reach    History of Falls Interventions: Bed/chair exit alarm

## 2018-02-19 NOTE — PROGRESS NOTES
Follow up with pt and daughter at bedside. SNF rehab referral to [facility] with request for a bed on Tuesday. Await bed offers, daug aware that we anticipate that pt will transfer tomorrow once bed confirmed.

## 2018-02-19 NOTE — PROGRESS NOTES
Problem: Falls - Risk of  Goal: *Absence of Falls  Document Leda Fall Risk and appropriate interventions in the flowsheet.    Outcome: Progressing Towards Goal  Fall Risk Interventions:  Mobility Interventions: Bed/chair exit alarm, Communicate number of staff needed for ambulation/transfer, Patient to call before getting OOB, PT Consult for mobility concerns, PT Consult for assist device competence       Medication Interventions: Bed/chair exit alarm, Evaluate medications/consider consulting pharmacy, Patient to call before getting OOB, Teach patient to arise slowly    Elimination Interventions: Bed/chair exit alarm, Call light in reach, Toilet paper/wipes in reach, Toileting schedule/hourly rounds    History of Falls Interventions: Bed/chair exit alarm, Consult care management for discharge planning, Door open when patient unattended    BED ALARM ON AND FUNCTIONING

## 2018-02-20 VITALS
WEIGHT: 102.7 LBS | TEMPERATURE: 98.2 F | BODY MASS INDEX: 17.53 KG/M2 | DIASTOLIC BLOOD PRESSURE: 67 MMHG | HEIGHT: 64 IN | RESPIRATION RATE: 20 BRPM | OXYGEN SATURATION: 97 % | SYSTOLIC BLOOD PRESSURE: 121 MMHG | HEART RATE: 84 BPM

## 2018-02-20 LAB
ERYTHROCYTE [DISTWIDTH] IN BLOOD BY AUTOMATED COUNT: 13.4 % (ref 11.9–14.6)
GLUCOSE BLD STRIP.AUTO-MCNC: 286 MG/DL (ref 65–100)
GLUCOSE BLD STRIP.AUTO-MCNC: 98 MG/DL (ref 65–100)
HCT VFR BLD AUTO: 34.9 % (ref 35.8–46.3)
HGB BLD-MCNC: 11.4 G/DL (ref 11.7–15.4)
MCH RBC QN AUTO: 31.8 PG (ref 26.1–32.9)
MCHC RBC AUTO-ENTMCNC: 32.7 G/DL (ref 31.4–35)
MCV RBC AUTO: 97.2 FL (ref 79.6–97.8)
PLATELET # BLD AUTO: 268 K/UL (ref 150–450)
PMV BLD AUTO: 9.3 FL (ref 10.8–14.1)
RBC # BLD AUTO: 3.59 M/UL (ref 4.05–5.25)
WBC # BLD AUTO: 9.9 K/UL (ref 4.3–11.1)

## 2018-02-20 PROCEDURE — 74011250637 HC RX REV CODE- 250/637: Performed by: INTERNAL MEDICINE

## 2018-02-20 PROCEDURE — 74011636637 HC RX REV CODE- 636/637: Performed by: FAMILY MEDICINE

## 2018-02-20 PROCEDURE — 3331090001 HH PPS REVENUE CREDIT

## 2018-02-20 PROCEDURE — 74011250637 HC RX REV CODE- 250/637: Performed by: FAMILY MEDICINE

## 2018-02-20 PROCEDURE — 85027 COMPLETE CBC AUTOMATED: CPT | Performed by: INTERNAL MEDICINE

## 2018-02-20 PROCEDURE — 74011636637 HC RX REV CODE- 636/637: Performed by: INTERNAL MEDICINE

## 2018-02-20 PROCEDURE — 94761 N-INVAS EAR/PLS OXIMETRY MLT: CPT

## 2018-02-20 PROCEDURE — 82962 GLUCOSE BLOOD TEST: CPT

## 2018-02-20 PROCEDURE — 97530 THERAPEUTIC ACTIVITIES: CPT

## 2018-02-20 PROCEDURE — 77010033678 HC OXYGEN DAILY

## 2018-02-20 PROCEDURE — 3331090002 HH PPS REVENUE DEBIT

## 2018-02-20 PROCEDURE — 36415 COLL VENOUS BLD VENIPUNCTURE: CPT | Performed by: INTERNAL MEDICINE

## 2018-02-20 RX ORDER — CEPHALEXIN 500 MG/1
500 CAPSULE ORAL 3 TIMES DAILY
Qty: 21 CAP | Refills: 0 | Status: SHIPPED | OUTPATIENT
Start: 2018-02-20 | End: 2018-02-27

## 2018-02-20 RX ORDER — DOXYCYCLINE 100 MG/1
100 CAPSULE ORAL EVERY 12 HOURS
Qty: 14 CAP | Refills: 0 | Status: SHIPPED | OUTPATIENT
Start: 2018-02-20 | End: 2018-02-27

## 2018-02-20 RX ORDER — TRAMADOL HYDROCHLORIDE 50 MG/1
50 TABLET ORAL
Qty: 12 TAB | Refills: 0 | Status: SHIPPED | OUTPATIENT
Start: 2018-02-20 | End: 2019-01-01

## 2018-02-20 RX ADMIN — MULTIPLE VITAMINS W/ MINERALS TAB 1 TABLET: TAB at 08:09

## 2018-02-20 RX ADMIN — INSULIN LISPRO 6 UNITS: 100 INJECTION, SOLUTION INTRAVENOUS; SUBCUTANEOUS at 12:38

## 2018-02-20 RX ADMIN — INSULIN LISPRO 10 UNITS: 100 INJECTION, SOLUTION INTRAVENOUS; SUBCUTANEOUS at 12:38

## 2018-02-20 RX ADMIN — METHIMAZOLE 5 MG: 5 TABLET ORAL at 08:09

## 2018-02-20 RX ADMIN — HYDROCODONE BITARTRATE AND ACETAMINOPHEN 1 TABLET: 5; 325 TABLET ORAL at 00:51

## 2018-02-20 RX ADMIN — APIXABAN 5 MG: 5 TABLET, FILM COATED ORAL at 08:09

## 2018-02-20 RX ADMIN — CEPHALEXIN 500 MG: 500 CAPSULE ORAL at 08:09

## 2018-02-20 RX ADMIN — INSULIN LISPRO 5 UNITS: 100 INJECTION, SOLUTION INTRAVENOUS; SUBCUTANEOUS at 07:45

## 2018-02-20 RX ADMIN — PREDNISONE 5 MG: 5 TABLET ORAL at 07:47

## 2018-02-20 RX ADMIN — DOXYCYCLINE HYCLATE 100 MG: 100 CAPSULE ORAL at 08:09

## 2018-02-20 RX ADMIN — ASPIRIN 81 MG: 81 TABLET, COATED ORAL at 08:09

## 2018-02-20 RX ADMIN — METOPROLOL TARTRATE 50 MG: 50 TABLET ORAL at 08:09

## 2018-02-20 RX ADMIN — HYDROCODONE BITARTRATE AND ACETAMINOPHEN 1 TABLET: 5; 325 TABLET ORAL at 07:01

## 2018-02-20 RX ADMIN — DIGOXIN 0.12 MG: 125 TABLET ORAL at 08:10

## 2018-02-20 RX ADMIN — CYANOCOBALAMIN TAB 1000 MCG 1000 MCG: 1000 TAB at 12:38

## 2018-02-20 RX ADMIN — FERROUS SULFATE TAB 325 MG (65 MG ELEMENTAL FE) 325 MG: 325 (65 FE) TAB at 12:38

## 2018-02-20 RX ADMIN — CALCIUM CARBONATE 500 MG (1,250 MG)-VITAMIN D3 200 UNIT TABLET 1 TABLET: at 12:38

## 2018-02-20 RX ADMIN — LORAZEPAM 1 MG: 1 TABLET ORAL at 08:09

## 2018-02-20 NOTE — PROGRESS NOTES
Problem: Mobility Impaired (Adult and Pediatric)  Goal: *Acute Goals and Plan of Care (Insert Text)  LTG:  (1.)Ms. Ezra Lai will move from supine to sit and sit to supine , scoot up and down and roll side to side in bed with MODIFIED INDEPENDENCE within 7 treatment day(s). (2.)Ms. Ezra Lai will transfer from bed to chair and chair to bed with MODIFIED INDEPENDENCE using the least restrictive device within 7 treatment day(s). (3.)Ms. Ezra Lai will ambulate with MODIFIED INDEPENDENCE for 50+ feet with the least restrictive device within 7 treatment day(s). ________________________________________________________________________________________________      PHYSICAL THERAPY: Daily Note, Treatment Day: 2nd, AM 2/20/2018  INPATIENT: Hospital Day: 8  [place] / Product Type: Medicare /   Walking shoe R    NAME/AGE/GENDER: Suman Negro is a 69 y.o. female   PRIMARY DIAGNOSIS: Atrial fibrillation with rapid ventricular response (HCC)  Atrial fibrillation with RVR ([address] Atrial fibrillation with rapid ventricular response ([address] Atrial fibrillation with rapid ventricular response ([address]        ICD-10: Treatment Diagnosis:   · Difficulty in walking, Not elsewhere classified (R26.2)   Precaution/Allergies:  Multaq [dronedarone]; Other medication; and Statins-hmg-coa reductase inhibitors      ASSESSMENT:     Ms. Ezra Lai presents supine in bed, very pleasant and agreeable for PT. She stated she needed to use the restroom. She performed bed mobility with supervision. She needed assistance donning both shoes. Pressure relief shoe on the R. She ambulated in the room with the rolling walker, CGA. She had good standing balance while performing toileting activities, holding with 1 hand to raised toilet seat. She returned to her bedside chair and was left with needs in reach.    Ms. Ezra Lai would benefit from skilled physical therapy (medically necessary) to address her deficits and maximize her function. This section established at most recent assessment   PROBLEM LIST (Impairments causing functional limitations):  1. Decreased ADL/Functional Activities  2. Decreased Transfer Abilities  3. Decreased Ambulation Ability/Technique  4. Decreased Balance  5. Increased Pain  6. Decreased Activity Tolerance   INTERVENTIONS PLANNED: (Benefits and precautions of physical therapy have been discussed with the patient.)  1. Balance Exercise  2. Bed Mobility  3. Gait Training  4. Therapeutic Activites  5. Therapeutic Exercise/Strengthening  6. Transfer Training  7. education  8. Group Therapy     TREATMENT PLAN: Frequency/Duration: 3 times a week for duration of hospital stay  Rehabilitation Potential For Stated Goals: Good     RECOMMENDED REHABILITATION/EQUIPMENT: (at time of discharge pending progress): Due to the probability of continued deficits (see above) this patient will likely need continued skilled physical therapy after discharge. Equipment:    RW              HISTORY:   History of Present Injury/Illness (Reason for Referral):  Per MD note, \"Patient is a pleasant 80yoF with PMHx significant for afib on eliquis, as well as CAD, CHF, HTN who presents to the hospital with a 4 hour history of cough, fevers/chills, general malaise, weakness. She had been in her typical state of health until this afternoon when she began experiencing rigors. She tells me that she feels as though she \"cannot get warm. \"  Her daughter went to check on her after she found out by phone that she was feeling ill, and on measurement of temperature, she had a reported fever of 101.7. This prompted family to bring her to the ER for further evaluation.     Patient was found to have a leukocytosis of 19. Given her flu-like symptoms, a rapid flu was obtained which was reportedly negative. On cardiac monitoring, patient was found to be in afib with RVR with HR around 130s.   She did not miss a dose of her home meds, which include metoprolol and digoxin as well as Eliquis, and denies any chest pain. She does not have SOB, but does report a weak cough which started this afternoon as well. No extremity edema of recent either.     Of note, patient has a h/o PAD with R foot wound treated in hospital in December 2017 with subsequent surgery. She has been following up with outpatient wound care and MD without difficulty, but patient's family does mention that her R medial bunion has been hurting a little more, although it does not appear to be infected per their inspection of recent. \"  Past Medical History/Comorbidities:   Ms. 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 She also has no past medical history of Adverse effect of anesthesia; Difficult intubation; Malignant hyperthermia due to anesthesia; or Pseudocholinesterase deficiency.   Ms. Aleksey Gonsalez  has a past surgical history that includes hx hysterectomy (1988); hx appendectomy (1959); hx pacemaker placement; hx open cholecystectomy; hx urological (Left, 1980s); hx lithotripsy; hx pacemaker; hx orthopaedic (Left); hx orthopaedic (Left); hx amputation (Left, 2016); hx heent; pr cabg, artery-vein, four (1993); pr cardiac surg procedure unlist; hx heart catheterization; vascular surgery procedure unlist (Left, 2-17-16); hx aortic valve replacement; and vascular surgery procedure unlist (Right, 12/20/2017). Social History/Living Environment:   Home Environment: Private residence  # Steps to Enter: 2  One/Two Story Residence: One story  Living Alone: Yes  Support Systems: Family member(s), Home care staff  Patient Expects to be Discharged to[de-identified] Private residence  Current DME Used/Available at Home: Grab bars, Shower chair, Walker, rollator, Walker, rolling  Tub or Shower Type: Tub/Shower combination  Prior Level of Function/Work/Activity:  Lives alone with frequent help from caregivers and family. Ambulates short distances with rollator independently. Wears walking shoe R due to recent surgery on her foot. Number of Personal Factors/Comorbidities that affect the Plan of Care: 3+: HIGH COMPLEXITY   EXAMINATION:   Most Recent Physical Functioning:   Gross Assessment:                  Posture:     Balance:  Sitting: Intact  Standing: Impaired  Standing - Static: Good  Standing - Dynamic : Fair Bed Mobility:  Rolling: Supervision  Supine to Sit: Supervision  Wheelchair Mobility:     Transfers:  Sit to Stand: Contact guard assistance  Stand to Sit: Contact guard assistance  Bed to Chair: Contact guard assistance  Gait:     Base of Support: Center of gravity altered  Speed/Kira: Slow;Shuffled  Step Length: Right shortened;Left shortened  Gait Abnormalities: Decreased step clearance  Distance (ft): 10 Feet (ft) (2 reps)  Assistive Device: Walker, rolling  Ambulation - Level of Assistance: Contact guard assistance      Body Structures Involved:  1. Heart  2. Bones  3. Joints Body Functions Affected:  1. Sensory/Pain  2. Cardio  3. Movement Related Activities and Participation Affected:  1. Mobility  2. Self Care  3.  Domestic Life   Number of elements that affect the Plan of Care: 4+: HIGH COMPLEXITY   CLINICAL PRESENTATION:   Presentation: Evolving clinical presentation with changing clinical characteristics: MODERATE COMPLEXITY   CLINICAL DECISION MAKIN[address] [place] [id number] AM-PAC 6 Clicks   Basic Mobility Inpatient Short Form  How much difficulty does the patient currently have. .. Unable A Lot A Little None   1. Turning over in bed (including adjusting bedclothes, sheets and blankets)? [] 1   [] 2   [x] 3   [] 4   2. Sitting down on and standing up from a chair with arms ( e.g., wheelchair, bedside commode, etc.)   [] 1   [] 2   [x] 3   [] 4   3. Moving from lying on back to sitting on the side of the bed? [] 1   [] 2   [x] 3   [] 4   How much help from another person does the patient currently need. .. Total A Lot A Little None   4. Moving to and from a bed to a chair (including a wheelchair)? [] 1   [] 2   [x] 3   [] 4   5. Need to walk in hospital room? [] 1   [] 2   [x] 3   [] 4   6. Climbing 3-5 steps with a railing? [] 1   [] 2   [x] 3   [] 4   © 2007, Trustees of [address] [place] [address] [address] [address], under license to [employer]. All rights reserved      Score:  Initial: 18 Most Recent: X (Date: -- )    Interpretation of Tool:  Represents activities that are increasingly more difficult (i.e. Bed mobility, Transfers, Gait). Score 24 23 22-20 19-15 14-10 9-7 6     Modifier CH CI CJ CK CL CM CN      ? Mobility - Walking and Moving Around:     - CURRENT STATUS: CK - 40%-59% impaired, limited or restricted    - GOAL STATUS: CJ - 20%-39% impaired, limited or restricted    - D/C STATUS:  ---------------To be determined---------------  [place] / [place] / Product Type: Medicare /      Medical Necessity:     · Patient is expected to demonstrate progress in strength, range of motion, balance, coordination and functional technique to increase independence with   and improve safety during all functional mobiilty.   Reason for Services/Other Comments:  · Patient continues to require skilled intervention due to medical complications and patient unable to attend/participate in therapy as expected. Use of outcome tool(s) and clinical judgement create a POC that gives a: Questionable prediction of patient's progress: MODERATE COMPLEXITY            TREATMENT:   (In addition to Assessment/Re-Assessment sessions the following treatments were rendered)   Pre-treatment Symptoms/Complaints:    Pain: Initial:   Pain Intensity 1: 0  Post Session:  Unchanged. Therapeutic Activity: (   18 ):  Therapeutic activities including Bed transfers, Chair transfers, toilet transfers and Ambulation on level ground to improve mobility, strength, balance and coordination. Required minimal   to promote sequencing of mobility. Therapeutic Exercise: (  0 minutes):  Exercises per grid below to improve mobility, strength, balance and coordination. Required minimal visual and verbal cues to promote proper body alignment. Progressed complexity of movement as indicated. DATE: 2/19/18       Ambulation        Hip Flexion x10 AB       Long Arc Quads x10 AB       Knee Squeezes        Ankle DF/PF                                         Key:  A=active, AA=active assisted, P=passive, B=bilaterally, R=right, L=left   DF=dorsiflexion, PF=plantarflexion      Braces/Orthotics/Lines/Etc:   · O2 Device: Room air  Treatment/Session Assessment:    · Response to Treatment:  See above. · Interdisciplinary Collaboration:   o Physical Therapist  o Certified Nursing Assistant/Patient Care Technician  · After treatment position/precautions:   o Up in chair  o Bed/Chair-wheels locked  o Call light within reach  o RN notified   · Compliance with Program/Exercises: compliant all of the time. · Recommendations/Intent for next treatment session: \"Next visit will focus on advancements to more challenging activities and reduction in assistance provided\".   Total Treatment Duration:  PT Patient Time In/Time Out  Time In: 1003  Time Out: 1001 [facility]

## 2018-02-20 NOTE — PROGRESS NOTES
TRANSFER - OUT REPORT:    Verbal report given to Fredy Katz RN on Marii Russ  being transferred to Rehab for routine progression of care       Report consisted of patients Situation, Background, Assessment and Recommendations(SBAR). Information from the following report(s) SBAR, Kardex and Recent Results was reviewed with the receiving nurse. Opportunity for questions and clarification was provided.

## 2018-02-20 NOTE — PROGRESS NOTES
Received call from [facility] admission coordinator that patients daughter had called her and asked DON if they had beds. I had spoke with patients  Daughter Olinda Hamman earlier that I was told that [facility], [address] [facility] did not have any beds for today and physician had discharge orders for patient. Daughter was okay with [facility] and voiced understanding of no beds for today and their facilities. Per Theresa Scherer at [facility] may have beds tomorrow but not sure if they would have any beds and no bed was offered by [facility].

## 2018-02-20 NOTE — DISCHARGE INSTRUCTIONS
Atrial Fibrillation: Care Instructions  Your Care Instructions    Atrial fibrillation is an irregular and often fast heartbeat. Treating this condition is important for several reasons. It can cause blood clots, which can travel from your heart to your brain and cause a stroke. If you have a fast heartbeat, you may feel lightheaded, dizzy, and weak. An irregular heartbeat can also increase your risk for heart failure. Atrial fibrillation is often the result of another heart condition, such as high blood pressure or coronary artery disease. Making changes to improve your heart condition will help you stay healthy and active. Follow-up care is a key part of your treatment and safety. Be sure to make and go to all appointments, and call your doctor if you are having problems. It's also a good idea to know your test results and keep a list of the medicines you take. How can you care for yourself at home? Medicines  ? · Take your medicines exactly as prescribed. Call your doctor if you think you are having a problem with your medicine. You will get more details on the specific medicines your doctor prescribes. ? · If your doctor has given you a blood thinner to prevent a stroke, be sure you get instructions about how to take your medicine safely. Blood thinners can cause serious bleeding problems. ? · Do not take any vitamins, over-the-counter drugs, or herbal products without talking to your doctor first.   ? Lifestyle changes  ? · Do not smoke. Smoking can increase your chance of a stroke and heart attack. If you need help quitting, talk to your doctor about stop-smoking programs and medicines. These can increase your chances of quitting for good. ? · Eat a heart-healthy diet. ? · Stay at a healthy weight. Lose weight if you need to.   ? · Limit alcohol to 2 drinks a day for men and 1 drink a day for women. Too much alcohol can cause health problems. ? · Avoid colds and flu.  Get a pneumococcal vaccine shot. If you have had one before, ask your doctor whether you need another dose. Get a flu shot every year. If you must be around people with colds or flu, wash your hands often. Activity  ? · If your doctor recommends it, get more exercise. Walking is a good choice. Bit by bit, increase the amount you walk every day. Try for at least 30 minutes on most days of the week. You also may want to swim, bike, or do other activities. Your doctor may suggest that you join a cardiac rehabilitation program so that you can have help increasing your physical activity safely. ? · Start light exercise if your doctor says it is okay. Even a small amount will help you get stronger, have more energy, and manage stress. Walking is an easy way to get exercise. Start out by walking a little more than you did in the hospital. Gradually increase the amount you walk. ? · When you exercise, watch for signs that your heart is working too hard. You are pushing too hard if you cannot talk while you are exercising. If you become short of breath or dizzy or have chest pain, sit down and rest immediately. ? · Check your pulse regularly. Place two fingers on the artery at the palm side of your wrist, in line with your thumb. If your heartbeat seems uneven or fast, talk to your doctor. When should you call for help? Call 911 anytime you think you may need emergency care. For example, call if:  ? · You have symptoms of a heart attack. These may include:  ¨ Chest pain or pressure, or a strange feeling in the chest.  ¨ Sweating. ¨ Shortness of breath. ¨ Nausea or vomiting. ¨ Pain, pressure, or a strange feeling in the back, neck, jaw, or upper belly or in one or both shoulders or arms. ¨ Lightheadedness or sudden weakness. ¨ A fast or irregular heartbeat. After you call 911, the  may tell you to chew 1 adult-strength or 2 to 4 low-dose aspirin. Wait for an ambulance. Do not try to drive yourself.    ? · You have symptoms of a stroke. These may include:  ¨ Sudden numbness, tingling, weakness, or loss of movement in your face, arm, or leg, especially on only one side of your body. ¨ Sudden vision changes. ¨ Sudden trouble speaking. ¨ Sudden confusion or trouble understanding simple statements. ¨ Sudden problems with walking or balance. ¨ A sudden, severe headache that is different from past headaches. ? · You passed out (lost consciousness). ?Call your doctor now or seek immediate medical care if:  ? · You have new or increased shortness of breath. ? · You feel dizzy or lightheaded, or you feel like you may faint. ? · Your heart rate becomes irregular. ? · You can feel your heart flutter in your chest or skip heartbeats. Tell your doctor if these symptoms are new or worse. ? Watch closely for changes in your health, and be sure to contact your doctor if you have any problems. Where can you learn more? Go to http://tae-bairon.info/. Enter U020 in the search box to learn more about \"Atrial Fibrillation: Care Instructions. \"  Current as of: September 21, 2016  Content Version: 11.4  © 8036-8379 [employer]. Care instructions adapted under license by [employer] (which disclaims liability or warranty for this information). If you have questions about a medical condition or this instruction, always ask your healthcare professional. [address] any warranty or liability for your use of this information. Bunions: Care Instructions  Your Care Instructions    A bunion is a bump on the outside of the joint at the bottom of your big toe. It can cause pain and swelling in the toe. A bunion forms when bone or tissue around the joint becomes swollen from too much pressure. You also can have a bunionette, or tailor's bunion, which forms on the joint of the little toe. Sometimes, a bunion on the big toe turns the toe in toward the second toe.  This is called displacement. It can lead to problems with the other toes. You can get a bunion from having an unusual walking style, having flatfeet, or wearing tight-fitting shoes. You can treat most bunions at home with a few simple steps. If you have a lot of pain, your doctor may inject medicine into the bunion to reduce swelling for a while. If you still have pain, you may need to have surgery. Follow-up care is a key part of your treatment and safety. Be sure to make and go to all appointments, and call your doctor if you are having problems. It's also a good idea to know your test results and keep a list of the medicines you take. How can you care for yourself at home? · Ask your doctor if you can take an over-the-counter pain medicine, such as acetaminophen (Tylenol), ibuprofen (Advil, Motrin), or naproxen (Aleve). Be safe with medicines. Read and follow all instructions on the label. · Wear shoes that have a wide and deep space for the toes. Also, wear shoes that have low or flat heels and good arch supports. Do not wear tight, narrow, or high-heeled shoes. · Try bunion pads, arch supports, toe spacers, or shoe inserts. They can help shift your weight when you walk to take pressure off your big toe. · Put moleskin or another type of cushion on or around the bunion to keep it from rubbing against your shoe. · Put ice or a cold pack on the area for 10 to 20 minutes at a time as needed. Put a thin cloth between the ice and your skin. · Prop up your foot on a pillow when you ice your toe or anytime you sit or lie down. Try to keep it above the level of your heart. This will help reduce swelling. When should you call for help? Call your doctor now or seek immediate medical care if:  ? · You have severe pain. ? · Your toe is cool or pale or changes color. ? · You have tingling, weakness, or numbness in the toe. ? Watch closely for changes in your health, and be sure to contact your doctor if:  ? · Pain and swelling get worse. ? · You do not get better as expected. Where can you learn more? Go to http://tae-bairon.info/. Enter H210 in the search box to learn more about \"Bunions: Care Instructions. \"  Current as of: March 21, 2017  Content Version: 11.4  © 0859-4368 [email or web address]. Care instructions adapted under license by [employer] (which disclaims liability or warranty for this information). If you have questions about a medical condition or this instruction, always ask your healthcare professional. [address] any warranty or liability for your use of this information. Heart-Healthy Diet: Care Instructions  Your Care Instructions    A heart-healthy diet has lots of vegetables, fruits, nuts, beans, and whole grains, and is low in salt. It limits foods that are high in saturated fat, such as meats, cheeses, and fried foods. It may be hard to change your diet, but even small changes can lower your risk of heart attack and heart disease. Follow-up care is a key part of your treatment and safety. Be sure to make and go to all appointments, and call your doctor if you are having problems. It's also a good idea to know your test results and keep a list of the medicines you take. How can you care for yourself at home? Watch your portions  · Learn what a serving is. A \"serving\" and a \"portion\" are not always the same thing. Make sure that you are not eating larger portions than are recommended. For example, a serving of pasta is ½ cup. A serving size of meat is 2 to 3 ounces. A 3-ounce serving is about the size of a deck of cards. Measure serving sizes until you are good at [place]" them. Keep in mind that restaurants often serve portions that are 2 or 3 times the size of one serving. · To keep your energy level up and keep you from feeling hungry, eat often but in smaller portions.   · Eat only the number of calories you need to stay at a healthy weight. If you need to lose weight, eat fewer calories than your body burns (through exercise and other physical activity). Eat more fruits and vegetables  · Eat a variety of fruit and vegetables every day. Dark green, deep orange, red, or yellow fruits and vegetables are especially good for you. Examples include spinach, carrots, peaches, and berries. · Keep carrots, celery, and other veggies handy for snacks. Buy fruit that is in season and store it where you can see it so that you will be tempted to eat it. · Cook dishes that have a lot of veggies in them, such as stir-fries and soups. Limit saturated and trans fat  · Read food labels, and try to avoid saturated and trans fats. They increase your risk of heart disease. Trans fat is found in many processed foods such as cookies and crackers. · Use olive or canola oil when you cook. Try cholesterol-lowering spreads, such as Benecol or Take Control. · Bake, broil, grill, or steam foods instead of frying them. · Choose lean meats instead of high-fat meats such as hot dogs and sausages. Cut off all visible fat when you prepare meat. · Eat fish, skinless poultry, and meat alternatives such as soy products instead of high-fat meats. Soy products, such as tofu, may be especially good for your heart. · Choose low-fat or fat-free milk and dairy products. Eat fish  · Eat at least two servings of fish a week. Certain fish, such as salmon and tuna, contain omega-3 fatty acids, which may help reduce your risk of heart attack. Eat foods high in fiber  · Eat a variety of grain products every day. Include whole-grain foods that have lots of fiber and nutrients. Examples of whole-grain foods include oats, whole wheat bread, and brown rice. · Buy whole-grain breads and cereals, instead of white bread or pastries. Limit salt and sodium  · Limit how much salt and sodium you eat to help lower your blood pressure. · Taste food before you salt it.  Add only a little salt when you think you need it. With time, your taste buds will adjust to less salt. · Eat fewer snack items, fast foods, and other high-salt, processed foods. Check food labels for the amount of sodium in packaged foods. · Choose low-sodium versions of canned goods (such as soups, vegetables, and beans). Limit sugar  · Limit drinks and foods with added sugar. These include candy, desserts, and soda pop. Limit alcohol  · Limit alcohol to no more than 2 drinks a day for men and 1 drink a day for women. Too much alcohol can cause health problems. When should you call for help? Watch closely for changes in your health, and be sure to contact your doctor if:  ? · You would like help planning heart-healthy meals. Where can you learn more? Go to http://tae-bairon.info/. Enter V137 in the search box to learn more about \"Heart-Healthy Diet: Care Instructions. \"  Current as of: September 21, 2016  Content Version: 11.4  © 4552-7754 [employer]. Care instructions adapted under license by [employer] (which disclaims liability or warranty for this information). If you have questions about a medical condition or this instruction, always ask your healthcare professional. [place] [address] any warranty or liability for your use of this information. DISCHARGE SUMMARY from Nurse    PATIENT INSTRUCTIONS:    After general anesthesia or intravenous sedation, for 24 hours or while taking prescription Narcotics:  · Limit your activities  · Do not drive and operate hazardous machinery  · Do not make important personal or business decisions  · Do  not drink alcoholic beverages  · If you have not urinated within 8 hours after discharge, please contact your surgeon on call.     Report the following to your surgeon:  · Excessive pain, swelling, redness or odor of or around the surgical area  · Temperature over 100.5  · Nausea and vomiting lasting longer than 4 hours or if unable to take medications  · Any signs of decreased circulation or nerve impairment to extremity: change in color, persistent  numbness, tingling, coldness or increase pain  · Any questions    What to do at Home:  Recommended activity: Activity as tolerated    If you experience any of the following symptoms of unrelieved pain or increased shortness of breath, please follow up with your primary care physician. *  Please give a list of your current medications to your Primary Care Provider. *  Please update this list whenever your medications are discontinued, doses are      changed, or new medications (including over-the-counter products) are added. *  Please carry medication information at all times in case of emergency situations. These are general instructions for a healthy lifestyle:    No smoking/ No tobacco products/ Avoid exposure to second hand smoke  Surgeon General's Warning:  Quitting smoking now greatly reduces serious risk to your health. Obesity, smoking, and sedentary lifestyle greatly increases your risk for illness    A healthy diet, regular physical exercise & weight monitoring are important for maintaining a healthy lifestyle    You may be retaining fluid if you have a history of heart failure or if you experience any of the following symptoms:  Weight gain of 3 pounds or more overnight or 5 pounds in a week, increased swelling in our hands or feet or shortness of breath while lying flat in bed. Please call your doctor as soon as you notice any of these symptoms; do not wait until your next office visit. Recognize signs and symptoms of STROKE:    F-face looks uneven    A-arms unable to move or move unevenly    S-speech slurred or non-existent    T-time-call 911 as soon as signs and symptoms begin-DO NOT go       Back to bed or wait to see if you get better-TIME IS BRAIN. Warning Signs of HEART ATTACK     Call 911 if you have these symptoms:   Chest discomfort.  Most heart attacks involve discomfort in the center of the chest that lasts more than a few minutes, or that goes away and comes back. It can feel like uncomfortable pressure, squeezing, fullness, or pain.  Discomfort in other areas of the upper body. Symptoms can include pain or discomfort in one or both arms, the back, neck, jaw, or stomach.  Shortness of breath with or without chest discomfort.  Other signs may include breaking out in a cold sweat, nausea, or lightheadedness. Don't wait more than five minutes to call 911 - MINUTES MATTER! Fast action can save your life. Calling 911 is almost always the fastest way to get lifesaving treatment. Emergency Medical Services staff can begin treatment when they arrive -- up to an hour sooner than if someone gets to the hospital by car. The discharge information has been reviewed with the patient. The patient verbalized understanding. Discharge medications reviewed with the patient and appropriate educational materials and side effects teaching were provided.   ___________________________________________________________________________________________________________________________________

## 2018-02-20 NOTE — PROGRESS NOTES
Care Management Interventions  PCP Verified by CM: Yes (6 weeks ago )  Mode of Transport at Discharge: BLS ([facility] )  Transition of Care Consult (CM Consult): SNF  Partner SNF: Yes  Physical Therapy Consult: Yes  Current Support Network: Lives Alone  Confirm Follow Up Transport: Family  Plan discussed with Pt/Family/Caregiver: Yes  Freedom of Choice Offered: Yes  Discharge Location  Discharge Placement: Skilled nursing facility  Patient has decided to see if [address] has a bed and they have offered a bed for patient. Patient requested I call her daughter Jorge Miller [phone] to see if she is okay with that facility. I spoke with patient daughter and she is in agreement with [facility]. Transport set up for 115 pm today by [facility] and SNF packet sent with patient.

## 2018-02-20 NOTE — PROGRESS NOTES
Problem: Falls - Risk of  Goal: *Absence of Falls  Document Leda Fall Risk and appropriate interventions in the flowsheet. Outcome: Progressing Towards Goal  Fall Risk Interventions:  Mobility Interventions: Bed/chair exit alarm, Communicate number of staff needed for ambulation/transfer, Patient to call before getting OOB      Pt progressing towards goal. No falls since admission. Bed low and locked. Call light within reach. Side rails x 2. Gripper socks applied. Personal belongings within reach. Pt verbalizes understanding to call for assistance.    Bed alarm on    Medication Interventions: Bed/chair exit alarm, Patient to call before getting OOB, Teach patient to arise slowly    Elimination Interventions: Bed/chair exit alarm, Call light in reach, Patient to call for help with toileting needs, Toilet paper/wipes in reach    History of Falls Interventions: Bed/chair exit alarm

## 2018-02-20 NOTE — PROGRESS NOTES
Bedside and Verbal shift change report given to self (oncoming nurse) by Bridger Mckenzie RN (offgoing nurse). Report included the following information SBAR, Kardex, MAR and Recent Results.

## 2018-02-20 NOTE — PROGRESS NOTES
Patient is ready for d/c but no bed offers. [facility] declined and CM left message for [facility] to see if they can offer a bed for today. CM following.

## 2018-02-20 NOTE — PROGRESS NOTES
Verbal bedside report received from Sabino Fitzpatrick [place]. Assumed care of patient.

## 2018-02-20 NOTE — PROGRESS NOTES
Verbal bedside report given to Hugo Vela oncoming RN. Patient's situation, background, assessment and recommendations provided. Opportunity for questions provided. Oncoming RN assumed care of patient.

## 2018-02-20 NOTE — PROGRESS NOTES
Bedside and Verbal shift change report given to [place] RN (oncoming nurse) by self Robert sifuentes). Report included the following information SBAR, Kardex, MAR and Recent Results.

## 2018-02-20 NOTE — DISCHARGE SUMMARY
Hospitalist Discharge Summary     Admit Date:  2018  7:04 PM   Name:  Maxwell John   Age:  69 y.o.  :  1940   MRN:  [id number]   PCP:  Az Mohan MD  Treatment Team: Attending Provider: Khai Qiu MD; Utilization Review: Rupal oMhan;  Care Manager: Janey George RN; Consulting Provider: Margaret Hashimoto, MD    Problem List for this Hospitalization:  Hospital Problems as of 2018  Date Reviewed: 2018          Codes Class Noted - Resolved POA    Sepsis ([facility] ICD-10-CM: A41.9  ICD-9-CM: 038.9, 995.91  2/15/2018 - Present Yes        Atrial fibrillation with RVR ([facility] ICD-10-CM: I48.91  ICD-9-CM: 427.31  2018 - Present Unknown        * (Principal)Atrial fibrillation with rapid ventricular response ([facility] ICD-10-CM: I48.91  ICD-9-CM: 427.31  2018 - Present Yes        Leukocytosis ICD-10-CM: T76.252  ICD-9-CM: 288.60  2018 - Present Yes        PAD (peripheral artery disease) ([facility] ICD-10-CM: I73.9  ICD-9-CM: 443.9  2016 - Present Yes    Overview Addendum 3/4/2016  1:47 PM by Michelle Almeida, NP     16 (Dr Ohara) A/o, BLR Angiogram, L PT PTA (), L SFA PTA/Stent (6x 100 [place] PTX)              CHF (congestive heart failure) ([facility] ICD-10-CM: I50.9  ICD-9-CM: 428.0  2014 - Present Yes        Diabetes mellitus type 2, controlled ([facility] ICD-10-CM: E11.9  ICD-9-CM: 250.00  3/20/2014 - Present Yes        Hypomagnesemia ICD-10-CM: E83.42  ICD-9-CM: 275.2  3/20/2014 - Present Yes        Hyperglycemia ICD-10-CM: R73.9  ICD-9-CM: 790.29  3/20/2014 - Present Yes        Atrial fibrillation ([address] ICD-10-CM: I48.91  ICD-9-CM: 427.31  3/15/2014 - Present Yes        Dyslipidemia (Chronic) ICD-10-CM: E78.5  ICD-9-CM: 272.4  2013 - Present Yes        Essential hypertension, benign (Chronic) ICD-10-CM: I10  ICD-9-CM: 401.1  2013 - Present Yes        CAD (coronary artery disease) ICD-10-CM: I25.10  ICD-9-CM: 414.00  Unknown - Present Yes    Overview Signed 8/11/2010 12:47 PM by Roxanne Gonzalez MD     HX CABG 1993, EF 55%                     Admission HPI from 2/13/2018:    \" Patient is a pleasant 80yoF with PMHx significant for afib on eliquis, as well as CAD, CHF, HTN who presents to the hospital with a 4 hour history of cough, fevers/chills, general malaise, weakness. She had been in her typical state of health until this afternoon when she began experiencing rigors. She tells me that she feels as though she \"cannot get warm. \"  Her daughter went to check on her after she found out by phone that she was feeling ill, and on measurement of temperature, she had a reported fever of 101.7. This prompted family to bring her to the ER for further evaluation.     Patient was found to have a leukocytosis of 19. Given her flu-like symptoms, a rapid flu was obtained which was reportedly negative. On cardiac monitoring, patient was found to be in afib with RVR with HR around 130s. She did not miss a dose of her home meds, which include metoprolol and digoxin as well as Eliquis, and denies any chest pain. She does not have SOB, but does report a weak cough which started this afternoon as well. No extremity edema of recent either.     Of note, patient has a h/o PAD with R foot wound treated in hospital in December 2017 with subsequent surgery. She has been following up with outpatient wound care and MD without difficulty, but patient's family does mention that her R medial bunion has been hurting a little more, although it does not appear to be infected per their inspection of recent. \"    Hospital Course:  [id number] with AFib on eliquis, CAD, AS s/p TAVR, PAD with chronic R foot wound, HTN, DM2 who was admitted 2/14 with rigors/chills, in AFib with RVR. Her afib RVR became better controlled on her home meds. eliquis was continued but higher dose because she has normal renal function and is not [de-identified] y/o yet.    Also with more drainage and change in color of her chronic foot wound. Improvement once started on Vanc. Imaging in past has showed PAD, so she was evaluated by Dr. Joe Osei, vascular surgeon, who said he will collaborate with Dr. Edita Cottrell as an outpatient to decide if she needs any further surgical intervention. Surgery recommended keflex based on previous cultures, but she seemed to get slightly worse when this was attempted, so doxycycline was added for possible MRSA and her leg improved some overnight. She still has quite a ways to go to resolution; if her leg is not improved by the end of her abx course, she may need further imaging to see if there is an abscess, though no evidence appreciated on exam currently. Her fasting blood sugars have been running low so we have been holding her insulin here. However her other sugars have been running high, inconsistently. She will probably need further adjustment for her DM at SNF because it has been unpredictable. Follow up instructions and discharge meds at bottom of this note. Plan was discussed with pt. All questions answered. Patient was stable at time of discharge. Diagnostic Imaging/Tests:   Xr Chest Sngl V    Result Date: 2/13/2018  AP PORTABLE CHEST X-RAY HISTORY: Fever and cough. Body aches. COMPARISON: 2/8/2016 FINDINGS: Median sternotomy wires and a percutaneously replaced aortic valve are present. A cardiac pacemaker device is present. There is no lobar consolidation, pleural effusions, or pulmonary edema. IMPRESSION: No consolidation. Echocardiogram results:  No results found for this visit on 02/13/18.       All Micro Results     Procedure Component Value Units Date/Time    CULTURE, BLOOD [[id number]] Collected:  02/13/18 2018    Order Status:  Completed Specimen:  Whole Blood from Blood Updated:  02/18/18 0606     Special Requests: --        LEFT  ARM       Culture result: NO GROWTH 5 DAYS       CULTURE, BLOOD [[id number]] Collected:  02/13/18 2018    Order Status:  Completed Specimen: Whole Blood from Blood Updated:  02/18/18 0606     Special Requests: --        RIGHT  Antecubital       Culture result: NO GROWTH 5 DAYS       INFLUENZA A & B AG (RAPID TEST) [[id number]] Collected:  02/13/18 1842    Order Status:  Completed Specimen:  Nasopharyngeal from Nasal washing Updated:  02/13/18 1900     Influenza A Ag NEGATIVE          NEGATIVE FOR THE PRESENCE OF INFLUENZA A ANTIGEN  INFECTION DUE TO INFLUENZA A CANNOT BE RULED OUT. BECAUSE THE ANTIGEN PRESENT IN THE SAMPLE MAY BE BELOW  THE DETECTION LIMIT OF THE TEST. A NEGATIVE TEST IS PRESUMPTIVE AND IT IS RECOMMENDED THAT THESE RESULTS BE CONFIRMED BY VIRAL CULTURE OR AN FDA-CLEARED INFLUENZA A AND B MOLECULAR ASSAY. Influenza B Ag NEGATIVE          NEGATIVE FOR THE PRESENCE OF INFLUENZA B ANTIGEN  INFECTION DUE TO INFLUENZA B CANNOT BE RULED OUT. BECAUSE THE ANTIGEN PRESENT IN THE SAMPLE MAY BE BELOW  THE DETECTION LIMIT OF THE TEST. A NEGATIVE TEST IS PRESUMPTIVE AND IT IS RECOMMENDED THAT THESE RESULTS BE CONFIRMED BY VIRAL CULTURE OR AN FDA-CLEARED INFLUENZA A AND B MOLECULAR ASSAY. Labs: Results:       BMP, Mg, Phos No results for input(s): NA, K, CL, CO2, AGAP, BUN, CREA, CA, GLU, MG, PHOS in the last 72 hours. CBC Recent Labs      02/20/18   0452   WBC  9.9   RBC  3.59*   HGB  11.4*   HCT  34.9*   PLT  268      LFT No results for input(s): SGOT, ALT, TBIL, AP, TP, ALB, GLOB, AGRAT, GPT in the last 72 hours.    Cardiac Testing Lab Results   Component Value Date/Time     02/10/2016 04:30 AM     02/06/2016 05:58 PM     11/16/2015 07:15 PM    CK 34 02/06/2016 05:58 PM    Troponin-I, Qt. 0.70 (HH) 02/07/2016 08:50 AM    Troponin-I, Qt. 0.62 () 02/07/2016 01:45 AM    Troponin-I, Qt. 0.65 () 02/06/2016 05:58 PM      Coagulation Tests Lab Results   Component Value Date/Time    Prothrombin time 16.0 (H) 12/05/2017 07:40 PM    Prothrombin time 12.7 (H) 02/06/2016 05:58 PM    Prothrombin time 15.1 (H) 06/24/2014 05:25 AM    INR 1.3 12/05/2017 07:40 PM    INR 1.2 02/06/2016 05:58 PM    INR 1.4 (H) 06/24/2014 05:25 AM    aPTT 25.2 02/08/2016 11:56 AM    aPTT 50.8 (H) 02/08/2016 04:43 AM    aPTT 36.3 (H) 02/07/2016 08:45 PM      A1c Lab Results   Component Value Date/Time    Hemoglobin A1c 9.7 (H) 12/06/2017 07:02 AM    Hemoglobin A1c 7.4 (H) 02/19/2016 10:50 AM    Hemoglobin A1c 8.0 (H) 03/21/2014 04:39 AM      Lipid Panel Lab Results   Component Value Date/Time    Cholesterol, total 113 06/24/2014 05:25 AM    HDL Cholesterol 33 (L) 06/24/2014 05:25 AM    LDL, calculated 66.8 06/24/2014 05:25 AM    VLDL, calculated 13.2 06/24/2014 05:25 AM    Triglyceride 66 06/24/2014 05:25 AM    CHOL/HDL Ratio 3.4 06/24/2014 05:25 AM      Thyroid Panel Lab Results   Component Value Date/Time    TSH 4.150 (H) 06/24/2014 05:25 AM    TSH 0.023 (L) 04/03/2014 05:31 AM    T4, Total 6.6 06/21/2012 10:26 AM    T4, Free 0.9 06/27/2014 10:20 AM    T4, Free 2.4 (H) 03/16/2014 04:52 AM    T3 Uptake 39 06/27/2014 10:20 AM    T3 Uptake 36 06/21/2012 10:26 AM        Most Recent UA Lab Results   Component Value Date/Time    Color YELLOW 06/23/2014 04:30 PM    Appearance CLEAR 06/23/2014 04:30 PM    Specific gravity 1.007 06/23/2014 04:30 PM    pH (UA) 6.0 06/23/2014 04:30 PM    Protein NEGATIVE  06/23/2014 04:30 PM    Glucose 250 06/23/2014 04:30 PM    Ketone NEGATIVE  06/23/2014 04:30 PM    Bilirubin NEGATIVE  06/23/2014 04:30 PM    Blood LARGE (A) 06/23/2014 04:30 PM    Urobilinogen 0.2 06/23/2014 04:30 PM    Nitrites NEGATIVE  06/23/2014 04:30 PM    Leukocyte Esterase NEGATIVE  06/23/2014 04:30 PM        Allergies   Allergen Reactions    Multaq [Dronedarone] Nausea Only    Other Medication Other (comments)     STATINS CAUSE MUSCLE WEAKNESS  Cholesterol medications    Statins-Hmg-Coa Reductase Inhibitors Myalgia     Immunization History   Administered Date(s) Administered    Influenza High Dose Vaccine PF 10/27/2014, 10/22/2015, 11/09/2016, 10/18/2017    Influenza Vaccine PF 11/02/2004, 11/30/2005, 10/06/2006, 09/29/2009, 11/07/2011, 10/17/2012, 10/16/2013    Pneumococcal Conjugate (PCV-13) 12/16/2013    Pneumococcal Polysaccharide (PPSV-23) 01/22/2007    TB Skin Test (PPD) Intradermal 03/20/2014, 06/24/2014, 02/09/2016, 02/17/2018    Tdap 06/08/2012       All Labs from Last 24 Hrs:  Recent Results (from the past 24 hour(s))   GLUCOSE, POC    Collection Time: 02/19/18 10:33 AM   Result Value Ref Range    Glucose (POC) 382 (H) 65 - 100 mg/dL   GLUCOSE, POC    Collection Time: 02/19/18  4:56 PM   Result Value Ref Range    Glucose (POC) 226 (H) 65 - 100 mg/dL   PLEASE READ & DOCUMENT PPD TEST IN 48 HRS    Collection Time: 02/19/18  9:00 PM   Result Value Ref Range    PPD negative Negative    mm Induration 0 mm   GLUCOSE, POC    Collection Time: 02/19/18  9:37 PM   Result Value Ref Range    Glucose (POC) 79 65 - 100 mg/dL   CBC W/O DIFF    Collection Time: 02/20/18  4:52 AM   Result Value Ref Range    WBC 9.9 4.3 - 11.1 K/uL    RBC 3.59 (L) 4.05 - 5.25 M/uL    HGB 11.4 (L) 11.7 - 15.4 g/dL    HCT 34.9 (L) 35.8 - 46.3 %    MCV 97.2 79.6 - 97.8 FL    MCH 31.8 26.1 - 32.9 PG    MCHC 32.7 31.4 - 35.0 g/dL    RDW 13.4 11.9 - 14.6 %    PLATELET 576 327 - 919 K/uL    MPV 9.3 (L) 10.8 - 14.1 FL   GLUCOSE, POC    Collection Time: 02/20/18  5:48 AM   Result Value Ref Range    Glucose (POC) 98 65 - 100 mg/dL       Discharge Exam:  Patient Vitals for the past 24 hrs:   Temp Pulse Resp BP SpO2   02/20/18 0414 98 °F (36.7 °C) 71 16 124/64 95 %   02/20/18 0029 97.8 °F (36.6 °C) 67 18 138/70 96 %   02/19/18 2120 97.7 °F (36.5 °C) 82 16 155/82 100 %   02/19/18 1918 98.6 °F (37 °C) 77 20 113/68 98 %   02/19/18 1754 - 85 - 154/84 -   02/19/18 1255 98.1 °F (36.7 °C) 78 20 129/65 97 %   02/19/18 0950 98.2 °F (36.8 °C) 93 20 112/51 96 %   02/19/18 0823 - 87 - 121/65 -     Oxygen Therapy  O2 Sat (%): 95 % (02/20/18 6434)  Pulse via Oximetry: 91 beats per minute (02/13/18 2131)  O2 Device: Room air (02/20/18 0700)    Intake/Output Summary (Last 24 hours) at 02/20/18 0749  Last data filed at 02/20/18 9535   Gross per 24 hour   Intake             1440 ml   Output             2950 ml   Net            -1510 ml       General:    Well nourished. Alert. No distress. Eyes:   Normal sclera. Extraocular movements intact. ENT:  Normocephalic, atraumatic. Moist mucous membranes  CV:   Regular rate and rhythm. No murmur, rub, or gallop. Lungs:  Clear to auscultation bilaterally. No wheezing, rhonchi, or rales. Abdomen: Soft, nontender, nondistended. Bowel sounds normal.   Extremities: Warm and dry. No cyanosis or edema. Neurologic: CN II-XII grossly intact. Sensation intact. Skin:     No rashes or jaundice. Psych:  Normal mood and affect. Discharge Info:   Current Discharge Medication List      START taking these medications    Details   cephALEXin (KEFLEX) 500 mg capsule Take 1 Cap by mouth three (3) times daily for 7 days. Qty: 21 Cap, Refills: 0      doxycycline (VIBRAMYCIN) 100 mg capsule Take 1 Cap by mouth every twelve (12) hours for 7 days. Qty: 14 Cap, Refills: 0         CONTINUE these medications which have CHANGED    Details   traMADol (ULTRAM) 50 mg tablet Take 1 Tab by mouth every eight (8) hours as needed for Pain. Max Daily Amount: 150 mg.  Qty: 12 Tab, Refills: 0    Associated Diagnoses: PAD (peripheral artery disease) ([facility])      apixaban (ELIQUIS) 5 mg tablet Take 1 Tab by mouth every twelve (12) hours. Qty: 60 Tab, Refills: 2    Associated Diagnoses: Atrial fibrillation with RVR ([address]         CONTINUE these medications which have NOT CHANGED    Details   linagliptin (TRADJENTA) 5 mg tablet Take 5 mg by mouth daily. Indications: type 2 diabetes mellitus, morning      glipiZIDE (GLUCOTROL) 10 mg tablet Take 10 mg by mouth daily.  Two tablets in the morning   Indications: type 2 diabetes mellitus      CALCIUM CARBONATE (CALCIUM 300 PO) Take 1 Tab by mouth daily. Stop seven days prior to surgery per anesthesia protocol. methimazole (TAPAZOLE) 5 mg tablet Take 5 mg by mouth daily. Take day of surgery per anesthesia protocol. Pt reports she alternates 1/12 tablets and two tablets every other day  Indications: hyperthyroidism, morning      aspirin delayed-release 81 mg tablet Take 81 mg by mouth daily. Morning   Take day of surgery per anesthesia protocol. predniSONE (DELTASONE) 5 mg tablet Take 1 Tab by mouth daily (with breakfast). Qty: 30 Tab, Refills: 0      digoxin (LANOXIN) 0.125 mg tablet Take 1 Tab by mouth daily. Qty: 30 Tab, Refills: 6      multivitamin (ONE A DAY) tablet Take 1 Tab by mouth daily. Stop seven days prior to surgery per anesthesia protocol. ferrous sulfate 325 mg (65 mg iron) tablet Take  by mouth Daily (before lunch). GLUC HCL/GLUC KENNY/AC-D-GLUCOS (GLUCOSAMINE COMPLEX PO) Take 1 Tab by mouth three (3) times daily. Stop seven days prior to surgery per anesthesia protocol.      lorazepam (ATIVAN) 1 mg tablet Take 1 mg by mouth two (2) times a day. Take day of surgery per anesthesia protocol. DOCOSAHEXANOIC ACID/EPA (FISH OIL PO) Take 2 Tabs by mouth two (2) times a day. Stop seven days prior to surgery per anesthesia protocol. OMEPRAZOLE (PRILOSEC PO) Take 20 mg by mouth daily. .Take day of surgery per anesthesia protocol.      ergocalciferol (ERGOCALCIFEROL) 50,000 unit capsule Take 50,000 Units by mouth every month. Stop seven days prior to surgery per anesthesia protocol. metoprolol (LOPRESSOR) 50 mg tablet Take 1 Tab by mouth two (2) times a day. Qty: 60 Tab, Refills: 0      CYANOCOBALAMIN (VITAMIN B-12 PO) Take 1 Tab by mouth daily. Stop seven days prior to surgery per anesthesia protocol.          STOP taking these medications       insulin glargine (LANTUS SOLOSTAR) 100 unit/mL (3 mL) pen Comments:   Reason for Stopping:         HYDROcodone-acetaminophen (NORCO) 5-325 mg per tablet Comments: Reason for Stopping:                 Disposition: SNF    Activity: PT/OT Eval and Treat  Diet: DIET DIABETIC CONSISTENT CARB Regular    Follow-up Appointments   Procedures    FOLLOW UP VISIT Appointment in: Two Weeks Dr Henrique Walter Surgery in 2 weeks for foot follow up     Dr Nacho Sampson in 2 weeks for foot follow up     Standing Status:   Standing     Number of Occurrences:   1     Order Specific Question:   Appointment in     Answer: Two Weeks         Follow-up Information     Follow up With Details Comments Contact Info    Jean Marie Patel MD Go to when discharged from SNF, for diabetes follow up [address]  [phone]      Jessica Burns MD In 2 weeks foot follow up, to determine if surgery needed [address] [facility]  [phone]            Time spent in patient discharge planning and coordination 35 minutes.     Signed:  Regan Guadalupe MD

## 2018-02-21 ENCOUNTER — PATIENT OUTREACH (OUTPATIENT)
Dept: CASE MANAGEMENT | Age: 78
End: 2018-02-21

## 2018-02-21 PROCEDURE — 3331090002 HH PPS REVENUE DEBIT

## 2018-02-21 PROCEDURE — 3331090001 HH PPS REVENUE CREDIT

## 2018-02-21 NOTE — PROGRESS NOTES
Alex note this patient was IP d/c to [facility] and will be followed by Indra Barba RN until d/c. This note will not be viewable in [address] Meliza Kenney LPN/ Care Coordinator  [address]  [address] [place] [address]  [email or web address] [facility]

## 2018-02-22 PROCEDURE — 3331090002 HH PPS REVENUE DEBIT

## 2018-02-22 PROCEDURE — 3331090001 HH PPS REVENUE CREDIT

## 2018-02-23 PROCEDURE — 3331090001 HH PPS REVENUE CREDIT

## 2018-02-23 PROCEDURE — 3331090002 HH PPS REVENUE DEBIT

## 2018-02-24 PROCEDURE — 3331090001 HH PPS REVENUE CREDIT

## 2018-02-24 PROCEDURE — 3331090002 HH PPS REVENUE DEBIT

## 2018-02-25 PROCEDURE — 3331090002 HH PPS REVENUE DEBIT

## 2018-02-25 PROCEDURE — 3331090001 HH PPS REVENUE CREDIT

## 2018-02-26 PROCEDURE — 3331090002 HH PPS REVENUE DEBIT

## 2018-02-26 PROCEDURE — 3331090001 HH PPS REVENUE CREDIT

## 2018-02-27 PROCEDURE — 3331090002 HH PPS REVENUE DEBIT

## 2018-02-27 PROCEDURE — 3331090001 HH PPS REVENUE CREDIT

## 2018-02-28 PROCEDURE — 3331090001 HH PPS REVENUE CREDIT

## 2018-02-28 PROCEDURE — 3331090002 HH PPS REVENUE DEBIT

## 2018-03-01 PROCEDURE — 3331090001 HH PPS REVENUE CREDIT

## 2018-03-01 PROCEDURE — 3331090002 HH PPS REVENUE DEBIT

## 2018-03-02 PROCEDURE — 3331090002 HH PPS REVENUE DEBIT

## 2018-03-02 PROCEDURE — 3331090001 HH PPS REVENUE CREDIT

## 2018-03-03 PROCEDURE — 3331090001 HH PPS REVENUE CREDIT

## 2018-03-03 PROCEDURE — 3331090002 HH PPS REVENUE DEBIT

## 2018-03-04 PROCEDURE — 3331090002 HH PPS REVENUE DEBIT

## 2018-03-04 PROCEDURE — 3331090001 HH PPS REVENUE CREDIT

## 2018-03-05 PROCEDURE — 3331090001 HH PPS REVENUE CREDIT

## 2018-03-05 PROCEDURE — 3331090002 HH PPS REVENUE DEBIT

## 2018-03-06 PROCEDURE — 3331090002 HH PPS REVENUE DEBIT

## 2018-03-06 PROCEDURE — 3331090001 HH PPS REVENUE CREDIT

## 2018-03-07 ENCOUNTER — HOME CARE VISIT (OUTPATIENT)
Dept: SCHEDULING | Facility: HOME HEALTH | Age: 78
End: 2018-03-07
Payer: MEDICARE

## 2018-03-07 VITALS
SYSTOLIC BLOOD PRESSURE: 106 MMHG | RESPIRATION RATE: 16 BRPM | HEART RATE: 85 BPM | OXYGEN SATURATION: 98 % | DIASTOLIC BLOOD PRESSURE: 60 MMHG | TEMPERATURE: 98.7 F

## 2018-03-07 PROCEDURE — 3331090001 HH PPS REVENUE CREDIT

## 2018-03-07 PROCEDURE — G0299 HHS/HOSPICE OF RN EA 15 MIN: HCPCS

## 2018-03-07 PROCEDURE — 3331090002 HH PPS REVENUE DEBIT

## 2018-03-08 PROCEDURE — 3331090002 HH PPS REVENUE DEBIT

## 2018-03-08 PROCEDURE — 3331090001 HH PPS REVENUE CREDIT

## 2018-03-09 ENCOUNTER — HOME CARE VISIT (OUTPATIENT)
Dept: SCHEDULING | Facility: HOME HEALTH | Age: 78
End: 2018-03-09
Payer: MEDICARE

## 2018-03-09 VITALS
DIASTOLIC BLOOD PRESSURE: 60 MMHG | TEMPERATURE: 98 F | SYSTOLIC BLOOD PRESSURE: 104 MMHG | RESPIRATION RATE: 16 BRPM | OXYGEN SATURATION: 99 % | HEART RATE: 74 BPM

## 2018-03-09 VITALS
SYSTOLIC BLOOD PRESSURE: 118 MMHG | DIASTOLIC BLOOD PRESSURE: 64 MMHG | TEMPERATURE: 98.6 F | HEART RATE: 62 BPM | RESPIRATION RATE: 18 BRPM | OXYGEN SATURATION: 98 %

## 2018-03-09 PROCEDURE — G0152 HHCP-SERV OF OT,EA 15 MIN: HCPCS

## 2018-03-09 PROCEDURE — G0299 HHS/HOSPICE OF RN EA 15 MIN: HCPCS

## 2018-03-09 PROCEDURE — 3331090002 HH PPS REVENUE DEBIT

## 2018-03-09 PROCEDURE — 3331090001 HH PPS REVENUE CREDIT

## 2018-03-10 PROCEDURE — 3331090001 HH PPS REVENUE CREDIT

## 2018-03-10 PROCEDURE — 3331090002 HH PPS REVENUE DEBIT

## 2018-03-11 PROCEDURE — 3331090002 HH PPS REVENUE DEBIT

## 2018-03-11 PROCEDURE — 3331090001 HH PPS REVENUE CREDIT

## 2018-03-12 ENCOUNTER — HOME CARE VISIT (OUTPATIENT)
Dept: SCHEDULING | Facility: HOME HEALTH | Age: 78
End: 2018-03-12
Payer: MEDICARE

## 2018-03-12 VITALS
DIASTOLIC BLOOD PRESSURE: 86 MMHG | RESPIRATION RATE: 17 BRPM | TEMPERATURE: 98.4 F | SYSTOLIC BLOOD PRESSURE: 136 MMHG | HEART RATE: 84 BPM

## 2018-03-12 PROCEDURE — G0151 HHCP-SERV OF PT,EA 15 MIN: HCPCS

## 2018-03-12 PROCEDURE — 3331090001 HH PPS REVENUE CREDIT

## 2018-03-12 PROCEDURE — 3331090002 HH PPS REVENUE DEBIT

## 2018-03-13 PROCEDURE — A6446 CONFORM BAND S W>=3" <5"/YD: HCPCS

## 2018-03-13 PROCEDURE — A6260 WOUND CLEANSER ANY TYPE/SIZE: HCPCS

## 2018-03-13 PROCEDURE — A9270 NON-COVERED ITEM OR SERVICE: HCPCS

## 2018-03-13 PROCEDURE — 3331090002 HH PPS REVENUE DEBIT

## 2018-03-13 PROCEDURE — A6402 STERILE GAUZE <= 16 SQ IN: HCPCS

## 2018-03-13 PROCEDURE — 3331090001 HH PPS REVENUE CREDIT

## 2018-03-13 PROCEDURE — A6449 LT COMPRES BAND >=3" <5"/YD: HCPCS

## 2018-03-14 PROCEDURE — 3331090002 HH PPS REVENUE DEBIT

## 2018-03-14 PROCEDURE — 3331090001 HH PPS REVENUE CREDIT

## 2018-03-15 ENCOUNTER — HOME CARE VISIT (OUTPATIENT)
Dept: SCHEDULING | Facility: HOME HEALTH | Age: 78
End: 2018-03-15
Payer: MEDICARE

## 2018-03-15 PROCEDURE — 3331090001 HH PPS REVENUE CREDIT

## 2018-03-15 PROCEDURE — 3331090002 HH PPS REVENUE DEBIT

## 2018-03-16 ENCOUNTER — HOME CARE VISIT (OUTPATIENT)
Dept: SCHEDULING | Facility: HOME HEALTH | Age: 78
End: 2018-03-16
Payer: MEDICARE

## 2018-03-16 VITALS
DIASTOLIC BLOOD PRESSURE: 60 MMHG | RESPIRATION RATE: 16 BRPM | OXYGEN SATURATION: 99 % | TEMPERATURE: 97.5 F | HEART RATE: 73 BPM | SYSTOLIC BLOOD PRESSURE: 100 MMHG

## 2018-03-16 PROCEDURE — G0299 HHS/HOSPICE OF RN EA 15 MIN: HCPCS

## 2018-03-16 PROCEDURE — 3331090001 HH PPS REVENUE CREDIT

## 2018-03-16 PROCEDURE — 3331090002 HH PPS REVENUE DEBIT

## 2018-03-17 PROCEDURE — 3331090001 HH PPS REVENUE CREDIT

## 2018-03-17 PROCEDURE — 3331090002 HH PPS REVENUE DEBIT

## 2018-03-18 PROCEDURE — 3331090002 HH PPS REVENUE DEBIT

## 2018-03-18 PROCEDURE — 3331090001 HH PPS REVENUE CREDIT

## 2018-03-19 ENCOUNTER — HOME CARE VISIT (OUTPATIENT)
Dept: SCHEDULING | Facility: HOME HEALTH | Age: 78
End: 2018-03-19
Payer: MEDICARE

## 2018-03-19 VITALS
RESPIRATION RATE: 18 BRPM | SYSTOLIC BLOOD PRESSURE: 110 MMHG | HEART RATE: 84 BPM | TEMPERATURE: 97.5 F | DIASTOLIC BLOOD PRESSURE: 70 MMHG

## 2018-03-19 PROCEDURE — G0157 HHC PT ASSISTANT EA 15: HCPCS

## 2018-03-19 PROCEDURE — 3331090002 HH PPS REVENUE DEBIT

## 2018-03-19 PROCEDURE — 3331090001 HH PPS REVENUE CREDIT

## 2018-03-20 PROCEDURE — 3331090001 HH PPS REVENUE CREDIT

## 2018-03-20 PROCEDURE — 3331090002 HH PPS REVENUE DEBIT

## 2018-03-21 PROCEDURE — 3331090002 HH PPS REVENUE DEBIT

## 2018-03-21 PROCEDURE — 3331090001 HH PPS REVENUE CREDIT

## 2018-03-22 ENCOUNTER — HOME CARE VISIT (OUTPATIENT)
Dept: HOME HEALTH SERVICES | Facility: HOME HEALTH | Age: 78
End: 2018-03-22
Payer: MEDICARE

## 2018-03-22 PROCEDURE — 3331090002 HH PPS REVENUE DEBIT

## 2018-03-22 PROCEDURE — 3331090001 HH PPS REVENUE CREDIT

## 2018-03-23 ENCOUNTER — HOME CARE VISIT (OUTPATIENT)
Dept: SCHEDULING | Facility: HOME HEALTH | Age: 78
End: 2018-03-23
Payer: MEDICARE

## 2018-03-23 VITALS
TEMPERATURE: 97.5 F | DIASTOLIC BLOOD PRESSURE: 60 MMHG | HEART RATE: 63 BPM | RESPIRATION RATE: 16 BRPM | SYSTOLIC BLOOD PRESSURE: 128 MMHG | OXYGEN SATURATION: 96 %

## 2018-03-23 VITALS
TEMPERATURE: 97.8 F | HEART RATE: 85 BPM | OXYGEN SATURATION: 95 % | DIASTOLIC BLOOD PRESSURE: 72 MMHG | RESPIRATION RATE: 18 BRPM | SYSTOLIC BLOOD PRESSURE: 122 MMHG

## 2018-03-23 PROCEDURE — G0157 HHC PT ASSISTANT EA 15: HCPCS

## 2018-03-23 PROCEDURE — 3331090002 HH PPS REVENUE DEBIT

## 2018-03-23 PROCEDURE — G0299 HHS/HOSPICE OF RN EA 15 MIN: HCPCS

## 2018-03-23 PROCEDURE — 3331090001 HH PPS REVENUE CREDIT

## 2018-03-24 PROCEDURE — 3331090001 HH PPS REVENUE CREDIT

## 2018-03-24 PROCEDURE — 3331090002 HH PPS REVENUE DEBIT

## 2018-03-25 PROCEDURE — 3331090002 HH PPS REVENUE DEBIT

## 2018-03-25 PROCEDURE — 3331090001 HH PPS REVENUE CREDIT

## 2018-03-26 PROCEDURE — 3331090002 HH PPS REVENUE DEBIT

## 2018-03-26 PROCEDURE — 3331090001 HH PPS REVENUE CREDIT

## 2018-03-27 PROCEDURE — 3331090001 HH PPS REVENUE CREDIT

## 2018-03-27 PROCEDURE — 3331090002 HH PPS REVENUE DEBIT

## 2018-03-28 ENCOUNTER — HOME CARE VISIT (OUTPATIENT)
Dept: SCHEDULING | Facility: HOME HEALTH | Age: 78
End: 2018-03-28
Payer: MEDICARE

## 2018-03-28 VITALS
TEMPERATURE: 97.8 F | HEART RATE: 77 BPM | OXYGEN SATURATION: 97 % | DIASTOLIC BLOOD PRESSURE: 72 MMHG | RESPIRATION RATE: 17 BRPM | SYSTOLIC BLOOD PRESSURE: 132 MMHG

## 2018-03-28 PROCEDURE — 3331090001 HH PPS REVENUE CREDIT

## 2018-03-28 PROCEDURE — 3331090002 HH PPS REVENUE DEBIT

## 2018-03-28 PROCEDURE — G0157 HHC PT ASSISTANT EA 15: HCPCS

## 2018-03-29 PROCEDURE — 3331090001 HH PPS REVENUE CREDIT

## 2018-03-29 PROCEDURE — 3331090002 HH PPS REVENUE DEBIT

## 2018-03-30 ENCOUNTER — HOME CARE VISIT (OUTPATIENT)
Dept: SCHEDULING | Facility: HOME HEALTH | Age: 78
End: 2018-03-30
Payer: MEDICARE

## 2018-03-30 VITALS
SYSTOLIC BLOOD PRESSURE: 120 MMHG | HEART RATE: 75 BPM | TEMPERATURE: 98.2 F | RESPIRATION RATE: 18 BRPM | DIASTOLIC BLOOD PRESSURE: 60 MMHG

## 2018-03-30 VITALS
HEART RATE: 76 BPM | SYSTOLIC BLOOD PRESSURE: 104 MMHG | DIASTOLIC BLOOD PRESSURE: 58 MMHG | TEMPERATURE: 97.7 F | OXYGEN SATURATION: 97 % | RESPIRATION RATE: 16 BRPM

## 2018-03-30 PROCEDURE — G0157 HHC PT ASSISTANT EA 15: HCPCS

## 2018-03-30 PROCEDURE — G0299 HHS/HOSPICE OF RN EA 15 MIN: HCPCS

## 2018-03-30 PROCEDURE — 3331090001 HH PPS REVENUE CREDIT

## 2018-03-30 PROCEDURE — 3331090002 HH PPS REVENUE DEBIT

## 2018-03-31 PROCEDURE — 3331090001 HH PPS REVENUE CREDIT

## 2018-03-31 PROCEDURE — 3331090002 HH PPS REVENUE DEBIT

## 2018-04-01 PROCEDURE — 3331090002 HH PPS REVENUE DEBIT

## 2018-04-01 PROCEDURE — 3331090001 HH PPS REVENUE CREDIT

## 2018-04-02 ENCOUNTER — HOME CARE VISIT (OUTPATIENT)
Dept: SCHEDULING | Facility: HOME HEALTH | Age: 78
End: 2018-04-02
Payer: MEDICARE

## 2018-04-02 VITALS
OXYGEN SATURATION: 95 % | DIASTOLIC BLOOD PRESSURE: 60 MMHG | SYSTOLIC BLOOD PRESSURE: 112 MMHG | TEMPERATURE: 96.2 F | HEART RATE: 85 BPM | RESPIRATION RATE: 17 BRPM

## 2018-04-02 PROCEDURE — G0157 HHC PT ASSISTANT EA 15: HCPCS

## 2018-04-02 PROCEDURE — 3331090001 HH PPS REVENUE CREDIT

## 2018-04-02 PROCEDURE — 3331090002 HH PPS REVENUE DEBIT

## 2018-04-03 PROCEDURE — 3331090001 HH PPS REVENUE CREDIT

## 2018-04-03 PROCEDURE — 3331090002 HH PPS REVENUE DEBIT

## 2018-04-04 ENCOUNTER — HOME CARE VISIT (OUTPATIENT)
Dept: SCHEDULING | Facility: HOME HEALTH | Age: 78
End: 2018-04-04
Payer: MEDICARE

## 2018-04-04 VITALS
OXYGEN SATURATION: 96 % | RESPIRATION RATE: 16 BRPM | SYSTOLIC BLOOD PRESSURE: 110 MMHG | DIASTOLIC BLOOD PRESSURE: 60 MMHG | HEART RATE: 82 BPM | TEMPERATURE: 97.7 F

## 2018-04-04 PROCEDURE — G0151 HHCP-SERV OF PT,EA 15 MIN: HCPCS

## 2018-04-04 PROCEDURE — 3331090001 HH PPS REVENUE CREDIT

## 2018-04-04 PROCEDURE — G0299 HHS/HOSPICE OF RN EA 15 MIN: HCPCS

## 2018-04-04 PROCEDURE — 3331090002 HH PPS REVENUE DEBIT

## 2018-04-05 VITALS
TEMPERATURE: 97.9 F | SYSTOLIC BLOOD PRESSURE: 104 MMHG | HEART RATE: 80 BPM | RESPIRATION RATE: 18 BRPM | DIASTOLIC BLOOD PRESSURE: 70 MMHG | OXYGEN SATURATION: 95 %

## 2018-04-05 PROCEDURE — 3331090001 HH PPS REVENUE CREDIT

## 2018-04-05 PROCEDURE — 3331090002 HH PPS REVENUE DEBIT

## 2018-04-06 PROCEDURE — 3331090002 HH PPS REVENUE DEBIT

## 2018-04-06 PROCEDURE — 3331090001 HH PPS REVENUE CREDIT

## 2018-04-07 PROCEDURE — 3331090002 HH PPS REVENUE DEBIT

## 2018-04-07 PROCEDURE — 3331090003 HH PPS REVENUE ADJ

## 2018-04-07 PROCEDURE — 3331090001 HH PPS REVENUE CREDIT

## 2018-04-08 PROCEDURE — 3331090002 HH PPS REVENUE DEBIT

## 2018-04-08 PROCEDURE — 3331090001 HH PPS REVENUE CREDIT

## 2018-04-09 PROCEDURE — 3331090001 HH PPS REVENUE CREDIT

## 2018-04-09 PROCEDURE — 3331090002 HH PPS REVENUE DEBIT

## 2018-04-10 ENCOUNTER — HOME CARE VISIT (OUTPATIENT)
Dept: SCHEDULING | Facility: HOME HEALTH | Age: 78
End: 2018-04-10
Payer: MEDICARE

## 2018-04-10 VITALS
TEMPERATURE: 97.5 F | DIASTOLIC BLOOD PRESSURE: 64 MMHG | HEART RATE: 81 BPM | SYSTOLIC BLOOD PRESSURE: 130 MMHG | RESPIRATION RATE: 17 BRPM

## 2018-04-10 PROCEDURE — 3331090001 HH PPS REVENUE CREDIT

## 2018-04-10 PROCEDURE — 3331090002 HH PPS REVENUE DEBIT

## 2018-04-10 PROCEDURE — G0157 HHC PT ASSISTANT EA 15: HCPCS

## 2018-04-10 PROCEDURE — 400014 HH F/U

## 2018-04-11 PROCEDURE — 3331090001 HH PPS REVENUE CREDIT

## 2018-04-11 PROCEDURE — 3331090002 HH PPS REVENUE DEBIT

## 2018-04-12 ENCOUNTER — HOME CARE VISIT (OUTPATIENT)
Dept: SCHEDULING | Facility: HOME HEALTH | Age: 78
End: 2018-04-12
Payer: MEDICARE

## 2018-04-12 VITALS
DIASTOLIC BLOOD PRESSURE: 60 MMHG | TEMPERATURE: 98.2 F | SYSTOLIC BLOOD PRESSURE: 104 MMHG | RESPIRATION RATE: 18 BRPM | OXYGEN SATURATION: 95 % | HEART RATE: 78 BPM

## 2018-04-12 PROCEDURE — 3331090001 HH PPS REVENUE CREDIT

## 2018-04-12 PROCEDURE — G0157 HHC PT ASSISTANT EA 15: HCPCS

## 2018-04-12 PROCEDURE — 3331090002 HH PPS REVENUE DEBIT

## 2018-04-13 ENCOUNTER — HOME CARE VISIT (OUTPATIENT)
Dept: SCHEDULING | Facility: HOME HEALTH | Age: 78
End: 2018-04-13
Payer: MEDICARE

## 2018-04-13 ENCOUNTER — HOME CARE VISIT (OUTPATIENT)
Dept: HOME HEALTH SERVICES | Facility: HOME HEALTH | Age: 78
End: 2018-04-13
Payer: MEDICARE

## 2018-04-13 VITALS
DIASTOLIC BLOOD PRESSURE: 60 MMHG | TEMPERATURE: 97.7 F | HEART RATE: 76 BPM | RESPIRATION RATE: 16 BRPM | OXYGEN SATURATION: 98 % | SYSTOLIC BLOOD PRESSURE: 110 MMHG

## 2018-04-13 PROCEDURE — 3331090002 HH PPS REVENUE DEBIT

## 2018-04-13 PROCEDURE — 3331090001 HH PPS REVENUE CREDIT

## 2018-04-13 PROCEDURE — G0299 HHS/HOSPICE OF RN EA 15 MIN: HCPCS

## 2018-04-14 PROCEDURE — 3331090002 HH PPS REVENUE DEBIT

## 2018-04-14 PROCEDURE — 3331090001 HH PPS REVENUE CREDIT

## 2018-04-15 PROCEDURE — 3331090002 HH PPS REVENUE DEBIT

## 2018-04-15 PROCEDURE — 3331090001 HH PPS REVENUE CREDIT

## 2018-04-16 PROCEDURE — 3331090002 HH PPS REVENUE DEBIT

## 2018-04-16 PROCEDURE — 3331090001 HH PPS REVENUE CREDIT

## 2018-04-17 PROCEDURE — 3331090001 HH PPS REVENUE CREDIT

## 2018-04-17 PROCEDURE — 3331090002 HH PPS REVENUE DEBIT

## 2018-04-18 ENCOUNTER — HOME CARE VISIT (OUTPATIENT)
Dept: SCHEDULING | Facility: HOME HEALTH | Age: 78
End: 2018-04-18
Payer: MEDICARE

## 2018-04-18 PROCEDURE — A6209 FOAM DRSG <=16 SQ IN W/O BDR: HCPCS

## 2018-04-18 PROCEDURE — A6210 FOAM DRG >16<=48 SQ IN W/O B: HCPCS

## 2018-04-18 PROCEDURE — 3331090001 HH PPS REVENUE CREDIT

## 2018-04-18 PROCEDURE — 3331090002 HH PPS REVENUE DEBIT

## 2018-04-19 ENCOUNTER — HOME CARE VISIT (OUTPATIENT)
Dept: SCHEDULING | Facility: HOME HEALTH | Age: 78
End: 2018-04-19
Payer: MEDICARE

## 2018-04-19 VITALS
SYSTOLIC BLOOD PRESSURE: 128 MMHG | RESPIRATION RATE: 16 BRPM | DIASTOLIC BLOOD PRESSURE: 58 MMHG | OXYGEN SATURATION: 97 % | TEMPERATURE: 97.7 F | HEART RATE: 64 BPM

## 2018-04-19 VITALS
TEMPERATURE: 98.4 F | RESPIRATION RATE: 18 BRPM | SYSTOLIC BLOOD PRESSURE: 124 MMHG | HEART RATE: 91 BPM | OXYGEN SATURATION: 98 % | DIASTOLIC BLOOD PRESSURE: 60 MMHG

## 2018-04-19 PROCEDURE — 3331090001 HH PPS REVENUE CREDIT

## 2018-04-19 PROCEDURE — G0157 HHC PT ASSISTANT EA 15: HCPCS

## 2018-04-19 PROCEDURE — G0299 HHS/HOSPICE OF RN EA 15 MIN: HCPCS

## 2018-04-19 PROCEDURE — 3331090002 HH PPS REVENUE DEBIT

## 2018-04-20 PROCEDURE — 3331090002 HH PPS REVENUE DEBIT

## 2018-04-20 PROCEDURE — 3331090001 HH PPS REVENUE CREDIT

## 2018-04-21 PROCEDURE — 3331090002 HH PPS REVENUE DEBIT

## 2018-04-21 PROCEDURE — 3331090001 HH PPS REVENUE CREDIT

## 2018-04-22 PROCEDURE — 3331090002 HH PPS REVENUE DEBIT

## 2018-04-22 PROCEDURE — 3331090001 HH PPS REVENUE CREDIT

## 2018-04-23 ENCOUNTER — HOME CARE VISIT (OUTPATIENT)
Dept: SCHEDULING | Facility: HOME HEALTH | Age: 78
End: 2018-04-23
Payer: MEDICARE

## 2018-04-23 VITALS
TEMPERATURE: 97.5 F | SYSTOLIC BLOOD PRESSURE: 106 MMHG | HEART RATE: 82 BPM | RESPIRATION RATE: 16 BRPM | DIASTOLIC BLOOD PRESSURE: 56 MMHG | OXYGEN SATURATION: 97 %

## 2018-04-23 PROCEDURE — 3331090001 HH PPS REVENUE CREDIT

## 2018-04-23 PROCEDURE — 3331090002 HH PPS REVENUE DEBIT

## 2018-04-23 PROCEDURE — G0299 HHS/HOSPICE OF RN EA 15 MIN: HCPCS

## 2018-04-24 PROCEDURE — 3331090002 HH PPS REVENUE DEBIT

## 2018-04-24 PROCEDURE — 3331090001 HH PPS REVENUE CREDIT

## 2018-04-25 ENCOUNTER — HOME CARE VISIT (OUTPATIENT)
Dept: SCHEDULING | Facility: HOME HEALTH | Age: 78
End: 2018-04-25
Payer: MEDICARE

## 2018-04-25 VITALS
SYSTOLIC BLOOD PRESSURE: 140 MMHG | TEMPERATURE: 98 F | RESPIRATION RATE: 18 BRPM | HEART RATE: 84 BPM | DIASTOLIC BLOOD PRESSURE: 80 MMHG

## 2018-04-25 PROCEDURE — G0157 HHC PT ASSISTANT EA 15: HCPCS

## 2018-04-25 PROCEDURE — 3331090002 HH PPS REVENUE DEBIT

## 2018-04-25 PROCEDURE — 3331090001 HH PPS REVENUE CREDIT

## 2018-04-26 ENCOUNTER — HOME CARE VISIT (OUTPATIENT)
Dept: SCHEDULING | Facility: HOME HEALTH | Age: 78
End: 2018-04-26
Payer: MEDICARE

## 2018-04-26 VITALS
HEART RATE: 70 BPM | OXYGEN SATURATION: 97 % | TEMPERATURE: 97.5 F | RESPIRATION RATE: 16 BRPM | SYSTOLIC BLOOD PRESSURE: 100 MMHG | DIASTOLIC BLOOD PRESSURE: 60 MMHG

## 2018-04-26 PROCEDURE — 3331090002 HH PPS REVENUE DEBIT

## 2018-04-26 PROCEDURE — G0299 HHS/HOSPICE OF RN EA 15 MIN: HCPCS

## 2018-04-26 PROCEDURE — 3331090001 HH PPS REVENUE CREDIT

## 2018-04-27 ENCOUNTER — HOME CARE VISIT (OUTPATIENT)
Dept: HOME HEALTH SERVICES | Facility: HOME HEALTH | Age: 78
End: 2018-04-27
Payer: MEDICARE

## 2018-04-27 PROCEDURE — 3331090001 HH PPS REVENUE CREDIT

## 2018-04-27 PROCEDURE — 3331090002 HH PPS REVENUE DEBIT

## 2018-04-28 PROCEDURE — 3331090002 HH PPS REVENUE DEBIT

## 2018-04-28 PROCEDURE — 3331090001 HH PPS REVENUE CREDIT

## 2018-04-29 PROCEDURE — 3331090002 HH PPS REVENUE DEBIT

## 2018-04-29 PROCEDURE — 3331090001 HH PPS REVENUE CREDIT

## 2018-04-30 ENCOUNTER — HOSPITAL ENCOUNTER (OUTPATIENT)
Dept: LAB | Age: 78
Discharge: HOME | End: 2018-04-30
Payer: MEDICARE

## 2018-04-30 ENCOUNTER — HOME CARE VISIT (OUTPATIENT)
Dept: SCHEDULING | Facility: HOME HEALTH | Age: 78
End: 2018-04-30
Payer: MEDICARE

## 2018-04-30 VITALS
HEART RATE: 77 BPM | RESPIRATION RATE: 16 BRPM | SYSTOLIC BLOOD PRESSURE: 110 MMHG | DIASTOLIC BLOOD PRESSURE: 70 MMHG | TEMPERATURE: 97.7 F | OXYGEN SATURATION: 97 %

## 2018-04-30 LAB
EST. AVERAGE GLUCOSE BLD GHB EST-MCNC: 223 MG/DL
HBA1C MFR BLD: 9.4 % (ref 4.8–6)

## 2018-04-30 PROCEDURE — G0299 HHS/HOSPICE OF RN EA 15 MIN: HCPCS

## 2018-04-30 PROCEDURE — 3331090001 HH PPS REVENUE CREDIT

## 2018-04-30 PROCEDURE — 3331090002 HH PPS REVENUE DEBIT

## 2018-04-30 PROCEDURE — 83036 HEMOGLOBIN GLYCOSYLATED A1C: CPT | Performed by: INTERNAL MEDICINE

## 2018-05-01 PROCEDURE — 3331090002 HH PPS REVENUE DEBIT

## 2018-05-01 PROCEDURE — 3331090001 HH PPS REVENUE CREDIT

## 2018-05-02 ENCOUNTER — HOME CARE VISIT (OUTPATIENT)
Dept: SCHEDULING | Facility: HOME HEALTH | Age: 78
End: 2018-05-02
Payer: MEDICARE

## 2018-05-02 PROCEDURE — G0151 HHCP-SERV OF PT,EA 15 MIN: HCPCS

## 2018-05-02 PROCEDURE — 3331090002 HH PPS REVENUE DEBIT

## 2018-05-02 PROCEDURE — 3331090001 HH PPS REVENUE CREDIT

## 2018-05-03 ENCOUNTER — HOME CARE VISIT (OUTPATIENT)
Dept: SCHEDULING | Facility: HOME HEALTH | Age: 78
End: 2018-05-03
Payer: MEDICARE

## 2018-05-03 VITALS — HEART RATE: 75 BPM | OXYGEN SATURATION: 98 % | SYSTOLIC BLOOD PRESSURE: 98 MMHG | DIASTOLIC BLOOD PRESSURE: 54 MMHG

## 2018-05-03 LAB
FAX TO INFO,FAXT: NORMAL
FAX TO NUMBER,FAXN: NORMAL

## 2018-05-03 PROCEDURE — 3331090001 HH PPS REVENUE CREDIT

## 2018-05-03 PROCEDURE — G0299 HHS/HOSPICE OF RN EA 15 MIN: HCPCS

## 2018-05-03 PROCEDURE — 3331090002 HH PPS REVENUE DEBIT

## 2018-05-04 VITALS
SYSTOLIC BLOOD PRESSURE: 134 MMHG | OXYGEN SATURATION: 97 % | RESPIRATION RATE: 18 BRPM | TEMPERATURE: 97.4 F | HEART RATE: 88 BPM | DIASTOLIC BLOOD PRESSURE: 80 MMHG

## 2018-05-04 PROCEDURE — 3331090001 HH PPS REVENUE CREDIT

## 2018-05-04 PROCEDURE — 3331090002 HH PPS REVENUE DEBIT

## 2018-05-05 PROCEDURE — 3331090002 HH PPS REVENUE DEBIT

## 2018-05-05 PROCEDURE — 3331090001 HH PPS REVENUE CREDIT

## 2018-05-06 PROCEDURE — 3331090001 HH PPS REVENUE CREDIT

## 2018-05-06 PROCEDURE — 3331090002 HH PPS REVENUE DEBIT

## 2018-05-07 ENCOUNTER — HOME CARE VISIT (OUTPATIENT)
Dept: SCHEDULING | Facility: HOME HEALTH | Age: 78
End: 2018-05-07
Payer: MEDICARE

## 2018-05-07 VITALS
OXYGEN SATURATION: 99 % | RESPIRATION RATE: 16 BRPM | TEMPERATURE: 97.7 F | HEART RATE: 75 BPM | SYSTOLIC BLOOD PRESSURE: 120 MMHG | DIASTOLIC BLOOD PRESSURE: 60 MMHG

## 2018-05-07 PROCEDURE — G0299 HHS/HOSPICE OF RN EA 15 MIN: HCPCS

## 2018-05-07 PROCEDURE — 3331090001 HH PPS REVENUE CREDIT

## 2018-05-07 PROCEDURE — 3331090002 HH PPS REVENUE DEBIT

## 2018-05-08 ENCOUNTER — HOME CARE VISIT (OUTPATIENT)
Dept: SCHEDULING | Facility: HOME HEALTH | Age: 78
End: 2018-05-08
Payer: MEDICARE

## 2018-05-08 VITALS
OXYGEN SATURATION: 97 % | SYSTOLIC BLOOD PRESSURE: 112 MMHG | TEMPERATURE: 97.7 F | DIASTOLIC BLOOD PRESSURE: 60 MMHG | RESPIRATION RATE: 16 BRPM | HEART RATE: 62 BPM

## 2018-05-08 PROCEDURE — 3331090002 HH PPS REVENUE DEBIT

## 2018-05-08 PROCEDURE — G0299 HHS/HOSPICE OF RN EA 15 MIN: HCPCS

## 2018-05-08 PROCEDURE — 3331090001 HH PPS REVENUE CREDIT

## 2018-05-09 PROCEDURE — 3331090002 HH PPS REVENUE DEBIT

## 2018-05-09 PROCEDURE — 3331090001 HH PPS REVENUE CREDIT

## 2018-05-10 ENCOUNTER — HOME CARE VISIT (OUTPATIENT)
Dept: SCHEDULING | Facility: HOME HEALTH | Age: 78
End: 2018-05-10
Payer: MEDICARE

## 2018-05-10 VITALS
TEMPERATURE: 97.7 F | HEART RATE: 82 BPM | SYSTOLIC BLOOD PRESSURE: 104 MMHG | RESPIRATION RATE: 16 BRPM | DIASTOLIC BLOOD PRESSURE: 60 MMHG | OXYGEN SATURATION: 97 %

## 2018-05-10 PROCEDURE — 3331090002 HH PPS REVENUE DEBIT

## 2018-05-10 PROCEDURE — 3331090001 HH PPS REVENUE CREDIT

## 2018-05-10 PROCEDURE — G0299 HHS/HOSPICE OF RN EA 15 MIN: HCPCS

## 2018-05-11 PROCEDURE — 3331090001 HH PPS REVENUE CREDIT

## 2018-05-11 PROCEDURE — 3331090002 HH PPS REVENUE DEBIT

## 2018-05-12 PROCEDURE — 3331090001 HH PPS REVENUE CREDIT

## 2018-05-12 PROCEDURE — 3331090002 HH PPS REVENUE DEBIT

## 2018-05-13 PROCEDURE — 3331090002 HH PPS REVENUE DEBIT

## 2018-05-13 PROCEDURE — 3331090001 HH PPS REVENUE CREDIT

## 2018-05-14 ENCOUNTER — HOME CARE VISIT (OUTPATIENT)
Dept: SCHEDULING | Facility: HOME HEALTH | Age: 78
End: 2018-05-14
Payer: MEDICARE

## 2018-05-14 VITALS
HEART RATE: 75 BPM | RESPIRATION RATE: 16 BRPM | DIASTOLIC BLOOD PRESSURE: 58 MMHG | SYSTOLIC BLOOD PRESSURE: 98 MMHG | TEMPERATURE: 97.5 F | OXYGEN SATURATION: 98 %

## 2018-05-14 PROCEDURE — 3331090001 HH PPS REVENUE CREDIT

## 2018-05-14 PROCEDURE — 3331090002 HH PPS REVENUE DEBIT

## 2018-05-14 PROCEDURE — G0299 HHS/HOSPICE OF RN EA 15 MIN: HCPCS

## 2018-05-15 PROCEDURE — 3331090002 HH PPS REVENUE DEBIT

## 2018-05-15 PROCEDURE — 3331090001 HH PPS REVENUE CREDIT

## 2018-05-16 ENCOUNTER — HOME CARE VISIT (OUTPATIENT)
Dept: SCHEDULING | Facility: HOME HEALTH | Age: 78
End: 2018-05-16
Payer: MEDICARE

## 2018-05-16 VITALS
SYSTOLIC BLOOD PRESSURE: 100 MMHG | HEART RATE: 77 BPM | DIASTOLIC BLOOD PRESSURE: 56 MMHG | RESPIRATION RATE: 16 BRPM | TEMPERATURE: 97.7 F | OXYGEN SATURATION: 99 %

## 2018-05-16 PROCEDURE — 3331090001 HH PPS REVENUE CREDIT

## 2018-05-16 PROCEDURE — G0299 HHS/HOSPICE OF RN EA 15 MIN: HCPCS

## 2018-05-16 PROCEDURE — 3331090002 HH PPS REVENUE DEBIT

## 2018-05-16 PROCEDURE — A6022 COLLAGEN DRSG>16<=48 SQ IN: HCPCS

## 2018-05-17 ENCOUNTER — HOME CARE VISIT (OUTPATIENT)
Dept: HOME HEALTH SERVICES | Facility: HOME HEALTH | Age: 78
End: 2018-05-17
Payer: MEDICARE

## 2018-05-17 PROCEDURE — 3331090002 HH PPS REVENUE DEBIT

## 2018-05-17 PROCEDURE — 3331090001 HH PPS REVENUE CREDIT

## 2018-05-18 ENCOUNTER — HOME CARE VISIT (OUTPATIENT)
Dept: SCHEDULING | Facility: HOME HEALTH | Age: 78
End: 2018-05-18
Payer: MEDICARE

## 2018-05-18 PROCEDURE — G0299 HHS/HOSPICE OF RN EA 15 MIN: HCPCS

## 2018-05-18 PROCEDURE — 3331090001 HH PPS REVENUE CREDIT

## 2018-05-18 PROCEDURE — 3331090002 HH PPS REVENUE DEBIT

## 2018-05-19 PROCEDURE — 3331090002 HH PPS REVENUE DEBIT

## 2018-05-19 PROCEDURE — 3331090001 HH PPS REVENUE CREDIT

## 2018-05-20 VITALS
DIASTOLIC BLOOD PRESSURE: 60 MMHG | RESPIRATION RATE: 19 BRPM | HEART RATE: 83 BPM | TEMPERATURE: 97.6 F | OXYGEN SATURATION: 99 % | SYSTOLIC BLOOD PRESSURE: 110 MMHG

## 2018-05-20 PROCEDURE — 3331090001 HH PPS REVENUE CREDIT

## 2018-05-20 PROCEDURE — 3331090002 HH PPS REVENUE DEBIT

## 2018-05-21 ENCOUNTER — HOME CARE VISIT (OUTPATIENT)
Dept: SCHEDULING | Facility: HOME HEALTH | Age: 78
End: 2018-05-21
Payer: MEDICARE

## 2018-05-21 VITALS
DIASTOLIC BLOOD PRESSURE: 60 MMHG | TEMPERATURE: 97.7 F | OXYGEN SATURATION: 94 % | HEART RATE: 71 BPM | RESPIRATION RATE: 16 BRPM | SYSTOLIC BLOOD PRESSURE: 102 MMHG

## 2018-05-21 PROCEDURE — G0299 HHS/HOSPICE OF RN EA 15 MIN: HCPCS

## 2018-05-21 PROCEDURE — 3331090002 HH PPS REVENUE DEBIT

## 2018-05-21 PROCEDURE — 3331090001 HH PPS REVENUE CREDIT

## 2018-05-22 PROCEDURE — 3331090001 HH PPS REVENUE CREDIT

## 2018-05-22 PROCEDURE — 3331090002 HH PPS REVENUE DEBIT

## 2018-05-23 ENCOUNTER — HOME CARE VISIT (OUTPATIENT)
Dept: SCHEDULING | Facility: HOME HEALTH | Age: 78
End: 2018-05-23
Payer: MEDICARE

## 2018-05-23 VITALS
TEMPERATURE: 97.7 F | SYSTOLIC BLOOD PRESSURE: 106 MMHG | HEART RATE: 72 BPM | DIASTOLIC BLOOD PRESSURE: 56 MMHG | OXYGEN SATURATION: 97 % | RESPIRATION RATE: 16 BRPM

## 2018-05-23 PROCEDURE — 3331090001 HH PPS REVENUE CREDIT

## 2018-05-23 PROCEDURE — 3331090002 HH PPS REVENUE DEBIT

## 2018-05-23 PROCEDURE — G0299 HHS/HOSPICE OF RN EA 15 MIN: HCPCS

## 2018-05-24 PROCEDURE — 3331090001 HH PPS REVENUE CREDIT

## 2018-05-24 PROCEDURE — 3331090002 HH PPS REVENUE DEBIT

## 2018-05-25 PROCEDURE — 3331090002 HH PPS REVENUE DEBIT

## 2018-05-25 PROCEDURE — 3331090001 HH PPS REVENUE CREDIT

## 2018-05-26 ENCOUNTER — HOME CARE VISIT (OUTPATIENT)
Dept: SCHEDULING | Facility: HOME HEALTH | Age: 78
End: 2018-05-26
Payer: MEDICARE

## 2018-05-26 PROCEDURE — 3331090001 HH PPS REVENUE CREDIT

## 2018-05-26 PROCEDURE — G0299 HHS/HOSPICE OF RN EA 15 MIN: HCPCS

## 2018-05-26 PROCEDURE — 3331090002 HH PPS REVENUE DEBIT

## 2018-05-27 PROCEDURE — 3331090002 HH PPS REVENUE DEBIT

## 2018-05-27 PROCEDURE — 3331090001 HH PPS REVENUE CREDIT

## 2018-05-28 PROCEDURE — 3331090002 HH PPS REVENUE DEBIT

## 2018-05-28 PROCEDURE — 3331090001 HH PPS REVENUE CREDIT

## 2018-05-29 ENCOUNTER — HOME CARE VISIT (OUTPATIENT)
Dept: SCHEDULING | Facility: HOME HEALTH | Age: 78
End: 2018-05-29
Payer: MEDICARE

## 2018-05-29 VITALS
DIASTOLIC BLOOD PRESSURE: 70 MMHG | SYSTOLIC BLOOD PRESSURE: 120 MMHG | OXYGEN SATURATION: 97 % | TEMPERATURE: 98.2 F | HEART RATE: 87 BPM | RESPIRATION RATE: 20 BRPM

## 2018-05-29 VITALS
TEMPERATURE: 97.7 F | DIASTOLIC BLOOD PRESSURE: 60 MMHG | SYSTOLIC BLOOD PRESSURE: 110 MMHG | HEART RATE: 75 BPM | OXYGEN SATURATION: 97 % | RESPIRATION RATE: 16 BRPM

## 2018-05-29 PROCEDURE — 3331090002 HH PPS REVENUE DEBIT

## 2018-05-29 PROCEDURE — 3331090001 HH PPS REVENUE CREDIT

## 2018-05-29 PROCEDURE — G0299 HHS/HOSPICE OF RN EA 15 MIN: HCPCS

## 2018-05-30 PROCEDURE — 3331090002 HH PPS REVENUE DEBIT

## 2018-05-30 PROCEDURE — 3331090001 HH PPS REVENUE CREDIT

## 2018-05-31 ENCOUNTER — HOME CARE VISIT (OUTPATIENT)
Dept: SCHEDULING | Facility: HOME HEALTH | Age: 78
End: 2018-05-31
Payer: MEDICARE

## 2018-05-31 VITALS
HEART RATE: 72 BPM | SYSTOLIC BLOOD PRESSURE: 106 MMHG | RESPIRATION RATE: 16 BRPM | BODY MASS INDEX: 17.16 KG/M2 | DIASTOLIC BLOOD PRESSURE: 56 MMHG | OXYGEN SATURATION: 95 % | TEMPERATURE: 97.7 F | WEIGHT: 100 LBS

## 2018-05-31 PROCEDURE — G0299 HHS/HOSPICE OF RN EA 15 MIN: HCPCS

## 2018-05-31 PROCEDURE — A4450 NON-WATERPROOF TAPE: HCPCS

## 2018-05-31 PROCEDURE — 3331090001 HH PPS REVENUE CREDIT

## 2018-05-31 PROCEDURE — A6222 GAUZE <=16 IN NO W/SAL W/O B: HCPCS

## 2018-05-31 PROCEDURE — A6446 CONFORM BAND S W>=3" <5"/YD: HCPCS

## 2018-05-31 PROCEDURE — 3331090002 HH PPS REVENUE DEBIT

## 2018-06-01 ENCOUNTER — HOME CARE VISIT (OUTPATIENT)
Dept: SCHEDULING | Facility: HOME HEALTH | Age: 78
End: 2018-06-01
Payer: MEDICARE

## 2018-06-01 PROCEDURE — 3331090001 HH PPS REVENUE CREDIT

## 2018-06-01 PROCEDURE — 3331090002 HH PPS REVENUE DEBIT

## 2018-06-02 PROCEDURE — 3331090001 HH PPS REVENUE CREDIT

## 2018-06-02 PROCEDURE — 3331090002 HH PPS REVENUE DEBIT

## 2018-06-03 PROCEDURE — 3331090001 HH PPS REVENUE CREDIT

## 2018-06-03 PROCEDURE — 3331090002 HH PPS REVENUE DEBIT

## 2018-06-04 ENCOUNTER — HOME CARE VISIT (OUTPATIENT)
Dept: SCHEDULING | Facility: HOME HEALTH | Age: 78
End: 2018-06-04
Payer: MEDICARE

## 2018-06-04 VITALS
DIASTOLIC BLOOD PRESSURE: 58 MMHG | TEMPERATURE: 97.9 F | HEART RATE: 62 BPM | RESPIRATION RATE: 16 BRPM | SYSTOLIC BLOOD PRESSURE: 106 MMHG | OXYGEN SATURATION: 96 %

## 2018-06-04 PROCEDURE — 3331090001 HH PPS REVENUE CREDIT

## 2018-06-04 PROCEDURE — 3331090002 HH PPS REVENUE DEBIT

## 2018-06-04 PROCEDURE — G0299 HHS/HOSPICE OF RN EA 15 MIN: HCPCS

## 2018-06-05 PROCEDURE — 3331090002 HH PPS REVENUE DEBIT

## 2018-06-05 PROCEDURE — 3331090001 HH PPS REVENUE CREDIT

## 2018-06-05 PROCEDURE — A6209 FOAM DRSG <=16 SQ IN W/O BDR: HCPCS

## 2018-06-06 ENCOUNTER — HOME CARE VISIT (OUTPATIENT)
Dept: SCHEDULING | Facility: HOME HEALTH | Age: 78
End: 2018-06-06
Payer: MEDICARE

## 2018-06-06 VITALS
RESPIRATION RATE: 16 BRPM | OXYGEN SATURATION: 97 % | HEART RATE: 72 BPM | DIASTOLIC BLOOD PRESSURE: 60 MMHG | TEMPERATURE: 97.7 F | SYSTOLIC BLOOD PRESSURE: 110 MMHG

## 2018-06-06 PROCEDURE — 3331090002 HH PPS REVENUE DEBIT

## 2018-06-06 PROCEDURE — G0299 HHS/HOSPICE OF RN EA 15 MIN: HCPCS

## 2018-06-06 PROCEDURE — 3331090001 HH PPS REVENUE CREDIT

## 2018-06-07 PROCEDURE — 3331090002 HH PPS REVENUE DEBIT

## 2018-06-07 PROCEDURE — 3331090001 HH PPS REVENUE CREDIT

## 2018-06-08 ENCOUNTER — HOME CARE VISIT (OUTPATIENT)
Dept: SCHEDULING | Facility: HOME HEALTH | Age: 78
End: 2018-06-08
Payer: MEDICARE

## 2018-06-08 PROCEDURE — 3331090001 HH PPS REVENUE CREDIT

## 2018-06-08 PROCEDURE — 3331090002 HH PPS REVENUE DEBIT

## 2018-06-09 PROCEDURE — 3331090001 HH PPS REVENUE CREDIT

## 2018-06-09 PROCEDURE — 3331090002 HH PPS REVENUE DEBIT

## 2018-06-10 PROCEDURE — 3331090002 HH PPS REVENUE DEBIT

## 2018-06-10 PROCEDURE — 3331090001 HH PPS REVENUE CREDIT

## 2018-06-11 ENCOUNTER — HOME CARE VISIT (OUTPATIENT)
Dept: SCHEDULING | Facility: HOME HEALTH | Age: 78
End: 2018-06-11
Payer: MEDICARE

## 2018-06-11 VITALS
OXYGEN SATURATION: 97 % | TEMPERATURE: 97.7 F | RESPIRATION RATE: 16 BRPM | HEART RATE: 77 BPM | SYSTOLIC BLOOD PRESSURE: 110 MMHG | DIASTOLIC BLOOD PRESSURE: 58 MMHG

## 2018-06-11 PROCEDURE — 400014 HH F/U

## 2018-06-11 PROCEDURE — 3331090002 HH PPS REVENUE DEBIT

## 2018-06-11 PROCEDURE — A6260 WOUND CLEANSER ANY TYPE/SIZE: HCPCS

## 2018-06-11 PROCEDURE — G0299 HHS/HOSPICE OF RN EA 15 MIN: HCPCS

## 2018-06-11 PROCEDURE — 3331090001 HH PPS REVENUE CREDIT

## 2018-06-11 PROCEDURE — A6209 FOAM DRSG <=16 SQ IN W/O BDR: HCPCS

## 2018-06-11 PROCEDURE — A6237 HYDROCOLLD DRG <=16 IN W/BDR: HCPCS

## 2018-06-12 PROCEDURE — 3331090002 HH PPS REVENUE DEBIT

## 2018-06-12 PROCEDURE — 3331090001 HH PPS REVENUE CREDIT

## 2018-06-13 ENCOUNTER — HOME CARE VISIT (OUTPATIENT)
Dept: SCHEDULING | Facility: HOME HEALTH | Age: 78
End: 2018-06-13
Payer: MEDICARE

## 2018-06-13 VITALS
OXYGEN SATURATION: 97 % | SYSTOLIC BLOOD PRESSURE: 108 MMHG | TEMPERATURE: 97.7 F | HEART RATE: 72 BPM | RESPIRATION RATE: 16 BRPM | DIASTOLIC BLOOD PRESSURE: 58 MMHG

## 2018-06-13 PROCEDURE — 3331090002 HH PPS REVENUE DEBIT

## 2018-06-13 PROCEDURE — G0299 HHS/HOSPICE OF RN EA 15 MIN: HCPCS

## 2018-06-13 PROCEDURE — 3331090001 HH PPS REVENUE CREDIT

## 2018-06-14 PROCEDURE — 3331090001 HH PPS REVENUE CREDIT

## 2018-06-14 PROCEDURE — 3331090002 HH PPS REVENUE DEBIT

## 2018-06-15 PROCEDURE — 3331090001 HH PPS REVENUE CREDIT

## 2018-06-15 PROCEDURE — 3331090002 HH PPS REVENUE DEBIT

## 2018-06-16 PROCEDURE — 3331090001 HH PPS REVENUE CREDIT

## 2018-06-16 PROCEDURE — 3331090002 HH PPS REVENUE DEBIT

## 2018-06-17 PROCEDURE — 3331090001 HH PPS REVENUE CREDIT

## 2018-06-17 PROCEDURE — 3331090002 HH PPS REVENUE DEBIT

## 2018-06-18 ENCOUNTER — HOME CARE VISIT (OUTPATIENT)
Dept: SCHEDULING | Facility: HOME HEALTH | Age: 78
End: 2018-06-18
Payer: MEDICARE

## 2018-06-18 VITALS
SYSTOLIC BLOOD PRESSURE: 102 MMHG | HEART RATE: 68 BPM | RESPIRATION RATE: 16 BRPM | TEMPERATURE: 97.7 F | DIASTOLIC BLOOD PRESSURE: 58 MMHG | OXYGEN SATURATION: 97 %

## 2018-06-18 PROCEDURE — G0299 HHS/HOSPICE OF RN EA 15 MIN: HCPCS

## 2018-06-18 PROCEDURE — 3331090002 HH PPS REVENUE DEBIT

## 2018-06-18 PROCEDURE — 3331090001 HH PPS REVENUE CREDIT

## 2018-06-19 PROCEDURE — 3331090001 HH PPS REVENUE CREDIT

## 2018-06-19 PROCEDURE — 3331090002 HH PPS REVENUE DEBIT

## 2018-06-20 ENCOUNTER — HOME CARE VISIT (OUTPATIENT)
Dept: SCHEDULING | Facility: HOME HEALTH | Age: 78
End: 2018-06-20
Payer: MEDICARE

## 2018-06-20 VITALS
HEART RATE: 64 BPM | TEMPERATURE: 97.3 F | OXYGEN SATURATION: 97 % | DIASTOLIC BLOOD PRESSURE: 56 MMHG | SYSTOLIC BLOOD PRESSURE: 106 MMHG | RESPIRATION RATE: 16 BRPM

## 2018-06-20 PROCEDURE — 3331090001 HH PPS REVENUE CREDIT

## 2018-06-20 PROCEDURE — 3331090002 HH PPS REVENUE DEBIT

## 2018-06-20 PROCEDURE — G0299 HHS/HOSPICE OF RN EA 15 MIN: HCPCS

## 2018-06-21 PROCEDURE — 3331090001 HH PPS REVENUE CREDIT

## 2018-06-21 PROCEDURE — 3331090002 HH PPS REVENUE DEBIT

## 2018-06-22 ENCOUNTER — HOME CARE VISIT (OUTPATIENT)
Dept: SCHEDULING | Facility: HOME HEALTH | Age: 78
End: 2018-06-22
Payer: MEDICARE

## 2018-06-22 PROCEDURE — 3331090001 HH PPS REVENUE CREDIT

## 2018-06-22 PROCEDURE — 3331090002 HH PPS REVENUE DEBIT

## 2018-06-23 PROCEDURE — 3331090002 HH PPS REVENUE DEBIT

## 2018-06-23 PROCEDURE — 3331090001 HH PPS REVENUE CREDIT

## 2018-06-24 PROCEDURE — 3331090001 HH PPS REVENUE CREDIT

## 2018-06-24 PROCEDURE — 3331090002 HH PPS REVENUE DEBIT

## 2018-06-25 ENCOUNTER — HOME CARE VISIT (OUTPATIENT)
Dept: SCHEDULING | Facility: HOME HEALTH | Age: 78
End: 2018-06-25
Payer: MEDICARE

## 2018-06-25 VITALS
TEMPERATURE: 97.9 F | OXYGEN SATURATION: 97 % | SYSTOLIC BLOOD PRESSURE: 108 MMHG | DIASTOLIC BLOOD PRESSURE: 58 MMHG | RESPIRATION RATE: 16 BRPM | HEART RATE: 80 BPM

## 2018-06-25 PROCEDURE — G0299 HHS/HOSPICE OF RN EA 15 MIN: HCPCS

## 2018-06-25 PROCEDURE — 3331090001 HH PPS REVENUE CREDIT

## 2018-06-25 PROCEDURE — 3331090002 HH PPS REVENUE DEBIT

## 2018-06-26 PROCEDURE — 3331090002 HH PPS REVENUE DEBIT

## 2018-06-26 PROCEDURE — 3331090001 HH PPS REVENUE CREDIT

## 2018-06-27 ENCOUNTER — HOME CARE VISIT (OUTPATIENT)
Dept: SCHEDULING | Facility: HOME HEALTH | Age: 78
End: 2018-06-27
Payer: MEDICARE

## 2018-06-27 VITALS
TEMPERATURE: 98.3 F | RESPIRATION RATE: 16 BRPM | HEART RATE: 64 BPM | DIASTOLIC BLOOD PRESSURE: 58 MMHG | OXYGEN SATURATION: 97 % | SYSTOLIC BLOOD PRESSURE: 108 MMHG

## 2018-06-27 PROCEDURE — G0299 HHS/HOSPICE OF RN EA 15 MIN: HCPCS

## 2018-06-27 PROCEDURE — 3331090002 HH PPS REVENUE DEBIT

## 2018-06-27 PROCEDURE — 3331090001 HH PPS REVENUE CREDIT

## 2018-06-28 PROCEDURE — 3331090002 HH PPS REVENUE DEBIT

## 2018-06-28 PROCEDURE — 3331090001 HH PPS REVENUE CREDIT

## 2018-06-29 ENCOUNTER — HOME CARE VISIT (OUTPATIENT)
Dept: SCHEDULING | Facility: HOME HEALTH | Age: 78
End: 2018-06-29
Payer: MEDICARE

## 2018-06-29 PROCEDURE — 3331090002 HH PPS REVENUE DEBIT

## 2018-06-29 PROCEDURE — 3331090001 HH PPS REVENUE CREDIT

## 2018-06-30 PROCEDURE — 3331090002 HH PPS REVENUE DEBIT

## 2018-06-30 PROCEDURE — 3331090001 HH PPS REVENUE CREDIT

## 2018-07-01 PROCEDURE — 3331090001 HH PPS REVENUE CREDIT

## 2018-07-01 PROCEDURE — 3331090002 HH PPS REVENUE DEBIT

## 2018-07-02 ENCOUNTER — HOME CARE VISIT (OUTPATIENT)
Dept: SCHEDULING | Facility: HOME HEALTH | Age: 78
End: 2018-07-02
Payer: MEDICARE

## 2018-07-02 VITALS
RESPIRATION RATE: 16 BRPM | OXYGEN SATURATION: 97 % | TEMPERATURE: 97.9 F | SYSTOLIC BLOOD PRESSURE: 108 MMHG | DIASTOLIC BLOOD PRESSURE: 56 MMHG | HEART RATE: 65 BPM

## 2018-07-02 PROCEDURE — 3331090001 HH PPS REVENUE CREDIT

## 2018-07-02 PROCEDURE — G0299 HHS/HOSPICE OF RN EA 15 MIN: HCPCS

## 2018-07-02 PROCEDURE — 3331090002 HH PPS REVENUE DEBIT

## 2018-07-03 PROCEDURE — 3331090002 HH PPS REVENUE DEBIT

## 2018-07-03 PROCEDURE — 3331090001 HH PPS REVENUE CREDIT

## 2018-07-04 PROCEDURE — 3331090002 HH PPS REVENUE DEBIT

## 2018-07-04 PROCEDURE — 3331090001 HH PPS REVENUE CREDIT

## 2018-07-05 ENCOUNTER — HOME CARE VISIT (OUTPATIENT)
Dept: SCHEDULING | Facility: HOME HEALTH | Age: 78
End: 2018-07-05
Payer: MEDICARE

## 2018-07-05 VITALS
DIASTOLIC BLOOD PRESSURE: 62 MMHG | SYSTOLIC BLOOD PRESSURE: 108 MMHG | OXYGEN SATURATION: 97 % | RESPIRATION RATE: 18 BRPM | TEMPERATURE: 97.8 F | HEART RATE: 68 BPM

## 2018-07-05 PROCEDURE — G0299 HHS/HOSPICE OF RN EA 15 MIN: HCPCS

## 2018-07-05 PROCEDURE — 3331090002 HH PPS REVENUE DEBIT

## 2018-07-05 PROCEDURE — 3331090001 HH PPS REVENUE CREDIT

## 2018-07-06 ENCOUNTER — HOME CARE VISIT (OUTPATIENT)
Dept: SCHEDULING | Facility: HOME HEALTH | Age: 78
End: 2018-07-06
Payer: MEDICARE

## 2018-07-06 PROCEDURE — 3331090002 HH PPS REVENUE DEBIT

## 2018-07-06 PROCEDURE — 3331090001 HH PPS REVENUE CREDIT

## 2018-07-07 PROCEDURE — 3331090001 HH PPS REVENUE CREDIT

## 2018-07-07 PROCEDURE — 3331090002 HH PPS REVENUE DEBIT

## 2018-07-08 PROCEDURE — 3331090002 HH PPS REVENUE DEBIT

## 2018-07-08 PROCEDURE — 3331090001 HH PPS REVENUE CREDIT

## 2018-07-09 ENCOUNTER — HOME CARE VISIT (OUTPATIENT)
Dept: SCHEDULING | Facility: HOME HEALTH | Age: 78
End: 2018-07-09
Payer: MEDICARE

## 2018-07-09 VITALS
HEART RATE: 62 BPM | DIASTOLIC BLOOD PRESSURE: 58 MMHG | OXYGEN SATURATION: 97 % | SYSTOLIC BLOOD PRESSURE: 108 MMHG | TEMPERATURE: 97.7 F | RESPIRATION RATE: 16 BRPM

## 2018-07-09 PROCEDURE — 3331090001 HH PPS REVENUE CREDIT

## 2018-07-09 PROCEDURE — G0299 HHS/HOSPICE OF RN EA 15 MIN: HCPCS

## 2018-07-09 PROCEDURE — 3331090002 HH PPS REVENUE DEBIT

## 2018-07-10 PROCEDURE — 3331090001 HH PPS REVENUE CREDIT

## 2018-07-10 PROCEDURE — 3331090002 HH PPS REVENUE DEBIT

## 2018-07-11 ENCOUNTER — HOME CARE VISIT (OUTPATIENT)
Dept: SCHEDULING | Facility: HOME HEALTH | Age: 78
End: 2018-07-11
Payer: MEDICARE

## 2018-07-11 VITALS
RESPIRATION RATE: 16 BRPM | OXYGEN SATURATION: 97 % | SYSTOLIC BLOOD PRESSURE: 112 MMHG | HEART RATE: 72 BPM | TEMPERATURE: 97.9 F | DIASTOLIC BLOOD PRESSURE: 60 MMHG

## 2018-07-11 PROCEDURE — G0299 HHS/HOSPICE OF RN EA 15 MIN: HCPCS

## 2018-07-11 PROCEDURE — 3331090002 HH PPS REVENUE DEBIT

## 2018-07-11 PROCEDURE — 3331090001 HH PPS REVENUE CREDIT

## 2018-07-12 PROCEDURE — 3331090001 HH PPS REVENUE CREDIT

## 2018-07-12 PROCEDURE — 3331090002 HH PPS REVENUE DEBIT

## 2018-07-13 ENCOUNTER — HOME CARE VISIT (OUTPATIENT)
Dept: SCHEDULING | Facility: HOME HEALTH | Age: 78
End: 2018-07-13
Payer: MEDICARE

## 2018-07-13 PROCEDURE — 3331090001 HH PPS REVENUE CREDIT

## 2018-07-13 PROCEDURE — 3331090002 HH PPS REVENUE DEBIT

## 2018-07-14 PROCEDURE — 3331090001 HH PPS REVENUE CREDIT

## 2018-07-14 PROCEDURE — 3331090002 HH PPS REVENUE DEBIT

## 2018-07-15 PROCEDURE — 3331090002 HH PPS REVENUE DEBIT

## 2018-07-15 PROCEDURE — 3331090001 HH PPS REVENUE CREDIT

## 2018-07-16 ENCOUNTER — HOME CARE VISIT (OUTPATIENT)
Dept: SCHEDULING | Facility: HOME HEALTH | Age: 78
End: 2018-07-16
Payer: MEDICARE

## 2018-07-16 VITALS
RESPIRATION RATE: 16 BRPM | OXYGEN SATURATION: 97 % | SYSTOLIC BLOOD PRESSURE: 102 MMHG | TEMPERATURE: 97.9 F | DIASTOLIC BLOOD PRESSURE: 60 MMHG | HEART RATE: 72 BPM

## 2018-07-16 PROCEDURE — 3331090002 HH PPS REVENUE DEBIT

## 2018-07-16 PROCEDURE — G0299 HHS/HOSPICE OF RN EA 15 MIN: HCPCS

## 2018-07-16 PROCEDURE — 3331090001 HH PPS REVENUE CREDIT

## 2018-07-17 PROCEDURE — 3331090002 HH PPS REVENUE DEBIT

## 2018-07-17 PROCEDURE — 3331090001 HH PPS REVENUE CREDIT

## 2018-07-18 ENCOUNTER — HOME CARE VISIT (OUTPATIENT)
Dept: SCHEDULING | Facility: HOME HEALTH | Age: 78
End: 2018-07-18
Payer: MEDICARE

## 2018-07-18 PROCEDURE — 3331090002 HH PPS REVENUE DEBIT

## 2018-07-18 PROCEDURE — 3331090001 HH PPS REVENUE CREDIT

## 2018-07-19 PROCEDURE — 3331090002 HH PPS REVENUE DEBIT

## 2018-07-19 PROCEDURE — 3331090001 HH PPS REVENUE CREDIT

## 2018-07-20 ENCOUNTER — HOME CARE VISIT (OUTPATIENT)
Dept: SCHEDULING | Facility: HOME HEALTH | Age: 78
End: 2018-07-20
Payer: MEDICARE

## 2018-07-20 VITALS
DIASTOLIC BLOOD PRESSURE: 58 MMHG | RESPIRATION RATE: 16 BRPM | SYSTOLIC BLOOD PRESSURE: 108 MMHG | OXYGEN SATURATION: 98 % | HEART RATE: 64 BPM | TEMPERATURE: 97.7 F

## 2018-07-20 PROCEDURE — 3331090002 HH PPS REVENUE DEBIT

## 2018-07-20 PROCEDURE — G0299 HHS/HOSPICE OF RN EA 15 MIN: HCPCS

## 2018-07-20 PROCEDURE — 3331090001 HH PPS REVENUE CREDIT

## 2018-07-21 PROCEDURE — 3331090002 HH PPS REVENUE DEBIT

## 2018-07-21 PROCEDURE — 3331090001 HH PPS REVENUE CREDIT

## 2018-07-22 PROCEDURE — 3331090001 HH PPS REVENUE CREDIT

## 2018-07-22 PROCEDURE — 3331090002 HH PPS REVENUE DEBIT

## 2018-07-23 ENCOUNTER — HOME CARE VISIT (OUTPATIENT)
Dept: SCHEDULING | Facility: HOME HEALTH | Age: 78
End: 2018-07-23
Payer: MEDICARE

## 2018-07-23 VITALS
DIASTOLIC BLOOD PRESSURE: 54 MMHG | TEMPERATURE: 97.7 F | HEART RATE: 71 BPM | RESPIRATION RATE: 16 BRPM | SYSTOLIC BLOOD PRESSURE: 108 MMHG

## 2018-07-23 PROCEDURE — G0299 HHS/HOSPICE OF RN EA 15 MIN: HCPCS

## 2018-07-23 PROCEDURE — 3331090002 HH PPS REVENUE DEBIT

## 2018-07-23 PROCEDURE — 3331090001 HH PPS REVENUE CREDIT

## 2018-07-24 PROCEDURE — 3331090002 HH PPS REVENUE DEBIT

## 2018-07-24 PROCEDURE — 3331090001 HH PPS REVENUE CREDIT

## 2018-07-25 ENCOUNTER — HOME CARE VISIT (OUTPATIENT)
Dept: SCHEDULING | Facility: HOME HEALTH | Age: 78
End: 2018-07-25
Payer: MEDICARE

## 2018-07-25 VITALS
TEMPERATURE: 97.7 F | DIASTOLIC BLOOD PRESSURE: 68 MMHG | OXYGEN SATURATION: 97 % | SYSTOLIC BLOOD PRESSURE: 106 MMHG | HEART RATE: 78 BPM

## 2018-07-25 PROCEDURE — 3331090002 HH PPS REVENUE DEBIT

## 2018-07-25 PROCEDURE — G0299 HHS/HOSPICE OF RN EA 15 MIN: HCPCS

## 2018-07-25 PROCEDURE — 3331090001 HH PPS REVENUE CREDIT

## 2018-07-26 PROCEDURE — 3331090002 HH PPS REVENUE DEBIT

## 2018-07-26 PROCEDURE — 3331090001 HH PPS REVENUE CREDIT

## 2018-07-27 ENCOUNTER — HOME CARE VISIT (OUTPATIENT)
Dept: SCHEDULING | Facility: HOME HEALTH | Age: 78
End: 2018-07-27
Payer: MEDICARE

## 2018-07-27 PROCEDURE — 3331090001 HH PPS REVENUE CREDIT

## 2018-07-27 PROCEDURE — 3331090002 HH PPS REVENUE DEBIT

## 2018-07-28 PROCEDURE — 3331090001 HH PPS REVENUE CREDIT

## 2018-07-28 PROCEDURE — 3331090002 HH PPS REVENUE DEBIT

## 2018-07-29 PROCEDURE — 3331090001 HH PPS REVENUE CREDIT

## 2018-07-29 PROCEDURE — 3331090002 HH PPS REVENUE DEBIT

## 2018-07-30 ENCOUNTER — HOME CARE VISIT (OUTPATIENT)
Dept: SCHEDULING | Facility: HOME HEALTH | Age: 78
End: 2018-07-30
Payer: MEDICARE

## 2018-07-30 VITALS
TEMPERATURE: 97.5 F | RESPIRATION RATE: 16 BRPM | DIASTOLIC BLOOD PRESSURE: 58 MMHG | OXYGEN SATURATION: 97 % | SYSTOLIC BLOOD PRESSURE: 98 MMHG | HEART RATE: 92 BPM

## 2018-07-30 PROCEDURE — 3331090002 HH PPS REVENUE DEBIT

## 2018-07-30 PROCEDURE — G0299 HHS/HOSPICE OF RN EA 15 MIN: HCPCS

## 2018-07-30 PROCEDURE — 3331090001 HH PPS REVENUE CREDIT

## 2018-07-31 PROCEDURE — 3331090001 HH PPS REVENUE CREDIT

## 2018-07-31 PROCEDURE — 3331090002 HH PPS REVENUE DEBIT

## 2018-08-01 ENCOUNTER — HOME CARE VISIT (OUTPATIENT)
Dept: SCHEDULING | Facility: HOME HEALTH | Age: 78
End: 2018-08-01
Payer: MEDICARE

## 2018-08-01 VITALS
TEMPERATURE: 97.7 F | SYSTOLIC BLOOD PRESSURE: 108 MMHG | RESPIRATION RATE: 18 BRPM | HEART RATE: 68 BPM | DIASTOLIC BLOOD PRESSURE: 56 MMHG | OXYGEN SATURATION: 96 %

## 2018-08-01 PROCEDURE — A4450 NON-WATERPROOF TAPE: HCPCS

## 2018-08-01 PROCEDURE — G0299 HHS/HOSPICE OF RN EA 15 MIN: HCPCS

## 2018-08-01 PROCEDURE — 3331090002 HH PPS REVENUE DEBIT

## 2018-08-01 PROCEDURE — 3331090001 HH PPS REVENUE CREDIT

## 2018-08-01 PROCEDURE — A6222 GAUZE <=16 IN NO W/SAL W/O B: HCPCS

## 2018-08-01 PROCEDURE — A6446 CONFORM BAND S W>=3" <5"/YD: HCPCS

## 2018-08-02 PROCEDURE — 3331090002 HH PPS REVENUE DEBIT

## 2018-08-02 PROCEDURE — 3331090001 HH PPS REVENUE CREDIT

## 2018-08-03 ENCOUNTER — HOME CARE VISIT (OUTPATIENT)
Dept: SCHEDULING | Facility: HOME HEALTH | Age: 78
End: 2018-08-03
Payer: MEDICARE

## 2018-08-03 VITALS
SYSTOLIC BLOOD PRESSURE: 104 MMHG | TEMPERATURE: 97.4 F | RESPIRATION RATE: 16 BRPM | HEART RATE: 62 BPM | OXYGEN SATURATION: 97 % | DIASTOLIC BLOOD PRESSURE: 56 MMHG

## 2018-08-03 PROCEDURE — G0299 HHS/HOSPICE OF RN EA 15 MIN: HCPCS

## 2018-08-03 PROCEDURE — 3331090001 HH PPS REVENUE CREDIT

## 2018-08-03 PROCEDURE — 3331090002 HH PPS REVENUE DEBIT

## 2018-08-04 PROCEDURE — 3331090002 HH PPS REVENUE DEBIT

## 2018-08-04 PROCEDURE — 3331090001 HH PPS REVENUE CREDIT

## 2018-08-05 PROCEDURE — 3331090001 HH PPS REVENUE CREDIT

## 2018-08-05 PROCEDURE — 3331090002 HH PPS REVENUE DEBIT

## 2018-08-06 ENCOUNTER — HOME CARE VISIT (OUTPATIENT)
Dept: SCHEDULING | Facility: HOME HEALTH | Age: 78
End: 2018-08-06
Payer: MEDICARE

## 2018-08-06 VITALS
SYSTOLIC BLOOD PRESSURE: 122 MMHG | RESPIRATION RATE: 18 BRPM | HEART RATE: 68 BPM | OXYGEN SATURATION: 97 % | DIASTOLIC BLOOD PRESSURE: 58 MMHG

## 2018-08-06 PROCEDURE — 400014 HH F/U

## 2018-08-06 PROCEDURE — G0299 HHS/HOSPICE OF RN EA 15 MIN: HCPCS

## 2018-08-06 PROCEDURE — 3331090001 HH PPS REVENUE CREDIT

## 2018-08-06 PROCEDURE — 3331090002 HH PPS REVENUE DEBIT

## 2018-08-07 PROCEDURE — 3331090001 HH PPS REVENUE CREDIT

## 2018-08-07 PROCEDURE — 3331090002 HH PPS REVENUE DEBIT

## 2018-08-08 ENCOUNTER — HOME CARE VISIT (OUTPATIENT)
Dept: SCHEDULING | Facility: HOME HEALTH | Age: 78
End: 2018-08-08
Payer: MEDICARE

## 2018-08-08 PROCEDURE — 3331090001 HH PPS REVENUE CREDIT

## 2018-08-08 PROCEDURE — G0299 HHS/HOSPICE OF RN EA 15 MIN: HCPCS

## 2018-08-08 PROCEDURE — 3331090002 HH PPS REVENUE DEBIT

## 2018-08-09 VITALS
DIASTOLIC BLOOD PRESSURE: 72 MMHG | TEMPERATURE: 97.7 F | SYSTOLIC BLOOD PRESSURE: 118 MMHG | OXYGEN SATURATION: 98 % | RESPIRATION RATE: 18 BRPM | HEART RATE: 64 BPM

## 2018-08-09 PROCEDURE — 3331090001 HH PPS REVENUE CREDIT

## 2018-08-09 PROCEDURE — 3331090002 HH PPS REVENUE DEBIT

## 2018-08-10 ENCOUNTER — HOME CARE VISIT (OUTPATIENT)
Dept: SCHEDULING | Facility: HOME HEALTH | Age: 78
End: 2018-08-10
Payer: MEDICARE

## 2018-08-10 PROCEDURE — 3331090001 HH PPS REVENUE CREDIT

## 2018-08-10 PROCEDURE — 3331090002 HH PPS REVENUE DEBIT

## 2018-08-11 PROCEDURE — 3331090002 HH PPS REVENUE DEBIT

## 2018-08-11 PROCEDURE — 3331090001 HH PPS REVENUE CREDIT

## 2018-08-12 PROCEDURE — 3331090001 HH PPS REVENUE CREDIT

## 2018-08-12 PROCEDURE — 3331090002 HH PPS REVENUE DEBIT

## 2018-08-13 ENCOUNTER — HOME CARE VISIT (OUTPATIENT)
Dept: SCHEDULING | Facility: HOME HEALTH | Age: 78
End: 2018-08-13
Payer: MEDICARE

## 2018-08-13 VITALS
SYSTOLIC BLOOD PRESSURE: 118 MMHG | TEMPERATURE: 97.6 F | OXYGEN SATURATION: 98 % | RESPIRATION RATE: 18 BRPM | HEART RATE: 67 BPM | DIASTOLIC BLOOD PRESSURE: 74 MMHG

## 2018-08-13 PROCEDURE — 3331090002 HH PPS REVENUE DEBIT

## 2018-08-13 PROCEDURE — G0299 HHS/HOSPICE OF RN EA 15 MIN: HCPCS

## 2018-08-13 PROCEDURE — 3331090001 HH PPS REVENUE CREDIT

## 2018-08-14 PROCEDURE — 3331090002 HH PPS REVENUE DEBIT

## 2018-08-14 PROCEDURE — 3331090001 HH PPS REVENUE CREDIT

## 2018-08-15 ENCOUNTER — HOME CARE VISIT (OUTPATIENT)
Dept: SCHEDULING | Facility: HOME HEALTH | Age: 78
End: 2018-08-15
Payer: MEDICARE

## 2018-08-15 VITALS
TEMPERATURE: 97.4 F | HEART RATE: 64 BPM | RESPIRATION RATE: 16 BRPM | SYSTOLIC BLOOD PRESSURE: 118 MMHG | OXYGEN SATURATION: 96 % | DIASTOLIC BLOOD PRESSURE: 74 MMHG

## 2018-08-15 PROCEDURE — 3331090002 HH PPS REVENUE DEBIT

## 2018-08-15 PROCEDURE — 3331090001 HH PPS REVENUE CREDIT

## 2018-08-15 PROCEDURE — G0299 HHS/HOSPICE OF RN EA 15 MIN: HCPCS

## 2018-08-16 PROCEDURE — 3331090001 HH PPS REVENUE CREDIT

## 2018-08-16 PROCEDURE — 3331090002 HH PPS REVENUE DEBIT

## 2018-08-17 ENCOUNTER — HOME CARE VISIT (OUTPATIENT)
Dept: SCHEDULING | Facility: HOME HEALTH | Age: 78
End: 2018-08-17
Payer: MEDICARE

## 2018-08-17 VITALS
DIASTOLIC BLOOD PRESSURE: 68 MMHG | SYSTOLIC BLOOD PRESSURE: 118 MMHG | OXYGEN SATURATION: 98 % | TEMPERATURE: 97 F | RESPIRATION RATE: 18 BRPM | HEART RATE: 65 BPM

## 2018-08-17 PROCEDURE — G0299 HHS/HOSPICE OF RN EA 15 MIN: HCPCS

## 2018-08-17 PROCEDURE — 3331090001 HH PPS REVENUE CREDIT

## 2018-08-17 PROCEDURE — 3331090002 HH PPS REVENUE DEBIT

## 2018-08-18 PROCEDURE — 3331090002 HH PPS REVENUE DEBIT

## 2018-08-18 PROCEDURE — 3331090001 HH PPS REVENUE CREDIT

## 2018-08-19 PROCEDURE — 3331090002 HH PPS REVENUE DEBIT

## 2018-08-19 PROCEDURE — 3331090001 HH PPS REVENUE CREDIT

## 2018-08-20 ENCOUNTER — HOME CARE VISIT (OUTPATIENT)
Dept: SCHEDULING | Facility: HOME HEALTH | Age: 78
End: 2018-08-20
Payer: MEDICARE

## 2018-08-20 VITALS
DIASTOLIC BLOOD PRESSURE: 64 MMHG | TEMPERATURE: 97.7 F | SYSTOLIC BLOOD PRESSURE: 122 MMHG | HEART RATE: 78 BPM | RESPIRATION RATE: 16 BRPM | OXYGEN SATURATION: 97 %

## 2018-08-20 PROCEDURE — G0299 HHS/HOSPICE OF RN EA 15 MIN: HCPCS

## 2018-08-20 PROCEDURE — 3331090001 HH PPS REVENUE CREDIT

## 2018-08-20 PROCEDURE — 3331090002 HH PPS REVENUE DEBIT

## 2018-08-21 PROCEDURE — 3331090001 HH PPS REVENUE CREDIT

## 2018-08-21 PROCEDURE — 3331090002 HH PPS REVENUE DEBIT

## 2018-08-22 ENCOUNTER — HOME CARE VISIT (OUTPATIENT)
Dept: SCHEDULING | Facility: HOME HEALTH | Age: 78
End: 2018-08-22
Payer: MEDICARE

## 2018-08-22 VITALS
DIASTOLIC BLOOD PRESSURE: 56 MMHG | HEART RATE: 68 BPM | RESPIRATION RATE: 16 BRPM | TEMPERATURE: 97.7 F | OXYGEN SATURATION: 96 % | SYSTOLIC BLOOD PRESSURE: 106 MMHG

## 2018-08-22 PROCEDURE — 3331090002 HH PPS REVENUE DEBIT

## 2018-08-22 PROCEDURE — G0299 HHS/HOSPICE OF RN EA 15 MIN: HCPCS

## 2018-08-22 PROCEDURE — 3331090001 HH PPS REVENUE CREDIT

## 2018-08-23 PROCEDURE — 3331090001 HH PPS REVENUE CREDIT

## 2018-08-23 PROCEDURE — 3331090002 HH PPS REVENUE DEBIT

## 2018-08-24 PROCEDURE — 3331090002 HH PPS REVENUE DEBIT

## 2018-08-24 PROCEDURE — 3331090001 HH PPS REVENUE CREDIT

## 2018-08-25 ENCOUNTER — HOME CARE VISIT (OUTPATIENT)
Dept: SCHEDULING | Facility: HOME HEALTH | Age: 78
End: 2018-08-25
Payer: MEDICARE

## 2018-08-25 VITALS
HEART RATE: 62 BPM | OXYGEN SATURATION: 97 % | DIASTOLIC BLOOD PRESSURE: 58 MMHG | RESPIRATION RATE: 16 BRPM | TEMPERATURE: 97.8 F | SYSTOLIC BLOOD PRESSURE: 102 MMHG

## 2018-08-25 PROCEDURE — 3331090001 HH PPS REVENUE CREDIT

## 2018-08-25 PROCEDURE — A6199 ALGINATE DRSG WOUND FILLER: HCPCS

## 2018-08-25 PROCEDURE — G0299 HHS/HOSPICE OF RN EA 15 MIN: HCPCS

## 2018-08-25 PROCEDURE — 3331090002 HH PPS REVENUE DEBIT

## 2018-08-26 PROCEDURE — 3331090002 HH PPS REVENUE DEBIT

## 2018-08-26 PROCEDURE — 3331090001 HH PPS REVENUE CREDIT

## 2018-08-27 ENCOUNTER — HOME CARE VISIT (OUTPATIENT)
Dept: SCHEDULING | Facility: HOME HEALTH | Age: 78
End: 2018-08-27
Payer: MEDICARE

## 2018-08-27 VITALS
HEART RATE: 58 BPM | OXYGEN SATURATION: 97 % | RESPIRATION RATE: 16 BRPM | TEMPERATURE: 97.7 F | DIASTOLIC BLOOD PRESSURE: 56 MMHG | SYSTOLIC BLOOD PRESSURE: 102 MMHG

## 2018-08-27 PROCEDURE — G0299 HHS/HOSPICE OF RN EA 15 MIN: HCPCS

## 2018-08-27 PROCEDURE — 3331090001 HH PPS REVENUE CREDIT

## 2018-08-27 PROCEDURE — 3331090002 HH PPS REVENUE DEBIT

## 2018-08-28 PROCEDURE — 3331090001 HH PPS REVENUE CREDIT

## 2018-08-28 PROCEDURE — 3331090002 HH PPS REVENUE DEBIT

## 2018-08-29 ENCOUNTER — HOME CARE VISIT (OUTPATIENT)
Dept: SCHEDULING | Facility: HOME HEALTH | Age: 78
End: 2018-08-29
Payer: MEDICARE

## 2018-08-29 VITALS
DIASTOLIC BLOOD PRESSURE: 56 MMHG | HEART RATE: 58 BPM | TEMPERATURE: 97.7 F | RESPIRATION RATE: 16 BRPM | SYSTOLIC BLOOD PRESSURE: 102 MMHG | OXYGEN SATURATION: 97 %

## 2018-08-29 PROCEDURE — 3331090001 HH PPS REVENUE CREDIT

## 2018-08-29 PROCEDURE — 3331090002 HH PPS REVENUE DEBIT

## 2018-08-29 PROCEDURE — G0299 HHS/HOSPICE OF RN EA 15 MIN: HCPCS

## 2018-08-30 PROCEDURE — 3331090002 HH PPS REVENUE DEBIT

## 2018-08-30 PROCEDURE — 3331090001 HH PPS REVENUE CREDIT

## 2018-08-31 ENCOUNTER — HOME CARE VISIT (OUTPATIENT)
Dept: SCHEDULING | Facility: HOME HEALTH | Age: 78
End: 2018-08-31
Payer: MEDICARE

## 2018-08-31 VITALS
DIASTOLIC BLOOD PRESSURE: 60 MMHG | HEART RATE: 58 BPM | SYSTOLIC BLOOD PRESSURE: 104 MMHG | OXYGEN SATURATION: 96 % | RESPIRATION RATE: 16 BRPM

## 2018-08-31 PROCEDURE — G0299 HHS/HOSPICE OF RN EA 15 MIN: HCPCS

## 2018-08-31 PROCEDURE — 3331090002 HH PPS REVENUE DEBIT

## 2018-08-31 PROCEDURE — 3331090001 HH PPS REVENUE CREDIT

## 2018-10-07 NOTE — ED TRIAGE NOTES
Family states that pt has been having back pain for the last 10 days and her doctor has increased her pain medication but it is not helping pt states that the pain is right around her lower back

## 2018-10-08 NOTE — ED NOTES
I have reviewed discharge instructions with the patient. The patient verbalized understanding. Patient left ED via Discharge Method:  Wheelchair to Home with son. Opportunity for questions and clarification provided. Patient given 1 scripts. To continue your aftercare when you leave the hospital, you may receive an automated call from our care team to check in on how you are doing. This is a free service and part of our promise to provide the best care and service to meet your aftercare needs.  If you have questions, or wish to unsubscribe from this service please call [phone]. Thank you for Choosing our [facility] Emergency Department.

## 2018-10-08 NOTE — DISCHARGE INSTRUCTIONS
Back Strain: Care Instructions  Overview    A back strain happens when you overstretch, or pull, a muscle in your back. You may hurt your back in an accident or when you exercise or lift something. Sometimes you may not know how you hurt your back. Most back pain will get better with rest and time. You can take care of yourself at home to help your back heal.  Follow-up care is a key part of your treatment and safety. Be sure to make and go to all appointments, and call your doctor if you are having problems. It's also a good idea to know your test results and keep a list of the medicines you take. How can you care for yourself at home? · Try to stay as active as you can, but stop or reduce any activity that causes pain. · Put ice or a cold pack on the sore muscle for 10 to 20 minutes at a time to stop swelling. Try this every 1 to 2 hours for 3 days (when you are awake) or until the swelling goes down. Put a thin cloth between the ice pack and your skin. · After 2 or 3 days, apply a heating pad on low or a warm cloth to your back. Some doctors suggest that you go back and forth between hot and cold treatments. · Take pain medicines exactly as directed. ¨ If the doctor gave you a prescription medicine for pain, take it as prescribed. ¨ If you are not taking a prescription pain medicine, ask your doctor if you can take an over-the-counter medicine. · Try sleeping on your side with a pillow between your legs. Or put a pillow under your knees when you lie on your back. These measures can ease pain in your lower back. · Return to your usual level of activity slowly. When should you call for help? Call 911 anytime you think you may need emergency care. For example, call if:    · You are unable to move a leg at all.   [facility] your doctor now or seek immediate medical care if:    · You have new or worse symptoms in your legs, belly, or buttocks.  Symptoms may include:  ¨ Numbness or tingling. ¨ Weakness. ¨ Pain.     · You lose bladder or bowel control.    Watch closely for changes in your health, and be sure to contact your doctor if:    · You have a fever, lose weight, or don't feel well.     · You are not getting better as expected. Where can you learn more? Go to http://tae-bairon.info/. Enter S008 in the search box to learn more about \"Back Strain: Care Instructions. \"  Current as of: November 29, 2017  Content Version: 11.8  © 0764-3512 [employer]. Care instructions adapted under license by [employer] (which disclaims liability or warranty for this information). If you have questions about a medical condition or this instruction, always ask your healthcare professional. [address] any warranty or liability for your use of this information. Fatigue: Care Instructions  Your Care Instructions    Fatigue is a feeling of tiredness, exhaustion, or lack of energy. You may feel fatigue because of too much or not enough activity. It can also come from stress, lack of sleep, boredom, and poor diet. Many medical problems, such as viral infections, can cause fatigue. Emotional problems, especially depression, are often the cause of fatigue. Fatigue is most often a symptom of another problem. Treatment for fatigue depends on the cause. For example, if you have fatigue because you have a certain health problem, treating this problem also treats your fatigue. If depression or anxiety is the cause, treatment may help. Follow-up care is a key part of your treatment and safety. Be sure to make and go to all appointments, and call your doctor if you are having problems. It's also a good idea to know your test results and keep a list of the medicines you take. How can you care for yourself at home? · Get regular exercise. But don't overdo it. Go back and forth between rest and exercise.   · Get plenty of rest.  · Eat a healthy diet. Do not skip meals, especially breakfast.  · Reduce your use of caffeine, tobacco, and alcohol. Caffeine is most often found in coffee, tea, cola drinks, and chocolate. · Limit medicines that can cause fatigue. This includes tranquilizers and cold and allergy medicines. When should you call for help? Watch closely for changes in your health, and be sure to contact your doctor if:    · You have new symptoms such as fever or a rash.     · Your fatigue gets worse.     · You have been feeling down, depressed, or hopeless. Or you may have lost interest in things that you usually enjoy.     · You are not getting better as expected. Where can you learn more? Go to http://tae-bairon.info/. Enter O724 in the search box to learn more about \"Fatigue: Care Instructions. \"  Current as of: November 20, 2017  Content Version: 11.8  © 6085-3844 [employer], [employer]. Care instructions adapted under license by [employer] (which disclaims liability or warranty for this information). If you have questions about a medical condition or this instruction, always ask your healthcare professional. [place] [address] any warranty or liability for your use of this information.

## 2018-10-08 NOTE — ED PROVIDER NOTES
Patient is a 69 y.o. female presenting with back pain. The history is provided by the patient and a relative. Back Pain This is a new problem. The current episode started more than 1 week ago. The problem has been gradually worsening. The problem occurs constantly. Patient reports not work related injury. The pain is associated with lifting. The pain is present in the lower back. The quality of the pain is described as aching and sharp. The pain does not radiate. The pain is at a severity of 10/10. The symptoms are aggravated by bending, twisting and certain positions. The pain is the same all the time. Stiffness is present all day. Pertinent negatives include no chest pain, no fever, no numbness, no weight loss, no headaches, no abdominal pain, no abdominal swelling, no bowel incontinence, no perianal numbness, no bladder incontinence, no dysuria, no pelvic pain, no leg pain, no paresthesias, no paresis, no tingling and no weakness. She has tried bed rest (increase in her Norco that she takes for chronic pain) for the symptoms. The treatment provided no relief. Risk factors include menopause. The patient's surgical history non-contributory Past Medical History:  
Diagnosis Date  Acquired cyst of kidney  Anxiety   
 managed with medication  Aortic valve replaced  Atrial fibrillation, chronic ([address]   
 managed with medication and pacemaker  CAD (coronary artery disease) CABG x 5  
 Calculus of kidney  Carotid artery stenosis without cerebral infarction  Chronic pain GENERALIZED FROM ARTHRITIS  Gangrene associated with diabetes mellitus ([address] 5/26/2016  
 left great toe  GERD (gastroesophageal reflux disease)   
 managed with medication  Heart failure ([address]  History of kidney stones   
 multiple with surgical interventions  Hypercholesterolemia   
 managed with medication  Hypertension  Hypothyroidism   
 managed with medication  Ill-defined condition   
 pt takes eliquis  Microscopic hematuria  Nausea & vomiting   
 after anesthesia  Osteoarthritis   
 managed with medication  Pacemaker Metronic pacemaker only  PUD (peptic ulcer disease)   
 no recent episodes  PVD (peripheral vascular disease) (HCC)   
 left side x 1 stented  Rheumatoid arthritis(714.0)   
 managed with medication  Toe amputation status ([address]   
 left great toe  Type 2 diabetes mellitus ([facility] Dx 2006  
 oral t and insulin/Avg / no s/s of low BS/ does not have a sensation / last A1C7.4  Vertigo   
 no treatment, happens occassionally Past Surgical History:  
Procedure Laterality Date  CABG, ARTERY-VEIN, FOUR  1993  
 states x 5  
 CARDIAC SURG PROCEDURE UNLIST Cardiovert x 2-3 x last 2/4/2012  HX AMPUTATION Left 2016  
 great toe  HX AORTIC VALVE REPLACEMENT    
 [address]  HX HEART CATHETERIZATION    
 HX HEENT    
 dental  
 [address]  HX LITHOTRIPSY    
 multiple  HX OPEN CHOLECYSTECTOMY  HX ORTHOPAEDIC Left   
 achilles tendon  HX ORTHOPAEDIC Left \"toe surgery removing bones\"  HX PACEMAKER    
 HX PACEMAKER PLACEMENT Metronic  HX UROLOGICAL Left 1980s  
 open L kidney removal stones  VASCULAR SURGERY PROCEDURE UNLIST Left 2-17-16  
 lower extremity arteriogram with stent x 1 placement  VASCULAR SURGERY PROCEDURE UNLIST Right 12/20/2017  
 2nd toe- resection metatarsal head Family History:  
Problem Relation Age of Onset  Heart Disease Father  Heart Attack Father  Diabetes Father  Hypertension Father  High Cholesterol Father  Asthma Father  Heart Disease Other [facility] Other  Diabetes Mother  Stroke Mother TIAs  Hypertension Mother  Other Mother   
  kidney stone  Kidney Disease Mother  Heart Disease Mother  High Cholesterol Mother  Cancer Mother  Cancer Sister   
  multiple myeloma  Hypertension Sister  Hypertension Sister  Hypertension Brother  Diabetes Brother  Cancer Brother  Hypertension Sister  Heart Disease Sister  Hypertension Sister  Hypertension Sister Social History Social History  Marital status:  Spouse name: N/A  
 Number of children: N/A  
 Years of education: N/A Occupational History  Not on file. Social History Main Topics  Smoking status: Never Smoker  Smokeless tobacco: Never Used  Alcohol use No  
 Drug use: No  
 Sexual activity: Not on file Other Topics Concern  Not on file Social History Narrative ALLERGIES: Multaq [dronedarone]; Other medication; and Statins-hmg-coa reductase inhibitors Review of Systems Constitutional: Negative for chills, fever and weight loss. Cardiovascular: Negative for chest pain. Gastrointestinal: Negative for abdominal pain and bowel incontinence. Genitourinary: Negative for bladder incontinence, dysuria and pelvic pain. Musculoskeletal: Positive for back pain. Neurological: Negative for tingling, weakness, numbness, headaches and paresthesias. All other systems reviewed and are negative. Vitals:  
 10/07/18 1911 10/07/18 1927 10/07/18 1928 BP: 125/80  148/72 Pulse: 82  81 Resp: 16 Temp: 97.9 °F (36.6 °C) SpO2: 95% 100% 95% Weight: 45.4 kg (100 lb) Physical Exam  
Constitutional: She is oriented to person, place, and time. She appears well-developed. She appears cachectic. She appears distressed (mild). Elderly frail  female HENT:  
Head: Normocephalic and atraumatic. Right Ear: Tympanic membrane and external ear normal.  
Left Ear: Tympanic membrane and external ear normal.  
Mouth/Throat: Oropharynx is clear and moist.  
Eyes: Conjunctivae and EOM are normal. Pupils are equal, round, and reactive to light. Neck: Normal range of motion. Neck supple. No tracheal deviation present. Cardiovascular: Normal rate, regular rhythm, normal heart sounds and intact distal pulses. Exam reveals no gallop and no friction rub. No murmur heard. Pulmonary/Chest: Effort normal and breath sounds normal. No respiratory distress. She has no wheezes. Abdominal: Soft. Bowel sounds are normal. She exhibits no distension and no mass. There is no hepatosplenomegaly. There is no tenderness. There is no rebound and no guarding. Musculoskeletal: She exhibits no edema. Lumbar back: She exhibits decreased range of motion and tenderness. She exhibits no swelling, no edema, no laceration, no pain, no spasm and normal pulse. Back: 
 
Lymphadenopathy:  
  She has no cervical adenopathy. Neurological: She is alert and oriented to person, place, and time. She has normal strength. She displays normal reflexes. No cranial nerve deficit or sensory deficit. Coordination normal.  
Negative straight leg raise bilaterally, the patient has significant discomfort with any attempts to sit up. Skin: Skin is warm and dry. No rash noted. She is not diaphoretic. No erythema. Psychiatric: She has a normal mood and affect. Her speech is normal.  
Nursing note and vitals reviewed. MDM Number of Diagnoses or Management Options Lumbar strain, initial encounter: new and requires workup Weakness: new and requires workup Amount and/or Complexity of Data Reviewed Clinical lab tests: ordered and reviewed Tests in the radiology section of CPT®: ordered and reviewed Obtain history from someone other than the patient: yes Review and summarize past medical records: yes Risk of Complications, Morbidity, and/or Mortality Presenting problems: moderate Diagnostic procedures: moderate Management options: moderate Patient Progress Patient progress: stable ED Course Procedures Results Reviewed: Recent Results (from the past 24 hour(s)) URINE MICROSCOPIC Collection Time: 10/07/18  8:01 PM  
Result Value Ref Range WBC 0-3 0 /hpf  
 RBC >100 (H) 0 /hpf Epithelial cells 3-5 0 /hpf Bacteria 0 0 /hpf Casts 0 0 /lpf  
CBC WITH AUTOMATED DIFF Collection Time: 10/07/18  8:11 PM  
Result Value Ref Range WBC 13.4 (H) 4.3 - 11.1 K/uL  
 RBC 3.96 (L) 4.05 - 5.2 M/uL  
 HGB 13.0 11.7 - 15.4 g/dL HCT 38.7 35.8 - 46.3 % MCV 97.7 79.6 - 97.8 FL  
 MCH 32.8 26.1 - 32.9 PG  
 MCHC 33.6 31.4 - 35.0 g/dL  
 RDW 13.1 % PLATELET 927 374 - 020 K/uL MPV 9.1 (L) 9.4 - 12.3 FL ABSOLUTE NRBC 0.00 0.0 - 0.2 K/uL  
 DF AUTOMATED NEUTROPHILS 81 (H) 43 - 78 % LYMPHOCYTES 10 (L) 13 - 44 % MONOCYTES 8 4.0 - 12.0 % EOSINOPHILS 0 (L) 0.5 - 7.8 % BASOPHILS 0 0.0 - 2.0 % IMMATURE GRANULOCYTES 1 0.0 - 5.0 %  
 ABS. NEUTROPHILS 10.8 (H) 1.7 - 8.2 K/UL  
 ABS. LYMPHOCYTES 1.3 0.5 - 4.6 K/UL  
 ABS. MONOCYTES 1.1 0.1 - 1.3 K/UL  
 ABS. EOSINOPHILS 0.0 0.0 - 0.8 K/UL  
 ABS. BASOPHILS 0.0 0.0 - 0.2 K/UL  
 ABS. IMM. GRANS. 0.1 0.0 - 0.5 K/UL METABOLIC PANEL, COMPREHENSIVE Collection Time: 10/07/18  8:11 PM  
Result Value Ref Range Sodium 134 (L) 136 - 145 mmol/L Potassium 4.8 3.5 - 5.1 mmol/L Chloride 100 98 - 107 mmol/L  
 CO2 28 21 - 32 mmol/L Anion gap 6 (L) 7 - 16 mmol/L Glucose 285 (H) 65 - 100 mg/dL BUN 23 8 - 23 MG/DL Creatinine 0.77 0.6 - 1.0 MG/DL  
 GFR est AA >60 >60 ml/min/1.73m2 GFR est non-AA >60 >60 ml/min/1.73m2 Calcium 8.8 8.3 - 10.4 MG/DL Bilirubin, total 0.6 0.2 - 1.1 MG/DL  
 ALT (SGPT) 29 12 - 65 U/L  
 AST (SGOT) 23 15 - 37 U/L Alk. phosphatase 93 50 - 136 U/L Protein, total 6.7 6.3 - 8.2 g/dL Albumin 2.6 (L) 3.2 - 4.6 g/dL Globulin 4.1 (H) 2.3 - 3.5 g/dL A-G Ratio 0.6 (L) 1.2 - 3.5 XR SPINE LUMB 2 OR 3 V Final Result IMPRESSION:  
  
1. Osteopenia. Old compression deformities.  No evidence of acute compression  
fracture in the lumbar spine. 2. Degenerative changes. I discussed the results of all labs, procedures, radiographs, and treatments with the patient and available family. Treatment plan is agreed upon and the patient is ready for discharge. All voiced understanding of the discharge plan and medication instructions or changes as appropriate. Questions about treatment in the ED were answered. All were encouraged to return should symptoms worsen or new problems develop.

## 2019-01-01 ENCOUNTER — HOSPITAL ENCOUNTER (OUTPATIENT)
Dept: CARDIAC CATH/INVASIVE PROCEDURES | Age: 79
Discharge: HOME | End: 2019-08-02
Attending: INTERNAL MEDICINE | Admitting: INTERNAL MEDICINE
Payer: MEDICARE

## 2019-01-01 ENCOUNTER — HOME CARE VISIT (OUTPATIENT)
Dept: SCHEDULING | Facility: HOME HEALTH | Age: 79
End: 2019-01-01
Payer: MEDICARE

## 2019-01-01 ENCOUNTER — APPOINTMENT (OUTPATIENT)
Dept: CT IMAGING | Age: 79
DRG: 064 | End: 2019-01-01
Attending: EMERGENCY MEDICINE
Payer: MEDICARE

## 2019-01-01 ENCOUNTER — APPOINTMENT (OUTPATIENT)
Dept: GENERAL RADIOLOGY | Age: 79
DRG: 064 | End: 2019-01-01
Attending: FAMILY MEDICINE
Payer: MEDICARE

## 2019-01-01 ENCOUNTER — APPOINTMENT (OUTPATIENT)
Dept: ULTRASOUND IMAGING | Age: 79
DRG: 064 | End: 2019-01-01
Attending: INTERNAL MEDICINE
Payer: MEDICARE

## 2019-01-01 ENCOUNTER — HOSPITAL ENCOUNTER (EMERGENCY)
Age: 79
Discharge: HOME | End: 2019-08-17
Attending: EMERGENCY MEDICINE
Payer: MEDICARE

## 2019-01-01 ENCOUNTER — APPOINTMENT (OUTPATIENT)
Dept: GENERAL RADIOLOGY | Age: 79
End: 2019-01-01
Attending: EMERGENCY MEDICINE
Payer: MEDICARE

## 2019-01-01 ENCOUNTER — HOSPITAL ENCOUNTER (OUTPATIENT)
Dept: ULTRASOUND IMAGING | Age: 79
Discharge: HOME | DRG: 064 | End: 2019-08-21
Attending: INTERNAL MEDICINE
Payer: MEDICARE

## 2019-01-01 ENCOUNTER — HOSPITAL ENCOUNTER (OUTPATIENT)
Dept: LAB | Age: 79
Discharge: HOME | End: 2019-04-10
Payer: MEDICARE

## 2019-01-01 ENCOUNTER — HOME CARE VISIT (OUTPATIENT)
Dept: HOME HEALTH SERVICES | Facility: HOME HEALTH | Age: 79
End: 2019-01-01
Payer: MEDICARE

## 2019-01-01 ENCOUNTER — APPOINTMENT (OUTPATIENT)
Dept: GENERAL RADIOLOGY | Age: 79
DRG: 064 | End: 2019-01-01
Attending: EMERGENCY MEDICINE
Payer: MEDICARE

## 2019-01-01 ENCOUNTER — APPOINTMENT (OUTPATIENT)
Dept: MRI IMAGING | Age: 79
DRG: 064 | End: 2019-01-01
Attending: INTERNAL MEDICINE
Payer: MEDICARE

## 2019-01-01 ENCOUNTER — TELEPHONE (OUTPATIENT)
Dept: CASE MANAGEMENT | Age: 79
End: 2019-01-01

## 2019-01-01 ENCOUNTER — HOSPITAL ENCOUNTER (OUTPATIENT)
Dept: CT IMAGING | Age: 79
Discharge: HOME | DRG: 064 | End: 2019-08-21
Attending: INTERNAL MEDICINE
Payer: MEDICARE

## 2019-01-01 ENCOUNTER — HOSPITAL ENCOUNTER (INPATIENT)
Age: 79
LOS: 8 days | Discharge: NURSING, REHAB OR LONG-TERM CARE | DRG: 064 | End: 2019-08-31
Attending: EMERGENCY MEDICINE | Admitting: INTERNAL MEDICINE
Payer: MEDICARE

## 2019-01-01 ENCOUNTER — APPOINTMENT (OUTPATIENT)
Dept: CT IMAGING | Age: 79
DRG: 064 | End: 2019-01-01
Attending: NURSE PRACTITIONER
Payer: MEDICARE

## 2019-01-01 ENCOUNTER — HOSPITAL ENCOUNTER (OUTPATIENT)
Dept: LAB | Age: 79
Discharge: HOME | End: 2019-02-11
Payer: MEDICARE

## 2019-01-01 ENCOUNTER — HOSPITAL ENCOUNTER (OUTPATIENT)
Dept: LAB | Age: 79
Discharge: HOME | End: 2019-01-30
Payer: MEDICARE

## 2019-01-01 VITALS
DIASTOLIC BLOOD PRESSURE: 60 MMHG | HEART RATE: 76 BPM | SYSTOLIC BLOOD PRESSURE: 112 MMHG | TEMPERATURE: 98.3 F | RESPIRATION RATE: 19 BRPM | OXYGEN SATURATION: 99 %

## 2019-01-01 VITALS
TEMPERATURE: 98.3 F | OXYGEN SATURATION: 98 % | RESPIRATION RATE: 16 BRPM | DIASTOLIC BLOOD PRESSURE: 64 MMHG | SYSTOLIC BLOOD PRESSURE: 106 MMHG | HEART RATE: 58 BPM

## 2019-01-01 VITALS
TEMPERATURE: 97.9 F | RESPIRATION RATE: 17 BRPM | SYSTOLIC BLOOD PRESSURE: 121 MMHG | HEART RATE: 69 BPM | OXYGEN SATURATION: 98 % | DIASTOLIC BLOOD PRESSURE: 78 MMHG

## 2019-01-01 VITALS
OXYGEN SATURATION: 98 % | HEART RATE: 71 BPM | SYSTOLIC BLOOD PRESSURE: 108 MMHG | DIASTOLIC BLOOD PRESSURE: 68 MMHG | TEMPERATURE: 98 F | RESPIRATION RATE: 18 BRPM

## 2019-01-01 VITALS
SYSTOLIC BLOOD PRESSURE: 112 MMHG | DIASTOLIC BLOOD PRESSURE: 60 MMHG | TEMPERATURE: 97.6 F | HEART RATE: 71 BPM | OXYGEN SATURATION: 98 % | RESPIRATION RATE: 19 BRPM

## 2019-01-01 VITALS
SYSTOLIC BLOOD PRESSURE: 104 MMHG | TEMPERATURE: 97.6 F | OXYGEN SATURATION: 97 % | DIASTOLIC BLOOD PRESSURE: 60 MMHG | RESPIRATION RATE: 20 BRPM | HEART RATE: 61 BPM

## 2019-01-01 VITALS
SYSTOLIC BLOOD PRESSURE: 102 MMHG | HEART RATE: 76 BPM | DIASTOLIC BLOOD PRESSURE: 62 MMHG | TEMPERATURE: 96 F | OXYGEN SATURATION: 100 % | RESPIRATION RATE: 18 BRPM

## 2019-01-01 VITALS
RESPIRATION RATE: 16 BRPM | HEART RATE: 58 BPM | DIASTOLIC BLOOD PRESSURE: 62 MMHG | SYSTOLIC BLOOD PRESSURE: 116 MMHG | TEMPERATURE: 98.3 F | OXYGEN SATURATION: 97 %

## 2019-01-01 VITALS
RESPIRATION RATE: 16 BRPM | OXYGEN SATURATION: 99 % | DIASTOLIC BLOOD PRESSURE: 60 MMHG | SYSTOLIC BLOOD PRESSURE: 102 MMHG | HEART RATE: 66 BPM | TEMPERATURE: 97.6 F

## 2019-01-01 VITALS
DIASTOLIC BLOOD PRESSURE: 58 MMHG | RESPIRATION RATE: 16 BRPM | SYSTOLIC BLOOD PRESSURE: 110 MMHG | OXYGEN SATURATION: 99 % | HEART RATE: 58 BPM | TEMPERATURE: 98.2 F

## 2019-01-01 VITALS
TEMPERATURE: 97.2 F | SYSTOLIC BLOOD PRESSURE: 122 MMHG | DIASTOLIC BLOOD PRESSURE: 68 MMHG | OXYGEN SATURATION: 98 % | RESPIRATION RATE: 16 BRPM | HEART RATE: 84 BPM

## 2019-01-01 VITALS
DIASTOLIC BLOOD PRESSURE: 68 MMHG | RESPIRATION RATE: 17 BRPM | HEART RATE: 73 BPM | SYSTOLIC BLOOD PRESSURE: 121 MMHG | OXYGEN SATURATION: 99 % | TEMPERATURE: 97.8 F

## 2019-01-01 VITALS
BODY MASS INDEX: 17.72 KG/M2 | WEIGHT: 100 LBS | SYSTOLIC BLOOD PRESSURE: 108 MMHG | RESPIRATION RATE: 15 BRPM | TEMPERATURE: 98.1 F | HEART RATE: 66 BPM | HEIGHT: 63 IN | DIASTOLIC BLOOD PRESSURE: 53 MMHG | OXYGEN SATURATION: 96 %

## 2019-01-01 VITALS
RESPIRATION RATE: 16 BRPM | TEMPERATURE: 98.3 F | DIASTOLIC BLOOD PRESSURE: 60 MMHG | HEART RATE: 74 BPM | SYSTOLIC BLOOD PRESSURE: 110 MMHG | OXYGEN SATURATION: 98 %

## 2019-01-01 VITALS
DIASTOLIC BLOOD PRESSURE: 61 MMHG | HEIGHT: 63 IN | WEIGHT: 104 LBS | HEART RATE: 76 BPM | RESPIRATION RATE: 18 BRPM | SYSTOLIC BLOOD PRESSURE: 132 MMHG | TEMPERATURE: 98 F | OXYGEN SATURATION: 97 % | BODY MASS INDEX: 18.43 KG/M2

## 2019-01-01 VITALS
TEMPERATURE: 97.2 F | RESPIRATION RATE: 19 BRPM | HEART RATE: 78 BPM | OXYGEN SATURATION: 99 % | SYSTOLIC BLOOD PRESSURE: 110 MMHG | DIASTOLIC BLOOD PRESSURE: 70 MMHG

## 2019-01-01 VITALS
OXYGEN SATURATION: 98 % | TEMPERATURE: 97.5 F | SYSTOLIC BLOOD PRESSURE: 118 MMHG | DIASTOLIC BLOOD PRESSURE: 80 MMHG | HEART RATE: 82 BPM | RESPIRATION RATE: 18 BRPM

## 2019-01-01 VITALS
HEART RATE: 62 BPM | OXYGEN SATURATION: 98 % | SYSTOLIC BLOOD PRESSURE: 106 MMHG | TEMPERATURE: 97.8 F | DIASTOLIC BLOOD PRESSURE: 58 MMHG | RESPIRATION RATE: 16 BRPM

## 2019-01-01 VITALS
TEMPERATURE: 97.8 F | OXYGEN SATURATION: 98 % | SYSTOLIC BLOOD PRESSURE: 116 MMHG | HEART RATE: 60 BPM | RESPIRATION RATE: 16 BRPM | DIASTOLIC BLOOD PRESSURE: 58 MMHG

## 2019-01-01 VITALS
RESPIRATION RATE: 16 BRPM | SYSTOLIC BLOOD PRESSURE: 106 MMHG | OXYGEN SATURATION: 98 % | HEART RATE: 60 BPM | TEMPERATURE: 98.3 F | DIASTOLIC BLOOD PRESSURE: 62 MMHG

## 2019-01-01 VITALS
HEART RATE: 72 BPM | OXYGEN SATURATION: 98 % | RESPIRATION RATE: 16 BRPM | SYSTOLIC BLOOD PRESSURE: 106 MMHG | TEMPERATURE: 97.8 F | DIASTOLIC BLOOD PRESSURE: 62 MMHG

## 2019-01-01 VITALS
OXYGEN SATURATION: 98 % | SYSTOLIC BLOOD PRESSURE: 106 MMHG | RESPIRATION RATE: 16 BRPM | HEART RATE: 60 BPM | TEMPERATURE: 97.8 F | DIASTOLIC BLOOD PRESSURE: 62 MMHG

## 2019-01-01 VITALS
RESPIRATION RATE: 18 BRPM | DIASTOLIC BLOOD PRESSURE: 64 MMHG | SYSTOLIC BLOOD PRESSURE: 106 MMHG | HEART RATE: 76 BPM | TEMPERATURE: 98 F | OXYGEN SATURATION: 97 %

## 2019-01-01 VITALS
OXYGEN SATURATION: 98 % | TEMPERATURE: 97.6 F | HEART RATE: 76 BPM | SYSTOLIC BLOOD PRESSURE: 122 MMHG | DIASTOLIC BLOOD PRESSURE: 70 MMHG | RESPIRATION RATE: 19 BRPM

## 2019-01-01 VITALS
TEMPERATURE: 97.5 F | DIASTOLIC BLOOD PRESSURE: 60 MMHG | RESPIRATION RATE: 16 BRPM | SYSTOLIC BLOOD PRESSURE: 112 MMHG | HEART RATE: 72 BPM | OXYGEN SATURATION: 99 %

## 2019-01-01 VITALS
SYSTOLIC BLOOD PRESSURE: 112 MMHG | RESPIRATION RATE: 19 BRPM | OXYGEN SATURATION: 98 % | TEMPERATURE: 97.6 F | DIASTOLIC BLOOD PRESSURE: 64 MMHG | HEART RATE: 64 BPM

## 2019-01-01 VITALS
DIASTOLIC BLOOD PRESSURE: 68 MMHG | SYSTOLIC BLOOD PRESSURE: 118 MMHG | HEART RATE: 82 BPM | OXYGEN SATURATION: 100 % | RESPIRATION RATE: 16 BRPM | TEMPERATURE: 96.9 F

## 2019-01-01 VITALS
TEMPERATURE: 97.6 F | HEART RATE: 84 BPM | SYSTOLIC BLOOD PRESSURE: 110 MMHG | DIASTOLIC BLOOD PRESSURE: 62 MMHG | RESPIRATION RATE: 19 BRPM | OXYGEN SATURATION: 98 %

## 2019-01-01 VITALS
RESPIRATION RATE: 16 BRPM | SYSTOLIC BLOOD PRESSURE: 118 MMHG | TEMPERATURE: 98.3 F | DIASTOLIC BLOOD PRESSURE: 64 MMHG | OXYGEN SATURATION: 98 % | HEART RATE: 64 BPM

## 2019-01-01 VITALS
HEART RATE: 58 BPM | TEMPERATURE: 97.4 F | RESPIRATION RATE: 16 BRPM | DIASTOLIC BLOOD PRESSURE: 62 MMHG | OXYGEN SATURATION: 97 % | SYSTOLIC BLOOD PRESSURE: 116 MMHG

## 2019-01-01 VITALS
DIASTOLIC BLOOD PRESSURE: 70 MMHG | RESPIRATION RATE: 16 BRPM | HEART RATE: 73 BPM | TEMPERATURE: 99.1 F | OXYGEN SATURATION: 97 % | SYSTOLIC BLOOD PRESSURE: 108 MMHG

## 2019-01-01 VITALS
RESPIRATION RATE: 19 BRPM | TEMPERATURE: 97.1 F | OXYGEN SATURATION: 94 % | HEART RATE: 74 BPM | DIASTOLIC BLOOD PRESSURE: 62 MMHG | SYSTOLIC BLOOD PRESSURE: 104 MMHG

## 2019-01-01 VITALS
OXYGEN SATURATION: 98 % | DIASTOLIC BLOOD PRESSURE: 58 MMHG | RESPIRATION RATE: 16 BRPM | SYSTOLIC BLOOD PRESSURE: 110 MMHG | HEART RATE: 62 BPM | TEMPERATURE: 97.5 F

## 2019-01-01 VITALS
TEMPERATURE: 98.1 F | RESPIRATION RATE: 16 BRPM | SYSTOLIC BLOOD PRESSURE: 130 MMHG | DIASTOLIC BLOOD PRESSURE: 70 MMHG | OXYGEN SATURATION: 98 % | HEART RATE: 66 BPM

## 2019-01-01 VITALS
RESPIRATION RATE: 16 BRPM | HEART RATE: 72 BPM | DIASTOLIC BLOOD PRESSURE: 64 MMHG | OXYGEN SATURATION: 97 % | SYSTOLIC BLOOD PRESSURE: 128 MMHG | TEMPERATURE: 97.8 F

## 2019-01-01 VITALS
RESPIRATION RATE: 19 BRPM | OXYGEN SATURATION: 98 % | DIASTOLIC BLOOD PRESSURE: 60 MMHG | TEMPERATURE: 97.4 F | SYSTOLIC BLOOD PRESSURE: 106 MMHG | HEART RATE: 76 BPM

## 2019-01-01 VITALS
DIASTOLIC BLOOD PRESSURE: 64 MMHG | OXYGEN SATURATION: 95 % | SYSTOLIC BLOOD PRESSURE: 110 MMHG | RESPIRATION RATE: 19 BRPM | TEMPERATURE: 97.9 F | HEART RATE: 76 BPM

## 2019-01-01 VITALS
OXYGEN SATURATION: 97 % | RESPIRATION RATE: 18 BRPM | DIASTOLIC BLOOD PRESSURE: 60 MMHG | TEMPERATURE: 97.8 F | HEART RATE: 78 BPM | SYSTOLIC BLOOD PRESSURE: 106 MMHG

## 2019-01-01 VITALS
RESPIRATION RATE: 16 BRPM | SYSTOLIC BLOOD PRESSURE: 114 MMHG | HEART RATE: 58 BPM | OXYGEN SATURATION: 97 % | TEMPERATURE: 97.8 F | DIASTOLIC BLOOD PRESSURE: 60 MMHG

## 2019-01-01 VITALS
DIASTOLIC BLOOD PRESSURE: 70 MMHG | OXYGEN SATURATION: 98 % | SYSTOLIC BLOOD PRESSURE: 122 MMHG | HEART RATE: 72 BPM | RESPIRATION RATE: 19 BRPM | TEMPERATURE: 97.2 F

## 2019-01-01 VITALS
TEMPERATURE: 98.3 F | HEART RATE: 58 BPM | OXYGEN SATURATION: 98 % | RESPIRATION RATE: 16 BRPM | DIASTOLIC BLOOD PRESSURE: 62 MMHG | SYSTOLIC BLOOD PRESSURE: 108 MMHG

## 2019-01-01 VITALS
SYSTOLIC BLOOD PRESSURE: 116 MMHG | HEART RATE: 58 BPM | DIASTOLIC BLOOD PRESSURE: 58 MMHG | RESPIRATION RATE: 16 BRPM | OXYGEN SATURATION: 97 % | TEMPERATURE: 97.4 F

## 2019-01-01 VITALS
TEMPERATURE: 97.9 F | RESPIRATION RATE: 18 BRPM | OXYGEN SATURATION: 95 % | SYSTOLIC BLOOD PRESSURE: 94 MMHG | HEIGHT: 63 IN | HEART RATE: 62 BPM | WEIGHT: 108.3 LBS | DIASTOLIC BLOOD PRESSURE: 57 MMHG | BODY MASS INDEX: 19.19 KG/M2

## 2019-01-01 VITALS
HEART RATE: 58 BPM | SYSTOLIC BLOOD PRESSURE: 122 MMHG | RESPIRATION RATE: 16 BRPM | TEMPERATURE: 97.6 F | OXYGEN SATURATION: 98 % | DIASTOLIC BLOOD PRESSURE: 62 MMHG

## 2019-01-01 VITALS
RESPIRATION RATE: 16 BRPM | SYSTOLIC BLOOD PRESSURE: 116 MMHG | OXYGEN SATURATION: 97 % | TEMPERATURE: 98.3 F | HEART RATE: 60 BPM | DIASTOLIC BLOOD PRESSURE: 60 MMHG

## 2019-01-01 VITALS
DIASTOLIC BLOOD PRESSURE: 58 MMHG | HEART RATE: 64 BPM | RESPIRATION RATE: 16 BRPM | OXYGEN SATURATION: 98 % | SYSTOLIC BLOOD PRESSURE: 102 MMHG | TEMPERATURE: 96.8 F

## 2019-01-01 VITALS
SYSTOLIC BLOOD PRESSURE: 110 MMHG | TEMPERATURE: 98.2 F | HEART RATE: 60 BPM | DIASTOLIC BLOOD PRESSURE: 58 MMHG | RESPIRATION RATE: 16 BRPM | OXYGEN SATURATION: 98 %

## 2019-01-01 VITALS
OXYGEN SATURATION: 98 % | TEMPERATURE: 98 F | DIASTOLIC BLOOD PRESSURE: 64 MMHG | HEART RATE: 79 BPM | RESPIRATION RATE: 18 BRPM | SYSTOLIC BLOOD PRESSURE: 110 MMHG

## 2019-01-01 VITALS
OXYGEN SATURATION: 97 % | RESPIRATION RATE: 16 BRPM | HEART RATE: 58 BPM | SYSTOLIC BLOOD PRESSURE: 106 MMHG | DIASTOLIC BLOOD PRESSURE: 58 MMHG | TEMPERATURE: 97.8 F

## 2019-01-01 VITALS
HEART RATE: 82 BPM | RESPIRATION RATE: 16 BRPM | TEMPERATURE: 98.2 F | SYSTOLIC BLOOD PRESSURE: 112 MMHG | DIASTOLIC BLOOD PRESSURE: 70 MMHG | DIASTOLIC BLOOD PRESSURE: 70 MMHG | RESPIRATION RATE: 19 BRPM | HEART RATE: 75 BPM | SYSTOLIC BLOOD PRESSURE: 112 MMHG | TEMPERATURE: 97.6 F | OXYGEN SATURATION: 100 % | OXYGEN SATURATION: 96 %

## 2019-01-01 VITALS
DIASTOLIC BLOOD PRESSURE: 62 MMHG | HEART RATE: 58 BPM | TEMPERATURE: 97.3 F | OXYGEN SATURATION: 98 % | SYSTOLIC BLOOD PRESSURE: 106 MMHG | RESPIRATION RATE: 16 BRPM

## 2019-01-01 VITALS
RESPIRATION RATE: 18 BRPM | DIASTOLIC BLOOD PRESSURE: 60 MMHG | OXYGEN SATURATION: 100 % | HEART RATE: 76 BPM | TEMPERATURE: 97.3 F | SYSTOLIC BLOOD PRESSURE: 102 MMHG

## 2019-01-01 VITALS
SYSTOLIC BLOOD PRESSURE: 128 MMHG | TEMPERATURE: 97.3 F | DIASTOLIC BLOOD PRESSURE: 78 MMHG | RESPIRATION RATE: 16 BRPM | HEART RATE: 80 BPM | OXYGEN SATURATION: 100 %

## 2019-01-01 DIAGNOSIS — S39.012A LUMBAR STRAIN, INITIAL ENCOUNTER: ICD-10-CM

## 2019-01-01 DIAGNOSIS — R55 NEAR SYNCOPE: Primary | ICD-10-CM

## 2019-01-01 DIAGNOSIS — R41.82 ALTERED MENTAL STATUS, UNSPECIFIED ALTERED MENTAL STATUS TYPE: Primary | ICD-10-CM

## 2019-01-01 DIAGNOSIS — I35.0 AORTIC VALVE STENOSIS, ETIOLOGY OF CARDIAC VALVE DISEASE UNSPECIFIED: ICD-10-CM

## 2019-01-01 DIAGNOSIS — F41.9 ANXIETY: ICD-10-CM

## 2019-01-01 DIAGNOSIS — I63.40 CEREBROVASCULAR ACCIDENT (CVA) DUE TO EMBOLISM OF CEREBRAL ARTERY (HCC): ICD-10-CM

## 2019-01-01 DIAGNOSIS — E16.2 HYPOGLYCEMIA: ICD-10-CM

## 2019-01-01 DIAGNOSIS — R62.7 ADULT FAILURE TO THRIVE: ICD-10-CM

## 2019-01-01 DIAGNOSIS — I35.0 AORTIC VALVE STENOSIS, ETIOLOGY OF CARDIAC VALVE DISEASE UNSPECIFIED: Primary | ICD-10-CM

## 2019-01-01 DIAGNOSIS — R53.2 FUNCTIONAL QUADRIPLEGIA (HCC): ICD-10-CM

## 2019-01-01 LAB
ALBUMIN SERPL-MCNC: 2.6 G/DL (ref 3.2–4.6)
ALBUMIN SERPL-MCNC: 2.7 G/DL (ref 3.2–4.6)
ALBUMIN SERPL-MCNC: 2.9 G/DL (ref 3.2–4.6)
ALBUMIN/GLOB SERPL: 0.7 {RATIO} (ref 1.2–3.5)
ALBUMIN/GLOB SERPL: 0.8 {RATIO} (ref 1.2–3.5)
ALBUMIN/GLOB SERPL: 0.8 {RATIO} (ref 1.2–3.5)
ALP SERPL-CCNC: 116 U/L (ref 50–136)
ALP SERPL-CCNC: 85 U/L (ref 50–136)
ALP SERPL-CCNC: 97 U/L (ref 50–136)
ALT SERPL-CCNC: 31 U/L (ref 12–65)
ALT SERPL-CCNC: 32 U/L (ref 12–65)
ALT SERPL-CCNC: 44 U/L (ref 12–65)
AMORPH CRY URNS QL MICRO: NORMAL
ANION GAP SERPL CALC-SCNC: 11 MMOL/L (ref 7–16)
ANION GAP SERPL CALC-SCNC: 5 MMOL/L (ref 7–16)
ANION GAP SERPL CALC-SCNC: 6 MMOL/L (ref 7–16)
ANION GAP SERPL CALC-SCNC: 7 MMOL/L (ref 7–16)
ANION GAP SERPL CALC-SCNC: 7 MMOL/L (ref 7–16)
ANION GAP SERPL CALC-SCNC: 8 MMOL/L (ref 7–16)
ANION GAP SERPL CALC-SCNC: 9 MMOL/L (ref 7–16)
ANION GAP SERPL CALC-SCNC: 9 MMOL/L (ref 7–16)
APPEARANCE UR: ABNORMAL
APPEARANCE UR: CLEAR
AST SERPL-CCNC: 35 U/L (ref 15–37)
AST SERPL-CCNC: 36 U/L (ref 15–37)
AST SERPL-CCNC: 44 U/L (ref 15–37)
ATRIAL RATE: 72 BPM
ATRIAL RATE: 85 BPM
ATRIAL RATE: 85 BPM
BACTERIA SPEC CULT: NORMAL
BACTERIA SPEC CULT: NORMAL
BACTERIA URNS QL MICRO: 0 /HPF
BACTERIA URNS QL MICRO: 0 /HPF
BACTERIA URNS QL MICRO: ABNORMAL /HPF
BASOPHILS # BLD: 0.1 K/UL (ref 0–0.2)
BASOPHILS NFR BLD: 1 % (ref 0–2)
BILIRUB SERPL-MCNC: 0.4 MG/DL (ref 0.2–1.1)
BILIRUB SERPL-MCNC: 1.1 MG/DL (ref 0.2–1.1)
BILIRUB SERPL-MCNC: 1.2 MG/DL (ref 0.2–1.1)
BILIRUB UR QL: ABNORMAL
BILIRUB UR QL: NEGATIVE
BNP SERPL-MCNC: 2226 PG/ML
BUN SERPL-MCNC: 10 MG/DL (ref 8–23)
BUN SERPL-MCNC: 10 MG/DL (ref 8–23)
BUN SERPL-MCNC: 12 MG/DL (ref 8–23)
BUN SERPL-MCNC: 12 MG/DL (ref 8–23)
BUN SERPL-MCNC: 13 MG/DL (ref 8–23)
BUN SERPL-MCNC: 14 MG/DL (ref 8–23)
BUN SERPL-MCNC: 15 MG/DL (ref 8–23)
BUN SERPL-MCNC: 17 MG/DL (ref 8–23)
BUN SERPL-MCNC: 19 MG/DL (ref 8–23)
CALCIUM SERPL-MCNC: 8.5 MG/DL (ref 8.3–10.4)
CALCIUM SERPL-MCNC: 8.6 MG/DL (ref 8.3–10.4)
CALCIUM SERPL-MCNC: 8.7 MG/DL (ref 8.3–10.4)
CALCIUM SERPL-MCNC: 8.8 MG/DL (ref 8.3–10.4)
CALCIUM SERPL-MCNC: 8.9 MG/DL (ref 8.3–10.4)
CALCIUM SERPL-MCNC: 9.1 MG/DL (ref 8.3–10.4)
CALCIUM SERPL-MCNC: 9.1 MG/DL (ref 8.3–10.4)
CALCIUM SERPL-MCNC: 9.7 MG/DL (ref 8.3–10.4)
CALCULATED R AXIS, ECG10: -9 DEGREES
CALCULATED R AXIS, ECG10: -97 DEGREES
CALCULATED R AXIS, ECG10: 63 DEGREES
CALCULATED T AXIS, ECG11: -103 DEGREES
CALCULATED T AXIS, ECG11: 140 DEGREES
CALCULATED T AXIS, ECG11: 79 DEGREES
CASTS URNS QL MICRO: 0 /LPF
CASTS URNS QL MICRO: 0 /LPF
CHLORIDE SERPL-SCNC: 101 MMOL/L (ref 98–107)
CHLORIDE SERPL-SCNC: 101 MMOL/L (ref 98–107)
CHLORIDE SERPL-SCNC: 103 MMOL/L (ref 98–107)
CHLORIDE SERPL-SCNC: 104 MMOL/L (ref 98–107)
CHLORIDE SERPL-SCNC: 104 MMOL/L (ref 98–107)
CHLORIDE SERPL-SCNC: 105 MMOL/L (ref 98–107)
CHLORIDE SERPL-SCNC: 106 MMOL/L (ref 98–107)
CHLORIDE SERPL-SCNC: 106 MMOL/L (ref 98–107)
CHLORIDE SERPL-SCNC: 107 MMOL/L (ref 98–107)
CHOLEST SERPL-MCNC: 132 MG/DL
CO2 SERPL-SCNC: 24 MMOL/L (ref 21–32)
CO2 SERPL-SCNC: 24 MMOL/L (ref 21–32)
CO2 SERPL-SCNC: 25 MMOL/L (ref 21–32)
CO2 SERPL-SCNC: 25 MMOL/L (ref 21–32)
CO2 SERPL-SCNC: 26 MMOL/L (ref 21–32)
CO2 SERPL-SCNC: 27 MMOL/L (ref 21–32)
CO2 SERPL-SCNC: 28 MMOL/L (ref 21–32)
CO2 SERPL-SCNC: 29 MMOL/L (ref 21–32)
CO2 SERPL-SCNC: 31 MMOL/L (ref 21–32)
COLOR UR: ABNORMAL
COLOR UR: YELLOW
CREAT SERPL-MCNC: 0.62 MG/DL (ref 0.6–1)
CREAT SERPL-MCNC: 0.67 MG/DL (ref 0.6–1)
CREAT SERPL-MCNC: 0.68 MG/DL (ref 0.6–1)
CREAT SERPL-MCNC: 0.7 MG/DL (ref 0.6–1)
CREAT SERPL-MCNC: 0.71 MG/DL (ref 0.6–1)
CREAT SERPL-MCNC: 0.74 MG/DL (ref 0.6–1)
CREAT SERPL-MCNC: 0.75 MG/DL (ref 0.6–1)
CREAT SERPL-MCNC: 0.79 MG/DL (ref 0.6–1)
CREAT SERPL-MCNC: 0.83 MG/DL (ref 0.6–1)
CREAT SERPL-MCNC: 0.89 MG/DL (ref 0.6–1)
CREAT SERPL-MCNC: 0.97 MG/DL (ref 0.6–1)
CRYSTALS URNS QL MICRO: 0 /LPF
D DIMER PPP FEU-MCNC: 1.36 UG/ML(FEU)
DIAGNOSIS, 93000: NORMAL
DIFFERENTIAL METHOD BLD: ABNORMAL
DIGOXIN SERPL-MCNC: 1.8 NG/ML (ref 0.9–2.1)
EOSINOPHIL # BLD: 0.1 K/UL (ref 0–0.8)
EOSINOPHIL # BLD: 0.1 K/UL (ref 0–0.8)
EOSINOPHIL # BLD: 0.2 K/UL (ref 0–0.8)
EOSINOPHIL NFR BLD: 1 % (ref 0.5–7.8)
EOSINOPHIL NFR BLD: 1 % (ref 0.5–7.8)
EOSINOPHIL NFR BLD: 2 % (ref 0.5–7.8)
EPI CELLS #/AREA URNS HPF: 0 /HPF
EPI CELLS #/AREA URNS HPF: ABNORMAL /HPF
EPI CELLS #/AREA URNS HPF: ABNORMAL /HPF
ERYTHROCYTE [DISTWIDTH] IN BLOOD BY AUTOMATED COUNT: 13.6 % (ref 11.9–14.6)
ERYTHROCYTE [DISTWIDTH] IN BLOOD BY AUTOMATED COUNT: 14 % (ref 11.9–14.6)
ERYTHROCYTE [DISTWIDTH] IN BLOOD BY AUTOMATED COUNT: 14.2 % (ref 11.9–14.6)
ERYTHROCYTE [DISTWIDTH] IN BLOOD BY AUTOMATED COUNT: 14.5 % (ref 11.9–14.6)
ERYTHROCYTE [DISTWIDTH] IN BLOOD BY AUTOMATED COUNT: 14.5 % (ref 11.9–14.6)
ERYTHROCYTE [DISTWIDTH] IN BLOOD BY AUTOMATED COUNT: 14.8 % (ref 11.9–14.6)
EST. AVERAGE GLUCOSE BLD GHB EST-MCNC: 189 MG/DL
GLOBULIN SER CALC-MCNC: 3.4 G/DL (ref 2.3–3.5)
GLOBULIN SER CALC-MCNC: 3.6 G/DL (ref 2.3–3.5)
GLOBULIN SER CALC-MCNC: 3.7 G/DL (ref 2.3–3.5)
GLUCOSE BLD STRIP.AUTO-MCNC: 107 MG/DL (ref 65–100)
GLUCOSE BLD STRIP.AUTO-MCNC: 112 MG/DL (ref 65–100)
GLUCOSE BLD STRIP.AUTO-MCNC: 122 MG/DL (ref 65–100)
GLUCOSE BLD STRIP.AUTO-MCNC: 147 MG/DL (ref 65–100)
GLUCOSE BLD STRIP.AUTO-MCNC: 148 MG/DL (ref 65–100)
GLUCOSE BLD STRIP.AUTO-MCNC: 148 MG/DL (ref 65–100)
GLUCOSE BLD STRIP.AUTO-MCNC: 151 MG/DL (ref 65–100)
GLUCOSE BLD STRIP.AUTO-MCNC: 154 MG/DL (ref 65–100)
GLUCOSE BLD STRIP.AUTO-MCNC: 161 MG/DL (ref 65–100)
GLUCOSE BLD STRIP.AUTO-MCNC: 167 MG/DL (ref 65–100)
GLUCOSE BLD STRIP.AUTO-MCNC: 176 MG/DL (ref 65–100)
GLUCOSE BLD STRIP.AUTO-MCNC: 184 MG/DL (ref 65–100)
GLUCOSE BLD STRIP.AUTO-MCNC: 187 MG/DL (ref 65–100)
GLUCOSE BLD STRIP.AUTO-MCNC: 191 MG/DL (ref 65–100)
GLUCOSE BLD STRIP.AUTO-MCNC: 194 MG/DL (ref 65–100)
GLUCOSE BLD STRIP.AUTO-MCNC: 194 MG/DL (ref 65–100)
GLUCOSE BLD STRIP.AUTO-MCNC: 198 MG/DL (ref 65–100)
GLUCOSE BLD STRIP.AUTO-MCNC: 202 MG/DL (ref 65–100)
GLUCOSE BLD STRIP.AUTO-MCNC: 204 MG/DL (ref 65–100)
GLUCOSE BLD STRIP.AUTO-MCNC: 207 MG/DL (ref 65–100)
GLUCOSE BLD STRIP.AUTO-MCNC: 209 MG/DL (ref 65–100)
GLUCOSE BLD STRIP.AUTO-MCNC: 210 MG/DL (ref 65–100)
GLUCOSE BLD STRIP.AUTO-MCNC: 219 MG/DL (ref 65–100)
GLUCOSE BLD STRIP.AUTO-MCNC: 222 MG/DL (ref 65–100)
GLUCOSE BLD STRIP.AUTO-MCNC: 224 MG/DL (ref 65–100)
GLUCOSE BLD STRIP.AUTO-MCNC: 256 MG/DL (ref 65–100)
GLUCOSE BLD STRIP.AUTO-MCNC: 259 MG/DL (ref 65–100)
GLUCOSE BLD STRIP.AUTO-MCNC: 264 MG/DL (ref 65–100)
GLUCOSE BLD STRIP.AUTO-MCNC: 279 MG/DL (ref 65–100)
GLUCOSE BLD STRIP.AUTO-MCNC: 286 MG/DL (ref 65–100)
GLUCOSE BLD STRIP.AUTO-MCNC: 301 MG/DL (ref 65–100)
GLUCOSE BLD STRIP.AUTO-MCNC: 328 MG/DL (ref 65–100)
GLUCOSE BLD STRIP.AUTO-MCNC: 360 MG/DL (ref 65–100)
GLUCOSE BLD STRIP.AUTO-MCNC: 361 MG/DL (ref 65–100)
GLUCOSE BLD STRIP.AUTO-MCNC: 418 MG/DL (ref 65–100)
GLUCOSE BLD STRIP.AUTO-MCNC: 44 MG/DL (ref 65–100)
GLUCOSE BLD STRIP.AUTO-MCNC: 53 MG/DL (ref 65–100)
GLUCOSE SERPL-MCNC: 137 MG/DL (ref 65–100)
GLUCOSE SERPL-MCNC: 145 MG/DL (ref 65–100)
GLUCOSE SERPL-MCNC: 151 MG/DL (ref 65–100)
GLUCOSE SERPL-MCNC: 151 MG/DL (ref 65–100)
GLUCOSE SERPL-MCNC: 172 MG/DL (ref 65–100)
GLUCOSE SERPL-MCNC: 183 MG/DL (ref 65–100)
GLUCOSE SERPL-MCNC: 186 MG/DL (ref 65–100)
GLUCOSE SERPL-MCNC: 197 MG/DL (ref 65–100)
GLUCOSE SERPL-MCNC: 214 MG/DL (ref 65–100)
GLUCOSE SERPL-MCNC: 260 MG/DL (ref 65–100)
GLUCOSE SERPL-MCNC: 266 MG/DL (ref 65–100)
GLUCOSE UR STRIP.AUTO-MCNC: >1000 MG/DL
GLUCOSE UR STRIP.AUTO-MCNC: NEGATIVE MG/DL
HBA1C MFR BLD: 8.2 % (ref 4.8–6)
HCT VFR BLD AUTO: 40.9 % (ref 35.8–46.3)
HCT VFR BLD AUTO: 42.1 % (ref 35.8–46.3)
HCT VFR BLD AUTO: 42.4 % (ref 35.8–46.3)
HCT VFR BLD AUTO: 43.4 % (ref 35.8–46.3)
HCT VFR BLD AUTO: 44.2 % (ref 35.8–46.3)
HCT VFR BLD AUTO: 44.3 % (ref 35.8–46.3)
HDLC SERPL-MCNC: 40 MG/DL (ref 40–60)
HDLC SERPL: 3.3 {RATIO}
HGB BLD-MCNC: 13.7 G/DL (ref 11.7–15.4)
HGB BLD-MCNC: 13.8 G/DL (ref 11.7–15.4)
HGB BLD-MCNC: 13.9 G/DL (ref 11.7–15.4)
HGB BLD-MCNC: 14.1 G/DL (ref 11.7–15.4)
HGB BLD-MCNC: 14.7 G/DL (ref 11.7–15.4)
HGB BLD-MCNC: 14.8 G/DL (ref 11.7–15.4)
HGB UR QL STRIP: ABNORMAL
HGB UR QL STRIP: ABNORMAL
IMM GRANULOCYTES # BLD AUTO: 0 K/UL (ref 0–0.5)
IMM GRANULOCYTES # BLD AUTO: 0 K/UL (ref 0–0.5)
IMM GRANULOCYTES # BLD AUTO: 0.1 K/UL (ref 0–0.5)
IMM GRANULOCYTES NFR BLD AUTO: 0 % (ref 0–5)
IMM GRANULOCYTES NFR BLD AUTO: 0 % (ref 0–5)
IMM GRANULOCYTES NFR BLD AUTO: 1 % (ref 0–5)
INR PPP: 1.3
INR PPP: 1.8
INR PPP: 2.5
INR PPP: 3.2
INR PPP: 3.3
KETONES UR QL STRIP.AUTO: 15 MG/DL
KETONES UR QL STRIP.AUTO: NEGATIVE MG/DL
LDLC SERPL CALC-MCNC: 75.8 MG/DL
LEUKOCYTE ESTERASE UR QL STRIP.AUTO: ABNORMAL
LEUKOCYTE ESTERASE UR QL STRIP.AUTO: NEGATIVE
LIPID PROFILE,FLP: NORMAL
LYMPHOCYTES # BLD: 1.1 K/UL (ref 0.5–4.6)
LYMPHOCYTES # BLD: 1.3 K/UL (ref 0.5–4.6)
LYMPHOCYTES # BLD: 1.7 K/UL (ref 0.5–4.6)
LYMPHOCYTES NFR BLD: 14 % (ref 13–44)
LYMPHOCYTES NFR BLD: 14 % (ref 13–44)
LYMPHOCYTES NFR BLD: 19 % (ref 13–44)
MAGNESIUM SERPL-MCNC: 1.6 MG/DL (ref 1.8–2.4)
MAGNESIUM SERPL-MCNC: 1.7 MG/DL (ref 1.8–2.4)
MAGNESIUM SERPL-MCNC: 1.9 MG/DL (ref 1.8–2.4)
MAGNESIUM SERPL-MCNC: 2 MG/DL (ref 1.8–2.4)
MCH RBC QN AUTO: 33.3 PG (ref 26.1–32.9)
MCH RBC QN AUTO: 33.9 PG (ref 26.1–32.9)
MCH RBC QN AUTO: 34.1 PG (ref 26.1–32.9)
MCH RBC QN AUTO: 34.2 PG (ref 26.1–32.9)
MCH RBC QN AUTO: 34.3 PG (ref 26.1–32.9)
MCH RBC QN AUTO: 34.6 PG (ref 26.1–32.9)
MCHC RBC AUTO-ENTMCNC: 31.8 G/DL (ref 31.4–35)
MCHC RBC AUTO-ENTMCNC: 32.8 G/DL (ref 31.4–35)
MCHC RBC AUTO-ENTMCNC: 33.3 G/DL (ref 31.4–35)
MCHC RBC AUTO-ENTMCNC: 33.4 G/DL (ref 31.4–35)
MCHC RBC AUTO-ENTMCNC: 33.5 G/DL (ref 31.4–35)
MCHC RBC AUTO-ENTMCNC: 33.5 G/DL (ref 31.4–35)
MCV RBC AUTO: 101.7 FL (ref 79.6–97.8)
MCV RBC AUTO: 102.1 FL (ref 79.6–97.8)
MCV RBC AUTO: 102.4 FL (ref 79.6–97.8)
MCV RBC AUTO: 103.5 FL (ref 79.6–97.8)
MCV RBC AUTO: 104.4 FL (ref 79.6–97.8)
MCV RBC AUTO: 104.6 FL (ref 79.6–97.8)
MM INDURATION POC: 0 MM (ref 0–5)
MM INDURATION POC: 0 MM (ref 0–5)
MM INDURATION POC: NORMAL (ref 0–5)
MONOCYTES # BLD: 1 K/UL (ref 0.1–1.3)
MONOCYTES # BLD: 1 K/UL (ref 0.1–1.3)
MONOCYTES # BLD: 1.1 K/UL (ref 0.1–1.3)
MONOCYTES NFR BLD: 11 % (ref 4–12)
MONOCYTES NFR BLD: 11 % (ref 4–12)
MONOCYTES NFR BLD: 13 % (ref 4–12)
MUCOUS THREADS URNS QL MICRO: 0 /LPF
NEUTS SEG # BLD: 5.5 K/UL (ref 1.7–8.2)
NEUTS SEG # BLD: 6.1 K/UL (ref 1.7–8.2)
NEUTS SEG # BLD: 7.1 K/UL (ref 1.7–8.2)
NEUTS SEG NFR BLD: 67 % (ref 43–78)
NEUTS SEG NFR BLD: 69 % (ref 43–78)
NEUTS SEG NFR BLD: 74 % (ref 43–78)
NITRITE UR QL STRIP.AUTO: NEGATIVE
NITRITE UR QL STRIP.AUTO: NEGATIVE
NRBC # BLD: 0 K/UL (ref 0–0.2)
OTHER OBSERVATIONS,UCOM: ABNORMAL
PH UR STRIP: 6 [PH] (ref 5–9)
PH UR STRIP: 6 [PH] (ref 5–9)
PHOSPHATE SERPL-MCNC: 3.1 MG/DL (ref 2.3–3.7)
PLATELET # BLD AUTO: 110 K/UL (ref 150–450)
PLATELET # BLD AUTO: 133 K/UL (ref 150–450)
PLATELET # BLD AUTO: 144 K/UL (ref 150–450)
PLATELET # BLD AUTO: 146 K/UL (ref 150–450)
PLATELET # BLD AUTO: 165 K/UL (ref 150–450)
PLATELET # BLD AUTO: 215 K/UL (ref 150–450)
PMV BLD AUTO: 10 FL (ref 9.4–12.3)
PMV BLD AUTO: 10.2 FL (ref 9.4–12.3)
PMV BLD AUTO: 9 FL (ref 9.4–12.3)
PMV BLD AUTO: 9.7 FL (ref 9.4–12.3)
PMV BLD AUTO: 9.8 FL (ref 9.4–12.3)
PMV BLD AUTO: 9.9 FL (ref 9.4–12.3)
POTASSIUM SERPL-SCNC: 3.6 MMOL/L (ref 3.5–5.1)
POTASSIUM SERPL-SCNC: 4 MMOL/L (ref 3.5–5.1)
POTASSIUM SERPL-SCNC: 4.1 MMOL/L (ref 3.5–5.1)
POTASSIUM SERPL-SCNC: 4.2 MMOL/L (ref 3.5–5.1)
POTASSIUM SERPL-SCNC: 4.2 MMOL/L (ref 3.5–5.1)
POTASSIUM SERPL-SCNC: 4.3 MMOL/L (ref 3.5–5.1)
POTASSIUM SERPL-SCNC: 4.4 MMOL/L (ref 3.5–5.1)
POTASSIUM SERPL-SCNC: 4.4 MMOL/L (ref 3.5–5.1)
PPD POC: NEGATIVE NEGATIVE
PPD POC: NEGATIVE NEGATIVE
PPD POC: NORMAL
PROT SERPL-MCNC: 6.1 G/DL (ref 6.3–8.2)
PROT SERPL-MCNC: 6.3 G/DL (ref 6.3–8.2)
PROT SERPL-MCNC: 6.5 G/DL (ref 6.3–8.2)
PROT UR STRIP-MCNC: 100 MG/DL
PROT UR STRIP-MCNC: NEGATIVE MG/DL
PROTHROMBIN TIME: 16.2 SEC (ref 11.7–14.5)
PROTHROMBIN TIME: 20.9 SEC (ref 11.7–14.5)
PROTHROMBIN TIME: 28.1 SEC (ref 11.7–14.5)
PROTHROMBIN TIME: 33.5 SEC (ref 11.7–14.5)
PROTHROMBIN TIME: 34.2 SEC (ref 11.7–14.5)
Q-T INTERVAL, ECG07: 328 MS
Q-T INTERVAL, ECG07: 362 MS
Q-T INTERVAL, ECG07: 424 MS
QRS DURATION, ECG06: 160 MS
QRS DURATION, ECG06: 76 MS
QRS DURATION, ECG06: 90 MS
QTC CALCULATION (BEZET), ECG08: 376 MS
QTC CALCULATION (BEZET), ECG08: 427 MS
QTC CALCULATION (BEZET), ECG08: 467 MS
RBC # BLD AUTO: 4.02 M/UL (ref 4.05–5.2)
RBC # BLD AUTO: 4.06 M/UL (ref 4.05–5.2)
RBC # BLD AUTO: 4.11 M/UL (ref 4.05–5.2)
RBC # BLD AUTO: 4.15 M/UL (ref 4.05–5.2)
RBC # BLD AUTO: 4.28 M/UL (ref 4.05–5.2)
RBC # BLD AUTO: 4.33 M/UL (ref 4.05–5.2)
RBC #/AREA URNS HPF: >100 /HPF
RBC #/AREA URNS HPF: >100 /HPF
RBC #/AREA URNS HPF: NORMAL /HPF
SERVICE CMNT-IMP: NORMAL
SERVICE CMNT-IMP: NORMAL
SODIUM SERPL-SCNC: 136 MMOL/L (ref 136–145)
SODIUM SERPL-SCNC: 137 MMOL/L (ref 136–145)
SODIUM SERPL-SCNC: 138 MMOL/L (ref 136–145)
SODIUM SERPL-SCNC: 139 MMOL/L (ref 136–145)
SODIUM SERPL-SCNC: 139 MMOL/L (ref 136–145)
SODIUM SERPL-SCNC: 140 MMOL/L (ref 136–145)
SP GR UR REFRACTOMETRY: 1.02 (ref 1–1.02)
SP GR UR REFRACTOMETRY: 1.03 (ref 1–1.02)
T4 FREE SERPL-MCNC: 1.1 NG/DL (ref 0.78–1.46)
TRIGL SERPL-MCNC: 81 MG/DL (ref 35–150)
TROPONIN I SERPL-MCNC: 0.03 NG/ML (ref 0.02–0.05)
TROPONIN I SERPL-MCNC: 0.07 NG/ML (ref 0.02–0.05)
TROPONIN I SERPL-MCNC: 0.12 NG/ML (ref 0.02–0.05)
TROPONIN I SERPL-MCNC: 0.13 NG/ML (ref 0.02–0.05)
TSH SERPL DL<=0.005 MIU/L-ACNC: 0.99 UIU/ML (ref 0.36–3.74)
UROBILINOGEN UR QL STRIP.AUTO: 0.2 EU/DL (ref 0.2–1)
UROBILINOGEN UR QL STRIP.AUTO: 1 EU/DL (ref 0.2–1)
VENTRICULAR RATE, ECG03: 73 BPM
VENTRICULAR RATE, ECG03: 79 BPM
VENTRICULAR RATE, ECG03: 84 BPM
VLDLC SERPL CALC-MCNC: 16.2 MG/DL (ref 6–23)
WBC # BLD AUTO: 8 K/UL (ref 4.3–11.1)
WBC # BLD AUTO: 8.7 K/UL (ref 4.3–11.1)
WBC # BLD AUTO: 9 K/UL (ref 4.3–11.1)
WBC # BLD AUTO: 9 K/UL (ref 4.3–11.1)
WBC # BLD AUTO: 9.7 K/UL (ref 4.3–11.1)
WBC # BLD AUTO: 9.9 K/UL (ref 4.3–11.1)
WBC URNS QL MICRO: ABNORMAL /HPF
WBC URNS QL MICRO: ABNORMAL /HPF
WBC URNS QL MICRO: NORMAL /HPF

## 2019-01-01 PROCEDURE — 3331090001 HH PPS REVENUE CREDIT

## 2019-01-01 PROCEDURE — 3331090002 HH PPS REVENUE DEBIT

## 2019-01-01 PROCEDURE — 77030011256 HC DRSG MEPILEX <16IN NO BORD MOLN -A

## 2019-01-01 PROCEDURE — 74011250637 HC RX REV CODE- 250/637: Performed by: NURSE PRACTITIONER

## 2019-01-01 PROCEDURE — A9575 INJ GADOTERATE MEGLUMI 0.1ML: HCPCS | Performed by: INTERNAL MEDICINE

## 2019-01-01 PROCEDURE — G0299 HHS/HOSPICE OF RN EA 15 MIN: HCPCS

## 2019-01-01 PROCEDURE — 77030011943: Performed by: EMERGENCY MEDICINE

## 2019-01-01 PROCEDURE — 93005 ELECTROCARDIOGRAM TRACING: CPT | Performed by: EMERGENCY MEDICINE

## 2019-01-01 PROCEDURE — 74011636637 HC RX REV CODE- 636/637: Performed by: INTERNAL MEDICINE

## 2019-01-01 PROCEDURE — 74011250636 HC RX REV CODE- 250/636: Performed by: FAMILY MEDICINE

## 2019-01-01 PROCEDURE — 81015 MICROSCOPIC EXAM OF URINE: CPT

## 2019-01-01 PROCEDURE — 82962 GLUCOSE BLOOD TEST: CPT

## 2019-01-01 PROCEDURE — 77010033678 HC OXYGEN DAILY

## 2019-01-01 PROCEDURE — 99218 HC RM OBSERVATION: CPT

## 2019-01-01 PROCEDURE — 36415 COLL VENOUS BLD VENIPUNCTURE: CPT

## 2019-01-01 PROCEDURE — 74011636637 HC RX REV CODE- 636/637: Performed by: FAMILY MEDICINE

## 2019-01-01 PROCEDURE — 74011250636 HC RX REV CODE- 250/636: Performed by: INTERNAL MEDICINE

## 2019-01-01 PROCEDURE — 83036 HEMOGLOBIN GLYCOSYLATED A1C: CPT

## 2019-01-01 PROCEDURE — 99284 EMERGENCY DEPT VISIT MOD MDM: CPT | Performed by: EMERGENCY MEDICINE

## 2019-01-01 PROCEDURE — 95822 EEG COMA OR SLEEP ONLY: CPT | Performed by: PSYCHIATRY & NEUROLOGY

## 2019-01-01 PROCEDURE — 87086 URINE CULTURE/COLONY COUNT: CPT

## 2019-01-01 PROCEDURE — 65660000000 HC RM CCU STEPDOWN

## 2019-01-01 PROCEDURE — A6212 FOAM DRG <=16 SQ IN W/BORDER: HCPCS

## 2019-01-01 PROCEDURE — 74011250637 HC RX REV CODE- 250/637: Performed by: INTERNAL MEDICINE

## 2019-01-01 PROCEDURE — 3331090003 HH PPS REVENUE ADJ

## 2019-01-01 PROCEDURE — 65270000029 HC RM PRIVATE

## 2019-01-01 PROCEDURE — 74011000258 HC RX REV CODE- 258: Performed by: INTERNAL MEDICINE

## 2019-01-01 PROCEDURE — 74011250636 HC RX REV CODE- 250/636

## 2019-01-01 PROCEDURE — 93971 EXTREMITY STUDY: CPT

## 2019-01-01 PROCEDURE — 85027 COMPLETE CBC AUTOMATED: CPT

## 2019-01-01 PROCEDURE — 75574 CT ANGIO HRT W/3D IMAGE: CPT

## 2019-01-01 PROCEDURE — 97535 SELF CARE MNGMENT TRAINING: CPT

## 2019-01-01 PROCEDURE — 84439 ASSAY OF FREE THYROXINE: CPT

## 2019-01-01 PROCEDURE — 83735 ASSAY OF MAGNESIUM: CPT

## 2019-01-01 PROCEDURE — 80048 BASIC METABOLIC PNL TOTAL CA: CPT

## 2019-01-01 PROCEDURE — 97166 OT EVAL MOD COMPLEX 45 MIN: CPT

## 2019-01-01 PROCEDURE — 74011000250 HC RX REV CODE- 250: Performed by: INTERNAL MEDICINE

## 2019-01-01 PROCEDURE — 85379 FIBRIN DEGRADATION QUANT: CPT

## 2019-01-01 PROCEDURE — 74011636320 HC RX REV CODE- 636/320: Performed by: INTERNAL MEDICINE

## 2019-01-01 PROCEDURE — 84484 ASSAY OF TROPONIN QUANT: CPT

## 2019-01-01 PROCEDURE — 95816 EEG AWAKE AND DROWSY: CPT | Performed by: INTERNAL MEDICINE

## 2019-01-01 PROCEDURE — 85610 PROTHROMBIN TIME: CPT

## 2019-01-01 PROCEDURE — 74011250637 HC RX REV CODE- 250/637: Performed by: FAMILY MEDICINE

## 2019-01-01 PROCEDURE — 81001 URINALYSIS AUTO W/SCOPE: CPT

## 2019-01-01 PROCEDURE — 71275 CT ANGIOGRAPHY CHEST: CPT

## 2019-01-01 PROCEDURE — 400014 HH F/U

## 2019-01-01 PROCEDURE — 94760 N-INVAS EAR/PLS OXIMETRY 1: CPT

## 2019-01-01 PROCEDURE — 97530 THERAPEUTIC ACTIVITIES: CPT

## 2019-01-01 PROCEDURE — 0T9B70Z DRAINAGE OF BLADDER WITH DRAINAGE DEVICE, VIA NATURAL OR ARTIFICIAL OPENING: ICD-10-PCS | Performed by: EMERGENCY MEDICINE

## 2019-01-01 PROCEDURE — 93880 EXTRACRANIAL BILAT STUDY: CPT

## 2019-01-01 PROCEDURE — 87040 BLOOD CULTURE FOR BACTERIA: CPT

## 2019-01-01 PROCEDURE — 80053 COMPREHEN METABOLIC PANEL: CPT

## 2019-01-01 PROCEDURE — 71046 X-RAY EXAM CHEST 2 VIEWS: CPT

## 2019-01-01 PROCEDURE — 99152 MOD SED SAME PHYS/QHP 5/>YRS: CPT

## 2019-01-01 PROCEDURE — 85025 COMPLETE CBC W/AUTO DIFF WBC: CPT

## 2019-01-01 PROCEDURE — 70496 CT ANGIOGRAPHY HEAD: CPT

## 2019-01-01 PROCEDURE — 72156 MRI NECK SPINE W/O & W/DYE: CPT

## 2019-01-01 PROCEDURE — 71045 X-RAY EXAM CHEST 1 VIEW: CPT

## 2019-01-01 PROCEDURE — 99232 SBSQ HOSP IP/OBS MODERATE 35: CPT | Performed by: PSYCHIATRY & NEUROLOGY

## 2019-01-01 PROCEDURE — 77030019605

## 2019-01-01 PROCEDURE — 86580 TB INTRADERMAL TEST: CPT | Performed by: INTERNAL MEDICINE

## 2019-01-01 PROCEDURE — 70553 MRI BRAIN STEM W/O & W/DYE: CPT

## 2019-01-01 PROCEDURE — 84100 ASSAY OF PHOSPHORUS: CPT

## 2019-01-01 PROCEDURE — 93312 ECHO TRANSESOPHAGEAL: CPT

## 2019-01-01 PROCEDURE — 93005 ELECTROCARDIOGRAM TRACING: CPT | Performed by: INTERNAL MEDICINE

## 2019-01-01 PROCEDURE — 99223 1ST HOSP IP/OBS HIGH 75: CPT | Performed by: PSYCHIATRY & NEUROLOGY

## 2019-01-01 PROCEDURE — 81003 URINALYSIS AUTO W/O SCOPE: CPT | Performed by: EMERGENCY MEDICINE

## 2019-01-01 PROCEDURE — 97162 PT EVAL MOD COMPLEX 30 MIN: CPT

## 2019-01-01 PROCEDURE — 74011000250 HC RX REV CODE- 250

## 2019-01-01 PROCEDURE — 51701 INSERT BLADDER CATHETER: CPT | Performed by: EMERGENCY MEDICINE

## 2019-01-01 PROCEDURE — 74011000302 HC RX REV CODE- 302: Performed by: INTERNAL MEDICINE

## 2019-01-01 PROCEDURE — 84443 ASSAY THYROID STIM HORMONE: CPT

## 2019-01-01 PROCEDURE — 99232 SBSQ HOSP IP/OBS MODERATE 35: CPT | Performed by: NURSE PRACTITIONER

## 2019-01-01 PROCEDURE — 80061 LIPID PANEL: CPT

## 2019-01-01 PROCEDURE — 99285 EMERGENCY DEPT VISIT HI MDM: CPT | Performed by: EMERGENCY MEDICINE

## 2019-01-01 PROCEDURE — 83880 ASSAY OF NATRIURETIC PEPTIDE: CPT

## 2019-01-01 PROCEDURE — 80162 ASSAY OF DIGOXIN TOTAL: CPT

## 2019-01-01 PROCEDURE — A6402 STERILE GAUZE <= 16 SQ IN: HCPCS

## 2019-01-01 PROCEDURE — 70450 CT HEAD/BRAIN W/O DYE: CPT

## 2019-01-01 RX ORDER — FUROSEMIDE 20 MG/1
20 TABLET ORAL DAILY
Status: DISCONTINUED | OUTPATIENT
Start: 2019-01-01 | End: 2019-01-01 | Stop reason: HOSPADM

## 2019-01-01 RX ORDER — SULFAMETHOXAZOLE AND TRIMETHOPRIM 800; 160 MG/1; MG/1
1 TABLET ORAL EVERY 12 HOURS
Status: DISCONTINUED | OUTPATIENT
Start: 2019-01-01 | End: 2019-01-01 | Stop reason: HOSPADM

## 2019-01-01 RX ORDER — PREDNISONE 10 MG/1
5 TABLET ORAL
Status: DISCONTINUED | OUTPATIENT
Start: 2019-01-01 | End: 2019-01-01 | Stop reason: HOSPADM

## 2019-01-01 RX ORDER — LIDOCAINE HYDROCHLORIDE 20 MG/ML
15 SOLUTION OROPHARYNGEAL AS NEEDED
Status: DISCONTINUED | OUTPATIENT
Start: 2019-01-01 | End: 2019-01-01 | Stop reason: HOSPADM

## 2019-01-01 RX ORDER — LEVETIRACETAM 500 MG/1
500 TABLET ORAL EVERY 12 HOURS
Qty: 60 TAB | Refills: 0 | Status: SHIPPED | OUTPATIENT
Start: 2019-01-01

## 2019-01-01 RX ORDER — SODIUM CHLORIDE 0.9 % (FLUSH) 0.9 %
5-40 SYRINGE (ML) INJECTION AS NEEDED
Status: DISCONTINUED | OUTPATIENT
Start: 2019-01-01 | End: 2019-01-01 | Stop reason: HOSPADM

## 2019-01-01 RX ORDER — NALOXONE HYDROCHLORIDE 4 MG/.1ML
SPRAY NASAL
Qty: 1 EACH | Refills: 0 | Status: SHIPPED | OUTPATIENT
Start: 2019-01-01

## 2019-01-01 RX ORDER — ONDANSETRON 2 MG/ML
4 INJECTION INTRAMUSCULAR; INTRAVENOUS ONCE
Status: DISCONTINUED | OUTPATIENT
Start: 2019-01-01 | End: 2019-01-01 | Stop reason: HOSPADM

## 2019-01-01 RX ORDER — FUROSEMIDE 20 MG/1
TABLET ORAL
Qty: 3 TAB | Refills: 0 | Status: SHIPPED | OUTPATIENT
Start: 2019-01-01

## 2019-01-01 RX ORDER — ENOXAPARIN SODIUM 100 MG/ML
1 INJECTION SUBCUTANEOUS ONCE
Status: COMPLETED | OUTPATIENT
Start: 2019-01-01 | End: 2019-01-01

## 2019-01-01 RX ORDER — MAGNESIUM SULFATE HEPTAHYDRATE 40 MG/ML
2 INJECTION, SOLUTION INTRAVENOUS ONCE
Status: COMPLETED | OUTPATIENT
Start: 2019-01-01 | End: 2019-01-01

## 2019-01-01 RX ORDER — DEXTROSE 40 %
15 GEL (GRAM) ORAL AS NEEDED
Status: DISCONTINUED | OUTPATIENT
Start: 2019-01-01 | End: 2019-01-01 | Stop reason: HOSPADM

## 2019-01-01 RX ORDER — INSULIN LISPRO 100 [IU]/ML
INJECTION, SOLUTION INTRAVENOUS; SUBCUTANEOUS
Status: DISCONTINUED | OUTPATIENT
Start: 2019-01-01 | End: 2019-01-01 | Stop reason: HOSPADM

## 2019-01-01 RX ORDER — SODIUM CHLORIDE 9 MG/ML
75 INJECTION, SOLUTION INTRAVENOUS CONTINUOUS
Status: DISCONTINUED | OUTPATIENT
Start: 2019-01-01 | End: 2019-01-01 | Stop reason: HOSPADM

## 2019-01-01 RX ORDER — METOPROLOL TARTRATE 50 MG/1
50 TABLET ORAL 2 TIMES DAILY
Status: DISCONTINUED | OUTPATIENT
Start: 2019-01-01 | End: 2019-01-01 | Stop reason: HOSPADM

## 2019-01-01 RX ORDER — WARFARIN 2 MG/1
2 TABLET ORAL EVERY EVENING
Qty: 6 TAB | Refills: 0 | Status: SHIPPED | OUTPATIENT
Start: 2019-01-01

## 2019-01-01 RX ORDER — METHIMAZOLE 5 MG/1
10 TABLET ORAL
Status: DISCONTINUED | OUTPATIENT
Start: 2019-01-01 | End: 2019-01-01 | Stop reason: HOSPADM

## 2019-01-01 RX ORDER — ONDANSETRON 2 MG/ML
4 INJECTION INTRAMUSCULAR; INTRAVENOUS
Status: DISCONTINUED | OUTPATIENT
Start: 2019-01-01 | End: 2019-01-01

## 2019-01-01 RX ORDER — HYDROCODONE BITARTRATE AND ACETAMINOPHEN 5; 325 MG/1; MG/1
1 TABLET ORAL
Qty: 12 TAB | Refills: 0 | Status: SHIPPED | OUTPATIENT
Start: 2019-01-01 | End: 2019-01-01

## 2019-01-01 RX ORDER — WARFARIN 2 MG/1
2 TABLET ORAL EVERY EVENING
Status: DISCONTINUED | OUTPATIENT
Start: 2019-01-01 | End: 2019-01-01 | Stop reason: HOSPADM

## 2019-01-01 RX ORDER — FUROSEMIDE 10 MG/ML
40 INJECTION INTRAMUSCULAR; INTRAVENOUS ONCE
Status: COMPLETED | OUTPATIENT
Start: 2019-01-01 | End: 2019-01-01

## 2019-01-01 RX ORDER — HYDROCODONE BITARTRATE AND ACETAMINOPHEN 5; 325 MG/1; MG/1
1 TABLET ORAL
Status: DISCONTINUED | OUTPATIENT
Start: 2019-01-01 | End: 2019-01-01 | Stop reason: HOSPADM

## 2019-01-01 RX ORDER — SODIUM CHLORIDE 0.9 % (FLUSH) 0.9 %
5-40 SYRINGE (ML) INJECTION EVERY 8 HOURS
Status: DISCONTINUED | OUTPATIENT
Start: 2019-01-01 | End: 2019-01-01 | Stop reason: HOSPADM

## 2019-01-01 RX ORDER — SODIUM CHLORIDE 0.9 % (FLUSH) 0.9 %
10 SYRINGE (ML) INJECTION
Status: COMPLETED | OUTPATIENT
Start: 2019-01-01 | End: 2019-01-01

## 2019-01-01 RX ORDER — INSULIN LISPRO 100 [IU]/ML
INJECTION, SOLUTION INTRAVENOUS; SUBCUTANEOUS
Qty: 1 VIAL | Refills: 0 | Status: SHIPPED | OUTPATIENT
Start: 2019-01-01

## 2019-01-01 RX ORDER — METHIMAZOLE 5 MG/1
7.5 TABLET ORAL
Status: DISCONTINUED | OUTPATIENT
Start: 2019-01-01 | End: 2019-01-01 | Stop reason: HOSPADM

## 2019-01-01 RX ORDER — POLYETHYLENE GLYCOL 3350 17 G/17G
17 POWDER, FOR SOLUTION ORAL DAILY
Qty: 3 PACKET | Refills: 0 | Status: SHIPPED | OUTPATIENT
Start: 2019-01-01

## 2019-01-01 RX ORDER — LANOLIN ALCOHOL/MO/W.PET/CERES
1 CREAM (GRAM) TOPICAL
Status: DISCONTINUED | OUTPATIENT
Start: 2019-01-01 | End: 2019-01-01 | Stop reason: HOSPADM

## 2019-01-01 RX ORDER — WARFARIN 2 MG/1
4 TABLET ORAL EVERY EVENING
Status: DISCONTINUED | OUTPATIENT
Start: 2019-01-01 | End: 2019-01-01

## 2019-01-01 RX ORDER — WARFARIN SODIUM 5 MG/1
5 TABLET ORAL EVERY EVENING
Status: DISCONTINUED | OUTPATIENT
Start: 2019-01-01 | End: 2019-01-01

## 2019-01-01 RX ORDER — DEXTROSE 50 % IN WATER (D50W) INTRAVENOUS SYRINGE
25-50 AS NEEDED
Status: DISCONTINUED | OUTPATIENT
Start: 2019-01-01 | End: 2019-01-01 | Stop reason: HOSPADM

## 2019-01-01 RX ORDER — METHIMAZOLE 5 MG/1
TABLET ORAL
Qty: 6 TAB | Refills: 0 | Status: SHIPPED | OUTPATIENT
Start: 2019-01-01

## 2019-01-01 RX ORDER — INSULIN LISPRO 100 [IU]/ML
3 INJECTION, SOLUTION INTRAVENOUS; SUBCUTANEOUS
Status: DISCONTINUED | OUTPATIENT
Start: 2019-01-01 | End: 2019-01-01

## 2019-01-01 RX ORDER — FENTANYL CITRATE 50 UG/ML
25-50 INJECTION, SOLUTION INTRAMUSCULAR; INTRAVENOUS
Status: DISCONTINUED | OUTPATIENT
Start: 2019-01-01 | End: 2019-01-01 | Stop reason: HOSPADM

## 2019-01-01 RX ORDER — WARFARIN SODIUM 5 MG/1
5 TABLET ORAL EVERY EVENING
Qty: 30 TAB | Refills: 0 | Status: SHIPPED | OUTPATIENT
Start: 2019-01-01 | End: 2019-01-01

## 2019-01-01 RX ORDER — MIDAZOLAM HYDROCHLORIDE 1 MG/ML
.5-2 INJECTION, SOLUTION INTRAMUSCULAR; INTRAVENOUS
Status: DISCONTINUED | OUTPATIENT
Start: 2019-01-01 | End: 2019-01-01 | Stop reason: HOSPADM

## 2019-01-01 RX ORDER — SODIUM CHLORIDE 0.9 % (FLUSH) 0.9 %
10 SYRINGE (ML) INJECTION
Status: ACTIVE | OUTPATIENT
Start: 2019-01-01 | End: 2019-01-01

## 2019-01-01 RX ORDER — ASPIRIN 81 MG/1
81 TABLET ORAL DAILY
Status: DISCONTINUED | OUTPATIENT
Start: 2019-01-01 | End: 2019-01-01 | Stop reason: HOSPADM

## 2019-01-01 RX ORDER — GADOTERATE MEGLUMINE 376.9 MG/ML
10 INJECTION INTRAVENOUS
Status: COMPLETED | OUTPATIENT
Start: 2019-01-01 | End: 2019-01-01

## 2019-01-01 RX ORDER — NALOXONE HYDROCHLORIDE 0.4 MG/ML
0.4 INJECTION, SOLUTION INTRAMUSCULAR; INTRAVENOUS; SUBCUTANEOUS AS NEEDED
Status: DISCONTINUED | OUTPATIENT
Start: 2019-01-01 | End: 2019-01-01 | Stop reason: HOSPADM

## 2019-01-01 RX ORDER — LEVETIRACETAM 500 MG/1
500 TABLET ORAL EVERY 12 HOURS
Status: DISCONTINUED | OUTPATIENT
Start: 2019-01-01 | End: 2019-01-01 | Stop reason: HOSPADM

## 2019-01-01 RX ORDER — PANTOPRAZOLE SODIUM 40 MG/1
40 TABLET, DELAYED RELEASE ORAL
Status: DISCONTINUED | OUTPATIENT
Start: 2019-01-01 | End: 2019-01-01 | Stop reason: HOSPADM

## 2019-01-01 RX ORDER — ENOXAPARIN SODIUM 100 MG/ML
1 INJECTION SUBCUTANEOUS EVERY 12 HOURS
Status: DISCONTINUED | OUTPATIENT
Start: 2019-01-01 | End: 2019-01-01

## 2019-01-01 RX ORDER — SULFAMETHOXAZOLE AND TRIMETHOPRIM 800; 160 MG/1; MG/1
1 TABLET ORAL EVERY 12 HOURS
Qty: 6 TAB | Refills: 0 | Status: SHIPPED | OUTPATIENT
Start: 2019-01-01

## 2019-01-01 RX ORDER — LORAZEPAM 1 MG/1
1 TABLET ORAL 2 TIMES DAILY
Status: DISCONTINUED | OUTPATIENT
Start: 2019-01-01 | End: 2019-01-01 | Stop reason: HOSPADM

## 2019-01-01 RX ORDER — LANOLIN ALCOHOL/MO/W.PET/CERES
500 CREAM (GRAM) TOPICAL DAILY
Status: DISCONTINUED | OUTPATIENT
Start: 2019-01-01 | End: 2019-01-01 | Stop reason: HOSPADM

## 2019-01-01 RX ORDER — INSULIN GLARGINE 100 [IU]/ML
5 INJECTION, SOLUTION SUBCUTANEOUS DAILY
Status: DISCONTINUED | OUTPATIENT
Start: 2019-01-01 | End: 2019-01-01 | Stop reason: HOSPADM

## 2019-01-01 RX ORDER — FUROSEMIDE 40 MG/1
40 TABLET ORAL DAILY
Status: DISCONTINUED | OUTPATIENT
Start: 2019-01-01 | End: 2019-01-01

## 2019-01-01 RX ORDER — GLIPIZIDE 5 MG/1
5 TABLET ORAL DAILY
COMMUNITY
End: 2019-01-01

## 2019-01-01 RX ORDER — POLYETHYLENE GLYCOL 3350 17 G/17G
17 POWDER, FOR SOLUTION ORAL DAILY
Status: DISCONTINUED | OUTPATIENT
Start: 2019-01-01 | End: 2019-01-01 | Stop reason: HOSPADM

## 2019-01-01 RX ORDER — LORAZEPAM 1 MG/1
1 TABLET ORAL 2 TIMES DAILY
Qty: 6 TAB | Refills: 0 | Status: SHIPPED | OUTPATIENT
Start: 2019-01-01

## 2019-01-01 RX ORDER — DIGOXIN 125 MCG
0.12 TABLET ORAL DAILY
Status: DISCONTINUED | OUTPATIENT
Start: 2019-01-01 | End: 2019-01-01 | Stop reason: HOSPADM

## 2019-01-01 RX ORDER — METHIMAZOLE 10 MG/1
TABLET ORAL
Qty: 4 TAB | Refills: 0 | Status: SHIPPED | OUTPATIENT
Start: 2019-01-01

## 2019-01-01 RX ORDER — DEXTROSE 50 % IN WATER (D50W) INTRAVENOUS SYRINGE
Status: DISCONTINUED
Start: 2019-01-01 | End: 2019-01-01

## 2019-01-01 RX ORDER — INSULIN GLARGINE 100 [IU]/ML
INJECTION, SOLUTION SUBCUTANEOUS
Qty: 1 VIAL | Refills: 0 | Status: SHIPPED | OUTPATIENT
Start: 2019-01-01

## 2019-01-01 RX ADMIN — Medication 10 ML: at 15:41

## 2019-01-01 RX ADMIN — ENOXAPARIN SODIUM 50 MG: 60 INJECTION SUBCUTANEOUS at 08:28

## 2019-01-01 RX ADMIN — Medication 10 ML: at 21:43

## 2019-01-01 RX ADMIN — INSULIN GLARGINE 5 UNITS: 100 INJECTION, SOLUTION SUBCUTANEOUS at 08:27

## 2019-01-01 RX ADMIN — PANTOPRAZOLE SODIUM 40 MG: 40 TABLET, DELAYED RELEASE ORAL at 05:43

## 2019-01-01 RX ADMIN — LORAZEPAM 1 MG: 1 TABLET ORAL at 08:50

## 2019-01-01 RX ADMIN — CYANOCOBALAMIN TAB 1000 MCG 500 MCG: 1000 TAB at 08:21

## 2019-01-01 RX ADMIN — LEVETIRACETAM 500 MG: 500 TABLET, FILM COATED ORAL at 08:07

## 2019-01-01 RX ADMIN — PREDNISONE 5 MG: 10 TABLET ORAL at 09:35

## 2019-01-01 RX ADMIN — PANTOPRAZOLE SODIUM 40 MG: 40 TABLET, DELAYED RELEASE ORAL at 05:11

## 2019-01-01 RX ADMIN — INSULIN LISPRO 1 UNITS: 100 INJECTION, SOLUTION INTRAVENOUS; SUBCUTANEOUS at 08:23

## 2019-01-01 RX ADMIN — INSULIN LISPRO 5 UNITS: 100 INJECTION, SOLUTION INTRAVENOUS; SUBCUTANEOUS at 17:09

## 2019-01-01 RX ADMIN — ASPIRIN 81 MG: 81 TABLET, COATED ORAL at 08:22

## 2019-01-01 RX ADMIN — PANTOPRAZOLE SODIUM 40 MG: 40 TABLET, DELAYED RELEASE ORAL at 05:07

## 2019-01-01 RX ADMIN — METOPROLOL TARTRATE 50 MG: 50 TABLET ORAL at 08:22

## 2019-01-01 RX ADMIN — IOPAMIDOL 75 ML: 755 INJECTION, SOLUTION INTRAVENOUS at 09:27

## 2019-01-01 RX ADMIN — PANTOPRAZOLE SODIUM 40 MG: 40 TABLET, DELAYED RELEASE ORAL at 05:28

## 2019-01-01 RX ADMIN — METHIMAZOLE 7.5 MG: 5 TABLET ORAL at 17:30

## 2019-01-01 RX ADMIN — LEVETIRACETAM 500 MG: 500 TABLET, FILM COATED ORAL at 11:39

## 2019-01-01 RX ADMIN — PANTOPRAZOLE SODIUM 40 MG: 40 TABLET, DELAYED RELEASE ORAL at 05:13

## 2019-01-01 RX ADMIN — LEVETIRACETAM 500 MG: 500 TABLET, FILM COATED ORAL at 21:56

## 2019-01-01 RX ADMIN — LEVETIRACETAM 500 MG: 500 TABLET, FILM COATED ORAL at 21:42

## 2019-01-01 RX ADMIN — HYDROCODONE BITARTRATE AND ACETAMINOPHEN 1 TABLET: 5; 325 TABLET ORAL at 11:49

## 2019-01-01 RX ADMIN — METHIMAZOLE 7.5 MG: 5 TABLET ORAL at 17:39

## 2019-01-01 RX ADMIN — APIXABAN 5 MG: 5 TABLET, FILM COATED ORAL at 21:06

## 2019-01-01 RX ADMIN — ASPIRIN 81 MG: 81 TABLET, COATED ORAL at 09:37

## 2019-01-01 RX ADMIN — LORAZEPAM 1 MG: 1 TABLET ORAL at 18:10

## 2019-01-01 RX ADMIN — ASPIRIN 81 MG: 81 TABLET, COATED ORAL at 09:00

## 2019-01-01 RX ADMIN — PANTOPRAZOLE SODIUM 40 MG: 40 TABLET, DELAYED RELEASE ORAL at 05:42

## 2019-01-01 RX ADMIN — POLYETHYLENE GLYCOL 3350 17 G: 17 POWDER, FOR SOLUTION ORAL at 08:06

## 2019-01-01 RX ADMIN — FERROUS SULFATE TAB 325 MG (65 MG ELEMENTAL FE) 325 MG: 325 (65 FE) TAB at 11:30

## 2019-01-01 RX ADMIN — METHIMAZOLE 7.5 MG: 5 TABLET ORAL at 21:50

## 2019-01-01 RX ADMIN — INSULIN LISPRO 2 UNITS: 100 INJECTION, SOLUTION INTRAVENOUS; SUBCUTANEOUS at 11:30

## 2019-01-01 RX ADMIN — MAGNESIUM SULFATE HEPTAHYDRATE 2 G: 40 INJECTION, SOLUTION INTRAVENOUS at 13:17

## 2019-01-01 RX ADMIN — Medication 10 ML: at 05:34

## 2019-01-01 RX ADMIN — SODIUM CHLORIDE 100 ML: 900 INJECTION, SOLUTION INTRAVENOUS at 14:46

## 2019-01-01 RX ADMIN — LORAZEPAM 1 MG: 1 TABLET ORAL at 08:22

## 2019-01-01 RX ADMIN — CYANOCOBALAMIN TAB 1000 MCG 500 MCG: 1000 TAB at 08:49

## 2019-01-01 RX ADMIN — LORAZEPAM 1 MG: 1 TABLET ORAL at 09:37

## 2019-01-01 RX ADMIN — LORAZEPAM 1 MG: 1 TABLET ORAL at 09:35

## 2019-01-01 RX ADMIN — LORAZEPAM 1 MG: 1 TABLET ORAL at 18:03

## 2019-01-01 RX ADMIN — DIGOXIN 0.12 MG: 0.12 TABLET ORAL at 09:32

## 2019-01-01 RX ADMIN — INSULIN LISPRO 2 UNITS: 100 INJECTION, SOLUTION INTRAVENOUS; SUBCUTANEOUS at 11:14

## 2019-01-01 RX ADMIN — LEVETIRACETAM 500 MG: 500 TABLET, FILM COATED ORAL at 20:45

## 2019-01-01 RX ADMIN — ENOXAPARIN SODIUM 50 MG: 60 INJECTION SUBCUTANEOUS at 21:55

## 2019-01-01 RX ADMIN — Medication 10 ML: at 22:35

## 2019-01-01 RX ADMIN — METOPROLOL TARTRATE 50 MG: 50 TABLET ORAL at 08:50

## 2019-01-01 RX ADMIN — APIXABAN 5 MG: 5 TABLET, FILM COATED ORAL at 08:22

## 2019-01-01 RX ADMIN — APIXABAN 5 MG: 5 TABLET, FILM COATED ORAL at 21:42

## 2019-01-01 RX ADMIN — DEXTROSE 50 % IN WATER (D50W) INTRAVENOUS SYRINGE 25 G: at 05:32

## 2019-01-01 RX ADMIN — Medication 10 ML: at 05:43

## 2019-01-01 RX ADMIN — LIDOCAINE HYDROCHLORIDE 15 ML: 20 SOLUTION ORAL; TOPICAL at 08:40

## 2019-01-01 RX ADMIN — LEVETIRACETAM 500 MG: 500 TABLET, FILM COATED ORAL at 20:39

## 2019-01-01 RX ADMIN — Medication 10 ML: at 17:35

## 2019-01-01 RX ADMIN — PREDNISONE 5 MG: 10 TABLET ORAL at 09:32

## 2019-01-01 RX ADMIN — FERROUS SULFATE TAB 325 MG (65 MG ELEMENTAL FE) 325 MG: 325 (65 FE) TAB at 12:12

## 2019-01-01 RX ADMIN — METOPROLOL TARTRATE 50 MG: 50 TABLET ORAL at 09:36

## 2019-01-01 RX ADMIN — PREDNISONE 5 MG: 10 TABLET ORAL at 08:27

## 2019-01-01 RX ADMIN — METOPROLOL TARTRATE 50 MG: 50 TABLET ORAL at 18:03

## 2019-01-01 RX ADMIN — MIDAZOLAM HYDROCHLORIDE 1 MG: 1 INJECTION, SOLUTION INTRAMUSCULAR; INTRAVENOUS at 08:48

## 2019-01-01 RX ADMIN — DIGOXIN 0.12 MG: 0.12 TABLET ORAL at 09:35

## 2019-01-01 RX ADMIN — TUBERCULIN PURIFIED PROTEIN DERIVATIVE 5 UNITS: 5 INJECTION INTRADERMAL at 17:09

## 2019-01-01 RX ADMIN — FUROSEMIDE 20 MG: 20 TABLET ORAL at 09:00

## 2019-01-01 RX ADMIN — FERROUS SULFATE TAB 325 MG (65 MG ELEMENTAL FE) 325 MG: 325 (65 FE) TAB at 11:49

## 2019-01-01 RX ADMIN — LEVETIRACETAM 500 MG: 500 TABLET, FILM COATED ORAL at 09:38

## 2019-01-01 RX ADMIN — Medication 10 ML: at 20:46

## 2019-01-01 RX ADMIN — Medication 10 ML: at 17:15

## 2019-01-01 RX ADMIN — INSULIN GLARGINE 5 UNITS: 100 INJECTION, SOLUTION SUBCUTANEOUS at 09:00

## 2019-01-01 RX ADMIN — PANTOPRAZOLE SODIUM 40 MG: 40 TABLET, DELAYED RELEASE ORAL at 05:36

## 2019-01-01 RX ADMIN — CYANOCOBALAMIN TAB 1000 MCG 500 MCG: 1000 TAB at 08:26

## 2019-01-01 RX ADMIN — INSULIN LISPRO 2 UNITS: 100 INJECTION, SOLUTION INTRAVENOUS; SUBCUTANEOUS at 17:04

## 2019-01-01 RX ADMIN — FERROUS SULFATE TAB 325 MG (65 MG ELEMENTAL FE) 325 MG: 325 (65 FE) TAB at 12:08

## 2019-01-01 RX ADMIN — LEVETIRACETAM 500 MG: 500 TABLET, FILM COATED ORAL at 21:35

## 2019-01-01 RX ADMIN — METHIMAZOLE 10 MG: 5 TABLET ORAL at 18:09

## 2019-01-01 RX ADMIN — INSULIN LISPRO 1 UNITS: 100 INJECTION, SOLUTION INTRAVENOUS; SUBCUTANEOUS at 16:30

## 2019-01-01 RX ADMIN — Medication 10 ML: at 21:13

## 2019-01-01 RX ADMIN — IOPAMIDOL 50 ML: 755 INJECTION, SOLUTION INTRAVENOUS at 14:46

## 2019-01-01 RX ADMIN — Medication 10 ML: at 05:07

## 2019-01-01 RX ADMIN — METOPROLOL TARTRATE 50 MG: 50 TABLET ORAL at 09:37

## 2019-01-01 RX ADMIN — SODIUM CHLORIDE 100 ML: 900 INJECTION, SOLUTION INTRAVENOUS at 09:27

## 2019-01-01 RX ADMIN — APIXABAN 5 MG: 5 TABLET, FILM COATED ORAL at 22:35

## 2019-01-01 RX ADMIN — LEVETIRACETAM 500 MG: 500 TABLET, FILM COATED ORAL at 21:50

## 2019-01-01 RX ADMIN — METHIMAZOLE 10 MG: 5 TABLET ORAL at 16:01

## 2019-01-01 RX ADMIN — GADOTERATE MEGLUMINE 10 ML: 376.9 INJECTION INTRAVENOUS at 15:29

## 2019-01-01 RX ADMIN — LORAZEPAM 1 MG: 1 TABLET ORAL at 09:00

## 2019-01-01 RX ADMIN — LEVETIRACETAM 500 MG: 500 TABLET, FILM COATED ORAL at 08:50

## 2019-01-01 RX ADMIN — INSULIN LISPRO 3 UNITS: 100 INJECTION, SOLUTION INTRAVENOUS; SUBCUTANEOUS at 11:30

## 2019-01-01 RX ADMIN — DIGOXIN 0.12 MG: 0.12 TABLET ORAL at 09:38

## 2019-01-01 RX ADMIN — METOPROLOL TARTRATE 50 MG: 50 TABLET ORAL at 09:32

## 2019-01-01 RX ADMIN — LORAZEPAM 1 MG: 1 TABLET ORAL at 17:32

## 2019-01-01 RX ADMIN — INSULIN LISPRO 4 UNITS: 100 INJECTION, SOLUTION INTRAVENOUS; SUBCUTANEOUS at 11:49

## 2019-01-01 RX ADMIN — METOPROLOL TARTRATE 50 MG: 50 TABLET ORAL at 08:27

## 2019-01-01 RX ADMIN — MAGNESIUM SULFATE HEPTAHYDRATE 2 G: 40 INJECTION, SOLUTION INTRAVENOUS at 16:18

## 2019-01-01 RX ADMIN — CYANOCOBALAMIN TAB 1000 MCG 500 MCG: 1000 TAB at 09:31

## 2019-01-01 RX ADMIN — LORAZEPAM 1 MG: 1 TABLET ORAL at 08:31

## 2019-01-01 RX ADMIN — HYDROCODONE BITARTRATE AND ACETAMINOPHEN 1 TABLET: 5; 325 TABLET ORAL at 12:09

## 2019-01-01 RX ADMIN — DIGOXIN 0.12 MG: 0.12 TABLET ORAL at 09:00

## 2019-01-01 RX ADMIN — FERROUS SULFATE TAB 325 MG (65 MG ELEMENTAL FE) 325 MG: 325 (65 FE) TAB at 11:14

## 2019-01-01 RX ADMIN — HYDROCODONE BITARTRATE AND ACETAMINOPHEN 1 TABLET: 5; 325 TABLET ORAL at 12:10

## 2019-01-01 RX ADMIN — MIDAZOLAM HYDROCHLORIDE 2 MG: 1 INJECTION, SOLUTION INTRAMUSCULAR; INTRAVENOUS at 08:42

## 2019-01-01 RX ADMIN — METHIMAZOLE 7.5 MG: 5 TABLET ORAL at 07:05

## 2019-01-01 RX ADMIN — PREDNISONE 5 MG: 10 TABLET ORAL at 08:00

## 2019-01-01 RX ADMIN — DIGOXIN 0.12 MG: 0.12 TABLET ORAL at 08:27

## 2019-01-01 RX ADMIN — LORAZEPAM 1 MG: 1 TABLET ORAL at 09:31

## 2019-01-01 RX ADMIN — LORAZEPAM 1 MG: 1 TABLET ORAL at 17:10

## 2019-01-01 RX ADMIN — LEVETIRACETAM 500 MG: 500 TABLET, FILM COATED ORAL at 09:36

## 2019-01-01 RX ADMIN — DEXTROSE 50 % IN WATER (D50W) INTRAVENOUS SYRINGE 25 G: at 17:14

## 2019-01-01 RX ADMIN — INSULIN LISPRO 2 UNITS: 100 INJECTION, SOLUTION INTRAVENOUS; SUBCUTANEOUS at 07:30

## 2019-01-01 RX ADMIN — PREDNISONE 5 MG: 10 TABLET ORAL at 08:49

## 2019-01-01 RX ADMIN — FERROUS SULFATE TAB 325 MG (65 MG ELEMENTAL FE) 325 MG: 325 (65 FE) TAB at 11:21

## 2019-01-01 RX ADMIN — APIXABAN 5 MG: 5 TABLET, FILM COATED ORAL at 09:31

## 2019-01-01 RX ADMIN — APIXABAN 5 MG: 5 TABLET, FILM COATED ORAL at 09:38

## 2019-01-01 RX ADMIN — HYDROCODONE BITARTRATE AND ACETAMINOPHEN 1 TABLET: 5; 325 TABLET ORAL at 09:12

## 2019-01-01 RX ADMIN — HYDROCODONE BITARTRATE AND ACETAMINOPHEN 1 TABLET: 5; 325 TABLET ORAL at 05:07

## 2019-01-01 RX ADMIN — WARFARIN SODIUM 5 MG: 5 TABLET ORAL at 17:04

## 2019-01-01 RX ADMIN — DIGOXIN 0.12 MG: 0.12 TABLET ORAL at 08:50

## 2019-01-01 RX ADMIN — CYANOCOBALAMIN TAB 1000 MCG 500 MCG: 1000 TAB at 09:37

## 2019-01-01 RX ADMIN — DIGOXIN 0.12 MG: 0.12 TABLET ORAL at 08:33

## 2019-01-01 RX ADMIN — METHIMAZOLE 7.5 MG: 5 TABLET ORAL at 17:49

## 2019-01-01 RX ADMIN — Medication 10 ML: at 13:50

## 2019-01-01 RX ADMIN — Medication 10 ML: at 09:27

## 2019-01-01 RX ADMIN — INSULIN LISPRO 2 UNITS: 100 INJECTION, SOLUTION INTRAVENOUS; SUBCUTANEOUS at 08:28

## 2019-01-01 RX ADMIN — MIDAZOLAM HYDROCHLORIDE 1 MG: 1 INJECTION, SOLUTION INTRAMUSCULAR; INTRAVENOUS at 08:54

## 2019-01-01 RX ADMIN — Medication 10 ML: at 14:00

## 2019-01-01 RX ADMIN — INSULIN LISPRO 1 UNITS: 100 INJECTION, SOLUTION INTRAVENOUS; SUBCUTANEOUS at 08:50

## 2019-01-01 RX ADMIN — Medication 10 ML: at 21:57

## 2019-01-01 RX ADMIN — APIXABAN 5 MG: 5 TABLET, FILM COATED ORAL at 08:34

## 2019-01-01 RX ADMIN — Medication 10 ML: at 18:11

## 2019-01-01 RX ADMIN — FUROSEMIDE 40 MG: 10 INJECTION, SOLUTION INTRAMUSCULAR; INTRAVENOUS at 12:09

## 2019-01-01 RX ADMIN — ENOXAPARIN SODIUM 50 MG: 100 INJECTION SUBCUTANEOUS at 10:25

## 2019-01-01 RX ADMIN — DIGOXIN 0.12 MG: 0.12 TABLET ORAL at 08:22

## 2019-01-01 RX ADMIN — LEVETIRACETAM 500 MG: 500 TABLET, FILM COATED ORAL at 08:33

## 2019-01-01 RX ADMIN — METOPROLOL TARTRATE 50 MG: 50 TABLET ORAL at 17:32

## 2019-01-01 RX ADMIN — WARFARIN SODIUM 5 MG: 5 TABLET ORAL at 22:03

## 2019-01-01 RX ADMIN — PREDNISONE 5 MG: 10 TABLET ORAL at 08:21

## 2019-01-01 RX ADMIN — Medication 10 ML: at 15:29

## 2019-01-01 RX ADMIN — INSULIN LISPRO 2 UNITS: 100 INJECTION, SOLUTION INTRAVENOUS; SUBCUTANEOUS at 08:32

## 2019-01-01 RX ADMIN — HYDROCODONE BITARTRATE AND ACETAMINOPHEN 1 TABLET: 5; 325 TABLET ORAL at 21:08

## 2019-01-01 RX ADMIN — INSULIN LISPRO 1 UNITS: 100 INJECTION, SOLUTION INTRAVENOUS; SUBCUTANEOUS at 18:34

## 2019-01-01 RX ADMIN — INSULIN HUMAN 6 UNITS: 100 INJECTION, SOLUTION PARENTERAL at 22:35

## 2019-01-01 RX ADMIN — Medication 10 ML: at 17:33

## 2019-01-01 RX ADMIN — INSULIN LISPRO 1 UNITS: 100 INJECTION, SOLUTION INTRAVENOUS; SUBCUTANEOUS at 17:33

## 2019-01-01 RX ADMIN — METOPROLOL TARTRATE 50 MG: 50 TABLET ORAL at 08:34

## 2019-01-01 RX ADMIN — LORAZEPAM 1 MG: 1 TABLET ORAL at 08:33

## 2019-01-01 RX ADMIN — INSULIN GLARGINE 5 UNITS: 100 INJECTION, SOLUTION SUBCUTANEOUS at 09:44

## 2019-01-01 RX ADMIN — ASPIRIN 81 MG: 81 TABLET, COATED ORAL at 08:33

## 2019-01-01 RX ADMIN — INSULIN LISPRO 1 UNITS: 100 INJECTION, SOLUTION INTRAVENOUS; SUBCUTANEOUS at 07:30

## 2019-01-01 RX ADMIN — METOPROLOL TARTRATE 50 MG: 50 TABLET ORAL at 17:16

## 2019-01-01 RX ADMIN — DEXTROSE MONOHYDRATE 25 G: 25 INJECTION, SOLUTION INTRAVENOUS at 17:14

## 2019-01-01 RX ADMIN — Medication 10 ML: at 14:46

## 2019-01-01 RX ADMIN — Medication 10 ML: at 05:16

## 2019-01-01 RX ADMIN — LEVETIRACETAM 500 MG: 500 TABLET, FILM COATED ORAL at 08:31

## 2019-01-01 RX ADMIN — CYANOCOBALAMIN TAB 1000 MCG 500 MCG: 1000 TAB at 09:00

## 2019-01-01 RX ADMIN — LORAZEPAM 1 MG: 1 TABLET ORAL at 17:04

## 2019-01-01 RX ADMIN — WARFARIN SODIUM 4 MG: 2 TABLET ORAL at 17:45

## 2019-01-01 RX ADMIN — LORAZEPAM 1 MG: 1 TABLET ORAL at 17:45

## 2019-01-01 RX ADMIN — Medication 10 ML: at 22:03

## 2019-01-01 RX ADMIN — LORAZEPAM 1 MG: 1 TABLET ORAL at 17:33

## 2019-01-01 RX ADMIN — ASPIRIN 81 MG: 81 TABLET, COATED ORAL at 09:31

## 2019-01-01 RX ADMIN — PREDNISONE 5 MG: 10 TABLET ORAL at 09:37

## 2019-01-01 RX ADMIN — APIXABAN 5 MG: 5 TABLET, FILM COATED ORAL at 20:45

## 2019-01-01 RX ADMIN — METOPROLOL TARTRATE 50 MG: 50 TABLET ORAL at 17:33

## 2019-01-01 RX ADMIN — Medication 10 ML: at 21:55

## 2019-01-01 RX ADMIN — FUROSEMIDE 40 MG: 40 TABLET ORAL at 09:36

## 2019-01-01 RX ADMIN — PREDNISONE 5 MG: 10 TABLET ORAL at 08:34

## 2019-01-01 RX ADMIN — Medication 10 ML: at 05:14

## 2019-01-01 RX ADMIN — INSULIN LISPRO 5 UNITS: 100 INJECTION, SOLUTION INTRAVENOUS; SUBCUTANEOUS at 18:10

## 2019-01-01 RX ADMIN — ASPIRIN 81 MG: 81 TABLET, COATED ORAL at 09:35

## 2019-01-01 RX ADMIN — INSULIN LISPRO 1 UNITS: 100 INJECTION, SOLUTION INTRAVENOUS; SUBCUTANEOUS at 12:08

## 2019-01-01 RX ADMIN — HYDROCODONE BITARTRATE AND ACETAMINOPHEN 1 TABLET: 5; 325 TABLET ORAL at 11:21

## 2019-01-01 RX ADMIN — SODIUM CHLORIDE 75 ML/HR: 900 INJECTION, SOLUTION INTRAVENOUS at 08:30

## 2019-01-01 RX ADMIN — ASPIRIN 81 MG: 81 TABLET, COATED ORAL at 08:30

## 2019-01-01 RX ADMIN — PANTOPRAZOLE SODIUM 40 MG: 40 TABLET, DELAYED RELEASE ORAL at 04:52

## 2019-01-01 RX ADMIN — CYANOCOBALAMIN TAB 1000 MCG 500 MCG: 1000 TAB at 09:36

## 2019-01-01 RX ADMIN — Medication 10 ML: at 21:51

## 2019-01-01 RX ADMIN — Medication 10 ML: at 05:42

## 2019-01-01 RX ADMIN — CYANOCOBALAMIN TAB 1000 MCG 500 MCG: 1000 TAB at 08:34

## 2019-01-01 RX ADMIN — METOPROLOL TARTRATE 50 MG: 50 TABLET ORAL at 17:10

## 2019-01-01 RX ADMIN — METOPROLOL TARTRATE 50 MG: 50 TABLET ORAL at 18:10

## 2019-01-01 RX ADMIN — Medication 10 ML: at 17:45
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
## 2019-08-02 NOTE — PROGRESS NOTES
Discharge instructions given per orders to patient and daughter, voiced good understanding of post SUZANNE care, medications & follow up care. Denies any questions

## 2019-08-02 NOTE — PROGRESS NOTES
Patient received to [address] [place] [address] room # 8  Ambulatory from [facility]. Patient scheduled for SUZANNE today with Dr Juan Bello. Procedure reviewed & questions answered, voiced good understanding consent obtained & placed on chart. All medications and medical history reviewed. Will prep patient per orders. Patient & family updated on plan of care. The patient has a fraility score of 5-MILDLY FRAIL, based on uses a walker for ambulation. Reports had a recent fall. Has bruising to buttocks and isrrael area.

## 2019-08-02 NOTE — DISCHARGE INSTRUCTIONS
AFTER YOUR TRANSESOPHAGEAL ECHOCARDIOGRAM    Be sure someone else drives you home. You may feel drowsy for several hours. Do not eat or drink for at least two hours after your procedure. Your throat will be numb and there is a risk you might have difficulty swallowing for a while. Be careful when you do eat or drink for the first time especially with hot fluids since you could easily burn your throat. Call your doctor if:    · You are bleeding from your throat or mouth. · You have trouble breathing all of a sudden. · You have chest pain or any pain that spreads to your neck, jaw, or arms. · You have questions or concerns. · You have a fever greater than 101°F.    Doctor: Ander Fraire [phone]    Special Instructions:    No driving for 24 hours.

## 2019-08-02 NOTE — PROGRESS NOTES
Report received from Marina Saenz. Procedural findings communicated. Intra procedural  medication administration reviewed. Progression of care discussed. Routine post procedural vital signs  initiated yes

## 2019-08-02 NOTE — H&P
[address] Cardiology History & Physical  
  
Date of  Admission: 8/2/2019  7:01 AM  
 
CC: Dyspnea HPI:  Alfredito Colbert is a 69 y.o. female Prior history of TAVR (2016). Recent issues with worsening dyspnea and echocardiogram obtained with noted ejection fraction decreased to about 40% and increased bioprosthetic gradients compared to echo from 1/19 [mean gradient around 26; dimensionless index of 0.18; RVSP noted at around 70mmHg]. SUZANNE was recommended to further evaluate the valve Past Medical History:  
Diagnosis Date  Acquired cyst of kidney  Anxiety   
 managed with medication  Aortic valve replaced  Atrial fibrillation, chronic ([address]   
 managed with medication and pacemaker  CAD (coronary artery disease) CABG x 5  
 Calculus of kidney  Carotid artery stenosis without cerebral infarction  Chronic pain GENERALIZED FROM ARTHRITIS  Gangrene associated with diabetes mellitus ([address] 5/26/2016  
 left great toe  GERD (gastroesophageal reflux disease)   
 managed with medication  Heart failure ([address]  History of kidney stones   
 multiple with surgical interventions  Hypercholesterolemia   
 managed with medication  Hypertension  Hypothyroidism   
 managed with medication  Ill-defined condition   
 pt takes eliquis  Microscopic hematuria  Nausea & vomiting   
 after anesthesia  Osteoarthritis   
 managed with medication  Pacemaker Metronic pacemaker only  PUD (peptic ulcer disease)   
 no recent episodes  PVD (peripheral vascular disease) (HCC)   
 left side x 1 stented  Rheumatoid arthritis(714.0)   
 managed with medication  Toe amputation status ([address]   
 left great toe  Type 2 diabetes mellitus ([address] Dx 2006  
 oral t and insulin/Avg / no s/s of low BS/ does not have a sensation / last A1C7.4  Vertigo   
 no treatment, happens occassionally Past Surgical History: Procedure Laterality Date  CABG, ARTERY-VEIN, FOUR  1993  
 states x 5  
 CARDIAC SURG PROCEDURE UNLIST Cardiovert x 2-3 x last 2/4/2012  HX AMPUTATION Left 2016  
 great toe  HX AORTIC VALVE REPLACEMENT    
 [address]  HX HEART CATHETERIZATION    
 HX HEENT    
 dental  
 [address]  HX LITHOTRIPSY    
 multiple  HX OPEN CHOLECYSTECTOMY  HX ORTHOPAEDIC Left   
 achilles tendon  HX ORTHOPAEDIC Left \"toe surgery removing bones\"  HX PACEMAKER    
 HX PACEMAKER PLACEMENT Metronic  HX UROLOGICAL Left 1980s  
 open L kidney removal stones  VASCULAR SURGERY PROCEDURE UNLIST Left 2-17-16  
 lower extremity arteriogram with stent x 1 placement  VASCULAR SURGERY PROCEDURE UNLIST Right 12/20/2017  
 2nd toe- resection metatarsal head Allergies Allergen Reactions  Multaq [Dronedarone] Nausea Only  Other Medication Other (comments) STATINS CAUSE MUSCLE WEAKNESS Cholesterol medications  Statins-Hmg-Coa Reductase Inhibitors Myalgia Social History Socioeconomic History  Marital status:  Spouse name: Not on file  Number of children: Not on file  Years of education: Not on file  Highest education level: Not on file Occupational History  Not on file Social Needs  Financial resource strain: Not on file  Food insecurity:  
  Worry: Not on file Inability: Not on file  Transportation needs:  
  Medical: Not on file Non-medical: Not on file Tobacco Use  Smoking status: Never Smoker  Smokeless tobacco: Never Used Substance and Sexual Activity  Alcohol use: No  
 Drug use: No  
  Types: Prescription  Sexual activity: Not on file Lifestyle  Physical activity:  
  Days per week: Not on file Minutes per session: Not on file  Stress: Not on file Relationships  Social connections:  
  Talks on phone: Not on file Gets together: Not on file Attends Hoahaoism service: Not on file Active member of club or organization: Not on file Attends meetings of clubs or organizations: Not on file Relationship status: Not on file  Intimate partner violence:  
  Fear of current or ex partner: Not on file Emotionally abused: Not on file Physically abused: Not on file Forced sexual activity: Not on file Other Topics Concern  Not on file Social History Narrative  Not on file Family History Problem Relation Age of Onset  Heart Disease Father  Heart Attack Father  Diabetes Father  Hypertension Father  High Cholesterol Father  Asthma Father  Heart Disease Other [facility] Surgery Other  Diabetes Mother  Stroke Mother TIAs  Hypertension Mother  Other Mother   
     kidney stone  Kidney Disease Mother  Heart Disease Mother  High Cholesterol Mother  Cancer Mother  Cancer Sister   
     multiple myeloma  Hypertension Sister  Hypertension Sister  Hypertension Brother  Diabetes Brother  Cancer Brother  Hypertension Sister  Heart Disease Sister  Hypertension Sister  Hypertension Sister Current Facility-Administered Medications Medication Dose Route Frequency  0.9% sodium chloride infusion  75 mL/hr IntraVENous CONTINUOUS  
 midazolam (VERSED) injection 0.5-2 mg  0.5-2 mg IntraVENous Multiple  fentaNYL citrate (PF) injection 25-50 mcg  25-50 mcg IntraVENous Multiple  lidocaine (XYLOCAINE) 2 % viscous solution 15 mL  15 mL Mouth/Throat PRN Review of Systems ROS As per HPI Subjective:  
 
Visit Vitals /57 Pulse (!) 53 Temp 98.1 °F (36.7 °C) Resp 15 Ht 5' 3\" (1.6 m) Wt 45.4 kg (100 lb) SpO2 99% BMI 17.71 kg/m² No intake/output data recorded. No intake/output data recorded. Physical Exam: 
General: Well Developed, Well Nourished, No Acute Distress HEENT: pupils equal and round, no abnormalities noted Neck: supple, no JVD, no carotid bruits Heart: 2/6 FEI at RUSB; aura S1 Lungs: Clear throughout auscultation bilaterally without adventitious sounds Abd: soft, nontender, nondistended, with good bowel sounds Ext: warm, no edema, calves supple/nontender, pulses 2+ bilaterally Skin: warm and dry Psychiatric: Normal mood and affect Neurologic: Alert and oriented X 3 Labs:  
Recent Results (from the past 24 hour(s)) CBC W/O DIFF Collection Time: 08/02/19  7:29 AM  
Result Value Ref Range WBC 9.0 4.3 - 11.1 K/uL  
 RBC 4.02 (L) 4.05 - 5.2 M/uL  
 HGB 13.7 11.7 - 15.4 g/dL HCT 40.9 35.8 - 46.3 % .7 (H) 79.6 - 97.8 FL  
 MCH 34.1 (H) 26.1 - 32.9 PG  
 MCHC 33.5 31.4 - 35.0 g/dL  
 RDW 13.6 11.9 - 14.6 % PLATELET 638 967 - 847 K/uL MPV 9.7 9.4 - 12.3 FL ABSOLUTE NRBC 0.00 0.0 - 0.2 K/uL METABOLIC PANEL, BASIC Collection Time: 08/02/19  7:29 AM  
Result Value Ref Range Sodium 137 136 - 145 mmol/L Potassium 3.6 3.5 - 5.1 mmol/L Chloride 101 98 - 107 mmol/L  
 CO2 31 21 - 32 mmol/L Anion gap 5 (L) 7 - 16 mmol/L Glucose 151 (H) 65 - 100 mg/dL BUN 19 8 - 23 MG/DL Creatinine 0.89 0.6 - 1.0 MG/DL  
 GFR est AA >60 >60 ml/min/1.73m2 GFR est non-AA >60 >60 ml/min/1.73m2 Calcium 9.7 8.3 - 10.4 MG/DL PROTHROMBIN TIME + INR Collection Time: 08/02/19  7:29 AM  
Result Value Ref Range Prothrombin time 16.2 (H) 11.7 - 14.5 sec INR 1.3 EKG, 12 LEAD, INITIAL Collection Time: 08/02/19  7:29 AM  
Result Value Ref Range Ventricular Rate 84 BPM  
 Atrial Rate 85 BPM  
 QRS Duration 90 ms Q-T Interval 362 ms QTC Calculation (Bezet) 427 ms Calculated R Axis 63 degrees Calculated T Axis -103 degrees Diagnosis Atrial fibrillation with frequent ventricular-paced complexes Cannot rule out Anterior infarct , age undetermined Marked ST abnormality, possible inferior subendocardial injury Abnormal ECG When compared with ECG of 13-FEB-2018 19:26, 
Electronic ventricular pacemaker has replaced Atrial fibrillation Vent. rate has decreased BY  70 BPM 
Confirmed by LEON RIOS (), Antwan Resendez ([id number]) on 8/2/2019 8:31:42 AM 
  
 
 
 
 Assessment/Plan: 1. TAVR 2. Permanent atrial fibrillation Plan SUZANNE for further evaluation of the aortic bioprosthesis. Noted progression from echocardiogram from 1/19 along with left ventricular dysfunction which may underestimate the severity of the valve. Consideration for early degeneration versus leaflet thrombosis. May also need to consider CT scan for further evaluation to help further distinguish. Currently on low-dose Eliquis for chronic atrial fibrillation. Consideration for switching over to Coumadin and reassessing gradients.   
 
Natali Baugh MD 
8/2/2019 8:00 AM

## 2019-08-02 NOTE — PROGRESS NOTES
SUZANNE performed by Dr. Elliott Quinones, assisted by JOHN Ribeiro. Julieth Roque, RN at bedside for procedure Patient's throat numbed at 0840 Time Out performed at 841 Procedure started at 46 Procedure ended at 0179 Patient received 4mg Versed IV per MD order for sedation Patient tolerated well

## 2019-08-12 PROBLEM — I50.22 SYSTOLIC CHF, CHRONIC (HCC): Chronic | Status: ACTIVE | Noted: 2019-01-01

## 2019-08-12 NOTE — TELEPHONE ENCOUNTER
Spoke with daughter Jordon Pugh, arrangements made for TAVR CT on August 21st, arrive at 0800 for 0830 exam. Hold lasix on 8/21 and am diabetic medications, take 1/2 dose insulin night prior to scan. No food after 0430 and nothing to drink except sips with meds after 0630. Be sure to take metoprolol and digoxin am of scan. Contact information provided for any questions.     Noman Orellana, TAVR Coordinator

## 2019-08-17 NOTE — ED TRIAGE NOTES
Patient reports is having \"dizzy spells. \"  States had  TAVRS valve replacement and is getting more SOB and weak. Reports the valve is failing. States this AM she was sitting and began shaking. Family reports she woke up rapidly. Patient with no history of seizures. BGL 95 at home after incident with family.

## 2019-08-17 NOTE — DISCHARGE INSTRUCTIONS
Patient Education        Lightheadedness or Faintness: Care Instructions  Your Care Instructions  Lightheadedness is a feeling that you are about to faint or \"pass out. \" You do not feel as if you or your surroundings are moving. It is different from vertigo, which is the feeling that you or things around you are spinning or tilting. Lightheadedness usually goes away or gets better when you lie down. If lightheadedness gets worse, it can lead to a fainting spell. It is common to feel lightheaded from time to time. Lightheadedness usually is not caused by a serious problem. It often is caused by a short-lasting drop in blood pressure and blood flow to your head that occurs when you get up too quickly from a seated or lying position. Follow-up care is a key part of your treatment and safety. Be sure to make and go to all appointments, and call your doctor if you are having problems. It's also a good idea to know your test results and keep a list of the medicines you take. How can you care for yourself at home? · Lie down for 1 or 2 minutes when you feel lightheaded. After lying down, sit up slowly and remain sitting for 1 to 2 minutes before slowly standing up. · Avoid movements, positions, or activities that have made you lightheaded in the past.  · Get plenty of rest, especially if you have a cold or flu, which can cause lightheadedness. · Make sure you drink plenty of fluids, especially if you have a fever or have been sweating. · Do not drive or put yourself and others in danger while you feel lightheaded. When should you call for help? Call 911 anytime you think you may need emergency care. For example, call if:    · You have symptoms of a stroke. These may include:  ? Sudden numbness, tingling, weakness, or loss of movement in your face, arm, or leg, especially on only one side of your body. ? Sudden vision changes. ? Sudden trouble speaking.   ? Sudden confusion or trouble understanding simple statements. ? Sudden problems with walking or balance. ? A sudden, severe headache that is different from past headaches.     · You have symptoms of a heart attack. These may include:  ? Chest pain or pressure, or a strange feeling in the chest.  ? Sweating. ? Shortness of breath. ? Nausea or vomiting. ? Pain, pressure, or a strange feeling in the back, neck, jaw, or upper belly or in one or both shoulders or arms. ? Lightheadedness or sudden weakness. ? A fast or irregular heartbeat. After you call 911, the  may tell you to chew 1 adult-strength or 2 to 4 low-dose aspirin. Wait for an ambulance. Do not try to drive yourself.    Watch closely for changes in your health, and be sure to contact your doctor if:    · Your lightheadedness gets worse or does not get better with home care. Where can you learn more? Go to http://tae-bairon.info/. Enter U202 in the search box to learn more about \"Lightheadedness or Faintness: Care Instructions. \"  Current as of: September 23, 2018  Content Version: 12.1  © 2405-5934 [employer]. Care instructions adapted under license by [employer] (which disclaims liability or warranty for this information). If you have questions about a medical condition or this instruction, always ask your healthcare professional. [address] any warranty or liability for your use of this information.

## 2019-08-17 NOTE — ED PROVIDER NOTES
60-year-old  female with a history of coronary disease, A. fib on Eliquis presents to the emergency department with a complaint of feeling lightheaded at breakfast today followed by a brief 2 to 3-second episode of shaking with associated loss of consciousness, then returning to normal mental status without noting pain or palpitations or shortness of breath. There is no postictal.  Patient denies any complaint of lightheadedness now. She does have a pacemaker which was just interrogated 5 days ago. Episode was witnessed by her son who brought her in for further evaluation. The history is provided by the patient and a relative. Fatigue   This is a new problem. The current episode started 1 to 2 hours ago. The problem has been resolved. There was no focality noted. Primary symptoms include unresponsiveness (2 to 3 seconds). Pertinent negatives include no focal weakness, no loss of sensation, no loss of balance, no slurred speech, no speech difficulty, no memory loss, no movement disorder, no agitation, no visual change, no auditory change, no mental status change and no disorientation. There has been no fever. Pertinent negatives include no shortness of breath, no chest pain, no vomiting, no altered mental status, no confusion, no headaches, no choking, no nausea, no bowel incontinence and no bladder incontinence. There were no medications administered prior to arrival. Associated medical issues do not include trauma, mood changes, bleeding disorder, seizures, dementia, CVA or clotting disorder. Dizziness   Primary symptoms include unresponsiveness (2 to 3 seconds). Pertinent negatives include no focal weakness, no loss of sensation, no loss of balance, no slurred speech, no speech difficulty, no memory loss, no movement disorder, no agitation, no visual change, no auditory change, no mental status change and no disorientation.  Pertinent negatives include no shortness of breath, no chest pain, no vomiting, no altered mental status, no confusion, no headaches, no choking, no nausea, no bowel incontinence and no bladder incontinence. Associated medical issues do not include trauma, mood changes, bleeding disorder, seizures, dementia, CVA or clotting disorder.         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 toe    GERD (gastroesophageal reflux disease)     managed with medication     Heart failure ([address]     History of kidney stones     multiple with surgical interventions    Hypercholesterolemia     managed with medication     Hypertension     Hypothyroidism     managed with medication     Ill-defined condition     pt takes eliquis     Microscopic hematuria     Nausea & vomiting     after anesthesia    Osteoarthritis     managed with medication     Pacemaker     Metronic pacemaker only    PUD (peptic ulcer disease)     no recent episodes    PVD (peripheral vascular disease) ([address]     left side x 1 stented    Rheumatoid arthritis(714.0)     managed with medication     Toe amputation status ([address]     left great toe     Type 2 diabetes mellitus ([address] Dx 2006    oral t and insulin/Avg / no s/s of low BS/ does not have a sensation / last A1C7.4    Vertigo     no treatment, happens occassionally       Past Surgical History:   Procedure Laterality Date    CABG, ARTERY-VEIN, FOUR  1993    states x 5    CARDIAC SURG PROCEDURE UNLIST      Cardiovert x 2-3 x last 2/4/2012    HX AMPUTATION Left 2016    great toe    HX AORTIC VALVE REPLACEMENT      HX APPENDECTOMY  1959    HX HEART CATHETERIZATION      HX HEENT      dental    HX HYSTERECTOMY  1988    HX LITHOTRIPSY      multiple    HX OPEN CHOLECYSTECTOMY      HX ORTHOPAEDIC Left     achilles tendon    HX ORTHOPAEDIC Left     \"toe surgery removing bones\"    HX PACEMAKER      HX PACEMAKER PLACEMENT      Metronic    HX UROLOGICAL Left 1980s    open L kidney removal stones    VASCULAR SURGERY PROCEDURE UNLIST Left 2-17-16    lower extremity arteriogram with stent x 1 placement    VASCULAR SURGERY PROCEDURE UNLIST Right 12/20/2017    2nd toe- resection metatarsal head         Family History:   Problem Relation Age of Onset    Heart Disease Father     Heart Attack Father     Diabetes Father     Hypertension Father     High Cholesterol Father     Asthma Father     Heart Disease Other     Heart Surgery Other     Diabetes Mother     Stroke Mother         TIAs    Hypertension Mother     Other Mother         kidney stone    Kidney Disease Mother     Heart Disease Mother     High Cholesterol Mother    Mary [place] Mother     Cancer Sister         multiple myeloma    Hypertension Sister     Hypertension Sister     Hypertension Brother     Diabetes Brother     Cancer Brother     Hypertension Sister     Heart Disease Sister     Hypertension Sister     Hypertension Sister        Social History     Socioeconomic History    Marital status:      Spouse name: Not on file    Number of children: Not on file    Years of education: Not on file    Highest education level: Not on file   Occupational History    Not on file   Social Needs    Financial resource strain: Not on file    Food insecurity:     Worry: Not on file     Inability: Not on file    Transportation needs:     Medical: Not on file     Non-medical: Not on file   Tobacco Use    Smoking status: Never Smoker    Smokeless tobacco: Never Used   Substance and Sexual Activity    Alcohol use: No    Drug use: No     Types: Prescription    Sexual activity: Not on file   Lifestyle    Physical activity:     Days per week: Not on file     Minutes per session: Not on file    Stress: Not on file   Relationships    Social connections:     Talks on phone: Not on file     Gets together: Not on file     Attends Confucianist service: Not on file     Active member of club or organization: Not on file     Attends meetings of clubs or organizations: Not on file     Relationship status: Not on file    Intimate partner violence:     Fear of current or ex partner: Not on file     Emotionally abused: Not on file     Physically abused: Not on file     Forced sexual activity: Not on file   Other Topics Concern    Not on file   Social History Narrative    Not on file         ALLERGIES: Multaq [dronedarone]; Other medication; and Statins-hmg-coa reductase inhibitors    Review of Systems   Constitutional: Positive for fatigue. Negative for chills and fever. Respiratory: Negative for choking and shortness of breath. Cardiovascular: Negative for chest pain. Gastrointestinal: Negative for abdominal pain, bowel incontinence, nausea and vomiting. Genitourinary: Negative for bladder incontinence. Neurological: Positive for dizziness. Negative for focal weakness, speech difficulty, headaches and loss of balance. Psychiatric/Behavioral: Negative for agitation, confusion and memory loss. All other systems reviewed and are negative. Vitals:    08/17/19 0947   BP: 112/62   Pulse: 85   Resp: 16   Temp: 97.4 °F (36.3 °C)   SpO2: 98%   Weight: 47.2 kg (104 lb)   Height: 5' 3\" (1.6 m)            Physical Exam   Constitutional: She is oriented to person, place, and time. She appears well-developed and well-nourished. No distress. HENT:   Head: Normocephalic and atraumatic. Right Ear: External ear normal.   Left Ear: External ear normal.   Mouth/Throat: Oropharynx is clear and moist.   Eyes: Pupils are equal, round, and reactive to light.  Conjunctivae and EOM are normal.   Neck: Normal range of motion. Neck supple. No thyromegaly present. Cardiovascular: Normal rate, regular rhythm and intact distal pulses. Exam reveals no gallop and no friction rub. Murmur heard. Pulmonary/Chest: Effort normal and breath sounds normal. She has no wheezes. She has no rales. Abdominal: Soft. Bowel sounds are normal. There is no tenderness. No hernia. Musculoskeletal: Normal range of motion. She exhibits edema (3+ left lower extremity 2+ right lower extremity, negative calf tenderness. ). Neurological: She is alert and oriented to person, place, and time. She has normal strength. No cranial nerve deficit or sensory deficit. Coordination normal.   Skin: Skin is warm and dry. Capillary refill takes less than 2 seconds. Psychiatric: She has a normal mood and affect. Her speech is normal and behavior is normal. Thought content normal.   Nursing note and vitals reviewed. MDM  Number of Diagnoses or Management Options     Amount and/or Complexity of Data Reviewed  Clinical lab tests: ordered and reviewed  Obtain history from someone other than the patient: yes  Review and summarize past medical records: yes  Independent visualization of images, tracings, or specimens: yes    Risk of Complications, Morbidity, and/or Mortality  Presenting problems: high  Diagnostic procedures: minimal  Management options: moderate    Patient Progress  Patient progress: improved         Procedures    The patient was observed in the ED. patient's pacemaker was interrogated in the emergency department by the representative for the pacemaker; patient had an episode of 2 to 3 seconds of A. fib with RVR this morning approximately an hour or hour and a half prior to her episode of near syncope, but was noted to have a few other episodes of this earlier in the week that were not associated with any sensation or symptoms. Patient remained asymptomatic throughout her stay in the emergency department.   She will be discharged home with instructions to follow-up with her cardiologist next week. Results Reviewed:      Recent Results (from the past 24 hour(s))   CBC WITH AUTOMATED DIFF    Collection Time: 08/17/19  9:50 AM   Result Value Ref Range    WBC 8.0 4.3 - 11.1 K/uL    RBC 4.11 4.05 - 5.2 M/uL    HGB 14.1 11.7 - 15.4 g/dL    HCT 42.1 35.8 - 46.3 %    .4 (H) 79.6 - 97.8 FL    MCH 34.3 (H) 26.1 - 32.9 PG    MCHC 33.5 31.4 - 35.0 g/dL    RDW 14.5 11.9 - 14.6 %    PLATELET 964 059 - 352 K/uL    MPV 9.0 (L) 9.4 - 12.3 FL    ABSOLUTE NRBC 0.00 0.0 - 0.2 K/uL    DF AUTOMATED      NEUTROPHILS 69 43 - 78 %    LYMPHOCYTES 14 13 - 44 %    MONOCYTES 13 (H) 4.0 - 12.0 %    EOSINOPHILS 2 0.5 - 7.8 %    BASOPHILS 1 0.0 - 2.0 %    IMMATURE GRANULOCYTES 0 0.0 - 5.0 %    ABS. NEUTROPHILS 5.5 1.7 - 8.2 K/UL    ABS. LYMPHOCYTES 1.1 0.5 - 4.6 K/UL    ABS. MONOCYTES 1.1 0.1 - 1.3 K/UL    ABS. EOSINOPHILS 0.2 0.0 - 0.8 K/UL    ABS. BASOPHILS 0.1 0.0 - 0.2 K/UL    ABS. IMM. GRANS. 0.0 0.0 - 0.5 K/UL   METABOLIC PANEL, COMPREHENSIVE    Collection Time: 08/17/19  9:50 AM   Result Value Ref Range    Sodium 136 136 - 145 mmol/L    Potassium 4.1 3.5 - 5.1 mmol/L    Chloride 101 98 - 107 mmol/L    CO2 28 21 - 32 mmol/L    Anion gap 7 7 - 16 mmol/L    Glucose 137 (H) 65 - 100 mg/dL    BUN 15 8 - 23 MG/DL    Creatinine 0.97 0.6 - 1.0 MG/DL    GFR est AA >60 >60 ml/min/1.73m2    GFR est non-AA 59 (L) >60 ml/min/1.73m2    Calcium 9.1 8.3 - 10.4 MG/DL    Bilirubin, total 1.2 (H) 0.2 - 1.1 MG/DL    ALT (SGPT) 44 12 - 65 U/L    AST (SGOT) 44 (H) 15 - 37 U/L    Alk.  phosphatase 97 50 - 136 U/L    Protein, total 6.5 6.3 - 8.2 g/dL    Albumin 2.9 (L) 3.2 - 4.6 g/dL    Globulin 3.6 (H) 2.3 - 3.5 g/dL    A-G Ratio 0.8 (L) 1.2 - 3.5     TROPONIN I    Collection Time: 08/17/19  9:50 AM   Result Value Ref Range    Troponin-I, Qt. 0.03 0.02 - 0.05 NG/ML   EKG, 12 LEAD, INITIAL    Collection Time: 08/17/19  9:51 AM   Result Value Ref Range Ventricular Rate 79 BPM    Atrial Rate 72 BPM    QRS Duration 76 ms    Q-T Interval 328 ms    QTC Calculation (Bezet) 376 ms    Calculated R Axis -9 degrees    Calculated T Axis 140 degrees    Diagnosis       Undetermined rhythm  Septal infarct , age undetermined  ST & T wave abnormality, consider lateral ischemia  Abnormal ECG  When compared with ECG of 02-AUG-2019 07:29,  Current undetermined rhythm precludes rhythm comparison, needs review  Confirmed by Ilene Steel ([id number]) on 8/17/2019 11:11:49 AM         I discussed the results of all labs, procedures, radiographs, and treatments with the patient and available family. Treatment plan is agreed upon and the patient is ready for discharge. All voiced understanding of the discharge plan and medication instructions or changes as appropriate. Questions about treatment in the ED were answered. All were encouraged to return should symptoms worsen or new problems develop.

## 2019-08-17 NOTE — ED NOTES
I have reviewed discharge instructions with the patient. The patient verbalized understanding. Patient left ED via Discharge Method: ambulatory to Home with her sons. Opportunity for questions and clarification provided. Patient given 0 scripts. To continue your aftercare when you leave the hospital, you may receive an automated call from our care team to check in on how you are doing. This is a free service and part of our promise to provide the best care and service to meet your aftercare needs.  If you have questions, or wish to unsubscribe from this service please call [phone]. Thank you for Choosing our [facility] Emergency Department.

## 2019-08-23 PROBLEM — R53.2 FUNCTIONAL QUADRIPLEGIA (HCC): Status: ACTIVE | Noted: 2019-01-01

## 2019-08-23 PROBLEM — R56.9 WITNESSED SEIZURE-LIKE ACTIVITY (HCC): Status: ACTIVE | Noted: 2019-01-01

## 2019-08-23 PROBLEM — E16.2 HYPOGLYCEMIA: Status: ACTIVE | Noted: 2019-01-01

## 2019-08-23 PROBLEM — R41.0 INTERMITTENT CONFUSION: Status: ACTIVE | Noted: 2019-01-01

## 2019-08-23 PROBLEM — S32.020A CLOSED COMPRESSION FRACTURE OF L2 VERTEBRA (HCC): Status: ACTIVE | Noted: 2019-01-01

## 2019-08-23 NOTE — ED NOTES
Dr Sosa Mantilla at bedside. Patient a/ox 3 and answering questions appropriately. Patient is a insulin dependent diabetic.

## 2019-08-23 NOTE — ED TRIAGE NOTES
Pt in via EMS for hypoglycemia of 48 per son. EMS states 60 when they checked. 250cc D10 given en route. Pt was altered on EMS arrival. 22g r forearm. Last sugar 309 @ 0722.  /57, DE 74, 94% RA. 2 L nasal cannula brought to 100%

## 2019-08-23 NOTE — PROGRESS NOTES
Rounding done every hour and PRN while patient in room. Patient resting in room. No signs or symptoms of distress. No changes in status. Patient denies any further needs or pain at this time. Patient had large BM and urinated on Bedside commode.

## 2019-08-23 NOTE — PROGRESS NOTES
08/23/19 1456   Dual Skin Pressure Injury Assessment   Dual Skin Pressure Injury Assessment X   Second Care Provider (Based on [address] [place] [address]) TEJ Archer   Elbows Left  (reddened area with scabbed [place])   Sacrum  Mid  (reddened)   Heel  Right  (red and boggy)   Toes Left Toes  (2nd toe red scabbed area)          08/23/19 1456   Skin Integumentary   Skin Integumentary (WDL) X   Skin Color Ecchymosis (comment)   Skin Condition/Temp Cold   Skin Integrity Abrasion   Turgor Epidermis thin w/ loss of subcut tissue    Pressure  Injury Documentation No Pressure Injury Noted-Pressure Ulcer Prevention Initiated       Prevlon boots ordered, heels up on pillow.

## 2019-08-23 NOTE — H&P
HOSPITALIST H&P  NAME:  Franci Saucedo   Age:  69 y.o.  :   1940   MRN:   [id number]  PCP: Markus Saab MD  Consulting MD:  Treatment Team: Attending Provider: Razia Hernandez MD  HPI:   Patient is a 70 yo F who presents to the ED via EMS for a blood sugar of 48 and altered mental status at home. Her son noted her blood sugar to be low this morning and called EMS as she was confused. She is presently accompanied by her daughter, Alena Donaldson, who provides most of the history. History of CAD s/p remote CABG, TAVR in 2016 now with severe aortic valve stenosis undergoing evaluation by cardiology, a fib on Eliquis, diabetes on insulin, RA on prednisone. Ms. Duane Marmolejo is oriented to self, location, and year, but is generally a poor historian. She does admit to having low back pain and some shortness of breath at home. Alena Donaldson reports patient has had problems with walking, difficulty with speaking (will yell 'help' and cannot find other words to say), and has not been eating for the last 3 weeks. Symptoms started after she feel while trying to get off the toilet at the end of July. Prior to 3 weeks ago, she was able to ambulate through her house with a rollator walker. Alena Donaldson states that on , she had an episode while eating breakfast where she lost consciousness and had jerking of her shoulders and arms while at the table. Event was witnessed by her son. She was brought to the ER for this. Alena Donaldson is very concerned as she has had a rapid decline, and they are currently unable to care for her as she has essentially become non-ambulatory. She states patient has not complained of chest pain specifically, but she will grab at her chest occasionally. Of note, she has had recent TTE and SUZANNE showing EF of 40-45% and severe AS. She had CTA chest/abd/pelvis ordered by cardiology on  showing diff atherosclerotic disease in carotid, abdominal, and renal arteries.   Also, showed acute L1 and L2 compression fracture with recommendation for possible IR evaluation. In the ER, labs unrevealing. CT head negative. Hospitalist service asked to admit for further evaluation.     Complete ROS done and is as stated in HPI or otherwise negative  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 toe    GERD (gastroesophageal reflux disease)     managed with medication     Heart failure ([address]     History of kidney stones     multiple with surgical interventions    Hypercholesterolemia     managed with medication     Hypertension     Hypothyroidism     managed with medication     Ill-defined condition     pt takes eliquis     Microscopic hematuria     Nausea & vomiting     after anesthesia    Osteoarthritis     managed with medication     Pacemaker     Metronic pacemaker only    PUD (peptic ulcer disease)     no recent episodes    PVD (peripheral vascular disease) ([address]     left side x 1 stented    Rheumatoid arthritis(714.0)     managed with medication     Toe amputation status ([address]     left great toe     Type 2 diabetes mellitus ([address] Dx 2006    oral t and insulin/Avg / no s/s of low BS/ does not have a sensation / last A1C7.4    Vertigo     no treatment, happens occassionally      Past Surgical History:   Procedure Laterality Date    CABG, ARTERY-VEIN, FOUR  1993    states x 5    CARDIAC SURG PROCEDURE UNLIST      Cardiovert x 2-3 x last 2/4/2012    HX AMPUTATION Left 2016    great toe    HX AORTIC VALVE REPLACEMENT      HX APPENDECTOMY  1959    HX HEART CATHETERIZATION      HX HEENT      dental    HX HYSTERECTOMY  1988    HX LITHOTRIPSY      multiple    HX OPEN CHOLECYSTECTOMY      HX ORTHOPAEDIC Left     achilles tendon    HX ORTHOPAEDIC Left     \"toe surgery removing bones\"    HX PACEMAKER      HX PACEMAKER PLACEMENT      Metronic    HX UROLOGICAL Left 1980s    open L kidney removal stones    VASCULAR SURGERY PROCEDURE UNLIST Left 2-17-16    lower extremity arteriogram with stent x 1 placement    VASCULAR SURGERY PROCEDURE UNLIST Right 12/20/2017    2nd toe- resection metatarsal head      Prior to Admission Medications   Prescriptions Last Dose Informant Patient Reported? Taking? CALCIUM CARBONATE (CALCIUM 300 PO)   Yes No   Sig: Take 1 Tab by mouth daily. CYANOCOBALAMIN (VITAMIN B-12 PO)   Yes No   Sig: Take 1 Tab by mouth daily. DOCOSAHEXANOIC ACID/EPA (FISH OIL PO)   Yes No   Sig: Take 2 Tabs by mouth two (2) times a day. GLUC HCL/GLUC KENNY/AC-D-GLUCOS (GLUCOSAMINE COMPLEX PO)   Yes No   Sig: Take 1 Tab by mouth three (3) times daily. HYDROcodone-acetaminophen (NORCO) 5-325 mg per tablet   No No   Sig: Take 1 Tab by mouth every four (4) hours as needed for Pain. Max Daily Amount: 6 Tabs. OMEPRAZOLE (PRILOSEC PO)   Yes No   Sig: Take 20 mg by mouth daily. acetaminophen (TYLENOL) 325 mg tablet   Yes No   Sig: Take 325 mg by mouth every six (6) hours as needed for Pain. apixaban (ELIQUIS) 2.5 mg tablet   No No   Sig: Take 1 Tab by mouth two (2) times a day. aspirin delayed-release 81 mg tablet   Yes No   Sig: Take 81 mg by mouth daily. Morning      digoxin (LANOXIN) 0.125 mg tablet   No No   Sig: Take 1 Tab by mouth daily. ergocalciferol (ERGOCALCIFEROL) 50,000 unit capsule   Yes No   Sig: Take 50,000 Units by mouth every month. ferrous sulfate 325 mg (65 mg iron) tablet   Yes No   Sig: Take 1 Tab by mouth Daily (before lunch). furosemide (LASIX) 40 mg tablet   No No   Sig: Take 1 Tab by mouth daily.    Patient taking differently: Take 40 mg by mouth daily. Indications: every other day   glipiZIDE (GLUCOTROL) 10 mg tablet   Yes No   Sig: Take 10 mg by mouth daily. Indications: type 2 diabetes mellitus   insulin degludec (TRESIBA FLEXTOUCH U-100) 100 unit/mL (3 mL) inpn   Yes No   Si Units by SubCUTAneous route nightly. linagliptin (TRADJENTA) 5 mg tablet   Yes No   Sig: Take 5 mg by mouth daily. Indications: type 2 diabetes mellitus, morning   loperamide (IMODIUM) 2 mg capsule   Yes No   Sig: Take 2 mg by mouth four (4) times daily as needed for Diarrhea.   lorazepam (ATIVAN) 1 mg tablet   Yes No   Sig: Take 1 mg by mouth two (2) times a day for Anxiety. methimazole (TAPAZOLE) 5 mg tablet   Yes No   Sig: Take 5 mg by mouth daily. Pt reports she alternates 1/12 tablets and two tablets every monday and friday  Indications: overactive thyroid gland, morning   metoprolol (LOPRESSOR) 50 mg tablet   No No   Sig: Take 1 Tab by mouth two (2) times a day. Patient taking differently: Take 25 mg by mouth two (2) times a day. multivitamin (ONE A DAY) tablet   Yes No   Sig: Take 1 Tab by mouth daily. potassium chloride (K-DUR, KLOR-CON) 20 mEq tablet   No No   Sig: Take 1 Tab by mouth two (2) times a day. Patient taking differently: Take 20 mEq by mouth daily. predniSONE (DELTASONE) 5 mg tablet   No No   Sig: Take 1 Tab by mouth daily (with breakfast). traMADol (ULTRAM) 50 mg tablet   No No   Sig: Take 1 Tab by mouth every eight (8) hours as needed for Pain. Max Daily Amount: 150 mg.       Facility-Administered Medications: None     Allergies   Allergen Reactions    Multaq [Dronedarone] Nausea Only    Other Medication Other (comments)     STATINS CAUSE MUSCLE WEAKNESS  Cholesterol medications    Statins-Hmg-Coa Reductase Inhibitors Myalgia      Social History     Tobacco Use    Smoking status: Never Smoker    Smokeless tobacco: Never Used   Substance Use Topics    Alcohol use: No      Family History   Problem Relation Age of Onset    Heart Disease Father     Heart Attack Father     Diabetes Father     Hypertension Father     High Cholesterol Father     Asthma Father     Heart Disease Other     Heart Surgery Other     Diabetes Mother     Stroke Mother         TIAs    Hypertension Mother     Other Mother         kidney stone    Kidney Disease Mother     Heart Disease Mother     High Cholesterol Mother     Cancer Mother     Cancer Sister         multiple myeloma    Hypertension Sister     Hypertension Sister     Hypertension Brother     Diabetes Brother     Cancer Brother     Hypertension Sister     Heart Disease Sister     Hypertension Sister     Hypertension Sister       Objective:     Visit Vitals  /68 (BP 1 Location: Right arm, BP Patient Position: At rest)   Pulse 83   Temp 97.3 °F (36.3 °C)   Resp 20   Ht 5' 3\" (1.6 m)   Wt 47.2 kg (104 lb)   SpO2 98%   BMI 18.42 kg/m²      Temp (24hrs), Av.8 °F (36 °C), Min:96.3 °F (35.7 °C), Max:97.3 °F (36.3 °C)    Patient Vitals for the past 24 hrs:   Temp Pulse Resp BP SpO2   19 1547 97.3 °F (36.3 °C) 83 20 127/68 98 %   19 1359 96.7 °F (35.9 °C) 84  137/65 97 %   19 1328  76  119/58 95 %   19 1059  68 20 126/59 93 %   19 0951     96 %   19 0950     96 %   19 0945 96.3 °F (35.7 °C)       19 0939  90 19 132/63 98 %   19 0919  (!) 59  117/55    19 0757  72 16 105/53 96 %       Oxygen Therapy  O2 Sat (%): 98 % (19 1547)  Pulse via Oximetry: 84 beats per minute (19 1359)  O2 Device: Nasal cannula (19 0951)  O2 Flow Rate (L/min): 2 l/min (19 0951)  Physical Exam:  General:    Alert, cooperative, appears elderly in no distress but chronically ill  Head:   Normocephalic, without obvious abnormality, atraumatic. Nose:  Nares normal. No drainage or sinus tenderness. Lungs:   Clear to auscultation bilaterally. No Wheezing or Rhonchi.  No rales.  Heart:   Irregular rhythm, rate controlled  Abdomen:   Soft, non-tender. Not distended. Bowel sounds normal.   Extremities: No cyanosis. No edema. No clubbing  Skin:     Texture, turgor normal. No rashes or lesions. Not Jaundiced  Neurologic: Alert and oriented to self/location/year, unable to answer month or POTUS, arms 5/5 strength, legs 3/5 in strength   Data Review:   Recent Results (from the past 24 hour(s))   GLUCOSE, POC    Collection Time: 08/23/19  8:04 AM   Result Value Ref Range    Glucose (POC) 187 (H) 65 - 100 mg/dL   EKG, 12 LEAD, INITIAL    Collection Time: 08/23/19  8:05 AM   Result Value Ref Range    Ventricular Rate 73 BPM    Atrial Rate 85 BPM    QRS Duration 160 ms    Q-T Interval 424 ms    QTC Calculation (Bezet) 467 ms    Calculated R Axis -97 degrees    Calculated T Axis 79 degrees    Diagnosis       AF with intermittetnt V-pacing  Confirmed by Jose Sims MD ()FLORENCIA ([id number]) on 8/23/2019 12:59:49 PM     CBC WITH AUTOMATED DIFF    Collection Time: 08/23/19  8:26 AM   Result Value Ref Range    WBC 9.7 4.3 - 11.1 K/uL    RBC 4.06 4.05 - 5.2 M/uL    HGB 13.9 11.7 - 15.4 g/dL    HCT 42.4 35.8 - 46.3 %    .4 (H) 79.6 - 97.8 FL    MCH 34.2 (H) 26.1 - 32.9 PG    MCHC 32.8 31.4 - 35.0 g/dL    RDW 14.8 (H) 11.9 - 14.6 %    PLATELET 636 (L) 875 - 450 K/uL    MPV 9.9 9.4 - 12.3 FL    ABSOLUTE NRBC 0.00 0.0 - 0.2 K/uL    DF AUTOMATED      NEUTROPHILS 74 43 - 78 %    LYMPHOCYTES 14 13 - 44 %    MONOCYTES 11 4.0 - 12.0 %    EOSINOPHILS 1 0.5 - 7.8 %    BASOPHILS 1 0.0 - 2.0 %    IMMATURE GRANULOCYTES 1 0.0 - 5.0 %    ABS. NEUTROPHILS 7.1 1.7 - 8.2 K/UL    ABS. LYMPHOCYTES 1.3 0.5 - 4.6 K/UL    ABS. MONOCYTES 1.0 0.1 - 1.3 K/UL    ABS. EOSINOPHILS 0.1 0.0 - 0.8 K/UL    ABS. BASOPHILS 0.1 0.0 - 0.2 K/UL    ABS. IMM.  GRANS. 0.1 0.0 - 0.5 K/UL   METABOLIC PANEL, COMPREHENSIVE    Collection Time: 08/23/19  8:26 AM   Result Value Ref Range    Sodium 137 136 - 145 mmol/L    Potassium 4.0 3.5 - 5.1 mmol/L    Chloride 104 98 - 107 mmol/L    CO2 26 21 - 32 mmol/L    Anion gap 7 7 - 16 mmol/L    Glucose 145 (H) 65 - 100 mg/dL    BUN 14 8 - 23 MG/DL    Creatinine 0.83 0.6 - 1.0 MG/DL    GFR est AA >60 >60 ml/min/1.73m2    GFR est non-AA >60 >60 ml/min/1.73m2    Calcium 8.8 8.3 - 10.4 MG/DL    Bilirubin, total 1.1 0.2 - 1.1 MG/DL    ALT (SGPT) 31 12 - 65 U/L    AST (SGOT) 35 15 - 37 U/L    Alk. phosphatase 85 50 - 136 U/L    Protein, total 6.1 (L) 6.3 - 8.2 g/dL    Albumin 2.7 (L) 3.2 - 4.6 g/dL    Globulin 3.4 2.3 - 3.5 g/dL    A-G Ratio 0.8 (L) 1.2 - 3.5     TSH 3RD GENERATION    Collection Time: 08/23/19  8:26 AM   Result Value Ref Range    TSH 0.988 0.358 - 3.740 uIU/mL   TROPONIN I    Collection Time: 08/23/19  8:26 AM   Result Value Ref Range    Troponin-I, Qt. 0.07 (H) 0.02 - 0.05 NG/ML   GLUCOSE, POC    Collection Time: 08/23/19  9:31 AM   Result Value Ref Range    Glucose (POC) 148 (H) 65 - 100 mg/dL   URINE MICROSCOPIC    Collection Time: 08/23/19 10:06 AM   Result Value Ref Range    WBC 0-3 0 /hpf    RBC  0 /hpf    Epithelial cells 0 0 /hpf    Bacteria 0 0 /hpf    Casts 0 0 /lpf    Crystals, urine 0 0 /LPF    Amorphous Crystals TRACE 0      Mucus 0 0 /lpf   D DIMER    Collection Time: 08/23/19  2:31 PM   Result Value Ref Range    D DIMER 1.36 (HH) <0.56 ug/ml(FEU)   GLUCOSE, POC    Collection Time: 08/23/19  3:43 PM   Result Value Ref Range    Glucose (POC) 44 (L) 65 - 100 mg/dL     Imaging /Procedures /Studies   CT HEAD WO CONT   Final Result   Impression:    1. No evidence of hemorrhage. 2. Mild white matter hypodensities are nonspecific, not unusual for age and most   often chronic microvascular ischemic change. XR CHEST PORT   Final Result   IMPRESSION:    1. Small bilateral pleural effusions, stable to slightly increased. 2. Otherwise no significant change. Cardiac surgery again noted.          DUPLEX LOWER EXT VENOUS LEFT    (Results Pending)       Assessment and Plan: Active Hospital Problems    Diagnosis Date Noted    Hypoglycemia  - Admit to medical bed  - Hold long acting insulin and oral hypoglycemic agents  - Place on SSI and hypoglycemia protocol   08/23/2019    Functional quadriplegia (HCC)  - Rapidly worsening  - May be related to compression fracture and back pain, but she does not appear to be in severe pain   08/23/2019    Intermittent confusion  - CT head negative  - On multiple meds that could cause confusion in elderly (lorazepam, norco), but she has been on these for years  - ? If she has had seizures given event witnessed on 8/23 by her son  - Check EEG and consult neurology to give an opinion given her rapid decline   08/23/2019    Witnessed seizure-like activity ([facility]  - Check EEG   08/23/2019    PAD (peripheral artery disease) ([facility] [address] [facility]  - No acute issues 02/12/2016         Rheumatoid arthritis ([facility] [address] [facility]  - Continue prednisone   03/15/2014    Atrial fibrillation (HCC)  - Continue eliaquis, metoprolol    Aortic stenosis  - Undergoing outpatient evaluation by cardiology. No acute issues. Acute L2 compression fracture  - IR evaluation for kyphoplasty    Slight Trop elevation  - Trend trop    L leg swelling  - Check venous duplex      Given multitude of medical issues and rapid decline with advanced age, she will require > 2 midnight stay for evaluation. Will place PP and have PT/OT evaluate as she will likely need SNF for rehab on discharge as she was functioning at a higher level 3 weeks ago. 03/15/2014       Code Status: DNR per discussion with patient and her daughter, Olman Huitron    Estimated LOS:  > 2 midnights    Signed By: Onesimo Saldaña MD     August 23, 2019

## 2019-08-23 NOTE — ED PROVIDER NOTES
Marilee Padron is a 71-year-old who arrives in the emergency department today via EMS from home with some altered mental status. Paramedics report they were called out for low blood sugar. She was found to have a sugar in the 40s by her family. They gave her some D10 IV and sugar was up to 300. Son arrived a short time later and adds to much of the history. He states that she has not been eating well. She is still on her insulin. This morning she was less responsive than normal and he noted her blood sugar to be in the 40s. The history is provided by the patient, a relative and the EMS personnel. Low Blood Sugar    This is a new problem. The current episode started 1 to 2 hours ago. The problem has been resolved. Her past medical history is significant for diabetes and heart disease. Her past medical history does not include seizures, CVA or head trauma.         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 toe    GERD (gastroesophageal reflux disease)     managed with medication     Heart failure ([address]     History of kidney stones     multiple with surgical interventions    Hypercholesterolemia     managed with medication     Hypertension     Hypothyroidism     managed with medication     Ill-defined condition     pt takes eliquis     Microscopic hematuria     Nausea & vomiting     after anesthesia    Osteoarthritis     managed with medication     Pacemaker     Metronic pacemaker only    PUD (peptic ulcer disease)     no recent episodes    PVD (peripheral vascular disease) ([address] left side x 1 stented    Rheumatoid arthritis(714.0)     managed with medication     Toe amputation status ([facility]     left great toe     Type 2 diabetes mellitus ([facility] Dx 2006    oral t and insulin/Avg / no s/s of low BS/ does not have a sensation / last A1C7.4    Vertigo     no treatment, happens occassionally       Past Surgical History:   Procedure Laterality Date    CABG, ARTERY-VEIN, FOUR  1993    states x 5    CARDIAC SURG PROCEDURE UNLIST      Cardiovert x 2-3 x last 2/4/2012    HX AMPUTATION Left 2016    great toe    HX AORTIC VALVE REPLACEMENT      HX APPENDECTOMY  1959    HX HEART CATHETERIZATION      HX HEENT      dental    HX HYSTERECTOMY  1988    HX LITHOTRIPSY      multiple    HX OPEN CHOLECYSTECTOMY      HX ORTHOPAEDIC Left     achilles tendon    HX ORTHOPAEDIC Left     \"toe surgery removing bones\"    HX PACEMAKER      HX PACEMAKER PLACEMENT      Metronic    HX UROLOGICAL Left 1980s    open L kidney removal stones    VASCULAR SURGERY PROCEDURE UNLIST Left 2-17-16    lower extremity arteriogram with stent x 1 placement    VASCULAR SURGERY PROCEDURE UNLIST Right 12/20/2017    2nd toe- resection metatarsal head         Family History:   Problem Relation Age of Onset    Heart Disease Father     Heart Attack Father     Diabetes Father     Hypertension Father     High Cholesterol Father     Asthma Father     Heart Disease Other     Heart Surgery Other     Diabetes Mother     Stroke Mother         TIAs    Hypertension Mother     Other Mother         kidney stone    Kidney Disease Mother     Heart Disease Mother     High Cholesterol Mother    [address] Mother     Cancer Sister         multiple myeloma    Hypertension Sister     Hypertension Sister     Hypertension Brother     Diabetes Brother     Cancer Brother     Hypertension Sister     Heart Disease Sister     Hypertension Sister     Hypertension Sister        Social History     Socioeconomic History    Marital status:      Spouse name: Not on file    Number of children: Not on file    Years of education: Not on file    Highest education level: Not on file   Occupational History    Not on file   Social Needs    Financial resource strain: Not on file    Food insecurity:     Worry: Not on file     Inability: Not on file    Transportation needs:     Medical: Not on file     Non-medical: Not on file   Tobacco Use    Smoking status: Never Smoker    Smokeless tobacco: Never Used   Substance and Sexual Activity    Alcohol use: No    Drug use: No     Types: Prescription    Sexual activity: Not on file   Lifestyle    Physical activity:     Days per week: Not on file     Minutes per session: Not on file    Stress: Not on file   Relationships    Social connections:     Talks on phone: Not on file     Gets together: Not on file     Attends [employer] service: Not on file     Active member of club or organization: Not on file     Attends meetings of clubs or organizations: Not on file     Relationship status: Not on file    Intimate partner violence:     Fear of current or ex partner: Not on file     Emotionally abused: Not on file     Physically abused: Not on file     Forced sexual activity: Not on file   Other Topics Concern    Not on file   Social History Narrative    Not on file         ALLERGIES: Multaq [dronedarone]; Other medication; and Statins-hmg-coa reductase inhibitors    Review of Systems   Unable to perform ROS: Mental status change       Vitals:    08/23/19 0757   BP: 105/53   Pulse: 72   Resp: 16   SpO2: 96%   Weight: 47.2 kg (104 lb)   Height: 5' 3\" (1.6 m)            Physical Exam   Constitutional: She is oriented to person, place, and time. She appears well-developed and well-nourished. Frail and elderly. HENT:   Head: Normocephalic and atraumatic. Eyes: Pupils are equal, round, and reactive to light. Conjunctivae and EOM are normal.   Neck: Normal range of motion. Neck supple. Cardiovascular: Normal rate, regular rhythm and intact distal pulses. Murmur heard. Pulmonary/Chest: Effort normal and breath sounds normal.   Pacemaker on left upper chest   Abdominal: Soft. There is no tenderness. There is no rebound and no guarding. Musculoskeletal: Normal range of motion. She exhibits edema. She exhibits no deformity. No calf tenderness, multiple chronic contractures from severe arthritis   Lymphadenopathy:     She has no cervical adenopathy. Neurological: She is alert and oriented to person, place, and time. She has normal strength. No cranial nerve deficit or sensory deficit. GCS eye subscore is 4. GCS verbal subscore is 5. GCS motor subscore is 6. Skin: Skin is warm and dry. Capillary refill takes less than 2 seconds. No rash noted. There is pallor. Nursing note and vitals reviewed. MDM  Number of Diagnoses or Management Options  Diagnosis management comments: EKG reviewed by me shows a ventricularly paced rhythm at a rate of 73    9:08 AM  X-ray shows small pleural effusions. No change from previous  CBC and basic metabolic panel are unremarkable I think the hypoglycemia was likely due to    Decrease caloric intake and still taking her regular insulin dosage. 10:57 AM  Urinalysis is negative for infection but does show some hematuria but this is a chronic issue. I spoke to the daughter who is now at the bedside. There has been a market decline in the patient's functioning over the past month. But it does appear that her symptoms are intermittent. She has not had CAT scan of the head I will order one here. She will likely need admission to the hospitalist for work-up further evaluation and possible placement due to the failure to thrive. 12:40 PM  Scan of the head is negative for hemorrhage. There is a small vessel ischemic disease which appears chronic.   Discussed these results again with the daughter by giving her altered mental status and failure to thrive I think she would benefit from admission to the hospital where they can better control her blood sugar and possibly arrange for further assistance or long-term placement. Voice dictation software was used during the making of this note. This software is not perfect and grammatical and other typographical errors may be present. This note has been proofread, but may still contain errors.   Susan Ziegler MD; 8/23/2019 @12:41 PM   ===================================================================         Amount and/or Complexity of Data Reviewed  Clinical lab tests: ordered and reviewed  Tests in the radiology section of CPT®: reviewed  Obtain history from someone other than the patient: yes  Review and summarize past medical records: yes  Discuss the patient with other providers: yes  Independent visualization of images, tracings, or specimens: yes    Risk of Complications, Morbidity, and/or Mortality  Presenting problems: moderate  Diagnostic procedures: low  Management options: low    Patient Progress  Patient progress: stable         Procedures

## 2019-08-23 NOTE — ED NOTES
TRANSFER - OUT REPORT:    Verbal report given to Kaity(name) on Creed Winston  being transferred to [facility](unit) for routine progression of care       Report consisted of patients Situation, Background, Assessment and   Recommendations(SBAR). Information from the following report(s) ED Summary was reviewed with the receiving nurse. Lines:   Peripheral IV 08/23/19 Right Forearm (Active)   Site Assessment Clean, dry, & intact 8/23/2019  8:05 AM   Phlebitis Assessment 0 8/23/2019  8:05 AM   Infiltration Assessment 0 8/23/2019  8:05 AM   Dressing Status Clean, dry, & intact 8/23/2019  8:05 AM   Hub Color/Line Status Blue 8/23/2019  8:05 AM        Opportunity for questions and clarification was provided.       Patient transported with:   Transport

## 2019-08-24 PROBLEM — R77.8 ELEVATED TROPONIN: Status: ACTIVE | Noted: 2019-01-01

## 2019-08-24 NOTE — CONSULTS
[facility] Consultation        Date of  Admission: 8/23/2019  7:48 AM     Primary Care Physician: Dr. Chaparro Andrew  Primary Cardiologist: Dr. Angel Avendaño  Referring Physician: Dr. Derick Madrigal Physician: Dr. Corinne Cramer    CC/Reason for consult: elevated troponin    HPI:  Olivier Monteor is a 69 y.o. WF with h/o CAD s/p remote CABG, severe AS s/p TAVR 2016, AF on Eliquis, SSS/heart block s/p Biotronik MRI safe SCPM, IDDM, RA on chronic prednisone, hyperthyroidism on Tapazole and GERD who has developed worsening weakness, confusion over last several weeks. Pt has had decreasing appetite and little to eat. Pt had a backwards fall into the toilet in July with bruising on her back and hip. She had low BS in the 40's with AMS and EMS was called on 8/23. In the ER, CT head was unremarkable. She was admitted by the hospitalist for workup. She was seen in consult by neurology. She was found to have a L2 compression fracture. Pt had elevated troponin of 0.03-0.07- 0.13- 0.12 and [facility] was consulted for evaluation. Recent echo shows EF 40-45%, sev AS and she has been undergoing work up for possible repeat TAVR.  Pt denies CP, SOB, palpitations, LE swelling, orthopnea or syncope on exam. Review of chart shows there was a reported event about 1 week ago of dizziness and syncope/LOC for seconds while eating breakfast.      Past Medical History:   Diagnosis Date    Acquired cyst of kidney     Anxiety     managed with medication     Aortic valve replaced     Atrial fibrillation, chronic (HCC)     managed with medication and pacemaker    CAD (coronary artery disease)     CABG x 5    Calculus of kidney     Carotid artery stenosis without cerebral infarction     Chronic pain     GENERALIZED FROM ARTHRITIS    Gangrene associated with diabetes mellitus ([facility] 5/26/2016    left great toe    GERD (gastroesophageal reflux disease)     managed with medication     Heart failure ([facility]     History of kidney stones     multiple with surgical interventions    Hypercholesterolemia     managed with medication     Hypertension     Hypothyroidism     managed with medication     Ill-defined condition     pt takes eliquis     Microscopic hematuria     Nausea & vomiting     after anesthesia    Osteoarthritis     managed with medication     Pacemaker     Metronic pacemaker only    PUD (peptic ulcer disease)     no recent episodes    PVD (peripheral vascular disease) ([address]     left side x 1 stented    Rheumatoid arthritis(714.0)     managed with medication     Toe amputation status ([address]     left great toe     Type 2 diabetes mellitus ([address] Dx 2006    oral t and insulin/Avg / no s/s of low BS/ does not have a sensation / last A1C7.4    Vertigo     no treatment, happens occassionally      Past Surgical History:   Procedure Laterality Date    CABG, ARTERY-VEIN, FOUR  1993    states x 5    CARDIAC SURG PROCEDURE UNLIST      Cardiovert x 2-3 x last 2/4/2012    HX AMPUTATION Left 2016    great toe    HX AORTIC VALVE REPLACEMENT      HX APPENDECTOMY  1959    HX HEART CATHETERIZATION      HX HEENT      dental    HX HYSTERECTOMY  1988    HX LITHOTRIPSY      multiple    HX OPEN CHOLECYSTECTOMY      HX ORTHOPAEDIC Left     achilles tendon    HX ORTHOPAEDIC Left     \"toe surgery removing bones\"    HX PACEMAKER      HX PACEMAKER PLACEMENT      Metronic    HX UROLOGICAL Left 1980s    open L kidney removal stones    VASCULAR SURGERY PROCEDURE UNLIST Left 2-17-16    lower extremity arteriogram with stent x 1 placement    VASCULAR SURGERY PROCEDURE UNLIST Right 12/20/2017    2nd toe- resection metatarsal head       Allergies   Allergen Reactions    Multaq [Dronedarone] Nausea Only    Other Medication Other (comments)     STATINS CAUSE MUSCLE WEAKNESS  Cholesterol medications    Statins-Hmg-Coa Reductase Inhibitors Myalgia      Social History     Socioeconomic History    Marital status:      Spouse name: Not on file    Number of children: Not on file    Years of education: Not on file    Highest education level: Not on file   Occupational History    Not on file   Social Needs    Financial resource strain: Not on file    Food insecurity:     Worry: Not on file     Inability: Not on file    Transportation needs:     Medical: Not on file     Non-medical: Not on file   Tobacco Use    Smoking status: Never Smoker    Smokeless tobacco: Never Used   Substance and Sexual Activity    Alcohol use: No    Drug use: No     Types: Prescription    Sexual activity: Not on file   Lifestyle    Physical activity:     Days per week: Not on file     Minutes per session: Not on file    Stress: Not on file   Relationships    Social connections:     Talks on phone: Not on file     Gets together: Not on file     Attends Worship service: Not on file     Active member of club or organization: Not on file     Attends meetings of clubs or organizations: Not on file     Relationship status: Not on file    Intimate partner violence:     Fear of current or ex partner: Not on file     Emotionally abused: Not on file     Physically abused: Not on file     Forced sexual activity: Not on file   Other Topics Concern    Not on file   Social History Narrative    Not on file     Family History   Problem Relation Age of Onset    Heart Disease Father     Heart Attack Father     Diabetes Father     Hypertension Father     High Cholesterol Father     Asthma Father     Heart Disease Other     Heart Surgery Other     Diabetes Mother     Stroke Mother         TIAs    Hypertension Mother     Other Mother         kidney stone    Kidney Disease Mother     Heart Disease Mother     High Cholesterol Mother    [address] Mother     Cancer Sister         multiple myeloma    Hypertension Sister     Hypertension Sister     Hypertension Brother     Diabetes Brother     Cancer Brother     Hypertension Sister  Heart Disease Sister     Hypertension Sister     Hypertension Sister         Current Facility-Administered Medications   Medication Dose Route Frequency    methIMAzole (TAPAZOLE) tablet 10 mg  10 mg Oral Once per day on Sun Sat    apixaban (ELIQUIS) tablet 5 mg  5 mg Oral Q12H    aspirin delayed-release tablet 81 mg  81 mg Oral DAILY    cyanocobalamin tablet 500 mcg  500 mcg Oral DAILY    digoxin (LANOXIN) tablet 0.125 mg  0.125 mg Oral DAILY    ferrous sulfate tablet 325 mg  1 Tab Oral ACL    HYDROcodone-acetaminophen (NORCO) 5-325 mg per tablet 1 Tab  1 Tab Oral Q4H PRN    LORazepam (ATIVAN) tablet 1 mg  1 mg Oral BID    methIMAzole (TAPAZOLE) tablet 5 mg  5 mg Oral DAILY    metoprolol tartrate (LOPRESSOR) tablet 50 mg  50 mg Oral BID    pantoprazole (PROTONIX) tablet 40 mg  40 mg Oral ACB    predniSONE (DELTASONE) tablet 5 mg  5 mg Oral DAILY WITH BREAKFAST    sodium chloride (NS) flush 5-40 mL  5-40 mL IntraVENous Q8H    sodium chloride (NS) flush 5-40 mL  5-40 mL IntraVENous PRN    tuberculin injection 5 Units  5 Units IntraDERMal ONCE    naloxone (NARCAN) injection 0.4 mg  0.4 mg IntraVENous PRN    ondansetron (ZOFRAN) injection 4 mg  4 mg IntraVENous Q4H PRN    dextrose 40% (GLUTOSE) oral gel 1 Tube  15 g Oral PRN    glucagon (GLUCAGEN) injection 1 mg  1 mg IntraMUSCular PRN    dextrose (D50W) injection syrg 12.5-25 g  25-50 mL IntraVENous PRN       Review of symptoms:  General:+recent weight loss, +weakness, +fatigue, no fever or chills   Skin: no rashes, lumps, or other skin changes   HEENT: no headache, +dizziness/lightheadedness, vision changes, hearing changes, tinnitus, vertigo, sinus pressure/pain, bleeding gums, sore throat, or hoarseness   Neck: no swollen glands, goiter, pain or stiffness   Respiratory: no cough, sputum, hemoptysis, dyspnea, wheezing   Cardiovascular: no chest pain or discomfort, palpitations, +dyspnea, no orthopnea, paroxysmal nocturnal dyspnea, peripheral edema   Gastrointestinal: no trouble swallowing, heartburn, +change of appetite/poor intake, nausea, change in bowel habits, pain with defecation, rectal bleeding or black/tarry stools, hemorrhoids, constipation, diarrhea, abdominal pain, jaundice, liver or gallbladder problems   Urinary: no frequency, urgency , hematuria, burning/pain with urination, recent flank pain, polyuria, nocturia, or difficulty urinating   Genital: no vaginal or pelvic infections   Peripheral Vascular: no claudication, leg cramps, prior DVTs, swelling of calves, legs, or feet, color change, or swelling with redness or tenderness   Musculoskeletal: no muscle or joint pain/stiffness, joint swelling, erythema of joints, or back pain   Psychiatric: no depression, mental disorders, or excessive stress   Neurological: no history of CVA, +dizziness, no sensory or motor loss, seizures, +syncope, tremors, numbness, tingling, no changes in mood, attention, or speech, no changes in orientation, memory, insight, or judgment. no headache, vertigo. Hematologic: no anemia, easy bruising or bleeding   Endocrine: +diabetes, +thyroid problems, heat or cold intolerance, excessive sweating, polyuria, polydipsia        Subjective:   Physical Exam    Visit Vitals  /67 (BP 1 Location: Left arm, BP Patient Position: At rest)   Pulse 84   Temp 98.5 °F (36.9 °C)   Resp 18   Ht 5' 3\" (1.6 m)   Wt 47.2 kg (104 lb)   SpO2 100%   BMI 18.42 kg/m²     General Appearance:  Well developed, well nourished, alert and individual in no acute distress. Ears/Nose/Mouth/Throat:   Hearing grossly normal.         Neck: Supple. Chest:   Lungs clear to auscultation bilaterally. Cardiovascular:  irregular rate and rhythm, S1, S2, +AS murmur   Abdomen:   Soft, non-tender, bowel sounds are active. Extremities: No edema bilaterally. Boots bilaterally   Skin: Warm and dry.        Labs:   Recent Results (from the past 24 hour(s))   D DIMER    Collection Time: 08/23/19  2:31 PM   Result Value Ref Range    D DIMER 1.36 (HH) <0.56 ug/ml(FEU)   GLUCOSE, POC    Collection Time: 08/23/19  3:43 PM   Result Value Ref Range    Glucose (POC) 44 (L) 65 - 100 mg/dL   GLUCOSE, POC    Collection Time: 08/23/19  4:41 PM   Result Value Ref Range    Glucose (POC) 122 (H) 65 - 100 mg/dL   GLUCOSE, POC    Collection Time: 08/23/19  4:51 PM   Result Value Ref Range    Glucose (POC) 147 (H) 65 - 100 mg/dL   TROPONIN I    Collection Time: 08/23/19  4:57 PM   Result Value Ref Range    Troponin-I, Qt. 0.13 (HH) 0.02 - 0.05 NG/ML   HEMOGLOBIN A1C WITH EAG    Collection Time: 08/23/19  4:57 PM   Result Value Ref Range    Hemoglobin A1c 8.2 (H) 4.8 - 6.0 %    Est. average glucose 189 mg/dL   TROPONIN I    Collection Time: 08/23/19  8:42 PM   Result Value Ref Range    Troponin-I, Qt. 0.12 (HH) 0.02 - 0.05 NG/ML   GLUCOSE, POC    Collection Time: 08/23/19  9:13 PM   Result Value Ref Range    Glucose (POC) 286 (H) 65 - 100 mg/dL   GLUCOSE, POC    Collection Time: 08/24/19  5:26 AM   Result Value Ref Range    Glucose (POC) 53 (L) 65 - 100 mg/dL   GLUCOSE, POC    Collection Time: 08/24/19  6:02 AM   Result Value Ref Range    Glucose (POC) 194 (H) 65 - 291 mg/dL   METABOLIC PANEL, BASIC    Collection Time: 08/24/19  6:22 AM   Result Value Ref Range    Sodium 140 136 - 145 mmol/L    Potassium 4.2 3.5 - 5.1 mmol/L    Chloride 107 98 - 107 mmol/L    CO2 25 21 - 32 mmol/L    Anion gap 8 7 - 16 mmol/L    Glucose 186 (H) 65 - 100 mg/dL    BUN 10 8 - 23 MG/DL    Creatinine 0.71 0.6 - 1.0 MG/DL    GFR est AA >60 >60 ml/min/1.73m2    GFR est non-AA >60 >60 ml/min/1.73m2    Calcium 8.8 8.3 - 10.4 MG/DL   GLUCOSE, POC    Collection Time: 08/24/19  7:12 AM   Result Value Ref Range    Glucose (POC) 107 (H) 65 - 100 mg/dL   GLUCOSE, POC    Collection Time: 08/24/19 10:53 AM   Result Value Ref Range    Glucose (POC) 328 (H) 65 - 100 mg/dL       Pt has been seen and examined by Dr. Babara Collet.  He agrees with the following assessment and plan. Assessment/Plan:       Diagnosis    Elevated troponin- Pt denies CP, low level positive troponin at peak 0.13 likely demand ischemia not MI, medical therapy recommended with w/u ongoing for weakness and increasing loss of appetite and intake.  Hypoglycemia- per primary team    Functional quadriplegia [facility])- neurology w/u MRI is OK as Pt had Biotronik MRI safe device placed    Intermittent confusion- ? Etiology, w/u per primary team    Witnessed seizure-like activity ([address]- LOC- ? Can interrogate device to check for an ventricular arrhythmias    CAD (coronary artery disease) s/p remote CABG- continue ASA, BB and intolerant to statins by history    HFrEF- appears fairly compensated, on Metoprolol likely needs to be changed to Toprol XL versus Coreg with addition of ACE-I/ARB, diuretics prn    Severe Aortic stenosis s/p TAVR (transcatheter aortic valve replacement), bioprosthetic 2016- undergoing w/u for possible candidacy for redo TAVR    PAD (peripheral artery disease) ([address]    Diabetes mellitus type 2, controlled ([address]    Hyperthyroidism- on Tapazole, per primary team    Closed compression fracture of L2 vertebra ([address]    Atrial fibrillation ([address]- permanent on BB, rate controlled, on Eliquis    Rheumatoid arthritis ([address]- on chronic steroids    Essential hypertension- continue home meds, monitor       Thank you for consulting [facility] and allowing us to participate in the care of this patient. We will continue to follow along with you.     Kesha Moncada PA-C

## 2019-08-24 NOTE — PROGRESS NOTES
Hourly rounds performed. All needs met. Pt BS this morning dropped to 53 from being 286 earlier that night. Pt received D50W and repeated BS it was 194. Vitals are normal. Pt still complains of some dizziness. Bed is in low position and call light is within reach. Will continue to monitor and report to oncoming nurse.

## 2019-08-24 NOTE — PROGRESS NOTES
Problem: Pressure Injury - Risk of  Goal: *Prevention of pressure injury  Description  Document Domenic Scale and appropriate interventions in the flowsheet.   Outcome: Progressing Towards Goal  Note:   Pressure Injury Interventions:  Sensory Interventions: Assess need for specialty bed    Moisture Interventions: Absorbent underpads    Activity Interventions: Increase time out of bed    Mobility Interventions: Assess need for specialty bed    Nutrition Interventions: Document food/fluid/supplement intake    Friction and Shear Interventions: Minimize layers

## 2019-08-24 NOTE — PROGRESS NOTES
Hospitalist Progress Note    2019  Admit Date: 2019  7:48 AM   NAME: Amanda Stokcton   :     MRN:  [id number]   Attending: Charisma Walton MD  PCP:  Mariann Scott MD    SUBJECTIVE:   Patient admitted  with progressive weakness/confusion and possible seizure-like activity noted about a week prior. Troponin elevated and peaked at 0.13. She denies chest pain. She is still confused and slow to answer questions. Oriented to place, year, situation. Unable to state month or POTUS. Notably, had another episode of hypoglycemia this AM after bedtime blood sugar was 286 (6 units regular insulin sliding scale given). Venous duplex L leg negative for DVT.       Review of Systems negative with exception of pertinent positives noted above  PHYSICAL EXAM     Visit Vitals  /78 (BP 1 Location: Left arm, BP Patient Position: At rest)   Pulse 86   Temp 97.8 °F (36.6 °C)   Resp 20   Ht 5' 3\" (1.6 m)   Wt 47.2 kg (104 lb)   SpO2 99%   BMI 18.42 kg/m²      Temp (24hrs), Av.4 °F (36.3 °C), Min:96.3 °F (35.7 °C), Max:98.3 °F (36.8 °C)    Patient Vitals for the past 24 hrs:   Temp Pulse Resp BP SpO2   19 0728 97.8 °F (36.6 °C) 86 20 136/78 99 %   19 0614 97.4 °F (36.3 °C) 75 18 105/67 98 %   19 0431 97.4 °F (36.3 °C) 88 18 133/79 91 %   19 2328 98.3 °F (36.8 °C) 80 18 122/72 99 %   19 1930 97.6 °F (36.4 °C) 78 16 105/65 100 %   19 1547 97.3 °F (36.3 °C) 83 20 127/68 98 %   19 1359 96.7 °F (35.9 °C) 84  137/65 97 %   19 1328  76  119/58 95 %   19 1059  68 20 126/59 93 %   19 0951     96 %   19 0950     96 %   19 0945 96.3 °F (35.7 °C)       19 0939  90 19 132/63 98 %   19 0919  (!) 59  117/55        Oxygen Therapy  O2 Sat (%): 99 % (19 0728)  Pulse via Oximetry: 84 beats per minute (19 1359)  O2 Device: Nasal cannula (19 0951)  O2 Flow Rate (L/min): 2 l/min (19 1797)  No intake or output data in the 24 hours ending 08/24/19 0135   General: No acute distress, elderly and debilitated appearing    Lungs:  CTA Bilaterally. Heart:  Irregular rhythm, systolic murmur noted  Abdomen: Soft, Non distended, Non tender, Positive bowel sounds  Extremities: No cyanosis, trace L leg edema  Neurologic:  No focal deficits    Recent Results (from the past 24 hour(s))   CBC WITH AUTOMATED DIFF    Collection Time: 08/23/19  8:26 AM   Result Value Ref Range    WBC 9.7 4.3 - 11.1 K/uL    RBC 4.06 4.05 - 5.2 M/uL    HGB 13.9 11.7 - 15.4 g/dL    HCT 42.4 35.8 - 46.3 %    .4 (H) 79.6 - 97.8 FL    MCH 34.2 (H) 26.1 - 32.9 PG    MCHC 32.8 31.4 - 35.0 g/dL    RDW 14.8 (H) 11.9 - 14.6 %    PLATELET 528 (L) 732 - 450 K/uL    MPV 9.9 9.4 - 12.3 FL    ABSOLUTE NRBC 0.00 0.0 - 0.2 K/uL    DF AUTOMATED      NEUTROPHILS 74 43 - 78 %    LYMPHOCYTES 14 13 - 44 %    MONOCYTES 11 4.0 - 12.0 %    EOSINOPHILS 1 0.5 - 7.8 %    BASOPHILS 1 0.0 - 2.0 %    IMMATURE GRANULOCYTES 1 0.0 - 5.0 %    ABS. NEUTROPHILS 7.1 1.7 - 8.2 K/UL    ABS. LYMPHOCYTES 1.3 0.5 - 4.6 K/UL    ABS. MONOCYTES 1.0 0.1 - 1.3 K/UL    ABS. EOSINOPHILS 0.1 0.0 - 0.8 K/UL    ABS. BASOPHILS 0.1 0.0 - 0.2 K/UL    ABS. IMM. GRANS. 0.1 0.0 - 0.5 K/UL   METABOLIC PANEL, COMPREHENSIVE    Collection Time: 08/23/19  8:26 AM   Result Value Ref Range    Sodium 137 136 - 145 mmol/L    Potassium 4.0 3.5 - 5.1 mmol/L    Chloride 104 98 - 107 mmol/L    CO2 26 21 - 32 mmol/L    Anion gap 7 7 - 16 mmol/L    Glucose 145 (H) 65 - 100 mg/dL    BUN 14 8 - 23 MG/DL    Creatinine 0.83 0.6 - 1.0 MG/DL    GFR est AA >60 >60 ml/min/1.73m2    GFR est non-AA >60 >60 ml/min/1.73m2    Calcium 8.8 8.3 - 10.4 MG/DL    Bilirubin, total 1.1 0.2 - 1.1 MG/DL    ALT (SGPT) 31 12 - 65 U/L    AST (SGOT) 35 15 - 37 U/L    Alk.  phosphatase 85 50 - 136 U/L    Protein, total 6.1 (L) 6.3 - 8.2 g/dL    Albumin 2.7 (L) 3.2 - 4.6 g/dL    Globulin 3.4 2.3 - 3.5 g/dL    A-G Ratio 0.8 (L) 1.2 - 3.5     TSH 3RD GENERATION    Collection Time: 08/23/19  8:26 AM   Result Value Ref Range    TSH 0.988 0.358 - 3.740 uIU/mL   TROPONIN I    Collection Time: 08/23/19  8:26 AM   Result Value Ref Range    Troponin-I, Qt. 0.07 (H) 0.02 - 0.05 NG/ML   GLUCOSE, POC    Collection Time: 08/23/19  9:31 AM   Result Value Ref Range    Glucose (POC) 148 (H) 65 - 100 mg/dL   URINE MICROSCOPIC    Collection Time: 08/23/19 10:06 AM   Result Value Ref Range    WBC 0-3 0 /hpf    RBC  0 /hpf    Epithelial cells 0 0 /hpf    Bacteria 0 0 /hpf    Casts 0 0 /lpf    Crystals, urine 0 0 /LPF    Amorphous Crystals TRACE 0      Mucus 0 0 /lpf   D DIMER    Collection Time: 08/23/19  2:31 PM   Result Value Ref Range    D DIMER 1.36 (HH) <0.56 ug/ml(FEU)   GLUCOSE, POC    Collection Time: 08/23/19  3:43 PM   Result Value Ref Range    Glucose (POC) 44 (L) 65 - 100 mg/dL   GLUCOSE, POC    Collection Time: 08/23/19  4:41 PM   Result Value Ref Range    Glucose (POC) 122 (H) 65 - 100 mg/dL   GLUCOSE, POC    Collection Time: 08/23/19  4:51 PM   Result Value Ref Range    Glucose (POC) 147 (H) 65 - 100 mg/dL   TROPONIN I    Collection Time: 08/23/19  4:57 PM   Result Value Ref Range    Troponin-I, Qt. 0.13 (HH) 0.02 - 0.05 NG/ML   HEMOGLOBIN A1C WITH EAG    Collection Time: 08/23/19  4:57 PM   Result Value Ref Range    Hemoglobin A1c 8.2 (H) 4.8 - 6.0 %    Est. average glucose 189 mg/dL   TROPONIN I    Collection Time: 08/23/19  8:42 PM   Result Value Ref Range    Troponin-I, Qt. 0.12 (HH) 0.02 - 0.05 NG/ML   GLUCOSE, POC    Collection Time: 08/23/19  9:13 PM   Result Value Ref Range    Glucose (POC) 286 (H) 65 - 100 mg/dL   GLUCOSE, POC    Collection Time: 08/24/19  5:26 AM   Result Value Ref Range    Glucose (POC) 53 (L) 65 - 100 mg/dL   GLUCOSE, POC    Collection Time: 08/24/19  6:02 AM   Result Value Ref Range    Glucose (POC) 194 (H) 65 - 912 mg/dL   METABOLIC PANEL, BASIC    Collection Time: 08/24/19  6:22 AM   Result Value Ref Range    Sodium 140 136 - 145 mmol/L    Potassium 4.2 3.5 - 5.1 mmol/L    Chloride 107 98 - 107 mmol/L    CO2 25 21 - 32 mmol/L    Anion gap 8 7 - 16 mmol/L    Glucose 186 (H) 65 - 100 mg/dL    BUN 10 8 - 23 MG/DL    Creatinine 0.71 0.6 - 1.0 MG/DL    GFR est AA >60 >60 ml/min/1.73m2    GFR est non-AA >60 >60 ml/min/1.73m2    Calcium 8.8 8.3 - 10.4 MG/DL   GLUCOSE, POC    Collection Time: 08/24/19  7:12 AM   Result Value Ref Range    Glucose (POC) 107 (H) 65 - 100 mg/dL     Imaging:    DUPLEX LOWER EXT VENOUS LEFT   Final Result      CT HEAD WO CONT   Final Result   Impression:    1. No evidence of hemorrhage. 2. Mild white matter hypodensities are nonspecific, not unusual for age and most   often chronic microvascular ischemic change. XR CHEST PORT   Final Result   IMPRESSION:    1. Small bilateral pleural effusions, stable to slightly increased. 2. Otherwise no significant change. Cardiac surgery again noted.                ASSESSMENT      Hospital Problems as of 8/24/2019 Date Reviewed: 1/24/2019          Codes Class Noted - Resolved POA    Hypoglycemia  - Stop SSI  - Monitor POC glucose  - If blood sugars elevated, start insulin sensitive SSI instead of normal sensitivity scale   ICD-10-CM: E16.2  ICD-9-CM: 251.2  8/23/2019 - Present Yes        * (Principal) Functional quadriplegia (HCC)  - Await PT/OT  - Progressive over last 3 weeks  - EEG results and neuro consult pending ICD-10-CM: R53.2  ICD-9-CM: 780.72  8/23/2019 - Present Yes        Intermittent confusion  - EEG pending ICD-10-CM: R41.0  ICD-9-CM: 298.9  8/23/2019 - Present Yes        Witnessed seizure-like activity ([facility] [address]  - EEG pending ICD-10-CM: R56.9  ICD-9-CM: 780.39  8/23/2019 - Present Yes        Closed compression fracture of L2 vertebra ([facility] [address]  - IR consult placed and will likely be evaluated 8/26 ICD-10-CM: S32.020A  ICD-9-CM: 805.4  8/23/2019 - Present Yes        PAD (peripheral artery disease) (HCC) ICD-10-CM: I73.9  ICD-9-CM: 443.9  2/12/2016 - Present Yes    Overview Addendum 3/4/2016  1:47 PM by Cat Pérez, NP     2/17/16 (Dr Ady Espinoza) A/o, BLR Angiogram, L PT PTA (), L SFA PTA/Stent (6x 100 [place] PTX)              Atrial fibrillation (HCC)  - Eliquis ICD-10-CM: I48.91  ICD-9-CM: 427.31  3/15/2014 - Present Yes        Rheumatoid arthritis ([address] (Chronic)  - Chronic prednisone    Elevated troponin  - Consult cardiology in case ischemic disease leading to functional decline ICD-10-CM: M06.9  ICD-9-CM: 714.0  3/15/2014 - Present Yes              DVT Prophylaxis: Eliquis    Signed By: Nacho Wilkins MD     August 24, 2019

## 2019-08-24 NOTE — PROGRESS NOTES
Notified RN  patient has an MRI conditional pacemaker (Stella Fret) and will be scanned during normal business hours as per hospital policy so that the device representative, RN and cardiology support are present.

## 2019-08-24 NOTE — PROGRESS NOTES
Occupational Therapy Note: orders received and chart reviewed. Pt with difficulty ambulating/standing. Pt found to have acute compression fractures at L1 and L2, IR consult pending. Additionally, per neuro, a cervical myelopathy could be contributing to her weakness/spastic paraparesis. MRI of brain & cervical spine are pending. Will HOLD OT evaluation until imaging obtained, consults performed, and plan of care established to move forward. Will continue to follow.   Thank you, Adrien Paul MS, OTR/L

## 2019-08-24 NOTE — CONSULTS
Consult note    Patient: John Bazzi MRN: [id number]     YOB: 1940  Age: 69 y.o. Sex: female      Subjective: John Bazzi is a 69 y.o. female who is being seen for progressive weakness, confusion and seizure-like activity    The patient was in her normal state of health until approximately 3 weeks ago. The patient's son tells me that she began having progressive difficulty with ambulation. She uses a Rollator at baseline. Her distance of ambulation has decreased substantially over the last 3 weeks. Onset of worsening has been gradual.  She has also has intermittent episodes of \"confusion\". This mostly has to do with her ability to move. Her son states that she will have episodes in which she is standing still and wants to move forward but cannot. If she does this naturally she seems to be better at it but if she is told to put one foot in front of the other she cannot do it. Approximately 1 week ago the patient was eating breakfast.  She was sitting down. She reported that she felt dizzy. She then briefly lost consciousness for 3 to 4 seconds. She had stiffening of the legs and arms. She returned back to normal  cognition immediately after. No urinary or fecal incontinence. She did not bite her tongue. She has a history of significant vascular disease.     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 toe    GERD (gastroesophageal reflux disease)     managed with medication     Heart failure ([address]     History of kidney stones     multiple with surgical interventions    Hypercholesterolemia     managed with medication     Hypertension     Hypothyroidism     managed with medication     Ill-defined condition     pt takes eliquis     Microscopic hematuria     Nausea & vomiting     after anesthesia    Osteoarthritis     managed with medication     Pacemaker     Metronic pacemaker only    PUD (peptic ulcer disease)     no recent episodes    PVD (peripheral vascular disease) ([address]     left side x 1 stented    Rheumatoid arthritis(714.0)     managed with medication     Toe amputation status ([address]     left great toe     Type 2 diabetes mellitus ([address] Dx 2006    oral t and insulin/Avg / no s/s of low BS/ does not have a sensation / last A1C7.4    Vertigo     no treatment, happens occassionally     Past Surgical History:   Procedure Laterality Date    CABG, ARTERY-VEIN, FOUR  1993    states x 5    CARDIAC SURG PROCEDURE UNLIST      Cardiovert x 2-3 x last 2/4/2012    HX AMPUTATION Left 2016    great toe    HX AORTIC VALVE REPLACEMENT      HX APPENDECTOMY  1959    HX HEART CATHETERIZATION      HX HEENT      dental    HX HYSTERECTOMY  1988    HX LITHOTRIPSY      multiple    HX OPEN CHOLECYSTECTOMY      HX ORTHOPAEDIC Left     achilles tendon    HX ORTHOPAEDIC Left     \"toe surgery removing bones\"    HX PACEMAKER      HX PACEMAKER PLACEMENT      Metronic    HX UROLOGICAL Left 1980s    open L kidney removal stones    VASCULAR SURGERY PROCEDURE UNLIST Left 2-17-16    lower extremity arteriogram with stent x 1 placement    VASCULAR SURGERY PROCEDURE UNLIST Right 12/20/2017    2nd toe- resection metatarsal head      Family History   Problem Relation Age of Onset    Heart Disease Father     Heart Attack Father     Diabetes Father     Hypertension Father     High Cholesterol Father     Asthma Father     Heart Disease Other     Heart Surgery Other     Diabetes Mother     Stroke Mother         TIAs    Hypertension Mother    [facility] Other Mother         kidney stone    Kidney Disease Mother     Heart Disease Mother     High Cholesterol Mother    Melissa Arellano Mother     Cancer Sister         multiple myeloma    Hypertension Sister     Hypertension Sister     Hypertension Brother     Diabetes Brother     Cancer Brother     Hypertension Sister     Heart Disease Sister     Hypertension Sister     Hypertension Sister      Social History     Tobacco Use    Smoking status: Never Smoker    Smokeless tobacco: Never Used   Substance Use Topics    Alcohol use: No      Current Facility-Administered Medications   Medication Dose Route Frequency Provider Last Rate Last Dose    methIMAzole (TAPAZOLE) tablet 10 mg  10 mg Oral Once per day on Sun Sat Noemí Kearney MD        apixaban [facility]) tablet 5 mg  5 mg Oral Q12H Noemí Kearney MD   5 mg at 08/24/19 3384    aspirin delayed-release tablet 81 mg  81 mg Oral DAILY Noemí Kearney MD   81 mg at 08/24/19 4861    cyanocobalamin tablet 500 mcg  500 mcg Oral DAILY Noemí Kearney MD   500 mcg at 08/24/19 6741    digoxin (LANOXIN) tablet 0.125 mg  0.125 mg Oral DAILY Noemí Kearney MD   0.125 mg at 08/24/19 0932    ferrous sulfate tablet 325 mg  1 Tab Oral ACL Noemí Kearney MD        HYDROcodone-acetaminophen [facility]) 5-325 mg per tablet 1 Tab  1 Tab Oral Q4H PRN Noemí Kearney MD        LORazepam (ATIVAN) tablet 1 mg  1 mg Oral BID Noemí Kearney MD   1 mg at 08/24/19 0847    methIMAzole (TAPAZOLE) tablet 5 mg  5 mg Oral DAILY Noemí Kearney MD        metoprolol tartrate (LOPRESSOR) tablet 50 mg  50 mg Oral BID Noemí Kearney MD   50 mg at 08/24/19 0932    pantoprazole (PROTONIX) tablet 40 mg  40 mg Oral ACB Noemí Kearney MD   40 mg at 08/24/19 0511    predniSONE (DELTASONE) tablet 5 mg  5 mg Oral DAILY WITH BREAKFAST Noemí Kearney MD   5 mg at 08/24/19 0932    sodium chloride (NS) flush 5-40 mL  5-40 mL IntraVENous Q8H Noemí Kearney MD   10 mL at 08/23/19 2235    sodium chloride (NS) flush 5-40 mL  5-40 mL IntraVENous PRN Angely Izaguirre MD        tuberculin injection 5 Units  5 Units IntraDERMal Marie Grissom MD   5 Units at 08/23/19 1709    naloxone [facility]) injection 0.4 mg  0.4 mg IntraVENous PRN Angely Izaguirre MD        ondansetron [facility]) injection 4 mg  4 mg IntraVENous Q4H PRN Angely Izaguirre MD        dextrose 40% (GLUTOSE) oral gel 1 Tube  15 g Oral PRN Angely Izaguirre MD        glucagon [facility]) injection 1 mg  1 mg IntraMUSCular PRN Angely Izaguirre MD        dextrose (D50W) injection syrg 12.5-25 g  25-50 mL IntraVENous PRN Angely Izaguirre MD   25 g at 08/24/19 0532        Allergies   Allergen Reactions    Multaq [Dronedarone] Nausea Only    Other Medication Other (comments)     STATINS CAUSE MUSCLE WEAKNESS  Cholesterol medications    Statins-Hmg-Coa Reductase Inhibitors Myalgia       Review of Systems:  Could not obtain due to altered mental status. Objective:     Vitals:    08/23/19 2328 08/24/19 0431 08/24/19 0614 08/24/19 0728   BP: 122/72 133/79 105/67 136/78   Pulse: 80 88 75 86   Resp: 18 18 18 20   Temp: 98.3 °F (36.8 °C) 97.4 °F (36.3 °C) 97.4 °F (36.3 °C) 97.8 °F (36.6 °C)   SpO2: 99% 91% 98% 99%   Weight:       Height:            Physical Exam:  General -cachectic appearing. Pleasant and conversant. HEENT - Normocephalic, atraumatic. Conjunctiva, tympanic membranes, and oropharynx are clear. Neck - Supple without masses, no bruits   Cardiovascular -irregular rhythm. Normal rate  Lungs - Clear to auscultation. Abdomen - Soft, nontender with normal bowel sounds. Extremities - Peripheral pulses intact. No edema and no rashes. Toe amputation. Neurological examination -comprehension is intact to one-step commands but she struggles to follow more complex commands.   Attention is normal.  Language and speech are normal. On cranial nerve examination pupils are equal round and reactive to light. Visual acuity is adequate. Visual fields are full to finger confrontation. Extraocular motility is normal. Face is symmetric and sensation is intact to light touch. Hearing is intact to finger rustle bilaterally. Motor examination -there is decreased muscle bulk. Tone is slightly increased in the bilateral lower limbs. Power is 4/5 in the bilateral upper limbs and symmetric. In the bilateral lower limbs she is 4/5 but limited by pain. Muscle stretch reflexes are hyperactive throughout including 3+ at the brachioradialis and biceps and 4+ at the patella, bilaterally. Sensation is intact to light touch, pinprick, vibration and proprioception in all extremities. Cerebellar examination is normal.  She cannot ambulate or stand        Lab Results   Component Value Date/Time    Cholesterol, total 113 06/24/2014 05:25 AM    HDL Cholesterol 33 (L) 06/24/2014 05:25 AM    LDL, calculated 66.8 06/24/2014 05:25 AM    VLDL, calculated 13.2 06/24/2014 05:25 AM    Triglyceride 66 06/24/2014 05:25 AM    CHOL/HDL Ratio 3.4 06/24/2014 05:25 AM        Lab Results   Component Value Date/Time    Hemoglobin A1c 8.2 (H) 08/23/2019 04:57 PM        CT Results (most recent): Personally Reviewed   Results from [place] encounter on 08/23/19   CT HEAD WO CONT    Narrative Noncontrast head CT     Clinical Indication: Acute confusion, altered mental status unexplained,  delirium; history includes diabetes, hypoglycemia today. Technique: Noncontrast axial images were obtained through the brain. All CT  scans at this location are performed using dose modulation techniques as  appropriate including the following: Automated exposure control, adjustment of  the MA and/or kV according to patient's size, or use of iterative reconstruction  technique. Comparison: None available    Findings: There is no acute intracranial hemorrhage, hydrocephalus, intra-axial  mass, or mass-effect.  There are scattered mild areas of low-attenuation  involving bilateral periventricular white matter, centrum semiovale, corona  radiata which are not unusual for age but nonspecific. Superimposed small or  developing infarct cannot be excluded by CT. There is no CT evidence of acute  large artery territorial infarction or abnormal extra-axial fluid collection. The mastoid air cells and paranasal sinuses are clear where imaged. No displaced skull fractures are present. Impression Impression:   1. No evidence of hemorrhage. 2. Mild white matter hypodensities are nonspecific, not unusual for age and most  often chronic microvascular ischemic change. Results for orders placed or performed during the hospital encounter of 08/23/19   EKG, 12 LEAD, INITIAL   Result Value Ref Range    Ventricular Rate 73 BPM    Atrial Rate 85 BPM    QRS Duration 160 ms    Q-T Interval 424 ms    QTC Calculation (Bezet) 467 ms    Calculated R Axis -97 degrees    Calculated T Axis 79 degrees    Diagnosis       AF with intermittetnt V-pacing  Confirmed by Melvi Rodarte MD (), Howie Mariscal ([id number]) on 8/23/2019 12:59:49 PM     Results for orders placed or performed in visit on 01/24/19   AMB POC EKG ROUTINE W/ 12 LEADS, INTER & REP    Impression    AFib with V-pacing and occ PVC's 89bpm  NSSTTW changes            Assessment:     44-year-old woman with weakness, intermittent confusion, and a seizure-like episode. In regards to weakness, for her muscle bulk she has appropriate strength but is limited by pain in the bilateral lower limbs. Her difficulty with ambulation may be related to her compression fracture at L2. She does have increased tone and hyperreflexia in the bilateral lower limbs and hyperreflexia in the bilateral upper limbs. A cervical myelopathy could contribute to her weakness/spastic paraparesis. In regards to her confusion, her son gives a good history for motor apraxia.   Considering the patient's cardiac and vascular history, a stroke should be considered. In regards to her seizure-like episode, this sounds like convulsive syncope. She had a prodrome of lightheadedness and then a very brief loss of consciousness without a postictal state. She likely hypoperfused. We can obtain an EEG but I have low suspicion for seizures. Plan:     If possible, MRI of the brain and cervical spine. The patient has a pacemaker. This needs to be investigated further to see if her pacemaker is compatible with MRI. We will obtain an EEG when able. Currently, our  is down. Again, low suspicion for seizure.   Her episode is more consistent with hypoperfusion causing convulsive syncope      Signed By: Cristóbal Deluca DO     August 24, 2019

## 2019-08-24 NOTE — PROGRESS NOTES
Physical Therapy Note:    Physical therapy evaluation orders received and chart reviewed. Pt with difficulty ambulating/standing. Pt found to have acute compression fractures at L1 and L2, IR consult pending. Additionally, per neuro, a cervical myelopathy could be contributing to her weakness/spastic paraparesis. MRI of brain & cervical spine are pending. Will HOLD PT until imaging obtained, consults performed, and plan of care established to move forward. Will continue to follow.     Thank you,  Sheyla Roy, PT, DPT

## 2019-08-25 NOTE — PROGRESS NOTES
[facility] CARDIOLOGY PROGRESS NOTE           8/25/2019 10:29 AM    Admit Date: 8/23/2019      Subjective:   No chest pain or dyspnea. Feels weak. ROS:  Cardiovascular:  As noted above    Objective:      Vitals:    08/24/19 2339 08/25/19 0316 08/25/19 0557 08/25/19 0754   BP: 99/61 112/69  105/59   Pulse: 79 82  90   Resp: 18 18 19   Temp: 97.7 °F (36.5 °C) 97.6 °F (36.4 °C)  98.1 °F (36.7 °C)   SpO2: 98% 95%  100%   Weight:   48.2 kg (106 lb 4.8 oz)    Height:           Physical Exam:  General-No Acute Distress  Neck- supple, no JVD  CV- regular rate and rhythm Grade II/VI FEI  Lung- clear bilaterally  Abd- soft, nontender, nondistended  Ext- trivial edema bilaterally. Skin- warm and dry      Data Review:   Recent Labs     08/25/19  0522 08/24/19  0622 08/23/19  0826    140 137   K 4.2 4.2 4.0   BUN 10 10 14   CREA 0.75 0.71 0.83   * 186* 145*   WBC  --   --  9.7   HGB  --   --  13.9   HCT  --   --  42.4   PLT  --   --  133*      No results found for: FLOYD Fallon    Assessment/Plan:     Principal Problem:    Functional quadri[facility]gia [facility] (8/23/2019)    Defer management to primary team.      Active Problems:    Atrial fibrillation ([facility] (3/15/2014)    Rate controlled. Continue Eliquis. Rheumatoid arthritis ([facility] (3/15/2014)    Defer management to primary team.        Aortic stenosis (3/15/2014)    Prosthetic valve stenosis. Currently appears poor candidate for intervention. Will follow. PAD (peripheral artery disease) ([facility] (2/12/2016)    Stable. No symptoms. Intermittent confusion (8/23/2019)    Defer management to primary team.        Witnessed seizure-like activity ([facility] (8/23/2019)    Defer management to primary team.         Elevated troponin (8/24/2019)    Suspect supply/demand imbalance in setting of known CAD. No intervention planned as no angina. Medical therapy.                    Jenn Moore MD  8/25/2019 10:29 AM

## 2019-08-25 NOTE — PROGRESS NOTES
Neurology Daily Progress Note     Assessment:     76-year-old woman with weakness, intermittent confusion, and a seizure-like episode. In regards to weakness, for her muscle bulk she has appropriate strength but is limited by pain in the bilateral lower limbs. Her difficulty with ambulation may be related to her compression fracture at L2. She does have increased tone and hyperreflexia in the bilateral lower limbs and hyperreflexia in the bilateral upper limbs. A cervical myelopathy could contribute to her weakness/spastic paraparesis. PPM is MRI compatible. MRI of brain and cervical spine pending.      In regards to her confusion, her son gives a good history for motor apraxia. Considering the patient's cardiac and vascular history, a stroke should be considered.     In regards to her seizure-like episode, this sounds like convulsive syncope. She had a prodrome of lightheadedness and then a very brief loss of consciousness without a postictal state. She likely hypoperfused. EEG pending,  but low suspicion for seizures. Plan:     MRI of brain pending. MRI of cervical spine pending. EEG pending. Continue supportive therapies. Subjective: Interval history:    Patient is doing well today. Patient is more alert today,and oriented to person and place, not time. PPM is MRI compatible. EEG and MRI of brain and cervical spine pending. History:    Whitney Sylvester is a 69 y.o. female who is being seen for progressive weakness, confusion and seizure-like activity. Review of systems negative with exception of pertinent positives and negatives noted above.        Objective:     Vitals:    08/24/19 2339 08/25/19 0316 08/25/19 0557 08/25/19 0754   BP: 99/61 112/69  105/59   Pulse: 79 82  90   Resp: 18 18 19   Temp: 97.7 °F (36.5 °C) 97.6 °F (36.4 °C)  98.1 °F (36.7 °C)   SpO2: 98% 95%  100%   Weight:   106 lb 4.8 oz (48.2 kg)    Height:              Current Facility-Administered Medications:   methIMAzole (TAPAZOLE) tablet 10 mg, 10 mg, Oral, Once per day on Sun Sat, Charlotte Reza MD, 10 mg at 08/24/19 1809    insulin lispro (HUMALOG) injection, , SubCUTAneous, Bhupendra Meza MD, 1 Units at 08/25/19 9921    apixaban (ELIQUIS) tablet 5 mg, 5 mg, Oral, Q12H, Charlotte Reza MD, 5 mg at 08/25/19 0280    aspirin delayed-release tablet 81 mg, 81 mg, Oral, DAILY, Charlotte Reza MD, 81 mg at 08/25/19 1951    cyanocobalamin tablet 500 mcg, 500 mcg, Oral, DAILY, Charlotte Reza MD, 500 mcg at 08/25/19 6591    digoxin (LANOXIN) tablet 0.125 mg, 0.125 mg, Oral, DAILY, Charlotte Reza MD, 0.125 mg at 08/25/19 8330    ferrous sulfate tablet 325 mg, 1 Tab, Oral, ACL, Charlotte Reza MD, 325 mg at 08/24/19 1130    HYDROcodone-acetaminophen (NORCO) 5-325 mg per tablet 1 Tab, 1 Tab, Oral, Q4H PRN, Charlotte Reza MD, 1 Tab at 08/24/19 2108    LORazepam (ATIVAN) tablet 1 mg, 1 mg, Oral, BID, Charlotte Reza MD, 1 mg at 08/25/19 4003    methIMAzole (TAPAZOLE) tablet 5 mg, 5 mg, Oral, DAILY, Charlotte Reza MD    metoprolol tartrate (LOPRESSOR) tablet 50 mg, 50 mg, Oral, BID, Charlotte Reza MD, 50 mg at 08/25/19 8378    pantoprazole (PROTONIX) tablet 40 mg, 40 mg, Oral, ACB, Charlotte Reza MD, 40 mg at 08/25/19 0513    predniSONE (DELTASONE) tablet 5 mg, 5 mg, Oral, DAILY WITH BREAKFAST, Charlotte Reza MD, 5 mg at 08/25/19 6734    sodium chloride (NS) flush 5-40 mL, 5-40 mL, IntraVENous, Q8H, Charlotte Reza MD, 10 mL at 08/25/19 0514    sodium chloride (NS) flush 5-40 mL, 5-40 mL, IntraVENous, PRN, Charlotte Reza MD    naloxone [facility]) injection 0.4 mg, 0.4 mg, IntraVENous, PRN, Charlotte Reza MD    ondansetron [facility]) injection 4 mg, 4 mg, IntraVENous, Q4H PRN, Charlotte Reza MD    dextrose 40% (GLUTOSE) oral gel 1 Tube, 15 g, Oral, PRN, Charlotte Reza MD    glucagon [facility]) injection 1 mg, 1 mg, IntraMUSCular, PRN, MD Ebenezer Cordero dextrose (D50W) injection syrg 12.5-25 g, 25-50 mL, IntraVENous, PRN, Jaspreet Reed MD, 25 g at 08/24/19 0532    Recent Results (from the past 12 hour(s))   METABOLIC PANEL, BASIC    Collection Time: 08/25/19  5:22 AM   Result Value Ref Range    Sodium 140 136 - 145 mmol/L    Potassium 4.2 3.5 - 5.1 mmol/L    Chloride 106 98 - 107 mmol/L    CO2 26 21 - 32 mmol/L    Anion gap 8 7 - 16 mmol/L    Glucose 151 (H) 65 - 100 mg/dL    BUN 10 8 - 23 MG/DL    Creatinine 0.75 0.6 - 1.0 MG/DL    GFR est AA >60 >60 ml/min/1.73m2    GFR est non-AA >60 >60 ml/min/1.73m2    Calcium 8.5 8.3 - 10.4 MG/DL   GLUCOSE, POC    Collection Time: 08/25/19  7:31 AM   Result Value Ref Range    Glucose (POC) 151 (H) 65 - 100 mg/dL         Physical Exam:  General -cachectic appearing. Pleasant and conversant. HEENT - Normocephalic, atraumatic. Conjunctiva, tympanic membranes, and oropharynx are clear. Neck - Supple without masses, no bruits   Cardiovascular -irregular rhythm. Normal rate  Lungs - Clear to auscultation. Abdomen - Soft, nontender with normal bowel sounds. Extremities - Peripheral pulses intact. No edema and no rashes. Toe amputation.     Neurological examination -comprehension is intact to one-step commands but she struggles to follow more complex commands. Attention is normal.  Language and speech are normal. On cranial nerve examination pupils are equal round and reactive to light. Visual acuity is adequate. Visual fields are full to finger confrontation. Extraocular motility is normal. Face is symmetric and sensation is intact to light touch. Hearing is intact to finger rustle bilaterally. Motor examination -there is decreased muscle bulk. Tone is slightly increased in the bilateral lower limbs. Power is 4/5 in the bilateral upper limbs and symmetric. In the bilateral lower limbs she is 4/5 but limited by pain.  Muscle stretch reflexes are hyperactive throughout including 3+ at the brachioradialis and biceps and 4+ at the patella, bilaterally. Sensation is intact to light touch, pinprick, vibration and proprioception in all extremities.  Cerebellar examination is normal.  She cannot ambulate or stand    Signed By: Laura Rivero NP     August 25, 2019

## 2019-08-25 NOTE — PROGRESS NOTES
Hourly rounds performed. All needs meet. Patient showed intermittent confusion, responsive to reorientation. Up to bedside commode x3 with UOP: red, clear. Bed low/locked. Call light within reach. Patient denies needs at this time.   Will continue to monitor and report to oncoming RN

## 2019-08-25 NOTE — PROGRESS NOTES
Nutrition  Reason for assessment: Referral received from nursing admission Malnutrition Screening Tool   Recently Lost Weight Without Trying: Unsure     Eating Poorly Due to Decreased Appetite: Yes  Assessment:   Diet: DIET CARDIAC Mechanical Soft    Food/Nutrition Patient History:  Pt sleeping during visit and opted not to wake her after review of notes. No family members at bedside. Admitted with hypoglycemia and a reported poor PO intake x 3 weeks PTA. It is noted that she was ambulating with a walker up to 3 weeks ago as well and is now relatively non-ambulatory. She has an L2 compression fracture. S/P EEG today with abnormal results. H/O CAD with prior CABG, DM, RA, CHF. Anthropometrics:Height: 5' 3\" (160 cm),  Weight: 48.2 kg (106 lb 4.8 oz), Weight Source: Bed, Body mass index is 18.83 kg/m². BMI class of underweight for age >65 years. WT / BMI 8/25/2019 8/17/2019 8/12/2019 8/2/2019 1/24/2019   WEIGHT 106 lb 4.8 oz 104 lb 104 lb 100 lb 93 lb     WT / BMI 10/7/2018 7/16/2018   WEIGHT 100 lb 98 lb 1.6 oz   Per weights listed in EMR, pt's weight has ranged from  pounds. According to weight, pt is at her highest weight now. Macronutrient needs:(48.2 kg)  EER:  7359-0558 kcal /day (25-30 kcal/kg listed BW)  EPR:  48-58 grams protein/day (1-1.2 grams/kg listed BW)  Intake/Comparative Standards: No recorded meal intakes x 7 days. Nutrition Diagnosis: Predicted inadequate energy intake r/t decreased ability to consume sufficient oral intake as evidenced by pt with intermittent confusion, reported decline in PO intake 3 weeks PTA, and underweight for age. Intervention:  Meals and snacks: Continue current diet. Nutrition Supplement Therapy: add glucerna shakes TID   Discharge nutrition plan: Too soon to determine.     Beatris Dubose [address], [address], [address] [place] [address], [address]

## 2019-08-25 NOTE — PROGRESS NOTES
Hospitalist Progress Note    2019  Admit Date: 2019  7:48 AM   NAME: John Bazzi   :     MRN:  [id number]   Attending: Taina Faria MD  PCP:  Mahi Walter MD    SUBJECTIVE:   Patient admitted  with progressive weakness/confusion and possible seizure-like activity noted about a week prior. Troponin elevated and peaked at 0.13. She denies chest pain. She is still confused and slow to answer questions. Oriented to place, year, situation. Unable to state month or POTUS. Notably, had another episode of hypoglycemia this AM after bedtime blood sugar was 286 (6 units regular insulin sliding scale given). Venous duplex L leg negative for DVT. - reports feeling okay but tired. Denies new concerns or chest pain. Is still confused and at times appears to be reading from the patient whiteboard.         Review of Systems negative with exception of pertinent positives noted above  PHYSICAL EXAM     Visit Vitals  /59 (BP 1 Location: Left arm, BP Patient Position: At rest)   Pulse 90   Temp 98.1 °F (36.7 °C)   Resp 19   Ht 5' 3\" (1.6 m)   Wt 48.2 kg (106 lb 4.8 oz)   SpO2 100%   BMI 18.83 kg/m²      Temp (24hrs), Av °F (36.7 °C), Min:97.6 °F (36.4 °C), Max:98.5 °F (36.9 °C)    Patient Vitals for the past 24 hrs:   Temp Pulse Resp BP SpO2   19 0754 98.1 °F (36.7 °C) 90 19 105/59 100 %   19 0316 97.6 °F (36.4 °C) 82 18 112/69 95 %   19 2339 97.7 °F (36.5 °C) 79 18 99/61 98 %   19 1941 °F (36.6 °C) 60 20 124/65 98 %   19 1613 98.4 °F (36.9 °C) 82 20 110/62 98 %   19 1135 98.5 °F (36.9 °C) 84 18 107/67 100 %       Oxygen Therapy  O2 Sat (%): 100 % (19 0754)  Pulse via Oximetry: 86 beats per minute (19)  O2 Device: Nasal cannula (19)  O2 Flow Rate (L/min): 2 l/min (19)    Intake/Output Summary (Last 24 hours) at 2019 1043  Last data filed at 2019 0514  Gross per 24 hour   Intake 360 ml   Output 125 ml   Net 235 ml      General: No acute distress, elderly and debilitated appearing    Lungs:  CTA Bilaterally. Heart:  Irregular rhythm, systolic murmur noted  Abdomen: Soft, Non distended, Non tender, Positive bowel sounds  Extremities: No cyanosis, trace L leg edema  Neurologic:  No focal deficits    Recent Results (from the past 24 hour(s))   GLUCOSE, POC    Collection Time: 08/24/19 10:53 AM   Result Value Ref Range    Glucose (POC) 328 (H) 65 - 100 mg/dL   GLUCOSE, POC    Collection Time: 08/24/19  4:04 PM   Result Value Ref Range    Glucose (POC) 418 (H) 65 - 100 mg/dL   PLEASE READ & DOCUMENT PPD TEST IN 24 HRS    Collection Time: 08/24/19  6:11 PM   Result Value Ref Range    PPD Negative Negative    mm Induration 0 0 - 5 mm   GLUCOSE, POC    Collection Time: 08/24/19  8:49 PM   Result Value Ref Range    Glucose (POC) 259 (H) 65 - 037 mg/dL   METABOLIC PANEL, BASIC    Collection Time: 08/25/19  5:22 AM   Result Value Ref Range    Sodium 140 136 - 145 mmol/L    Potassium 4.2 3.5 - 5.1 mmol/L    Chloride 106 98 - 107 mmol/L    CO2 26 21 - 32 mmol/L    Anion gap 8 7 - 16 mmol/L    Glucose 151 (H) 65 - 100 mg/dL    BUN 10 8 - 23 MG/DL    Creatinine 0.75 0.6 - 1.0 MG/DL    GFR est AA >60 >60 ml/min/1.73m2    GFR est non-AA >60 >60 ml/min/1.73m2    Calcium 8.5 8.3 - 10.4 MG/DL   GLUCOSE, POC    Collection Time: 08/25/19  7:31 AM   Result Value Ref Range    Glucose (POC) 151 (H) 65 - 100 mg/dL     Imaging:    DUPLEX LOWER EXT VENOUS LEFT   Final Result      CT HEAD WO CONT   Final Result   Impression:    1. No evidence of hemorrhage. 2. Mild white matter hypodensities are nonspecific, not unusual for age and most   often chronic microvascular ischemic change. XR CHEST PORT   Final Result   IMPRESSION:    1. Small bilateral pleural effusions, stable to slightly increased. 2. Otherwise no significant change. Cardiac surgery again noted.          MRI BRAIN W CONT    (Results Pending)   MRI CERV SPINE W CONT    (Results Pending)         Marina Patterson 80 Problems as of 8/24/2019 Date Reviewed: 1/24/2019          Codes Class Noted - Resolved POA    Hypoglycemia  - Resumed SSI at high sensitivity level yesterday afternoon due to hyperglycemia  - Monitor POC glucose     ICD-10-CM: E16.2  ICD-9-CM: 251.2  8/23/2019 - Present Yes        * (Principal) Functional quadriplegia (HCC)  - PT/OT on hold until MRI done, which is scheduled for tomorrow 8/26 due to pacemaker  - Progressive over last 3 weeks  - EEG with sharp spikes- neuro following ICD-10-CM: R53.2  ICD-9-CM: 780.72  8/23/2019 - Present Yes        Intermittent confusion  - EEG abnormal ICD-10-CM: R41.0  ICD-9-CM: 298.9  8/23/2019 - Present Yes        Witnessed seizure-like activity (HCC)  - EEG abnormal- neuro following and started keppra ICD-10-CM: R56.9  ICD-9-CM: 780.39  8/23/2019 - Present Yes        Closed compression fracture of L2 vertebra ([facility]  - IR consult placed and will likely be evaluated 8/26 ICD-10-CM: S32.020A  ICD-9-CM: 805.4  8/23/2019 - Present Yes        PAD (peripheral artery disease) ([facility] ICD-10-CM: I73.9  ICD-9-CM: 443.9  2/12/2016 - Present Yes    Overview Addendum 3/4/2016  1:47 PM by Pennelope Dakin, NP     2/17/16 (Dr Ayaka Ann) A/o, BLR Angiogram, L PT PTA (), L SFA PTA/Stent (6x 100 [place] PTX)              Atrial fibrillation (HCC)  - Eliquis ICD-10-CM: I48.91  ICD-9-CM: 427.31  3/15/2014 - Present Yes        Rheumatoid arthritis (HCC) (Chronic)  - Chronic prednisone    Elevated troponin  - Cardiology feels related to supply/demand mismatch.  - No invasive studies due to debility and being chest pain free ICD-10-CM: M06.9  ICD-9-CM: 714.0  3/15/2014 - Present Yes            Dispo- pending; will likely need short vs long-term placement  DVT Prophylaxis: Eliquis    Signed By: Iker Farrar MD     August 25, 2019

## 2019-08-25 NOTE — PROGRESS NOTES
Patient was calm  [place] family at bedside    Brief visit to build rapport with family  Patient looked at   Did not speak    Family was vice nice and thankful for our support    Will follow    Ketty Card, staff Yanelis romero [address], [address]  /   [email or web address]

## 2019-08-25 NOTE — PROGRESS NOTES
Occupational Therapy Note: Continuing to HOLD OT evaluation until MRI of brain & cervical spine as well as IR consult are performed (to be completed Monday per chart?) to ensure pt safety and appropriateness for mobility and participation in therapy given possible cervical myelopathy and L1 L2 fx. Will continue to follow.   Thank you, Rocael Mcintosh, MS, OTR/L

## 2019-08-25 NOTE — PROGRESS NOTES
Routine EEG Report    Reason for EEG: Altered mental status and seizure-like episode      Current Facility-Administered Medications:     methIMAzole (TAPAZOLE) tablet 10 mg, 10 mg, Oral, Once per day on Sun Sat, Mily Hunt MD, 10 mg at 08/24/19 1809    insulin lispro (HUMALOG) injection, , SubCUTAneous, Navjot Aragon MD, 1 Units at 08/25/19 3327    apixaban (ELIQUIS) tablet 5 mg, 5 mg, Oral, Q12H, Mily Hunt MD, 5 mg at 08/25/19 5997    aspirin delayed-release tablet 81 mg, 81 mg, Oral, DAILY, Mily Hunt MD, 81 mg at 08/25/19 4449    cyanocobalamin tablet 500 mcg, 500 mcg, Oral, DAILY, Mily Hunt MD, 500 mcg at 08/25/19 5203    digoxin (LANOXIN) tablet 0.125 mg, 0.125 mg, Oral, DAILY, Lorjyotsna Cea., MD, 0.125 mg at 08/25/19 0987    ferrous sulfate tablet 325 mg, 1 Tab, Oral, ACL, Lorjyotsna Hunt MD, 325 mg at 08/24/19 1130    HYDROcodone-acetaminophen (NORCO) 5-325 mg per tablet 1 Tab, 1 Tab, Oral, Q4H PRN, Mily Hunt MD, 1 Tab at 08/24/19 2108    LORazepam (ATIVAN) tablet 1 mg, 1 mg, Oral, BID, Lorjyotsna Cea., MD, 1 mg at 08/25/19 2557    methIMAzole (TAPAZOLE) tablet 5 mg, 5 mg, Oral, DAILY, Lorjyotsna Hunt MD    metoprolol tartrate (LOPRESSOR) tablet 50 mg, 50 mg, Oral, BID, Lorjyotsna Cea., MD, 50 mg at 08/25/19 8705    pantoprazole (PROTONIX) tablet 40 mg, 40 mg, Oral, ACB, Mily Hunt MD, 40 mg at 08/25/19 0513    predniSONE (DELTASONE) tablet 5 mg, 5 mg, Oral, DAILY WITH BREAKFAST, Mily Hunt MD, 5 mg at 08/25/19 6637    sodium chloride (NS) flush 5-40 mL, 5-40 mL, IntraVENous, Q8H, Mily Hunt MD, 10 mL at 08/25/19 0514    sodium chloride (NS) flush 5-40 mL, 5-40 mL, IntraVENous, PRN, Lorjyotsna Hunt MD    [facility]) injection 0.4 mg, 0.4 mg, IntraVENous, PRN, Lorjyotsna Hunt MD    ondansetron [facility]) injection 4 mg, 4 mg, IntraVENous, Q4H PRN, Lorjyotsna Hunt MD    dextrose 40% (GLUTOSE) oral gel 1 Tube, 15 g, Oral, JUANNLesia MD    glucagon [facility]) injection 1 mg, 1 mg, IntraMUSCular, JUANNLesia MD    dextrose (D50W) injection syrg 12.5-25 g, 25-50 mL, IntraVENous, PRNLesia MD, 25 g at 08/24/19 0532      Technical Summary: This EEG was performed with a 32-channel digital EEG machine with electrodes placed according to the international 10-20 system of placement. Background: The patient is asleep for the vast majority of the study. Background activity is mostly in the theta and delta range. Hyperventilation: Hyperventilation was not performed due to medical condition    Photic Stimulation: Photic stimulation was not performed due to medical condition    Sleep: Stage II non-REM sleep was characterized by occasional sleep spindles. Impression: Results of this EEG are abnormal.    Events: None    Interictal abnormalities: There are occasional sharply contoured waveforms with phase reversal at T3. Impression: The results of this EEG are abnormal.  There are sharply contoured waveforms in the left temporal lobe. This increases the patient's risk for focal and focal to bilateral tonic-clonic seizures.

## 2019-08-25 NOTE — PROGRESS NOTES
Problem: Falls - Risk of  Goal: *Absence of Falls  Description  Document Alesia Corona Fall Risk and appropriate interventions in the flowsheet. Outcome: Progressing Towards Goal  Note:   Fall Risk Interventions:  Mobility Interventions: Patient to call before getting OOB, PT Consult for mobility concerns    Mentation Interventions: Door open when patient unattended, More frequent rounding, Reorient patient    Medication Interventions: Evaluate medications/consider consulting pharmacy, Patient to call before getting OOB    Elimination Interventions: Call light in reach, Toileting schedule/hourly rounds    History of Falls Interventions: Evaluate medications/consider consulting pharmacy         Problem: Patient Education: Go to Patient Education Activity  Goal: Patient/Family Education  Outcome: Progressing Towards Goal     Problem: Pressure Injury - Risk of  Goal: *Prevention of pressure injury  Description  Document Domenic Scale and appropriate interventions in the flowsheet. Outcome: Progressing Towards Goal  Note:   Pressure Injury Interventions:  Sensory Interventions: Assess need for specialty bed, Assess changes in LOC    Moisture Interventions: Absorbent underpads    Activity Interventions: Increase time out of bed    Mobility Interventions: Assess need for specialty bed    Nutrition Interventions: Document food/fluid/supplement intake    Friction and Shear Interventions: Minimize layers, Apply protective barrier, creams and emollients                Problem: Diabetes Self-Management  Goal: *Disease process and treatment process  Description  Define diabetes and identify own type of diabetes; list 3 options for treating diabetes.   Outcome: Progressing Towards Goal     Problem: Patient Education: Go to Patient Education Activity  Goal: Patient/Family Education  Outcome: Progressing Towards Goal

## 2019-08-25 NOTE — PROGRESS NOTES
Physical Therapy Note:     Continuing to HOLD PT until MRI brain & cervical spine as well as IR consult are performed (to be completed Monday per chart?) to ensure pt safety and appropriateness for mobility and participation in therapy given possible cervical myelopathy and L1 L2 fx.  Will continue to follow.     Thank you,  Ruddy Curtis, PT, DPT

## 2019-08-26 NOTE — PROGRESS NOTES
Pt pleasantly confused. Hourly rounds performed; all needs met at this time. Bed in low/locked position and call light, personal items within reach.

## 2019-08-26 NOTE — PROGRESS NOTES
Hospitalist Progress Note    2019  Admit Date: 2019  7:48 AM   NAME: Tahmina Sevilla   :     MRN:  [id number]   Attending: Elissa Frost MD  PCP:  Jessica Zhou MD    SUBJECTIVE:   Patient admitted  with progressive weakness/confusion and possible seizure-like activity noted about a week prior. Troponin elevated and peaked at 0.13. She denies chest pain. She is still confused and slow to answer questions. Oriented to place, year, situation. Unable to state month or POTUS. Notably, had another episode of hypoglycemia this AM after bedtime blood sugar was 286 (6 units regular insulin sliding scale given). Venous duplex L leg negative for DVT. - reports feeling okay but tired. Denies new concerns or chest pain. Is still confused and at times appears to be reading from the patient whiteboard. - seen with granddaughter, Harman, at bedside. Patient oriented to self and location, otherwise confused. Denies pain. I stating 'this afternoon they gave me papers and Dr. Meek Ash came by.'  Re-oriented her that it is morning. Harman does state that Ms. Ramos Crowell has had some confusion at night for the last year. She feels her mentation and condition has worsened since admission. Hopefully will get MRI brain/neck today. Per staff, patient with bright red blood streak in blood.       Review of Systems negative with exception of pertinent positives noted above  PHYSICAL EXAM     Visit Vitals  /69 (BP 1 Location: Left arm, BP Patient Position: Head of bed elevated (Comment degrees))   Pulse 72   Temp 98.3 °F (36.8 °C)   Resp 18   Ht 5' 3\" (1.6 m)   Wt 49.7 kg (109 lb 9.6 oz)   SpO2 96%   BMI 19.41 kg/m²      Temp (24hrs), Av °F (36.7 °C), Min:96.5 °F (35.8 °C), Max:98.7 °F (37.1 °C)    Patient Vitals for the past 24 hrs:   Temp Pulse Resp BP SpO2   19 0809     96 %   19 0753 98.3 °F (36.8 °C) 72 18 110/69 96 %   08/26/19 0542 97.2 °F (36.2 °C) 86 18 118/58 97 %   08/26/19 0027 96.5 °F (35.8 °C) 80 18 130/43 100 %   08/25/19 2006 98.6 °F (37 °C) 89 18 140/62 98 %   08/25/19 1607 98.7 °F (37.1 °C) 88 18 121/69 95 %   08/25/19 1141 98.5 °F (36.9 °C) 83 20 109/62 95 %       Oxygen Therapy  O2 Sat (%): 96 % (08/26/19 0809)  Pulse via Oximetry: 86 beats per minute (08/1940)  O2 Device: Room air (08/26/19 0809)  O2 Flow Rate (L/min): 2 l/min ([birth year]/1941)  No intake or output data in the 24 hours ending 08/26/19 0820   General: No acute distress, elderly and debilitated appearing    Lungs:  CTA Bilaterally.    Heart:  Irregular rhythm, systolic murmur noted  Abdomen: Soft, Non distended, Non tender, Positive bowel sounds  Extremities: No cyanosis, trace L leg edema  Neurologic:  No focal deficits    Recent Results (from the past 24 hour(s))   GLUCOSE, POC    Collection Time: 08/25/19 10:55 AM   Result Value Ref Range    Glucose (POC) 202 (H) 65 - 100 mg/dL   GLUCOSE, POC    Collection Time: 08/25/19  4:31 PM   Result Value Ref Range    Glucose (POC) 360 (H) 65 - 100 mg/dL   PLEASE READ & DOCUMENT PPD TEST IN 48 HRS    Collection Time: 08/25/19  5:11 PM   Result Value Ref Range    PPD Negative Negative    mm Induration 0 0 - 5 mm   GLUCOSE, POC    Collection Time: 08/25/19  8:49 PM   Result Value Ref Range    Glucose (POC) 361 (H) 65 - 000 mg/dL   METABOLIC PANEL, BASIC    Collection Time: 08/26/19  5:47 AM   Result Value Ref Range    Sodium 137 136 - 145 mmol/L    Potassium 4.3 3.5 - 5.1 mmol/L    Chloride 103 98 - 107 mmol/L    CO2 26 21 - 32 mmol/L    Anion gap 8 7 - 16 mmol/L    Glucose 266 (H) 65 - 100 mg/dL    BUN 12 8 - 23 MG/DL    Creatinine 0.79 0.6 - 1.0 MG/DL    GFR est AA >60 >60 ml/min/1.73m2    GFR est non-AA >60 >60 ml/min/1.73m2    Calcium 8.9 8.3 - 10.4 MG/DL   GLUCOSE, POC    Collection Time: 08/26/19  7:31 AM   Result Value Ref Range    Glucose (POC) 219 (H) 65 - 100 mg/dL     Imaging:    DUPLEX LOWER EXT VENOUS LEFT   Final Result CT HEAD WO CONT   Final Result   Impression:    1. No evidence of hemorrhage. 2. Mild white matter hypodensities are nonspecific, not unusual for age and most   often chronic microvascular ischemic change. XR CHEST PORT   Final Result   IMPRESSION:    1. Small bilateral pleural effusions, stable to slightly increased. 2. Otherwise no significant change. Cardiac surgery again noted.          MRI BRAIN W WO CONT    (Results Pending)   MRI CERV SPINE W WO CONT    (Results Pending)         UlJaimee Patterson 80 Problems as of 8/24/2019 Date Reviewed: 1/24/2019          Codes Class Noted - Resolved POA    Hypoglycemia  - Resumed SSI at high sensitivity level yesterday afternoon due to hyperglycemia  - Monitor POC glucose     ICD-10-CM: E16.2  ICD-9-CM: 251.2  8/23/2019 - Present Yes        * (Principal) Functional quadriplegia (HCC)  - PT/OT on hold until MRI done,   - Hopefully will get MRI brain/cervical spine today  - Progressive over last 3 weeks  - EEG with sharp spikes- neuro following ICD-10-CM: R53.2  ICD-9-CM: 780.72  8/23/2019 - Present Yes        Intermittent confusion  - EEG abnormal ICD-10-CM: R41.0  ICD-9-CM: 298.9  8/23/2019 - Present Yes        Witnessed seizure-like activity (HCC)  - EEG abnormal- neuro following and started keppra ICD-10-CM: R56.9  ICD-9-CM: 780.39  8/23/2019 - Present Yes        Closed compression fracture of L2 vertebra (HCC)  - IR consult placed   - She denies back pain currently (has not been ambulating since being here) ICD-10-CM: S32.020A  ICD-9-CM: 805.4  8/23/2019 - Present Yes        PAD (peripheral artery disease) (HCC) ICD-10-CM: I73.9  ICD-9-CM: 443.9  2/12/2016 - Present Yes    Overview Addendum 3/4/2016  1:47 PM by Bridger Castillo NP     2/17/16 (Dr Pal Davis) A/o, BLR Angiogram, L PT PTA (), L SFA PTA/Stent (6x 100 [place] PTX)              Atrial fibrillation (HCC)  - Eliquis ICD-10-CM: I48.91  ICD-9-CM: 427.31  3/15/2014 - Present Yes Rheumatoid arthritis (HCC) (Chronic)  - Chronic prednisone    Elevated troponin  - Cardiology feels related to supply/demand mismatch.  - No invasive studies due to debility and being chest pain free    Blood in stool  - Will check CBC. May need to stop Eliquis if this continues. Patient very poor candidate for endoscopy given her condtion  - Transfuse prn ICD-10-CM: M06.9  ICD-9-CM: 714.0  3/15/2014 - Present Yes            Dispo- pending; will likely need short vs long-term placement  DVT Prophylaxis: Eliquis    Signed By: Emi Dance Marino Poncho, MD     August 26, 2019

## 2019-08-26 NOTE — PROGRESS NOTES
Pt now has a skin tear at right elbow while getting on or after getting off the [facility] with CNA. No excessive bleeding noted. Covered with a mepilex. Family present at this time.

## 2019-08-26 NOTE — PROGRESS NOTES
Chart screened by  for discharge planning. CM spoke with pt's dx, Mrs. Mikal Bobby, regarding DC plans. They are interested in short to long term care. CM informed dx that long term care would be private pay. Dx requested list of facilities that could accommodate short term covered by Chuy Tavarez a possible transition to long term private pay. Cm will follow for STR choices and send referrals. Please consult  if any new issues arise. Care Management Interventions  PCP Verified by CM:  Yes  Transition of Care Consult (CM Consult): Discharge Planning  Physical Therapy Consult: Yes  Occupational Therapy Consult: Yes  Current Support Network: Own Home  Confirm Follow Up Transport: Family  Plan discussed with Pt/Family/Caregiver: Yes  Freedom of Choice Offered: Yes  Discharge Location  Discharge Placement: Skilled nursing facility

## 2019-08-26 NOTE — PROGRESS NOTES
Patient was calm  Sitting in chair  Calling her son's name \"jennifer\"    Provided a calming presence for her  She opened her eyes and spoke with benita Boo for her    Will follow closely    Huber Villarreal, staff [address], [address] [facility]  /   [email or web address]

## 2019-08-26 NOTE — PROGRESS NOTES
Neurology Daily Progress Note     Assessment:     71-year-old woman with weakness, intermittent confusion, and a seizure-like episode.   Her difficulty with ambulation may be related to her compression fracture at L2. Much of her weakness and inability to walk may be secondary to progressive debility in the setting of chronic illness. She initially had significant hyperreflexia on exam, which is no longer as prominent. MRI of brain and cervical spine pending.      In regards to her confusion, her son gives a good history for motor apraxia. Considering the patient's cardiac and vascular history, a stroke should be considered.     In regards to seizure like episode, EEG was abnormal with sharply contoured waveforms in the left temporal lobe. The patient has been started on Keppra. Plan:     Continue Keppra   MRI of brain pending. MRI of cervical spine pending. Continue supportive therapies. Subjective: Interval history:    Patient is confused this morning. She has had some episodes of confusion over the past year and cannot stay alone. She has home health during the day and family members stay with her at night. Per family member, confusion is worse today. History:    Olivier Montero is a 69 y.o. female who is being seen for progressive weakness, confusion and seizure-like activity. Unable to obtain ROS due to AMS.        Objective:     Vitals:    08/26/19 0601 08/26/19 0753 08/26/19 0809 08/26/19 1155   BP:  110/69  115/70   Pulse:  72  74   Resp:  18  18   Temp:  98.3 °F (36.8 °C)  98.2 °F (36.8 °C)   SpO2:  96% 96% 96%   Weight: 109 lb 9.6 oz (49.7 kg)      Height:              Current Facility-Administered Medications:     gadoterate meglumine (DOTAREM) 0.5 mmol/mL (376.9 mg/mL) contrast solution 10 mL, 10 mL, IntraVENous, RAD ONCESol MD    saline peripheral flush soln 10 mL, 10 mL, InterCATHeter, RAD ONCESol MD    saline peripheral flush soln 10 mL, 10 mL, InterCATHeter, RAD ONCE, Creed Belt., MD    levETIRAcetam (KEPPRA) tablet 500 mg, 500 mg, Oral, Q12H, Sonja Javier NP, 500 mg at 08/26/19 0875    methIMAzole (TAPAZOLE) tablet 10 mg, 10 mg, Oral, Once per day on Sun Sat, Creed Belt., MD, 10 mg at 08/25/19 1601    insulin lispro (HUMALOG) injection, , SubCUTAneous, TIDAC, Creed Belt., MD, 2 Units at 08/26/19 1130    apixaban (ELIQUIS) tablet 5 mg, 5 mg, Oral, Q12H, Creed Belt., MD, 5 mg at 08/26/19 5060    aspirin delayed-release tablet 81 mg, 81 mg, Oral, DAILY, Creed Belt., MD, 81 mg at 08/26/19 5222    cyanocobalamin tablet 500 mcg, 500 mcg, Oral, DAILY, Creed Belt., MD, 500 mcg at 08/26/19 5436    digoxin (LANOXIN) tablet 0.125 mg, 0.125 mg, Oral, DAILY, Creed Belt., MD, 0.125 mg at 08/26/19 1680    ferrous sulfate tablet 325 mg, 1 Tab, Oral, ACL, Creed Belt., MD, 325 mg at 08/26/19 1212    HYDROcodone-acetaminophen (Lisa Serve) 5-325 mg per tablet 1 Tab, 1 Tab, Oral, Q4H PRN, Creed Belt., MD, 1 Tab at 08/24/19 2108    LORazepam (ATIVAN) tablet 1 mg, 1 mg, Oral, BID, Creed Belt., MD, 1 mg at 08/26/19 2650    methIMAzole (TAPAZOLE) tablet 7.5 mg, 7.5 mg, Oral, Once per day on Mon Tue Wed Thu Fri, Creed Belt., MD    metoprolol tartrate (LOPRESSOR) tablet 50 mg, 50 mg, Oral, BID, Creed Belt., MD, 50 mg at 08/26/19 0869    pantoprazole (PROTONIX) tablet 40 mg, 40 mg, Oral, ACB, Creed Belt., MD, 40 mg at 08/26/19 0542    predniSONE (DELTASONE) tablet 5 mg, 5 mg, Oral, DAILY WITH BREAKFAST, Creed Belt., MD, 5 mg at 08/26/19 0834    sodium chloride (NS) flush 5-40 mL, 5-40 mL, IntraVENous, Q8H, Creed Belt., MD, 10 mL at 08/26/19 0543    sodium chloride (NS) flush 5-40 mL, 5-40 mL, IntraVENous, PRN, Creed Belt., MD    [facility]) injection 0.4 mg, 0.4 mg, IntraVENous, PRN, Creed Belt., MD    ondansetron [facility]) injection 4 mg, 4 mg, IntraVENous, Q4H PRN, Elissa Frost MD    dextrose 40% (GLUTOSE) oral gel 1 Tube, 15 g, Oral, PRN, Elissa Frost MD    glucagon [facility]) injection 1 mg, 1 mg, IntraMUSCular, PRN, Elissa Frost MD    dextrose (D50W) injection syrg 12.5-25 g, 25-50 mL, IntraVENous, PRN, Elissa Frost MD, 25 g at 08/24/19 0532    Recent Results (from the past 12 hour(s))   METABOLIC PANEL, BASIC    Collection Time: 08/26/19  5:47 AM   Result Value Ref Range    Sodium 137 136 - 145 mmol/L    Potassium 4.3 3.5 - 5.1 mmol/L    Chloride 103 98 - 107 mmol/L    CO2 26 21 - 32 mmol/L    Anion gap 8 7 - 16 mmol/L    Glucose 266 (H) 65 - 100 mg/dL    BUN 12 8 - 23 MG/DL    Creatinine 0.79 0.6 - 1.0 MG/DL    GFR est AA >60 >60 ml/min/1.73m2    GFR est non-AA >60 >60 ml/min/1.73m2    Calcium 8.9 8.3 - 10.4 MG/DL   CBC WITH AUTOMATED DIFF    Collection Time: 08/26/19  5:47 AM   Result Value Ref Range    WBC 9.0 4.3 - 11.1 K/uL    RBC 4.28 4.05 - 5.2 M/uL    HGB 14.8 11.7 - 15.4 g/dL    HCT 44.3 35.8 - 46.3 %    .5 (H) 79.6 - 97.8 FL    MCH 34.6 (H) 26.1 - 32.9 PG    MCHC 33.4 31.4 - 35.0 g/dL    RDW 14.5 11.9 - 14.6 %    PLATELET 088 (L) 209 - 450 K/uL    MPV 10.2 9.4 - 12.3 FL    ABSOLUTE NRBC 0.00 0.0 - 0.2 K/uL    DF AUTOMATED      NEUTROPHILS 67 43 - 78 %    LYMPHOCYTES 19 13 - 44 %    MONOCYTES 11 4.0 - 12.0 %    EOSINOPHILS 1 0.5 - 7.8 %    BASOPHILS 1 0.0 - 2.0 %    IMMATURE GRANULOCYTES 0 0.0 - 5.0 %    ABS. NEUTROPHILS 6.1 1.7 - 8.2 K/UL    ABS. LYMPHOCYTES 1.7 0.5 - 4.6 K/UL    ABS. MONOCYTES 1.0 0.1 - 1.3 K/UL    ABS. EOSINOPHILS 0.1 0.0 - 0.8 K/UL    ABS. BASOPHILS 0.1 0.0 - 0.2 K/UL    ABS. IMM. GRANS. 0.0 0.0 - 0.5 K/UL   GLUCOSE, POC    Collection Time: 08/26/19  7:31 AM   Result Value Ref Range    Glucose (POC) 219 (H) 65 - 100 mg/dL   GLUCOSE, POC    Collection Time: 08/26/19 11:22 AM   Result Value Ref Range    Glucose (POC) 224 (H) 65 - 100 mg/dL         Physical Exam:  General -cachectic appearing.  Pleasant and conversant. HEENT - Normocephalic, atraumatic. Conjunctiva, tympanic membranes, and oropharynx are clear. Neck - Supple without masses, no bruits   Extremities - Peripheral pulses intact. No edema and no rashes. Toe amputation.     Neurological examination -Comprehension is intact to one-step commands but she struggles to follow more complex commands. Attention is normal.  Language and speech are normal. On cranial nerve examination pupils are equal round and reactive to light. Visual acuity is adequate. Visual fields are full to finger confrontation. Extraocular motility is normal. Face is symmetric and sensation is intact to light touch. Hearing is intact to finger rustle bilaterally. Motor examination -there is decreased muscle bulk. Tone is slightly increased in the bilateral lower limbs. Power is 4/5 in the bilateral upper limbs and symmetric. In the bilateral lower limbs. Muscle stretch reflexes are 2+ throughout. Sensation is intact to light touch, pinprick, vibration and proprioception in all extremities.  Cerebellar examination is normal.  She cannot ambulate or stand    Signed By: Esa Hartley NP     August 26, 2019

## 2019-08-26 NOTE — PROGRESS NOTES
[facility] CARDIOLOGY PROGRESS NOTE    8/26/2019 8:32 AM    Admit Date: 8/23/2019    Admit Diagnosis: Hypoglycemia [E16.2]; Hypoglycemia [E16.2]; Intermittent confusion [R41.0]; Functional quadriplegia (HCC) [R53.2]      Subjective:   Stable overnight without angina, CHF, or palpitations but weak and tired. ... Vitals stable and controlled. No other complaints overnight. Tolerating meds well. Objective:      Vitals:    08/26/19 0542 08/26/19 0601 08/26/19 0753 08/26/19 0809   BP: 118/58  110/69    Pulse: 86  72    Resp: 18  18    Temp: 97.2 °F (36.2 °C)  98.3 °F (36.8 °C)    SpO2: 97%  96% 96%   Weight:  49.7 kg (109 lb 9.6 oz)     Height:           Physical Exam:  Neck- supple, no JVD at 45 deg  CV- irregular rate and rhythm, 2/6 severe AS murmur, 3/6 TR murmur, no S3  Lung- clear bilaterally anteriorly, decreased lateral bases  Abd- soft, nontender, nondistended  Ext- no edema, boots bilaterally  Skin- warm and dry    Data Review:   Recent Labs     08/26/19  0547 08/25/19  0522    140   K 4.3 4.2   BUN 12 10   CREA 0.79 0.75   * 151*       Assessment/Plan:      Principal Problem:    Functional quadriplegia (HCC) (8/23/2019)    Defer management to primary team.   rehab as tolerated     Active Problems:    Atrial fibrillation ([facility] [address] (3/15/2014)    Rate controlled. Continue Eliquis as taking at home.        Rheumatoid arthritis ([facility] [address] (3/15/2014)    Defer management to primary team.         Aortic stenosis (3/15/2014)    Prosthetic valve stenosis. She is a very poor candidate for re-intervention. Continue supportive conservative care for now. Will follow.        PAD (peripheral artery disease) ([facility] [address] (2/12/2016)    Stable clinically but severe historically.   No symptoms.        Intermittent confusion (8/23/2019)    Defer management to primary team.         Witnessed seizure-like activity ([facility] [address] (8/23/2019)    Defer management to primary team.          Elevated troponin (8/24/2019)    Suspect supply/demand imbalance in setting of known CAD. No intervention planned as no angina and very poor invasive candidate. Continue current medical therapy.        CBC, BMP, MG IN AM  CONTINUE CONSERVATIVE CARDIAC CARE  AVOID ANY INVASIVE CARDIAC PROCEDURES.      Aria Escobedo MD  [facility] Cardiology  Pager [phone]

## 2019-08-26 NOTE — PROGRESS NOTES
OT NOTE:  OT order received and chart reviewed however therapy is still awaiting for MRI of the cervical spine. Will await results prior to initiating assessment and treatment.    Thank you,  Aviva Spence, OT

## 2019-08-27 NOTE — PROGRESS NOTES
Problem: Self Care Deficits Care Plan (Adult)  Goal: *Acute Goals and Plan of Care (Insert Text)  Description  1. Patient will complete upper body bathing and dressing with set-up and adaptive equipment as needed. 2. Patient will complete toileting with moderate assistance to demonstrate improved independence with self-care  3. Patient will tolerate 25 minutes of OT treatment with 2-3 rest breaks to increase activity tolerance for ADLs. 4. Patient will complete functional transfers with minimal assistance and adaptive equipment as needed. 5. Patient will tolerate standing for hygiene with minimal assistance to improve standing tolerance for ADL tasks. 6. Patient will complete functional mobility for household distances with supervision and appropriate safety. Timeframe: 7 visits      Outcome: Progressing Towards Goal       OCCUPATIONAL THERAPY: Initial Assessment, Daily Note and AM 8/27/2019  INPATIENT: OT Visit Days: 1  [place] / [place] / Product Type: Medicare /      NAME/AGE/GENDER: Sarah Ching is a 69 y.o. female   PRIMARY DIAGNOSIS:  Hypoglycemia [E16.2]  Hypoglycemia [E16.2]  Intermittent confusion [R41.0]  Functional quadriplegia (HCC) [R53.2] Functional quadriplegia (HCC)   Functional quadriplegia ([address]          ICD-10: Treatment Diagnosis:    Generalized Muscle Weakness (M62.81)  Other lack of cordination (R27.8)  Repeated Falls (R29.6)  Low Back Pain (M54.5)  Abnormal posture (R29.3)   Precautions/Allergies:     Multaq [dronedarone]; Other medication; and Statins-hmg-coa reductase inhibitors      ASSESSMENT:     Ms. Martine Read presents to the hospital with hypoglycemia, confusion, and functional quadriplegia. 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 Pt was supine in the bed upon arrival. Pt lethargic and drowsy this am. Pt is oriented to person and place but disoriented to time stating its \"January\". Pt c/o pain but not able to provide any details of her pain. Pt lives alone but has caregivers come to assist with bathing/dressing and family that stays with her at night. Pt uses a rollator for functional mobility and has had multiple falls at home. Pt typically can complete her own toileting but has demonstrated a decline in this over the past few weeks. Pt does a sponge bath vs getting into a shower. Pt educated this am on log roll to keep spine in alignment. Pt demonstrated fair balance and rounded posture once sitting edge of bed. Pt has RA and deformities to the hands. Pt demonstrates generalized weakness in B UEs. Pt reports chronic numbness in the hands. Pt unable to complete visual tracking despite multiple cues but was able to read the clock. Pt did have some decreased visual attention at times to the L side. Pt completed combing hair with minimal assistance to work on self-care tasks and to attempt to improve alertness with activity. Pt required additional time with task. Pt stood edge of bed with moderate assistance x 2 and needed additional cues to place B feet on the floor when transitioning for sit to stand. Pt initially standing on her heels. Pt was left up with with PT at end of session. Pt is currently functioning below baseline for ADL/functional transfers/cognition and will benefit from OT services to address stated goals and plan of care.      This section established at most recent assessment   PROBLEM LIST (Impairments causing functional limitations):  Decreased Strength  Decreased ADL/Functional Activities  Decreased Transfer Abilities  Decreased Ambulation Ability/Technique  Decreased Balance  Increased Pain  Decreased Activity Tolerance  Increased Fatigue  Increased Shortness of Breath  Decreased Flexibility/Joint Mobility  Decreased Knowledge of Precautions  Decreased Skin Integrity/Hygeine  Decreased Routt with Home Exercise Program  Decreased Cognition   INTERVENTIONS PLANNED: (Benefits and precautions of occupational therapy have been discussed with the patient.)  Activities of daily living training  Adaptive equipment training  Balance training  Clothing management  Cognitive training  Donning&doffing training  Neuromuscular re-eduation  Therapeutic activity  Therapeutic exercise     TREATMENT PLAN: Frequency/Duration: Follow patient 3 times per week to address above goals. Rehabilitation Potential For Stated Goals: Good     REHAB RECOMMENDATIONS (at time of discharge pending progress):    Placement: It is my opinion, based on this patient's performance to date, that Ms. Martha Iraheta may benefit from intensive therapy at a [address] [place] [address] after discharge due to the functional deficits listed above that are likely to improve with skilled rehabilitation and concerns that he/she may be unsafe to be unsupervised at home due to risk for future falls and further functional decline . Equipment:   TBD               OCCUPATIONAL PROFILE AND HISTORY:   History of Present Injury/Illness (Reason for Referral):  See H&P  Past Medical History/Comorbidities:   Ms. Martha Iraheta  has a past medical history of Acquired cyst of kidney, Anxiety, Aortic valve replaced, Atrial fibrillation, chronic ([address] CAD (coronary artery disease), Calculus of kidney, Carotid artery stenosis without cerebral infarction, Chronic pain, Gangrene associated with diabetes mellitus ([address] (5/26/2016), GERD (gastroesophageal reflux disease), Heart failure ([address] History of kidney stones, Hypercholesterolemia, Hypertension, Hypothyroidism, Ill-defined condition, Microscopic hematuria, Nausea & vomiting, Osteoarthritis, Pacemaker, PUD (peptic ulcer disease), PVD (peripheral vascular disease) ([address] Rheumatoid arthritis(714.0), Toe amputation status ([address] Type 2 diabetes mellitus ([address] (Dx 2006), and Vertigo.  She also has no past medical history of Adverse effect of anesthesia, Difficult intubation, Malignant hyperthermia due to anesthesia, or Pseudocholinesterase deficiency. Ms. Regan Delgado  has a past surgical history that includes hx hysterectomy (1988); hx appendectomy (1959); hx pacemaker placement; hx open cholecystectomy; hx urological (Left, 1980s); hx lithotripsy; hx pacemaker; hx orthopaedic (Left); hx orthopaedic (Left); hx amputation (Left, 2016); hx heent; pr cabg, artery-vein, four (1993); pr cardiac surg procedure unlist; hx heart catheterization; vascular surgery procedure unlist (Left, 2-17-16); hx aortic valve replacement; and vascular surgery procedure unlist (Right, 12/20/2017). Social History/Living Environment:   Home Environment: Private residence  # Steps to Enter: 1  One/Two Story Residence: hospitals level  # of Interior Steps: 1  Living Alone: Yes  Support Systems: Child(freddy), Family member(s)  Patient Expects to be Discharged to[de-identified] Unknown  Current DME Used/Available at Home: Walker, rollator  Prior Level of Function/Work/Activity:  Pt lives alone but has caregivers come to assist with bathing/dressing and family that stays with her at night. Pt uses a rollator for functional mobility and has had multiple falls at home. Pt typically can complete her own toileting but has demonstrated a decline in this over the past few weeks. Pt does a sponge bath vs getting into a shower.   Personal Factors:          Social Background:  Lives alone w/ caregivers/family coming into the home to assist        Past/Current Experience:  hx of falls and functional decline        Overall Behavior:  lethargic         Other factors that influence how disability is experienced by the patient:  multiple co-morbidities     Number of Personal Factors/Comorbidities that affect the Plan of Care: Extensive review of physical, cognitive, and psychosocial performance (3+):  HIGH COMPLEXITY   ASSESSMENT OF OCCUPATIONAL PERFORMANCE[de-identified]   Activities of Daily Living:   Basic ADLs (From Assessment) Complex ADLs (From Assessment)   Feeding: Stand-by assistance  Oral Facial Hygiene/Grooming: Minimum assistance  Bathing: Moderate assistance  Upper Body Dressing: Moderate assistance  Lower Body Dressing: Maximum assistance  Toileting: Maximum assistance Instrumental ADL  Meal Preparation: Total assistance  Homemaking: Total assistance   Grooming/Bathing/Dressing Activities of Daily Living   Grooming  Brushing/Combing Hair: Minimum assistance Cognitive Retraining  Safety/Judgement: Fall prevention;Home safety                       Bed/Mat Mobility  Rolling: Moderate assistance;Assist x2  Supine to Sit: Moderate assistance;Assist x2  Sit to Stand: Moderate assistance;Assist x2  Stand to Sit: Moderate assistance;Assist x2  Bed to Chair: Moderate assistance;Assist x2  Scooting: Maximum assistance;Assist x2     Most Recent Physical Functioning:   Gross Assessment:  AROM: Generally decreased, functional(B UE)  Strength: Generally decreased, functional(B UE)  Coordination: Generally decreased, functional(B UE)  Sensation: Impaired(hx of numbness in B hands)               Posture:  Posture (WDL): Exceptions to WDL  Posture Assessment: Forward head, Rounded shoulders  Balance:  Sitting: Impaired  Sitting - Static: Prop sitting  Sitting - Dynamic: Fair (occasional)  Standing: Impaired  Standing - Static: Poor  Standing - Dynamic : Poor Bed Mobility:  Rolling: Moderate assistance;Assist x2  Supine to Sit: Moderate assistance;Assist x2  Scooting: Maximum assistance;Assist x2  Interventions: Safety awareness training; Tactile cues; Verbal cues  Wheelchair Mobility:     Transfers:  Sit to Stand: Moderate assistance;Assist x2  Stand to Sit: Moderate assistance;Assist x2  Bed to Chair: Moderate assistance;Assist x2  Interventions: Safety awareness training; Tactile cues; Verbal cues            Patient Vitals for the past 6 hrs:   BP BP Patient Position SpO2 O2 Flow Rate (L/min) Pulse   08/27/19 0814 117/61 Head of bed elevated (Comment degrees) 97 % -- 88   08/27/19 1039 -- -- 92 % 2 l/min 77   19 1202 124/65 -- 96 % -- 77       Mental Status  Neurologic State: Confused, Lethargic  Orientation Level: Disoriented to time, Oriented to person, Oriented to place  Cognition: Decreased attention/concentration, Decreased command following  Perception: Appears intact  Perseveration: No perseveration noted  Safety/Judgement: Fall prevention, Home safety                          Physical Skills Involved:  Range of Motion  Balance  Strength  Activity Tolerance  Sensation  Gross Motor Control  Vision  Pain (acute)  Skin Integrity Cognitive Skills Affected (resulting in the inability to perform in a timely and safe manner):  Executive Function  Sustained Attention  Divided Attention Psychosocial Skills Affected:  Habits/Routines  Self-Awareness   Number of elements that affect the Plan of Care: 5+:  HIGH COMPLEXITY   CLINICAL DECISION MAKIN[address] [place] [id number] AM-PAC 6 Clicks   Daily Activity Inpatient Short Form  How much help from another person does the patient currently need. .. Total A Lot A Little None   1. Putting on and taking off regular lower body clothing? ? 1   ? 2   ? 3   ? 4   2. Bathing (including washing, rinsing, drying)? ? 1   ? 2   ? 3   ? 4   3. Toileting, which includes using toilet, bedpan or urinal?   ? 1   ? 2   ? 3   ? 4   4. Putting on and taking off regular upper body clothing? ? 1   ? 2   ? 3   ? 4   5. Taking care of personal grooming such as brushing teeth? ? 1   ? 2   ? 3   ? 4   6. Eating meals? ? 1   ? 2   ? 3   ? 4   © 2007, Trustees of [address] [place] [address], under license to [employer]. All rights reserved      Score:  Initial: 14 Most Recent: X (Date: -- )    Interpretation of Tool:  Represents activities that are increasingly more difficult (i.e. Bed mobility, Transfers, Gait). Medical Necessity:     Patient demonstrates good   rehab potential due to higher previous functional level.   Reason for Services/Other Comments:  Patient continues to require skilled intervention due to decreased independence and functional transfers with ADL   . Use of outcome tool(s) and clinical judgement create a POC that gives a: MODERATE COMPLEXITY         TREATMENT:   (In addition to Assessment/Re-Assessment sessions the following treatments were rendered)     Pre-treatment Symptoms/Complaints:    Pain: Initial:   Pain Intensity 1: 4  Pain Location 1: Generalized  Pain Intervention(s) 1: Repositioned  Post Session:  same     Self Care: (8 minutes): Procedure(s) (per grid) utilized to improve and/or restore self-care/home management as related to grooming. Required minimal visual, verbal, manual and tactile cueing to facilitate activities of daily living skills and compensatory activities. Braces/Orthotics/Lines/Etc:   O2 Device: Nasal cannula  Treatment/Session Assessment:    Response to Treatment:  Pt tolerated with lethargy. Interdisciplinary Collaboration:   Occupational Therapist  Registered Nurse  After treatment position/precautions: Working with PT    Compliance with Program/Exercises: Will assess as treatment progresses. Recommendations/Intent for next treatment session: \"Next visit will focus on advancements to more challenging activities and reduction in assistance provided\".   Total Treatment Duration:  OT Patient Time In/Time Out  Time In: 1015  Time Out: [id number], OT

## 2019-08-27 NOTE — PROGRESS NOTES
Problem: Mobility Impaired (Adult and Pediatric)  Goal: *Acute Goals and Plan of Care  Description  STG:  (1.)Ms. Martha Iraheta will move from supine to sit and sit to supine , scoot up and down and roll side to side with MODERATE ASSISTx1 within 3 treatment day(s). (2.)Ms. Martha Iraheta will transfer from bed to chair and chair to bed with MODERATE ASSISTx1 using the least restrictive device within 3 treatment day(s). (3.)Ms. Martha Iraheta will ambulate with MINIMAL ASSIST for 20 feet with the least restrictive device within 3 treatment day(s). LTG:  (1.)Ms. Martha Iraheta  will move from supine to sit and sit to supine , scoot up and down and roll side to side with MINIMAL ASSIST within 7 treatment day(s). (2.)Ms. Martha Iraheta will transfer from bed to chair and chair to bed with MINIMAL ASSIST using the least restrictive device within 7 treatment day(s). (3.)Ms. Martha Iraheta will ambulate with MINIMAL ASSIST for 50+ feet with the least restrictive device within 7 treatment day(s). (4.)Ms. Martha Iraheta will perform standing static and dynamic balance activities x 15 minutes with MINIMAL ASSIST to improve safety within 7 treatment day(s). (5.)Ms. Martha Iraheta will ascend and descend 1 stairs using no hand rail(s) with MINIMAL ASSIST to improve functional mobility and safety within 7 treatment day(s). (6.)Ms. Martha Iraheta will perform bilateral lower extremity exercises x 15 min for HEP with SUPERVISION to improve strength, endurance, and functional mobility within 7 treatment day(s).    ________________________________________________________________________________________________    PHYSICAL THERAPY: Initial Assessment, Daily Note and AM 8/27/2019  INPATIENT: PT Visit Days : 1  [place] / [place] / Product Type: Medicare /       NAME/AGE/GENDER: Paula Wilson is a 69 y.o. female   PRIMARY DIAGNOSIS: Hypoglycemia [E16.2]  Hypoglycemia [E16.2]  Intermittent confusion [R41.0]  Functional quadriplegia ([facility] [address] [R53.2] Functional quadriplegia ([facility] [address]   Functional quadriplegia ([facility] [address]          ICD-10: Treatment Diagnosis:   Generalized Muscle Weakness (M62.81)  Other lack of cordination (R27.8)  Difficulty in walking, Not elsewhere classified (R26.2)  Other abnormalities of gait and mobility (R26.89)  History of falling (Z91.81)  Low Back Pain (M54.5)   Precaution/Allergies:  Multaq [dronedarone]; Other medication; and Statins-hmg-coa reductase inhibitors      ASSESSMENT:     Ms. Jason Horton is a 69year old F who presented to hospital on 8/23/19 with low blood sugar, AMS, weakness. She has had a very recent, rapid decline in function over past couple weeks per family report. Pt found to have acute L1-L2 compression fractures; pt not on bed rest orders, IR consult for possible kyphoplasty is still pending. MRI of cervical spine unremarkable. EEG found abnormalities, pt started on Keppra. MRI of brain indicated small focus ischemic infarct R anterior parietal region. Prior to hospital admission pt lives alone, but with family & friends staying with her for assistance for the past year in a one story home with one step(s) to enter. Pt poor historian, daughter and granddaughter in room providing information, they endorse at least 3-4 falls in past 6 months. Prior to admission Ms. Jason Horton uses a rollator for [employer] she was able to use until recently (past couple weeks) when she experienced a sharp functional decline and increase in difficulty walking. Upon entering, pt on edge bed working with OT, agreeable to PT evaluation. she reports generalized pain at rest but is not able to verbalize where or give a number. BLE assessment indicates sensation to light touch intact, AROM diminished, coordination diminished (particularly LLE, with tendency to move RLE when given instruction to move LLE unless PT gave cues for pt to attend to it), and strength decreased BLE, but LLE > RLE in terms of weakness.  Pt sitting EOB with fair sitting balance control, required Mod-Max assistance for scooting to edge of bed however. Treatment initiated to include sit > stand with Mod Ax2 using Rw, cues for posture and safety, pt with tendency to put weight posteriorly on heels, difficulty coordinating BLE. Gait x 5 ft with Min-Mod Ax2, cues for walker navigation, upright posture, decreasing flexion in trunk and BLE to improve steadiness. Pt pivoted and came to sit in bedside chair Mod Ax2 for control of sit. Scooting posteriorly for safe positioning Max-Total Ax2. At end of session pt resting in chair with all needs within reach, alarm activated for safety, family in room, RN notified. Pt presents as functioning below her baseline, with deficits in mobility including transfers, gait, balance, and activity tolerance. Pt will benefit from skilled therapy services to address stated deficits to promote return to highest level of function, independence, and safety. Will continue to follow. This section established at most recent assessment   PROBLEM LIST (Impairments causing functional limitations):  Decreased Strength  Decreased Transfer Abilities  Decreased Ambulation Ability/Technique  Decreased Balance  Increased Pain  Decreased Activity Tolerance  Increased Fatigue  Increased Shortness of Breath   INTERVENTIONS PLANNED: (Benefits and precautions of physical therapy have been discussed with the patient.)  Balance Exercise  Bed Mobility  Family Education  Gait Training  Home Exercise Program (HEP)  Neuromuscular Re-education/Strengthening  Range of Motion (ROM)  Therapeutic Activites  Therapeutic Exercise/Strengthening  Transfer Training     TREATMENT PLAN: Frequency/Duration: 3 times a week for duration of hospital stay  Rehabilitation Potential For Stated Goals: Good     REHAB RECOMMENDATIONS (at time of discharge pending progress):    Placement: It is my opinion, based on this patient's performance to date, that Ms. Kavita Lowery may benefit from intensive therapy at a [address] [place] [address] after discharge due to the functional deficits listed above that are likely to improve with skilled rehabilitation and concerns that he/she may be unsafe to be unsupervised at home due to medical complications and mobility deficits which put her at increase risk of falling and/or functional decline . Equipment:   None at this time              HISTORY:   History of Present Injury/Illness (Reason for Referral):  Per H&P: \"Patient is a 70 yo F who presents to the ED via EMS for a blood sugar of 48 and altered mental status at home. Her son noted her blood sugar to be low this morning and called EMS as she was confused. She is presently accompanied by her daughter, Dash Killian, who provides most of the history. History of CAD s/p remote CABG, TAVR in 2016 now with severe aortic valve stenosis undergoing evaluation by cardiology, a fib on Eliquis, diabetes on insulin, RA on prednisone. Ms. Sergio Veloz is oriented to self, location, and year, but is generally a poor historian. She does admit to having low back pain and some shortness of breath at home. Dash Killian reports patient has had problems with walking, difficulty with speaking (will yell 'help' and cannot find other words to say), and has not been eating for the last 3 weeks. Symptoms started after she feel while trying to get off the toilet at the end of July. Prior to 3 weeks ago, she was able to ambulate through her house with a rollator walker. Dash Killian states that on 8/23, she had an episode while eating breakfast where she lost consciousness and had jerking of her shoulders and arms while at the table. Event was witnessed by her son. She was brought to the ER for this. Dash Killian is very concerned as she has had a rapid decline, and they are currently unable to care for her as she has essentially become non-ambulatory.   She states patient has not complained of chest pain specifically, but she will grab at her chest occasionally. Of note, she has had recent TTE and SUZANNE showing EF of 40-45% and severe AS. She had CTA chest/abd/pelvis ordered by cardiology on 8/21 showing diff atherosclerotic disease in carotid, abdominal, and renal arteries. Also, showed acute L1 and L2 compression fracture with recommendation for possible IR evaluation. \"  MRI of cervical spine unremarkable, MRI brain indicated small focus ischemic infarct R anterior parietal region. IR consult for possible kyphoplasty is still pending this date. Past Medical History/Comorbidities:   Ms. Marco Betts  has a past medical history of Acquired cyst of kidney, Anxiety, Aortic valve replaced, Atrial fibrillation, chronic ([address] CAD (coronary artery disease), Calculus of kidney, Carotid artery stenosis without cerebral infarction, Chronic pain, Gangrene associated with diabetes mellitus ([address] (5/26/2016), GERD (gastroesophageal reflux disease), Heart failure ([address] History of kidney stones, Hypercholesterolemia, Hypertension, Hypothyroidism, Ill-defined condition, Microscopic hematuria, Nausea & vomiting, Osteoarthritis, Pacemaker, PUD (peptic ulcer disease), PVD (peripheral vascular disease) ([address] Rheumatoid arthritis(714.0), Toe amputation status ([address] Type 2 diabetes mellitus ([address] (Dx 2006), and Vertigo. She also has no past medical history of Adverse effect of anesthesia, Difficult intubation, Malignant hyperthermia due to anesthesia, or Pseudocholinesterase deficiency.   Ms. Marco Betts  has a past surgical history that includes hx hysterectomy (1988); hx appendectomy (1959); hx pacemaker placement; hx open cholecystectomy; hx urological (Left, 1980s); hx lithotripsy; hx pacemaker; hx orthopaedic (Left); hx orthopaedic (Left); hx amputation (Left, 2016); hx heent; pr cabg, artery-vein, four (1993); pr cardiac surg procedure unlist; hx heart catheterization; vascular surgery procedure unlist (Left, 2-17-16); hx aortic valve replacement; and vascular surgery procedure live (Right, 12/20/2017). Social History/Living Environment:   Home Environment: Private residence  # Steps to Enter: 1  One/Two Story Residence: One story  Living Alone: Yes(but family rotates to stay with her)  Support Systems: Family member(s), Child(freddy), Friends \ neighbors  Patient Expects to be Discharged to[de-identified] Unknown  Current DME Used/Available at Home: Walker, rollator  Prior Level of Function/Work/Activity:  Prior to hospital admission pt lives alone, but with family & friends staying with her for assistance for the past year in a one story home with one step(s) to enter. Pt poor historian, daughter and granddaughter in room providing information, they endorse at least 3-4 falls in past 6 months. Prior to admission Ms. Jason Horton uses a rollator for [employer] she was able to use until recently (past couple weeks) when she experienced a sharp functional decline and increase in difficulty walking. Number of Personal Factors/Comorbidities that affect the Plan of Care: 3+: HIGH COMPLEXITY   EXAMINATION:   Most Recent Physical Functioning:   Gross Assessment:  AROM: Generally decreased, functional  Strength: Generally decreased, functional  Coordination: Generally decreased, functional  Sensation: Intact               Posture:  Posture (WDL): Exceptions to WDL  Posture Assessment: Forward head, Rounded shoulders  Balance:  Sitting: Impaired  Sitting - Static: Good (unsupported)  Sitting - Dynamic: Fair (occasional)  Standing: Impaired  Standing - Static: Poor  Standing - Dynamic : Poor Bed Mobility:  Rolling: Moderate assistance;Assist x2  Supine to Sit: Moderate assistance;Assist x2  Scooting: Moderate assistance;Maximum assistance;Assist x2  Interventions: Safety awareness training; Tactile cues; Verbal cues  Wheelchair Mobility:     Transfers:  Sit to Stand:  Moderate assistance;Assist x2  Stand to Sit: Moderate assistance;Assist x2  Bed to Chair: Moderate assistance;Assist x2  Interventions: Safety awareness training; Tactile cues; Verbal cues  Gait:     Base of Support: Center of gravity altered;Narrowed  Speed/Kira: Shuffled; Slow  Step Length: Left shortened;Right shortened  Gait Abnormalities: Decreased step clearance; Path deviations; Shuffling gait;Trunk sway increased  Distance (ft): 5 Feet (ft)  Assistive Device: Walker, rolling;Gait belt  Ambulation - Level of Assistance: Minimal assistance; Moderate assistance      Body Structures Involved:  Nerves  Heart  Lungs  Bones  Joints  Muscles Body Functions Affected:  Sensory/Pain  Cardio  Respiratory  Neuromusculoskeletal  Movement Related Activities and Participation Affected:  General Tasks and Demands  Mobility   Number of elements that affect the Plan of Care: 4+: HIGH COMPLEXITY   CLINICAL PRESENTATION:   Presentation: Evolving clinical presentation with changing clinical characteristics: MODERATE COMPLEXITY   CLINICAL DECISION MAKIN [facility] Mobility Inpatient Short Form  How much difficulty does the patient currently have. .. Unable A Lot A Little None   1. Turning over in bed (including adjusting bedclothes, sheets and blankets)? ? 1   ? 2   ? 3   ? 4   2. Sitting down on and standing up from a chair with arms ( e.g., wheelchair, bedside commode, etc.)   ? 1   ? 2   ? 3   ? 4   3. Moving from lying on back to sitting on the side of the bed?   ? 1   ? 2   ? 3   ? 4   How much help from another person does the patient currently need. .. Total A Lot A Little None   4. Moving to and from a bed to a chair (including a wheelchair)? ? 1   ? 2   ? 3   ? 4   5. Need to walk in hospital room? ? 1   ? 2   ? 3   ? 4   6. Climbing 3-5 steps with a railing? ? 1   ? 2   ? 3   ? 4   © 2007, Trustees of [address] [place] [address] [address] [id number], under license to [employer].  All rights reserved      Score:  Initial: 14 Most Recent: X (Date: -- )    Interpretation of Tool:  Represents activities that are increasingly more difficult (i.e. Bed mobility, Transfers, Gait). Medical Necessity:     Patient is expected to demonstrate progress in strength, range of motion, balance, coordination and functional technique   to improve safety during all mobility   . Reason for Services/Other Comments:  Patient continues to require skilled intervention due to medical complications and mobility deficits which impact her level of function, safety, and independence as indicated above   . Use of outcome tool(s) and clinical judgement create a POC that gives a: Questionable prediction of patient's progress: MODERATE COMPLEXITY        TREATMENT:   (In addition to Assessment/Re-Assessment sessions the following treatments were rendered)   Pre-treatment Symptoms/Complaints:  \"I'm cold\"  Pain: Initial: \"I hurt\" (unable to verbalize number)     Post Session:  Pt appears comfortable& resting at end of session     Therapeutic Activity: (   10 minutes): Therapeutic activities including Bed transfers, Chair transfers, Ambulation on level ground and sitting and standing balance control training to improve mobility, strength, balance and coordination. Required moderate physical assist and max cues/instruction   to promote static and dynamic balance in standing and promote coordination of bilateral, lower extremity(s). Braces/Orthotics/Lines/Etc:   O2 Device: Nasal cannula  Treatment/Session Assessment:    Response to Treatment:  See above  Interdisciplinary Collaboration:   Physical Therapist  Occupational Therapist  Registered Nurse  After treatment position/precautions:   Up in chair  Bed alarm/tab alert on  Bed/Chair-wheels locked  Bed in low position  Call light within reach  RN notified  Family at bedside   Compliance with Program/Exercises: Will assess as treatment progresses  Recommendations/Intent for next treatment session: \"Next visit will focus on advancements to more challenging activities and reduction in assistance provided\".     Total Treatment Duration:  PT Patient Time In/Time Out  Time In: 1039  Time Out: 199 [facility], PT

## 2019-08-27 NOTE — PROGRESS NOTES
[address] Cardiology    Spoke with Dr. Eldon Horton. Patient with new CVA despite Eliquis. Cardiac CT not officially read but appears to show possible leaflet thrombus as etiology of elevated gradient. Will stop Eliquis tonight. Plan to start Lovenox in AM.  STart Coumadin tonight.     Marie Sanches MD

## 2019-08-27 NOTE — PROGRESS NOTES
Neurology Daily Progress Note     Assessment:     67-year-old woman with weakness, intermittent confusion, and a seizure-like episode.       Small anterior parietal lobe stroke. History of afib, currently on Eliquis. SUZANNE in August was concerning for potential leaflet thrombosis. Coronary CT was ordered for further evaluation, but not yet completed.      Seizure like episode. EEG was abnormal with sharply contoured waveforms in the left temporal lobe. The patient has been started on Keppra. Plan:     · Continue Keppra   · Continue Eliquis  · Neurochecks Q4H  · MRI of brain  · CTA of head and neck   · Lipid panel. The patient has documented statin intolerance   · Telemetry   · PT/OT/ST  · DVT prophylaxis   · BP management - normotensive, with long-term goal <130/80  · Smoking cessation if applicable   · Diabetes education if applicable   · Depression Screening prior to discharge      Subjective: Interval history:    Patient is confused this morning. She has had some episodes of confusion over the past year and cannot stay alone. She has home health during the day and family members stay with her at night. Per family member, confusion is worse today. History:    John Bazzi is a 69 y.o. female who is being seen for progressive weakness, confusion and seizure-like activity. Unable to obtain ROS due to AMS.        Objective:     Vitals:    08/27/19 0533 08/27/19 0539 08/27/19 0814 08/27/19 1039   BP: 124/75  117/61    Pulse: 98  88 77   Resp: 18  18    Temp: 96.3 °F (35.7 °C)  97.4 °F (36.3 °C)    SpO2: 93%  97% 92%   Weight:  111 lb 8 oz (50.6 kg)     Height:              Current Facility-Administered Medications:     sodium chloride 0.9 % bolus infusion 100 mL, 100 mL, IntraVENous, RAD ONCE, Taina Faria MD    saline peripheral flush soln 10 mL, 10 mL, InterCATHeter, RAD ONCE, Taina Faria MD    iopamidol (ISOVUE-370) 76 % injection 70 mL, 70 mL, IntraVENous, RAD ONCE, Magalys Im MD SAMMY    levETIRAcetam (KEPPRA) tablet 500 mg, 500 mg, Oral, Q12H, Yaya Sonja LUIS DANIEL JOHNSON, 500 mg at 08/27/19 7181    methIMAzole (TAPAZOLE) tablet 10 mg, 10 mg, Oral, Once per day on Sun Sat, Lata Zhao MD, 10 mg at 08/25/19 1601    insulin lispro (HUMALOG) injection, , SubCUTAneous, Carli Smith MD, 1 Units at 08/27/19 0730    apixaban (ELIQUIS) tablet 5 mg, 5 mg, Oral, Q12H, Lata Zhao MD, 5 mg at 08/27/19 4051    aspirin delayed-release tablet 81 mg, 81 mg, Oral, DAILY, Lata Zhao MD, 81 mg at 08/27/19 5737    cyanocobalamin tablet 500 mcg, 500 mcg, Oral, DAILY, Lata Zhao MD, 500 mcg at 08/27/19 9218    digoxin (LANOXIN) tablet 0.125 mg, 0.125 mg, Oral, DAILY, Lata Zhao MD, 0.125 mg at 08/27/19 2145    ferrous sulfate tablet 325 mg, 1 Tab, Oral, ACL, Lata Zhao MD, 325 mg at 08/26/19 1212    HYDROcodone-acetaminophen ([facility]) 5-325 mg per tablet 1 Tab, 1 Tab, Oral, Q4H PRN, Lata Zhao MD, 1 Tab at 08/24/19 2108    LORazepam (ATIVAN) tablet 1 mg, 1 mg, Oral, BID, Lata Zhao MD, 1 mg at 08/27/19 3891    methIMAzole (TAPAZOLE) tablet 7.5 mg, 7.5 mg, Oral, Once per day on Mon Tue Wed Thu Fri, Lata Zhao MD, 7.5 mg at 08/26/19 1739    metoprolol tartrate (LOPRESSOR) tablet 50 mg, 50 mg, Oral, BID, Lata Zhao MD, 50 mg at 08/27/19 0946    pantoprazole (PROTONIX) tablet 40 mg, 40 mg, Oral, ACB, Lata Zhao MD, 40 mg at 08/27/19 0452    predniSONE (DELTASONE) tablet 5 mg, 5 mg, Oral, DAILY WITH BREAKFAST, Lata Zhao MD, 5 mg at 08/27/19 4501    sodium chloride (NS) flush 5-40 mL, 5-40 mL, IntraVENous, Q8H, Lata Zhao MD, 10 mL at 08/26/19 2046    sodium chloride (NS) flush 5-40 mL, 5-40 mL, IntraVENous, PRN, Lata Zhao MD    [facility]) injection 0.4 mg, 0.4 mg, IntraVENous, PRN, Lata Zhao MD    ondansetron [facility]) injection 4 mg, 4 mg, IntraVENous, Q4H PRN, MD Ambreen Echavarria dextrose 40% (GLUTOSE) oral gel 1 Tube, 15 g, Oral, PRN, Angely Izaguirre MD    glucagon [facility]) injection 1 mg, 1 mg, IntraMUSCular, JUANNAngely MD    dextrose (D50W) injection syrg 12.5-25 g, 25-50 mL, IntraVENous, PRN, Angely Izaguirre MD, 25 g at 08/24/19 0532    Recent Results (from the past 12 hour(s))   METABOLIC PANEL, BASIC    Collection Time: 08/27/19  6:28 AM   Result Value Ref Range    Sodium 140 136 - 145 mmol/L    Potassium 4.4 3.5 - 5.1 mmol/L    Chloride 106 98 - 107 mmol/L    CO2 25 21 - 32 mmol/L    Anion gap 9 7 - 16 mmol/L    Glucose 214 (H) 65 - 100 mg/dL    BUN 12 8 - 23 MG/DL    Creatinine 0.67 0.6 - 1.0 MG/DL    GFR est AA >60 >60 ml/min/1.73m2    GFR est non-AA >60 >60 ml/min/1.73m2    Calcium 8.6 8.3 - 10.4 MG/DL   CBC W/O DIFF    Collection Time: 08/27/19  6:28 AM   Result Value Ref Range    WBC 9.9 4.3 - 11.1 K/uL    RBC 4.15 4.05 - 5.2 M/uL    HGB 13.8 11.7 - 15.4 g/dL    HCT 43.4 35.8 - 46.3 %    .6 (H) 79.6 - 97.8 FL    MCH 33.3 (H) 26.1 - 32.9 PG    MCHC 31.8 31.4 - 35.0 g/dL    RDW 14.2 11.9 - 14.6 %    PLATELET 242 (L) 716 - 450 K/uL    MPV 9.8 9.4 - 12.3 FL    ABSOLUTE NRBC 0.00 0.0 - 0.2 K/uL   MAGNESIUM    Collection Time: 08/27/19  6:28 AM   Result Value Ref Range    Magnesium 1.6 (L) 1.8 - 2.4 mg/dL   GLUCOSE, POC    Collection Time: 08/27/19  7:44 AM   Result Value Ref Range    Glucose (POC) 191 (H) 65 - 100 mg/dL         Physical Exam:  General -cachectic appearing. Pleasant and conversant. HEENT - Normocephalic, atraumatic. Conjunctiva, tympanic membranes, and oropharynx are clear. Neck - Supple without masses, no bruits   Extremities - Peripheral pulses intact. No edema and no rashes. Toe amputation.     Neurological examination -Comprehension is intact to one-step commands but she struggles to follow more complex commands. Attention is normal. Oriented to person and place.   Language and speech are normal. On cranial nerve examination pupils are equal round and reactive to light. Visual acuity is adequate. Visual fields are full to finger confrontation. Extraocular motility is normal. Face is symmetric and sensation is intact to light touch. Hearing is intact to finger rustle bilaterally. Motor examination -there is decreased muscle bulk. Tone is slightly increased in the bilateral lower limbs. Power is 4/5 in the bilateral upper limbs and symmetric. 4/5 In the bilateral lower limbs. Muscle stretch reflexes are 2+ throughout. Sensation is intact to light touch, pinprick, vibration and proprioception in all extremities. Cerebellar examination demonstrates past pointing.   She cannot ambulate or stand    Signed By: Esa Hartley NP     August 27, 2019

## 2019-08-27 NOTE — PROGRESS NOTES
[facility] CARDIOLOGY PROGRESS NOTE           8/27/2019 10:29 AM    Admit Date: 8/23/2019      Subjective:   No chest pain or dyspnea. BP controlled. ROS:  Cardiovascular:  As noted above    Objective:      Vitals:    08/26/19 2254 08/27/19 0533 08/27/19 0539 08/27/19 0814   BP: 117/56 124/75  117/61   Pulse: 76 98  88   Resp: 18 18  18   Temp: 97.2 °F (36.2 °C) 96.3 °F (35.7 °C)  97.4 °F (36.3 °C)   SpO2: 97% 93%  97%   Weight:   50.6 kg (111 lb 8 oz)    Height:           Physical Exam:  General-No Acute Distress  Neck- supple, no JVD  CV- regular rate and rhythm Grade II/VI FEI  Lung- clear bilaterally  Abd- soft, nontender, nondistended  Ext- trivial edema bilaterally. Skin- warm and dry      Data Review:   Recent Labs     08/27/19  0628 08/26/19  0547    137   K 4.4 4.3   MG 1.6*  --    BUN 12 12   CREA 0.67 0.79   * 266*   WBC 9.9 9.0   HGB 13.8 14.8   HCT 43.4 44.3   * 144*      No results found for: FLOYD Marmolejo    Assessment/Plan:     Principal Problem:    Functional quadriplegia ([facility] (8/23/2019)    Defer management to primary team.      Active Problems:    Atrial fibrillation ([facility] (3/15/2014)    Rate controlled. Continue Eliquis. Rheumatoid arthritis ([facility] (3/15/2014)    Defer management to primary team.        Aortic stenosis (3/15/2014)    Prosthetic valve stenosis. Currently appears poor candidate for intervention. Will follow. PAD (peripheral artery disease) ([facility] (2/12/2016)    Stable. No symptoms. Intermittent confusion (8/23/2019)    Defer management to primary team.        Witnessed seizure-like activity ([facility] (8/23/2019)    Defer management to primary team.         Elevated troponin (8/24/2019)    Suspect supply/demand imbalance in setting of known CAD. No intervention planned as no angina. Medical therapy.                    Maldonado Gray MD  8/27/2019 10:29 AM

## 2019-08-27 NOTE — PROGRESS NOTES
Progress Note    Patient: Levi Abdul MRN: [id number]  SSN: [id number]    YOB: 1940  Age: 69 y.o. Sex: female      Admit Date: 8/23/2019    LOS: 4 days     Subjective:   F/U right small anterior parietal stroke    \"8/23 with progressive weakness/confusion and possible seizure-like activity noted about a week prior. Troponin elevated and peaked at 0.13. She denies chest pain. \"    Cardiology consulted who thought was due to supply/demand. Patient also with noted prosthetic valve stenosis but not a candidate for intervention. Neurology consulted who started keppra for witnessed seizure activity. No chest pain or SOB.    Current Facility-Administered Medications   Medication Dose Route Frequency    sodium chloride 0.9 % bolus infusion 100 mL  100 mL IntraVENous RAD ONCE    saline peripheral flush soln 10 mL  10 mL InterCATHeter RAD ONCE    iopamidol (ISOVUE-370) 76 % injection 70 mL  70 mL IntraVENous RAD ONCE    levETIRAcetam (KEPPRA) tablet 500 mg  500 mg Oral Q12H    methIMAzole (TAPAZOLE) tablet 10 mg  10 mg Oral Once per day on Sun Sat    insulin lispro (HUMALOG) injection   SubCUTAneous TIDAC    apixaban (ELIQUIS) tablet 5 mg  5 mg Oral Q12H    aspirin delayed-release tablet 81 mg  81 mg Oral DAILY    cyanocobalamin tablet 500 mcg  500 mcg Oral DAILY    digoxin (LANOXIN) tablet 0.125 mg  0.125 mg Oral DAILY    ferrous sulfate tablet 325 mg  1 Tab Oral ACL    HYDROcodone-acetaminophen (NORCO) 5-325 mg per tablet 1 Tab  1 Tab Oral Q4H PRN    LORazepam (ATIVAN) tablet 1 mg  1 mg Oral BID    methIMAzole (TAPAZOLE) tablet 7.5 mg  7.5 mg Oral Once per day on Mon Tue Wed Thu Fri    metoprolol tartrate (LOPRESSOR) tablet 50 mg  50 mg Oral BID    pantoprazole (PROTONIX) tablet 40 mg  40 mg Oral ACB    predniSONE (DELTASONE) tablet 5 mg  5 mg Oral DAILY WITH BREAKFAST    sodium chloride (NS) flush 5-40 mL  5-40 mL IntraVENous Q8H    sodium chloride (NS) flush 5-40 mL  5-40 mL IntraVENous PRN    naloxone (NARCAN) injection 0.4 mg  0.4 mg IntraVENous PRN    ondansetron (ZOFRAN) injection 4 mg  4 mg IntraVENous Q4H PRN    dextrose 40% (GLUTOSE) oral gel 1 Tube  15 g Oral PRN    glucagon (GLUCAGEN) injection 1 mg  1 mg IntraMUSCular PRN    dextrose (D50W) injection syrg 12.5-25 g  25-50 mL IntraVENous PRN       Objective:     Vitals:    08/27/19 0539 08/27/19 0814 08/27/19 1039 08/27/19 1202   BP:  117/61  124/65   Pulse:  88 77 77   Resp:  18  18   Temp:  97.4 °F (36.3 °C)  97.4 °F (36.3 °C)   SpO2:  97% 92% 96%   Weight: 50.6 kg (111 lb 8 oz)      Height:             Intake and Output:  Current Shift: 08/27 0701 - 08/27 1900  In: 120 [P.O.:120]  Out: -   Last three shifts: 08/25 1901 - 08/27 0700  In: 480 [P.O.:480]  Out: -     Physical Exam:   General:  Alert, cooperative, frail appearing. Eating lunch   Eyes:  Conjunctivae/corneas clear. Ears:  Normal TMs and external ear canals both ears. Nose: Nares normal. Septum midline. Mouth/Throat: Lips, mucosa, and tongue normal.    Neck:  no JVD. Back:   deferred   Lungs:   Clear to auscultation bilaterally. Heart:  Regular rate and rhythm, S1, S2 normal   Abdomen:   Soft, non-tender. Extremities: UE with good ROM. LE has 3/5 strength to left and 2/5 strength to right    Pulses: 2+ and symmetric all extremities. Skin: Skin color, texture, turgor normal. No rashes or lesions   Lymph nodes: Cervical, supraclavicular, and axillary nodes normal.   Neurologic: Limited speech while in room. No acute distress.         Lab/Data Review:    Recent Results (from the past 24 hour(s))   GLUCOSE, POC    Collection Time: 08/26/19  4:25 PM   Result Value Ref Range    Glucose (POC) 167 (H) 65 - 100 mg/dL   GLUCOSE, POC    Collection Time: 08/26/19  8:49 PM   Result Value Ref Range    Glucose (POC) 264 (H) 65 - 100 mg/dL   PLEASE READ & DOCUMENT PPD TEST IN 72 HRS    Collection Time: 08/26/19 10:04 PM   Result Value Ref Range    PPD      mm Induration     METABOLIC PANEL, BASIC    Collection Time: 08/27/19  6:28 AM   Result Value Ref Range    Sodium 140 136 - 145 mmol/L    Potassium 4.4 3.5 - 5.1 mmol/L    Chloride 106 98 - 107 mmol/L    CO2 25 21 - 32 mmol/L    Anion gap 9 7 - 16 mmol/L    Glucose 214 (H) 65 - 100 mg/dL    BUN 12 8 - 23 MG/DL    Creatinine 0.67 0.6 - 1.0 MG/DL    GFR est AA >60 >60 ml/min/1.73m2    GFR est non-AA >60 >60 ml/min/1.73m2    Calcium 8.6 8.3 - 10.4 MG/DL   CBC W/O DIFF    Collection Time: 08/27/19  6:28 AM   Result Value Ref Range    WBC 9.9 4.3 - 11.1 K/uL    RBC 4.15 4.05 - 5.2 M/uL    HGB 13.8 11.7 - 15.4 g/dL    HCT 43.4 35.8 - 46.3 %    .6 (H) 79.6 - 97.8 FL    MCH 33.3 (H) 26.1 - 32.9 PG    MCHC 31.8 31.4 - 35.0 g/dL    RDW 14.2 11.9 - 14.6 %    PLATELET 382 (L) 832 - 450 K/uL    MPV 9.8 9.4 - 12.3 FL    ABSOLUTE NRBC 0.00 0.0 - 0.2 K/uL   MAGNESIUM    Collection Time: 08/27/19  6:28 AM   Result Value Ref Range    Magnesium 1.6 (L) 1.8 - 2.4 mg/dL   GLUCOSE, POC    Collection Time: 08/27/19  7:44 AM   Result Value Ref Range    Glucose (POC) 191 (H) 65 - 100 mg/dL   GLUCOSE, POC    Collection Time: 08/27/19 11:44 AM   Result Value Ref Range    Glucose (POC) 256 (H) 65 - 100 mg/dL       Assessment/ Plan:     Principal Problem:    Functional quadriplegia (HCC) (8/23/2019)    Active Problems:    Atrial fibrillation (HCC) (3/15/2014)      Rheumatoid arthritis ([facility] (3/15/2014)      Aortic stenosis (3/15/2014)      PAD (peripheral artery disease) ([facility] (2/12/2016)      Overview: 2/17/16 (Dr Liz Porter) A/o, BLR Angiogram, L PT PTA (), L SFA PTA/Stent (6x       100 [place] PTX)       Hypoglycemia (8/23/2019)      Intermittent confusion (8/23/2019)      Witnessed seizure-like activity ([facility] [address] (8/23/2019)      Closed compression fracture of L2 vertebra (HCC) (8/23/2019)      Elevated troponin (8/24/2019)    CVA - Neurology following. CTA head neck ordered.  MRI brain showed small focus of ischemic infarction right anterior parietal region. MRI cervical spine showed no severe central stenoses or disc herniation or cord impingement. There are mild areas of canal narrowing due to ligamentum flavum thickening or laxity. On Eliquis. Cannot tolerate statin. CTA head neck pending. A fib - Eliquis. Digoxin. HNT- BB    Hypomagnesemia - Mg supplement. Seizure - Keppra 500mg BID. Anxiety - Ativan 1mg BID. DM type II - SS. Patient having elevated glucose readings at lunch. Will order scheduled humalog 3 units before lunch    Hyperthyroidism - methimazole     Patient on daily prednisone 5mg     Possible dc tomorrow to rehab.      DVT prophylaxis - Eliquis    Signed By: Clinton Sotelo DO     August 27, 2019

## 2019-08-28 NOTE — PROGRESS NOTES
Hourly rounds performed. All needs met. Pt denies any needs at this time. Both IVs still patent. Pt currently resting. Will continue to monitor and report to oncoming nurse.

## 2019-08-28 NOTE — PROGRESS NOTES
[facility] CARDIOLOGY PROGRESS NOTE           8/28/2019 10:29 AM    Admit Date: 8/23/2019      Subjective:   Pt resting in bed. States that she feels weak and \"sick. \" She is unable to further define \"sick. \" States that she has been trying to have a BM today. States no CP but feels SOB. She is overly concerned about getting some shoes put on her feet. Remains on O2. Very weak. She received her 1st dose of Coumadin last PM. Now on Lovenox bridge. INR yesterday 1.8 -- no labs yet today (PharmD has ordered).      ROS:  Cardiovascular:  As noted above    Objective:      Vitals:    08/28/19 0504 08/28/19 0506 08/28/19 0714 08/28/19 0723   BP: 124/65  139/78    Pulse: 88  65    Resp: 18  18    Temp: 97.7 °F (36.5 °C)  97.5 °F (36.4 °C)    SpO2: 96%  97% 99%   Weight:  49.1 kg (108 lb 4.8 oz)     Height:           Physical Exam:  General-No Acute Distress, frail, elderly, weak  Neck- supple, no JVD  CV- regular rate and rhythm presently, Grade II/VI FEI noted  Lung- clear anteriorly bilaterally, normal effort, mild dyspnea with conversation, on O2 support  Abd- soft, nontender, nondistended  Ext- trace edema bilaterally  Skin- warm and dry, thin and fragile      Data Review:   Recent Labs     08/28/19  0658 08/27/19  1857 08/27/19  0628 08/26/19  0547   NA  --   --  140 137   K  --   --  4.4 4.3   MG 2.0  --  1.6*  --    BUN  --   --  12 12   CREA  --   --  0.67 0.79   GLU  --   --  214* 266*   WBC  --   --  9.9 9.0   HGB  --   --  13.8 14.8   HCT  --   --  43.4 44.3   PLT  --   --  110* 144*   INR  --  1.8  --   --       No results found for: TROIQ, PANTERA, TROPT, TNIPOC    Assessment/Plan:     Principal Problem:    Functional quadriplegia -- defer management to primary team      Active Problems:    Atrial fibrillation -- rate controlled, cont digoxin and BB, now on Coumadin with Lovenox bridge -- PharmD managing      Rheumatoid arthritis -- defer management to primary team.        Aortic stenosis -- prior TAVR with prosthetic valvve leaflets with thrombus and thickened. She is not a candidate for redo TAVR. Cardiac CT with leaflet thrombus and plan anticoagulation with warfarin. Repeat echo in 3 months. Often times thrombus will resolve. PAD (peripheral artery disease) -- stable, no symptoms presently      Intermittent confusion -- defer management to primary team/Neuro        Witnessed seizure-like activity -- defer management to primary team/Neuro, on Keppra         Elevated troponin -- suspect supply/demand imbalance in setting of known CAD. No intervention planned as no angina. Cont medical therapy.        CVA -- small anterior parietal lobe stroke -- was on Eliquis at the time, now changed to Coumadin with Lovenox bridge, f/u INRs -- defer post-CVA care to Hospitalist/Neuro      Debility -- planning to go to rehab, very debilitated and weak presently          Yuli Rudolph MD

## 2019-08-28 NOTE — PROGRESS NOTES
Warfarin dosing per pharmacist    Royal Nesbitt is a 69 y.o. female. Height: 5' 3\" (160 cm)    Weight: 49.1 kg (108 lb 4.8 oz)    Indication:  Possible leaflet thrombus, CVA while on apixaban    Goal INR:  2-3    Home dose:  New start    Risk factors or significant drug interactions:  N/A    Other anticoagulants:  Enoxaparin 50 mg SubQ Q12H bridge therapy    Daily Monitoring  Date  INR     Warfarin dose HGB              Notes  8/27  1.8  5 mg  13.8    8/28  ---  5 mg  ---      Pharmacy consulted to assist dosing warfarin in patient with possible leaflet thrombus and CVA while on apixaban therapy. Cardiology and Neurology together agreed to switch to warfarin and bridge with enoxaparin. INR at baseline was elevated at 1.8, likely due to residual effects from apixaban (last dose was 5 mg on 8/27/19 at 0938). Warfarin 5 mg daily initiated on 8/27/19. INR not drawn today. Would continue with warfarin 5 mg for now. Would recommend continuing enoxaparin bridge therapy for minimum 5 days and until INR thearpeutic to allow time for warfarin to have full anticoagulant effect (factors 2, 7, 9, and 10 still circulating will need time to clear, can take 96 hours or longer)    Continue daily INR for now.     If patient discharges before 5 days of bridging and therapeutic INR, will need close monitoring to decide when to stop bridge therapy    Thank you,  Cristofer Huynh, PharmD  Clinical Pharmacist  [phone]

## 2019-08-28 NOTE — PROGRESS NOTES
Progress Note    Patient: Elen Quintero MRN: [id number]  SSN: [id number]    YOB: 1940  Age: 69 y.o. Sex: female      Admit Date: 8/23/2019    LOS: 5 days     Subjective:   F/U right small anterior parietal stroke    \"8/23 with progressive weakness/confusion and possible seizure-like activity noted about a week prior. Troponin elevated and peaked at 0.13. She denies chest pain. \"    Cardiology consulted who thought was due to supply/demand. CT cardiac with no official read but there is possible leaflet thrombus that may be the etiology of her CVA. Eliquis stopped and placed on lovenox bridge and coumadin. INR today 1.8. Patient also with noted prosthetic valve stenosis but not a candidate for intervention. Neurology consulted who started keppra for witnessed seizure activity. No chest pain or SOB. Vascular surgery consulted for her bilateral carotid stenosis. Anti-platelet therapy recommended with ASA. Vascular surgery would like follow up in three weeks. CTA head neck showed left internal carotid artery origin of greater than 75%. Stenosis of the distal segment of the right common carotid artery of 60%. Stenosis in the supraclinoid segment of both the right and left internal carotid arteries. Today, no chest pain or SOB.  More alert   Current Facility-Administered Medications   Medication Dose Route Frequency    insulin glargine (LANTUS) injection 5 Units  5 Units SubCUTAneous DAILY    warfarin (COUMADIN) tablet 5 mg  5 mg Oral QPM    enoxaparin (LOVENOX) injection 50 mg  1 mg/kg SubCUTAneous Q12H    levETIRAcetam (KEPPRA) tablet 500 mg  500 mg Oral Q12H    methIMAzole (TAPAZOLE) tablet 10 mg  10 mg Oral Once per day on Sun Sat    insulin lispro (HUMALOG) injection   SubCUTAneous TIDAC    aspirin delayed-release tablet 81 mg  81 mg Oral DAILY    cyanocobalamin tablet 500 mcg  500 mcg Oral DAILY    digoxin (LANOXIN) tablet 0.125 mg  0.125 mg Oral DAILY    ferrous sulfate tablet 325 mg  1 Tab Oral ACL    HYDROcodone-acetaminophen (NORCO) 5-325 mg per tablet 1 Tab  1 Tab Oral Q4H PRN    LORazepam (ATIVAN) tablet 1 mg  1 mg Oral BID    methIMAzole (TAPAZOLE) tablet 7.5 mg  7.5 mg Oral Once per day on Mon Tue Wed Thu Fri    metoprolol tartrate (LOPRESSOR) tablet 50 mg  50 mg Oral BID    pantoprazole (PROTONIX) tablet 40 mg  40 mg Oral ACB    predniSONE (DELTASONE) tablet 5 mg  5 mg Oral DAILY WITH BREAKFAST    sodium chloride (NS) flush 5-40 mL  5-40 mL IntraVENous Q8H    sodium chloride (NS) flush 5-40 mL  5-40 mL IntraVENous PRN    naloxone (NARCAN) injection 0.4 mg  0.4 mg IntraVENous PRN    ondansetron (ZOFRAN) injection 4 mg  4 mg IntraVENous Q4H PRN    dextrose 40% (GLUTOSE) oral gel 1 Tube  15 g Oral PRN    glucagon (GLUCAGEN) injection 1 mg  1 mg IntraMUSCular PRN    dextrose (D50W) injection syrg 12.5-25 g  25-50 mL IntraVENous PRN       Objective:     Vitals:    08/28/19 0506 08/28/19 0714 08/28/19 0723 08/28/19 1112   BP:  139/78  139/85   Pulse:  65  89   Resp:  18  18   Temp:  97.5 °F (36.4 °C)  97.8 °F (36.6 °C)   SpO2:  97% 99% 92%   Weight: 49.1 kg (108 lb 4.8 oz)      Height:             Intake and Output:  Current Shift: 08/28 0701 - 08/28 1900  In: 480 [P.O.:480]  Out: -   Last three shifts: 08/26 1901 - 08/28 0700  In: 510 [P.O.:510]  Out: -     Physical Exam:   General:  Alert, cooperative, frail appearing. Talking more this AM and answering questions appropriately other than cannot tell me the date   Eyes:  Conjunctivae/corneas clear. Ears:  Normal TMs and external ear canals both ears. Nose: Nares normal. Septum midline. Mouth/Throat: Lips, mucosa, and tongue normal.    Neck:  no JVD. Back:   deferred   Lungs:   Clear to auscultation bilaterally. Heart:  Regular rate and rhythm, S1, S2 normal   Abdomen:   Soft, non-tender. Extremities: UE with good ROM.  LE has 3/5 strength to left and 2/5 strength to right    Pulses: 2+ and symmetric all extremities. Skin: Skin color, texture, turgor normal. No rashes or lesions   Lymph nodes: Cervical, supraclavicular, and axillary nodes normal.   Neurologic: No acute distress.  More alert today        Lab/Data Review:    Recent Results (from the past 24 hour(s))   GLUCOSE, POC    Collection Time: 08/27/19  4:18 PM   Result Value Ref Range    Glucose (POC) 198 (H) 65 - 100 mg/dL   PROTHROMBIN TIME + INR    Collection Time: 08/27/19  6:57 PM   Result Value Ref Range    Prothrombin time 20.9 (H) 11.7 - 14.5 sec    INR 1.8     GLUCOSE, POC    Collection Time: 08/27/19  8:32 PM   Result Value Ref Range    Glucose (POC) 279 (H) 65 - 100 mg/dL   MAGNESIUM    Collection Time: 08/28/19  6:58 AM   Result Value Ref Range    Magnesium 2.0 1.8 - 2.4 mg/dL   LIPID PANEL    Collection Time: 08/28/19  6:58 AM   Result Value Ref Range    LIPID PROFILE          Cholesterol, total 132 <200 MG/DL    Triglyceride 81 35 - 150 MG/DL    HDL Cholesterol 40 40 - 60 MG/DL    LDL, calculated 75.8 <100 MG/DL    VLDL, calculated 16.2 6.0 - 23.0 MG/DL    CHOL/HDL Ratio 3.3     GLUCOSE, POC    Collection Time: 08/28/19  7:19 AM   Result Value Ref Range    Glucose (POC) 209 (H) 65 - 100 mg/dL   GLUCOSE, POC    Collection Time: 08/28/19 11:12 AM   Result Value Ref Range    Glucose (POC) 154 (H) 65 - 100 mg/dL       Assessment/ Plan:     Principal Problem:    Functional quadriplegia ([address] (8/23/2019)    Active Problems:    Atrial fibrillation ([address] (3/15/2014)      Rheumatoid arthritis ([address] (3/15/2014)      Aortic stenosis (3/15/2014)      Debility (2/10/2016)      PAD (peripheral artery disease) ([address] (2/12/2016)      Overview: 2/17/16 (Dr Sarah Maciel) A/o, BLR Angiogram, L PT PTA (), L SFA PTA/Stent (6x       100 [place] PTX)       Hypoglycemia (8/23/2019)      Intermittent confusion (8/23/2019)      Witnessed seizure-like activity ([address] (8/23/2019)      Closed compression fracture of L2 vertebra ([address] (8/23/2019)      Elevated troponin (8/24/2019)    CVA - Neurology following. CTA head neck with results as discussed above. MRI brain showed small focus of ischemic infarction right anterior parietal region. MRI cervical spine showed no severe central stenoses or disc herniation or cord impingement. There are mild areas of canal narrowing due to ligamentum flavum thickening or laxity. On Eliquis that now has been changed to coumadin and lovenox bridge. Goal INR 2-3. Will add ASA. Cannot tolerate statin. A fib - Eliquis. Digoxin. HNT- BB    Hypomagnesemia - normal today     Seizure - Keppra 500mg BID. Anxiety - Ativan 1mg BID. DM type II - SS. lantus 5 units daily     Hyperthyroidism - methimazole     Patient on daily prednisone 5mg     Possible dc tomorrow to rehab.      DVT prophylaxis - Lovenox and Coumadin    Signed By: Sharee Peterson DO     August 28, 2019

## 2019-08-28 NOTE — CONSULTS
[address] [facility]  [phone]    History and Physical   Jimmy Anna    Admit date: 2019    MRN: [id number]     : 1940     Age: 69 y.o.          2019 10:06 AM    Subjective/HPI:   This patient is a 69 y.o. white female seen at the request of Luma Castelan DO and evaluated for carotid stenosis. Patient is known to our practice for PAD care, having undergone right 2nd MT head resection (2017, Ronit Alan MD) and left lower extremity arteriogram with PTA / stenting, last seen in the office 2018 by NP. Patient was admitted 2019 via the ED for AMS, functional quadriplegia, seizure work-up. Brain MRI noted right anterior parietal ischemic infarction; patient on chronic Eliquis for aFib. CTA head / neck with >75% stenosis of left ICA, 60% stenosis of distal right CCA. Today patient reports previously, \"I couldn't make my body do what I wanted,\" now with near-resolution of symptoms. Does not recall slurred speech, facial droop, amaurosis fugax. Denies previous CVA / TIA, mother with TIAs. Has never smoked. PMH with CAD s/p 4v CABG, aFib s/p cardioversion and PPM on Eliquis, TAVR , CHF, HTN, HL, DM2 requiring insulin, hypothyroidism, GERD with remote PUD, RA on prednisone, anxiety and others. Patient's past surgical, family and social histories were reviewed as noted here and below. Review of Systems  Pertinent items are noted in HPI, but ROS somewhat limited by patient factors.      Past Medical History:   Diagnosis Date    Acquired cyst of kidney     Anxiety     managed with medication     Aortic valve replaced     Atrial fibrillation, chronic (HCC)     managed with medication and pacemaker    CAD (coronary artery disease)     CABG x 5    Calculus of kidney     Carotid artery stenosis without cerebral infarction     Chronic pain     GENERALIZED FROM ARTHRITIS    Gangrene associated with diabetes mellitus ([facility] 5/26/2016    left great toe    GERD (gastroesophageal reflux disease)     managed with medication     Heart failure ([facility]     History of kidney stones     multiple with surgical interventions    Hypercholesterolemia     managed with medication     Hypertension     Hypothyroidism     managed with medication     Ill-defined condition     pt takes eliquis     Microscopic hematuria     Nausea & vomiting     after anesthesia    Osteoarthritis     managed with medication     Pacemaker     Metronic pacemaker only    PUD (peptic ulcer disease)     no recent episodes    PVD (peripheral vascular disease) ([facility]     left side x 1 stented    Rheumatoid arthritis(714.0)     managed with medication     Toe amputation status ([facility]     left great toe     Type 2 diabetes mellitus ([facility] Dx 2006    oral t and insulin/Avg / no s/s of low BS/ does not have a sensation / last A1C7.4    Vertigo     no treatment, happens occassionally      Past Surgical History:   Procedure Laterality Date    CABG, ARTERY-VEIN, FOUR  1993    states x 5    CARDIAC SURG PROCEDURE UNLIST      Cardiovert x 2-3 x last 2/4/2012    HX AMPUTATION Left 2016    great toe    HX AORTIC VALVE REPLACEMENT      HX APPENDECTOMY  1959    HX HEART CATHETERIZATION      HX HEENT      dental    HX HYSTERECTOMY  1988    HX LITHOTRIPSY      multiple    HX OPEN CHOLECYSTECTOMY      HX ORTHOPAEDIC Left     achilles tendon    HX ORTHOPAEDIC Left     \"toe surgery removing bones\"    HX PACEMAKER      HX PACEMAKER PLACEMENT      Metronic    HX UROLOGICAL Left 1980s    open L kidney removal stones    VASCULAR SURGERY PROCEDURE UNLIST Left 2-17-16    lower extremity arteriogram with stent x 1 placement    VASCULAR SURGERY PROCEDURE UNLIST Right 12/20/2017    2nd toe- resection metatarsal head      Allergies   Allergen Reactions    Multaq [Dronedarone] Nausea Only    Other Medication Other (comments)     STATINS CAUSE MUSCLE WEAKNESS  Cholesterol medications    Statins-Hmg-Coa Reductase Inhibitors Myalgia      Social History     Tobacco Use    Smoking status: Never Smoker    Smokeless tobacco: Never Used   Substance Use Topics    Alcohol use: No     Family History   Problem Relation Age of Onset    Heart Disease Father     Heart Attack Father     Diabetes Father     Hypertension Father     High Cholesterol Father     Asthma Father     Heart Disease Other     Heart Surgery Other     Diabetes Mother     Stroke Mother         TIAs    Hypertension Mother     Other Mother         kidney stone    Kidney Disease Mother     Heart Disease Mother     High Cholesterol Mother     Cancer Mother     Cancer Sister         multiple myeloma    Hypertension Sister     Hypertension Sister     Hypertension Brother     Diabetes Brother     Cancer Brother     Hypertension Sister     Heart Disease Sister     Hypertension Sister     Hypertension Sister       Prior to Admission Medications   Prescriptions Last Dose Informant Patient Reported? Taking? CALCIUM CARBONATE (CALCIUM 300 PO)   Yes Yes   Sig: Take 1 Tab by mouth daily. CYANOCOBALAMIN (VITAMIN B-12 PO)   Yes Yes   Sig: Take 250 mcg by mouth daily. DOCOSAHEXANOIC ACID/EPA (FISH OIL PO)   Yes Yes   Sig: Take 2 Tabs by mouth two (2) times a day. GLUC HCL/GLUC KENNY/AC-D-GLUCOS (GLUCOSAMINE COMPLEX PO)   Yes Yes   Sig: Take 1 Tab by mouth three (3) times daily. HYDROcodone-acetaminophen (NORCO) 5-325 mg per tablet   No Yes   Sig: Take 1 Tab by mouth every four (4) hours as needed for Pain. Max Daily Amount: 6 Tabs. OMEPRAZOLE (PRILOSEC PO)   Yes Yes   Sig: Take 20 mg by mouth daily. apixaban (ELIQUIS) 2.5 mg tablet   No Yes   Sig: Take 1 Tab by mouth two (2) times a day. aspirin delayed-release 81 mg tablet   Yes Yes   Sig: Take 81 mg by mouth daily. Morning      digoxin (LANOXIN) 0.125 mg tablet   No Yes   Sig: Take 1 Tab by mouth daily.    ergocalciferol (ERGOCALCIFEROL) 50,000 unit capsule   Yes Yes   Sig: Take 50,000 Units by mouth every month. ferrous sulfate 325 mg (65 mg iron) tablet   Yes Yes   Sig: Take 1 Tab by mouth Daily (before lunch). furosemide (LASIX) 40 mg tablet   No Yes   Sig: Take 1 Tab by mouth daily. Patient taking differently: Take 40 mg by mouth every other day. Indications: every other day   glipiZIDE (GLUCOTROL) 5 mg tablet   Yes Yes   Sig: Take 5 mg by mouth daily. insulin degludec (TRESIBA FLEXTOUCH U-100) 100 unit/mL (3 mL) inpn   Yes Yes   Si Units by SubCUTAneous route nightly. linagliptin (TRADJENTA) 5 mg tablet   Yes Yes   Sig: Take 5 mg by mouth daily. Indications: type 2 diabetes mellitus, morning   loperamide (IMODIUM) 2 mg capsule Not Taking at Unknown time  Yes No   Sig: Take 2 mg by mouth four (4) times daily as needed for Diarrhea.   lorazepam (ATIVAN) 1 mg tablet   Yes Yes   Sig: Take 1 mg by mouth two (2) times a day for Anxiety. methimazole (TAPAZOLE) 5 mg tablet   Yes Yes   Sig: Take 5 mg by mouth daily. Indications: overactive thyroid gland, morning   metoprolol (LOPRESSOR) 50 mg tablet   No Yes   Sig: Take 1 Tab by mouth two (2) times a day. Patient taking differently: Take 25 mg by mouth two (2) times a day. multivitamin (ONE A DAY) tablet   Yes Yes   Sig: Take 1 Tab by mouth daily. potassium chloride (K-DUR, KLOR-CON) 20 mEq tablet   No Yes   Sig: Take 1 Tab by mouth two (2) times a day. Patient taking differently: Take 20 mEq by mouth daily. predniSONE (DELTASONE) 5 mg tablet   No Yes   Sig: Take 1 Tab by mouth daily (with breakfast). traMADol (ULTRAM) 50 mg tablet   No Yes   Sig: Take 1 Tab by mouth every eight (8) hours as needed for Pain. Max Daily Amount: 150 mg. Patient taking differently: Take 50 mg by mouth daily.  Takes once a day at 2 AM      Facility-Administered Medications: None     Current Facility-Administered Medications   Medication Dose Route Frequency    insulin glargine (LANTUS) injection 5 Units  5 Units SubCUTAneous DAILY    warfarin (COUMADIN) tablet 5 mg  5 mg Oral QPM    enoxaparin (LOVENOX) injection 50 mg  1 mg/kg SubCUTAneous Q12H    levETIRAcetam (KEPPRA) tablet 500 mg  500 mg Oral Q12H    methIMAzole (TAPAZOLE) tablet 10 mg  10 mg Oral Once per day on Sun Sat    insulin lispro (HUMALOG) injection   SubCUTAneous TIDAC    aspirin delayed-release tablet 81 mg  81 mg Oral DAILY    cyanocobalamin tablet 500 mcg  500 mcg Oral DAILY    digoxin (LANOXIN) tablet 0.125 mg  0.125 mg Oral DAILY    ferrous sulfate tablet 325 mg  1 Tab Oral ACL    HYDROcodone-acetaminophen (NORCO) 5-325 mg per tablet 1 Tab  1 Tab Oral Q4H PRN    LORazepam (ATIVAN) tablet 1 mg  1 mg Oral BID    methIMAzole (TAPAZOLE) tablet 7.5 mg  7.5 mg Oral Once per day on Mon Tue Wed Thu Fri    metoprolol tartrate (LOPRESSOR) tablet 50 mg  50 mg Oral BID    pantoprazole (PROTONIX) tablet 40 mg  40 mg Oral ACB    predniSONE (DELTASONE) tablet 5 mg  5 mg Oral DAILY WITH BREAKFAST    sodium chloride (NS) flush 5-40 mL  5-40 mL IntraVENous Q8H    sodium chloride (NS) flush 5-40 mL  5-40 mL IntraVENous PRN    naloxone (NARCAN) injection 0.4 mg  0.4 mg IntraVENous PRN    ondansetron (ZOFRAN) injection 4 mg  4 mg IntraVENous Q4H PRN    dextrose 40% (GLUTOSE) oral gel 1 Tube  15 g Oral PRN    glucagon (GLUCAGEN) injection 1 mg  1 mg IntraMUSCular PRN    dextrose (D50W) injection syrg 12.5-25 g  25-50 mL IntraVENous PRN     Objective:     Vitals:    08/28/19 0504 08/28/19 0506 08/28/19 0714 08/28/19 0723   BP: 124/65  139/78    Pulse: 88  65    Resp: 18  18    Temp: 97.7 °F (36.5 °C)  97.5 °F (36.4 °C)    SpO2: 96%  97% 99%   Weight:  108 lb 4.8 oz (49.1 kg)     Height:           Physical Exam:   General thin, frail, feeding self  Skin  warm, moist with texture appropriate for age, numerous purpura on B UE  HEENT normocephalic, extraocular muscles intact, nares patent, mouth with adequate dentition, oral mucosa moist  Neck  supple without JVD or bruits; trachea midline  Lungs  respirations unlabored, lungs clear to auscultation bilaterally  Heart  regular rate and rhythm, systolic murmur  Abdomen soft, non-tender, non-distended, bowel sounds normoactive  Extremities warm without cyanosis but ecchymoses as noted above, mild bilateral pedal edema, L>R; feet sensorimotor intact  Pulses  2+ palpable radial, brachial  Neurological alert and mostly oriented; tongue midline, facial symmetry to cheek puff / brow furrow, shoulder shrug and  strength symmetric, speaks very slowly but thoughts are cogent     Data Review   Recent Labs     08/27/19 0628 08/26/19  0547   WBC 9.9 9.0   HGB 13.8 14.8   HCT 43.4 44.3   * 144*     Recent Labs     08/28/19  0658 08/27/19  1857 08/27/19 0628 08/26/19  0547   NA  --   --  140 137   K  --   --  4.4 4.3   CL  --   --  106 103   CO2  --   --  25 26   GLU  --   --  214* 266*   BUN  --   --  12 12   CREA  --   --  0.67 0.79   MG 2.0  --  1.6*  --    INR  --  1.8  --   --        Imaging  CT angiography was performed of the neck and head with contrast and  three-dimensional CT angiography reconstruction and reformat was performed. IMPRESSION:  Stenosis at the left internal carotid artery origin of greater than 75%. Stenosis of the distal segment of the right common carotid artery of 60%. Stenosis in the supraclinoid segment of both the right and left internal carotid  arteries. Bilateral pleural effusion.       Assessment / Plan / Recommendations / Medical Decision Making:     Hospital Problems  Date Reviewed: 1/24/2019          Codes Class Noted POA    Elevated troponin ICD-10-CM: R74.8  ICD-9-CM: 790.6  8/24/2019 Yes        Hypoglycemia ICD-10-CM: E16.2  ICD-9-CM: 251.2  8/23/2019 Yes        * (Principal) Functional quadriplegia (HCC) ICD-10-CM: R53.2  ICD-9-CM: 780.72  8/23/2019 Yes        Intermittent confusion ICD-10-CM: R41.0  ICD-9-CM: 298.9  8/23/2019 Yes Witnessed seizure-like activity ([facility] [address] ICD-10-CM: R56.9  ICD-9-CM: 780.39  8/23/2019 Yes        Closed compression fracture of L2 vertebra ([facility]) ICD-10-CM: C17.126K  ICD-9-CM: 805.4  8/23/2019 Yes        PAD (peripheral artery disease) ([facility]) ICD-10-CM: I73.9  ICD-9-CM: 443.9  2/12/2016 Yes    Overview Addendum 3/4/2016  1:47 PM by Sabrina Peng, NP     2/17/16 (Dr Sara Cabrera) A/o, BLR Angiogram, L PT PTA (), L SFA PTA/Stent (6x 100 [place] PTX)              Atrial fibrillation ([address] ICD-10-CM: I48.91  ICD-9-CM: 427.31  3/15/2014 Yes        Rheumatoid arthritis ([address] (Chronic) ICD-10-CM: M06.9  ICD-9-CM: 714.0  3/15/2014 Yes        Aortic stenosis (Chronic) ICD-10-CM: I35.0  ICD-9-CM: 424.1  3/15/2014 Yes              Patient Active Problem List    Diagnosis Date Noted    Elevated troponin 08/24/2019    Hypoglycemia 08/23/2019    Functional quadriplegia ([address] 08/23/2019    Intermittent confusion 08/23/2019    Witnessed seizure-like activity ([address] 08/23/2019    Closed compression fracture of L2 vertebra ([facility]) 16/75/8916    Systolic CHF, chronic ([address] 08/12/2019    Sepsis ([address] 02/15/2018    Atrial fibrillation with RVR ([address] 02/14/2018    Atrial fibrillation with rapid ventricular response ([address] 02/13/2018    Leukocytosis 02/13/2018    Diabetic ulcer of right midfoot associated with type 2 diabetes mellitus, with bone involvement without evidence of necrosis ([address] 12/20/2017    S/p TAVR (transcatheter aortic valve replacement), bioprosthetic 08/25/2016    Gangrene associated with diabetes mellitus ([address] 05/26/2016    PAD (peripheral artery disease) ([facility] 02/12/2016    Debility 02/10/2016    Underweight 02/09/2016    CHF (congestive heart failure) ([facility] 06/23/2014    Diabetes mellitus type 2, controlled ([facility] 03/20/2014    Hypomagnesemia 03/20/2014    Hyperglycemia 03/20/2014    Chronic steroid use 03/20/2014    Atrial fibrillation ([facility] 03/15/2014    Rheumatoid arthritis ([facility] 03/15/2014    Aortic stenosis 03/15/2014    Hx of CABG 04/29/2013    Dyslipidemia 04/29/2013    Essential hypertension, benign 04/29/2013    CAD (coronary artery disease)          Kingsley Paredes is a 69 y.o. female with recent right anterior parietal ischemic infarction and head/neck CTA identifying left ICA stenosis >75%. - anticoagulation per Cardiology / Neurology / Primary  - we recommend antiplatelet therapy if no contraindications  - rehab for recent events  - likely follow-up in our office in 3wks  Vascular surgeon Migdalia Villa MD will evaluate patient, review EMR and imaging and determine further plan. Thank you very much for this referral. We appreciate the opportunity to participate in this patient's care. We will follow along with above stated plan. Lalita Badillo PA-C  Physician Assistant with [facility] Vascular Surgery  [facility].  Lynda Rich MD / Sharon Johnson MD

## 2019-08-28 NOTE — PROGRESS NOTES
Neurology Daily Progress Note     Assessment:     29-year-old woman with weakness, intermittent confusion, and a seizure-like episode.       Small anterior parietal lobe stroke. History of afib, currently on Eliquis. SUZANNE in August was concerning for potential leaflet thrombosis. Coronary CT was ordered for further evaluation, but not yet completed.      Seizure like episode. EEG was abnormal with sharply contoured waveforms in the left temporal lobe. The patient has been started on Keppra. Plan:     · Continue Keppra   · Continue Lovenox to warfarin bridge per cardiology   · Neurochecks Q4H  · Lipid panel. The patient has documented statin intolerance   · Telemetry   · PT/OT/ST  · BP management - normotensive, with long-term goal <130/80  · Smoking cessation if applicable   · Diabetes education if applicable   · Depression Screening prior to discharge      Subjective: Interval history:    Patient is awake, alert, able to follow commands. Oriented to person and place. Cardiac CT demonstrates thrombus on valve. Plan for coumadin bridge per cardiology. CTA demonstrates >75% stenosis of left ICA and 60% stenosis of right common carotid. Stenosis of supraclinoid segment of bilateral ICAs. History:    Sarah Ching is a 69 y.o. female who is being seen for progressive weakness, confusion and seizure-like activity. Unable to obtain ROS due to AMS.        Objective:     Vitals:    08/28/19 0504 08/28/19 0506 08/28/19 0714 08/28/19 0723   BP: 124/65  139/78    Pulse: 88  65    Resp: 18  18    Temp: 97.7 °F (36.5 °C)  97.5 °F (36.4 °C)    SpO2: 96%  97% 99%   Weight:  108 lb 4.8 oz (49.1 kg)     Height:              Current Facility-Administered Medications:     insulin glargine (LANTUS) injection 5 Units, 5 Units, SubCUTAneous, DAILY, Peggy Mayberry DO, 5 Units at 08/28/19 0827    warfarin (COUMADIN) tablet 5 mg, 5 mg, Oral, QPM, Alec Snow MD, 5 mg at 08/27/19 2209    enoxaparin (LOVENOX) injection 50 mg, 1 mg/kg, SubCUTAneous, Q12H, Kell Snow MD, 50 mg at 08/28/19 2617    levETIRAcetam (KEPPRA) tablet 500 mg, 500 mg, Oral, Q12H, Sonja Javier NP, 500 mg at 08/28/19 0831    methIMAzole (TAPAZOLE) tablet 10 mg, 10 mg, Oral, Once per day on Sun Sat, Loral Good., MD, 10 mg at 08/25/19 1601    insulin lispro (HUMALOG) injection, , SubCUTAneous, Josefa Wesley MD, 2 Units at 08/28/19 6232    aspirin delayed-release tablet 81 mg, 81 mg, Oral, DAILY, Loral Good., MD, 81 mg at 08/28/19 0830    cyanocobalamin tablet 500 mcg, 500 mcg, Oral, DAILY, Loral Good., MD, 500 mcg at 08/28/19 6897    digoxin (LANOXIN) tablet 0.125 mg, 0.125 mg, Oral, DAILY, Loral Good., MD, 0.125 mg at 08/28/19 0827    ferrous sulfate tablet 325 mg, 1 Tab, Oral, ACL, Loral Good., MD, 325 mg at 08/27/19 1121    HYDROcodone-acetaminophen ([facility]) 5-325 mg per tablet 1 Tab, 1 Tab, Oral, Q4H PRN, Loral Good., MD, 1 Tab at 08/27/19 1121    LORazepam (ATIVAN) tablet 1 mg, 1 mg, Oral, BID, Loral Good., MD, 1 mg at 08/28/19 0831    methIMAzole (TAPAZOLE) tablet 7.5 mg, 7.5 mg, Oral, Once per day on Mon Tue Wed Thu Fri, Loral Good., MD, 7.5 mg at 08/27/19 0344    metoprolol tartrate (LOPRESSOR) tablet 50 mg, 50 mg, Oral, BID, Loral Good., MD, 50 mg at 08/28/19 0827    pantoprazole (PROTONIX) tablet 40 mg, 40 mg, Oral, ACB, Loral Good., MD, 40 mg at 08/28/19 0536    predniSONE (DELTASONE) tablet 5 mg, 5 mg, Oral, DAILY WITH BREAKFAST, Umair Lowery., MD, 5 mg at 08/28/19 0827    sodium chloride (NS) flush 5-40 mL, 5-40 mL, IntraVENous, Q8H, Loral Venessa, MD, 10 mL at 08/28/19 0534    sodium chloride (NS) flush 5-40 mL, 5-40 mL, IntraVENous, PRN, Loral Sebastián., MD    [facility]) injection 0.4 mg, 0.4 mg, IntraVENous, PRN, Loral Sebastián., MD    ondansetron [facility]) injection 4 mg, 4 mg, IntraVENous, Q4H PRN, Loral Sebastián., MD Shelley Bright dextrose 40% (GLUTOSE) oral gel 1 Tube, 15 g, Oral, PRN, Olivier Bean MD    glucagon [facility]) injection 1 mg, 1 mg, IntraMUSCular, PRN, Olivier Bean MD    dextrose (D50W) injection syrg 12.5-25 g, 25-50 mL, IntraVENous, PRN, Olivier Bean MD, 25 g at 08/24/19 0532    Recent Results (from the past 12 hour(s))   MAGNESIUM    Collection Time: 08/28/19  6:58 AM   Result Value Ref Range    Magnesium 2.0 1.8 - 2.4 mg/dL   GLUCOSE, POC    Collection Time: 08/28/19  7:19 AM   Result Value Ref Range    Glucose (POC) 209 (H) 65 - 100 mg/dL         Physical Exam:  General -cachectic appearing. Pleasant and conversant. HEENT - Normocephalic, atraumatic. Conjunctiva, tympanic membranes, and oropharynx are clear. Neck - Supple without masses, no bruits   Extremities - Peripheral pulses intact. No edema and no rashes. Toe amputation.     Neurological examination -Comprehension is intact to one-step commands but she struggles to follow more complex commands. Attention is normal. Oriented to person and place. Language and speech are normal. On cranial nerve examination pupils are equal round and reactive to light. Visual acuity is adequate. Visual fields are full to finger confrontation. Extraocular motility is normal. Face is symmetric and sensation is intact to light touch. Hearing is intact to finger rustle bilaterally. Motor examination -there is decreased muscle bulk. Tone is slightly increased in the bilateral lower limbs. Power is 5/5 in BUE, RLE, 4/5 in LLE. Muscle stretch reflexes are 2+ throughout.      Signed By: Charlene Anderson NP     August 28, 2019

## 2019-08-28 NOTE — PROGRESS NOTES
Spoke with daughter and discussed snf referrals. Family would like referral sent to [facility]. Referral sent. Awaiting response.

## 2019-08-28 NOTE — PROGRESS NOTES
Interdisciplinary Rounds completed 08/28/19. Nursing, Case Management, Physician and PT present. Plan of care reviewed and updated.     Discharge to [place] in am.

## 2019-08-29 PROBLEM — Z86.73 HISTORY OF CVA (CEREBROVASCULAR ACCIDENT): Status: ACTIVE | Noted: 2019-01-01

## 2019-08-29 NOTE — PROGRESS NOTES
Problem: Falls - Risk of  Goal: *Absence of Falls  Description  Document Candida Lightning Fall Risk and appropriate interventions in the flowsheet. Outcome: Progressing Towards Goal  Note:   Fall Risk Interventions:  Mobility Interventions: Communicate number of staff needed for ambulation/transfer, Patient to call before getting OOB    Mentation Interventions: Door open when patient unattended, More frequent rounding, Room close to nurse's station    Medication Interventions: Evaluate medications/consider consulting pharmacy, Patient to call before getting OOB    Elimination Interventions: Call light in reach, Patient to call for help with toileting needs, Toileting schedule/hourly rounds    History of Falls Interventions: Evaluate medications/consider consulting pharmacy, Investigate reason for fall, Room close to nurse's station         Problem: Pressure Injury - Risk of  Goal: *Prevention of pressure injury  Description  Document Domenic Scale and appropriate interventions in the flowsheet. Outcome: Progressing Towards Goal  Note:   Pressure Injury Interventions:  Sensory Interventions: Assess changes in LOC, Check visual cues for pain, Keep linens dry and wrinkle-free    Moisture Interventions: Absorbent underpads, Check for incontinence Q2 hours and as needed, Limit adult briefs    Activity Interventions: Increase time out of bed, Chair cushion    Mobility Interventions: Chair cushion, HOB 30 degrees or less    Nutrition Interventions: Document food/fluid/supplement intake    Friction and Shear Interventions: Apply protective barrier, creams and emollients, HOB 30 degrees or less, Lift sheet, Minimize layers                Problem: Diabetes Self-Management  Goal: *Disease process and treatment process  Description  Define diabetes and identify own type of diabetes; list 3 options for treating diabetes.   Outcome: Progressing Towards Goal  Goal: *Incorporating nutritional management into lifestyle  Description  Describe effect of type, amount and timing of food on blood glucose; list 3 methods for planning meals. Outcome: Progressing Towards Goal  Goal: *Developing strategies to promote health/change behavior  Description  Define the ABC's of diabetes; identify appropriate screenings, schedule and personal plan for screenings. Outcome: Progressing Towards Goal  Goal: *Using medications safely  Description  State effect of diabetes medications on diabetes; name diabetes medication taking, action and side effects. Outcome: Progressing Towards Goal  Goal: *Monitoring blood glucose, interpreting and using results  Description  Identify recommended blood glucose targets  and personal targets. Outcome: Progressing Towards Goal  Goal: *Prevention, detection, treatment of acute complications  Description  List symptoms of hyper- and hypoglycemia; describe how to treat low blood sugar and actions for lowering  high blood glucose level. Outcome: Progressing Towards Goal  Goal: *Prevention, detection and treatment of chronic complications  Description  Define the natural course of diabetes and describe the relationship of blood glucose levels to long term complications of diabetes.   Outcome: Progressing Towards Goal  Goal: *Developing strategies to address psychosocial issues  Description  Describe feelings about living with diabetes; identify support needed and support network  Outcome: Progressing Towards Goal  Goal: *Sick day guidelines  Outcome: Progressing Towards Goal  Goal: *Patient Specific Goal (EDIT GOAL, INSERT TEXT)  Outcome: Progressing Towards Goal

## 2019-08-29 NOTE — PROGRESS NOTES
Problem: Self Care Deficits Care Plan (Adult)  Goal: *Acute Goals and Plan of Care (Insert Text)  Description  1. Patient will complete upper body bathing and dressing with set-up and adaptive equipment as needed. 2. Patient will complete toileting with moderate assistance to demonstrate improved independence with self-care  3. Patient will tolerate 25 minutes of OT treatment with 2-3 rest breaks to increase activity tolerance for ADLs. 4. Patient will complete functional transfers with minimal assistance and adaptive equipment as needed. 5. Patient will tolerate standing for hygiene with minimal assistance to improve standing tolerance for ADL tasks. 6. Patient will complete functional mobility for household distances with supervision and appropriate safety. Timeframe: 7 visits      Outcome: Progressing Towards Goal       OCCUPATIONAL THERAPY: Daily Note and AM 8/29/2019  INPATIENT: OT Visit Days: 2  [place] / [place] / Product Type: Medicare /      NAME/AGE/GENDER: Tahmina Sevilla is a 69 y.o. female   PRIMARY DIAGNOSIS:  Hypoglycemia [E16.2]  Hypoglycemia [E16.2]  Intermittent confusion [R41.0]  Functional quadriplegia (HCC) [R53.2] History of CVA (cerebrovascular accident)   History of CVA (cerebrovascular accident)         ICD-10: Treatment Diagnosis:    · Generalized Muscle Weakness (M62.81)  · Other lack of cordination (R27.8)  · Repeated Falls (R29.6)  · Low Back Pain (M54.5)  · Abnormal posture (R29.3)   Precautions/Allergies:     Multaq [dronedarone]; Other medication; and Statins-hmg-coa reductase inhibitors      ASSESSMENT:     Ms. Ramos Crowell presents to the hospital with hypoglycemia, confusion, and functional quadriplegia. 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  Pt was supine in the bed upon arrival. Pt lethargic and drowsy this am. Pt is oriented to person and place but disoriented to time stating its \"January\". Pt c/o pain but not able to provide any details of her pain. Pt lives alone but has caregivers come to assist with bathing/dressing and family that stays with her at night. Pt uses a rollator for functional mobility and has had multiple falls at home. Pt typically can complete her own toileting but has demonstrated a decline in this over the past few weeks. Pt does a sponge bath vs getting into a shower. Pt educated this am on log roll to keep spine in alignment. Pt demonstrated fair balance and rounded posture once sitting edge of bed. Pt has RA and deformities to the hands. Pt demonstrates generalized weakness in B UEs. Pt reports chronic numbness in the hands. Pt unable to complete visual tracking despite multiple cues but was able to read the clock. Pt did have some decreased visual attention at times to the L side. Pt completed combing hair with minimal assistance to work on self-care tasks and to attempt to improve alertness with activity. Pt was supine in bed upon arrival. Pt completed bed mobility with mod A. Pt completed grooming with supervision while sitting EOB. Pt had good sitting balance. Good progress made. Continue POC. This section established at most recent assessment   PROBLEM LIST (Impairments causing functional limitations):  1. Decreased Strength  2. Decreased ADL/Functional Activities  3. Decreased Transfer Abilities  4. Decreased Ambulation Ability/Technique  5. Decreased Balance  6. Increased Pain  7. Decreased Activity Tolerance  8. Increased Fatigue  9. Increased Shortness of Breath  10. Decreased Flexibility/Joint Mobility  11. Decreased Knowledge of Precautions  12. Decreased Skin Integrity/Hygeine  13. Decreased [place] with Home Exercise Program  14. Decreased Cognition   INTERVENTIONS PLANNED: (Benefits and precautions of occupational therapy have been discussed with the patient.)  1. Activities of daily living training  2.  Adaptive equipment training  3. Balance training  4. Clothing management  5. Cognitive training  6. Donning&doffing training  7. Neuromuscular re-eduation  8. Therapeutic activity  9. Therapeutic exercise     TREATMENT PLAN: Frequency/Duration: Follow patient 3 times per week to address above goals. Rehabilitation Potential For Stated Goals: Good     REHAB RECOMMENDATIONS (at time of discharge pending progress):    Placement: It is my opinion, based on this patient's performance to date, that Ms. Emil Jiang may benefit from intensive therapy at a [address] [place] [address] after discharge due to the functional deficits listed above that are likely to improve with skilled rehabilitation and concerns that he/she may be unsafe to be unsupervised at home due to risk for future falls and further functional decline . Equipment:    [email or web address]               OCCUPATIONAL PROFILE AND HISTORY:   History of Present Injury/Illness (Reason for Referral):  See H&P  Past Medical History/Comorbidities:   Ms. Emil Jiang  has a past medical history of Acquired cyst of kidney, Anxiety, Aortic valve replaced, Atrial fibrillation, chronic ([address] CAD (coronary artery disease), Calculus of kidney, Carotid artery stenosis without cerebral infarction, Chronic pain, Gangrene associated with diabetes mellitus ([address] (5/26/2016), GERD (gastroesophageal reflux disease), Heart failure ([address] History of kidney stones, Hypercholesterolemia, Hypertension, Hypothyroidism, Ill-defined condition, Microscopic hematuria, Nausea & vomiting, Osteoarthritis, Pacemaker, PUD (peptic ulcer disease), PVD (peripheral vascular disease) ([address] Rheumatoid arthritis(714.0), Toe amputation status ([address] Type 2 diabetes mellitus ([address] (Dx 2006), and Vertigo. She also has no past medical history of Adverse effect of anesthesia, Difficult intubation, Malignant hyperthermia due to anesthesia, or Pseudocholinesterase deficiency.   Ms. Emli Jiang  has a past surgical history that includes hx hysterectomy (1988); hx appendectomy (1959); hx pacemaker placement; hx open cholecystectomy; hx urological (Left, 1980s); hx lithotripsy; hx pacemaker; hx orthopaedic (Left); hx orthopaedic (Left); hx amputation (Left, 2016); hx heent; pr cabg, artery-vein, four (1993); pr cardiac surg procedure unlist; hx heart catheterization; vascular surgery procedure unlist (Left, 2-17-16); hx aortic valve replacement; and vascular surgery procedure unlist (Right, 12/20/2017). Social History/Living Environment:   Home Environment: Private residence  # Steps to Enter: 1  One/Two Story Residence: [facility] level  # of Interior Steps: 1  Living Alone: Yes  Support Systems: Child(freddy), Family member(s)  Patient Expects to be Discharged to[de-identified] Unknown  Current DME Used/Available at Home: Walker, rollator  Prior Level of Function/Work/Activity:  Pt lives alone but has caregivers come to assist with bathing/dressing and family that stays with her at night. Pt uses a rollator for functional mobility and has had multiple falls at home. Pt typically can complete her own toileting but has demonstrated a decline in this over the past few weeks. Pt does a sponge bath vs getting into a shower. Personal Factors:          Social Background:  Lives alone w/ caregivers/family coming into the home to assist        Past/Current Experience:  hx of falls and functional decline        Overall Behavior:  lethargic         Other factors that influence how disability is experienced by the patient:  multiple co-morbidities     Number of Personal Factors/Comorbidities that affect the Plan of Care: Extensive review of physical, cognitive, and psychosocial performance (3+):  HIGH COMPLEXITY   ASSESSMENT OF OCCUPATIONAL PERFORMANCE[de-identified]   Activities of Daily Living:   Basic ADLs (From Assessment) Complex ADLs (From Assessment)   Feeding: Stand-by assistance  Oral Facial Hygiene/Grooming: Minimum assistance  Bathing: Moderate assistance  Upper Body Dressing:  Moderate assistance  Lower Body Dressing: Maximum assistance  Toileting: Maximum assistance Instrumental ADL  Meal Preparation: Total assistance  Homemaking: Total assistance   Grooming/Bathing/Dressing Activities of Daily Living   Grooming  Washing Face: Supervision  Brushing/Combing Hair: Supervision Cognitive Retraining  Safety/Judgement: Fall prevention                       Bed/Mat Mobility  Supine to Sit: Moderate assistance  Sit to Supine: Moderate assistance     Most Recent Physical Functioning:   Gross Assessment:                  Posture:  Posture (WDL): Exceptions to WDL  Posture Assessment: Forward head, Rounded shoulders  Balance:  Sitting: Intact  Sitting - Static: Good (unsupported) Bed Mobility:  Supine to Sit: Moderate assistance  Sit to Supine: Moderate assistance  Wheelchair Mobility:     Transfers:               Patient Vitals for the past 6 hrs:   BP BP Patient Position SpO2 O2 Flow Rate (L/min) Pulse   19 0421 113/70 At rest 98 %  89   19 0659 120/65 At rest 96 %  60   19 0743    0 l/min        Mental Status  Neurologic State: Alert  Orientation Level: Oriented to person  Cognition: Follows commands  Perception: Verbal, Tactile  Perseveration: Tactile cues provided, Verbal cues provided  Safety/Judgement: Fall prevention                          Physical Skills Involved:  1. Range of Motion  2. Balance  3. Strength  4. Activity Tolerance  5. Sensation  6. Gross Motor Control  7. Vision  8. Pain (acute)  9. Skin Integrity Cognitive Skills Affected (resulting in the inability to perform in a timely and safe manner):  1. Executive Function  2. Sustained Attention  3. Divided Attention Psychosocial Skills Affected:  1. Habits/Routines  2.  Self-Awareness   Number of elements that affect the Plan of Care: 5+:  HIGH COMPLEXITY   CLINICAL DECISION MAKIN [facility] [address] [id number] AM-PAC 6 Clicks   Daily Activity Inpatient Short Form  How much help from another person does the patient currently need. .. Total A Lot A Little None   1. Putting on and taking off regular lower body clothing? ? 1   ? 2   ? 3   ? 4   2. Bathing (including washing, rinsing, drying)? ? 1   ? 2   ? 3   ? 4   3. Toileting, which includes using toilet, bedpan or urinal?   ? 1   ? 2   ? 3   ? 4   4. Putting on and taking off regular upper body clothing? ? 1   ? 2   ? 3   ? 4   5. Taking care of personal grooming such as brushing teeth? ? 1   ? 2   ? 3   ? 4   6. Eating meals? ? 1   ? 2   ? 3   ? 4   © 2007, Trustees of [address] [place] [address] [address] [id number], under license to [employer]. All rights reserved      Score:  Initial: 14 Most Recent: X (Date: -- )    Interpretation of Tool:  Represents activities that are increasingly more difficult (i.e. Bed mobility, Transfers, Gait). Medical Necessity:     · Patient demonstrates good  ·  rehab potential due to higher previous functional level. Reason for Services/Other Comments:  · Patient continues to require skilled intervention due to decreased independence and functional transfers with ADL   · . Use of outcome tool(s) and clinical judgement create a POC that gives a: MODERATE COMPLEXITY         TREATMENT:   (In addition to Assessment/Re-Assessment sessions the following treatments were rendered)     Pre-treatment Symptoms/Complaints:    Pain: Initial:   Pain Intensity 1: 0  Post Session:  same     Therapeutic Activity: (    29): Therapeutic activities including bed mobility to improve mobility, strength and balance. Required mod A verbal and tactile cues to promote motor control of bilateral, upper extremity(s), lower extremity(s). Braces/Orthotics/Lines/Etc:   · O2 Device: Nasal cannula  Treatment/Session Assessment:    · Response to Treatment:  Pt tolerated with lethargy.   · Interdisciplinary Collaboration:   o Certified Occupational Therapy Assistant  o Registered Nurse  · After treatment position/precautions:   o Supine in bed  o Bed alarm/tab alert on  o Bed/Chair-wheels locked  o Bed in low position  o Call light within reach  o RN notified  o Side rails x 3   · Compliance with Program/Exercises: Will assess as treatment progresses. · Recommendations/Intent for next treatment session: \"Next visit will focus on advancements to more challenging activities and reduction in assistance provided\".   Total Treatment Duration:  OT Patient Time In/Time Out  Time In: 8623  Time Out: [address] [facility], [place]

## 2019-08-29 NOTE — PROGRESS NOTES
Warfarin dosing per pharmacist    Sandrita Perdomo is a 69 y.o. female. Height: 5' 3\" (160 cm)    Weight: 49.5 kg (109 lb 1.6 oz)    Indication:  Possible leaflet thrombus, CVA while on apixaban    Goal INR:  2.5 to 3.5 per cardiology    Home dose:  New start    Risk factors or significant drug interactions:  N/A    Other anticoagulants:  Enoxaparin 50 mg SubQ Q12H bridge therapy (stopped 8/29/19)    Daily Monitoring  Date  INR     Warfarin dose HGB              Notes  8/27  1.8  5 mg  13.8    8/28  ---  5 mg  ---  8/29  2.5  4 mg  ---  Enoxaparin stopped      Pharmacy consulted to assist dosing warfarin in patient with possible leaflet thrombus and CVA while on apixaban therapy. Cardiology and Neurology together agreed to switch to warfarin and bridge with enoxaparin. INR at baseline was elevated at 1.8, likely due to residual effects from apixaban (last dose was 5 mg on 8/27/19 at 0938). Warfarin 5 mg daily initiated on 8/27/19. INR today resulted at lower end of goal (2.5 to 3.5). Per discussion with cardiology and hospitalist, enoxaparin bridge therapy stopped today. Given patient age, weight, and other comorbitidies, I suspect she will ultimately require a lower than average warfarin dose to remain therapeutic. Based on trend in INR since starting therapy and the above information, I will decrease warfarin dose this evening to 4 mg. Continue daily INR for now.     Thank you,  Chandrika Khan, PharmD  Clinical Pharmacist  [phone]

## 2019-08-29 NOTE — PROGRESS NOTES
[facility] CARDIOLOGY PROGRESS NOTE    8/29/2019 7:54 AM    Admit Date: 8/23/2019    Admit Diagnosis: Hypoglycemia [E16.2]; Hypoglycemia [E16.2]; Intermittent confusion [R41.0]; Functional quadriplegia (HCC) [R53.2]      Subjective:   Stable overnight without angina, CHF, or palpitations. Still weak but overall feeling a little better today per her report. HR and BP controlled. No other complaints overnight. Tolerating meds well. INR 2.5 today, stopping lovenox bridge. Objective:      Vitals:    08/28/19 1940 08/28/19 2349 08/29/19 0421 08/29/19 0659   BP: 127/59 106/65 113/70 120/65   Pulse: 90 86 89 60   Resp: 18 16 16 16   Temp: 97.7 °F (36.5 °C) 97.7 °F (36.5 °C) 97.7 °F (36.5 °C) 97.8 °F (36.6 °C)   SpO2: 95% 98% 98% 96%   Weight:   49.5 kg (109 lb 1.6 oz)    Height:           Physical Exam:  Neck- supple, no JVD at 60 deg  CV- irregular rate and rhythm, 2/6 FEI RUSB  Lung- clear bilaterally apically , decreased bibasilar  Abd- soft, nontender, nondistended  Ext- no edema, boots bilaterally  Skin- warm and dry    Data Review:   Recent Labs     08/29/19  0603 08/28/19  0658 08/27/19  1857 08/27/19  0628     --   --  140   K 4.4  --   --  4.4   MG  --  2.0  --  1.6*   BUN 13  --   --  12   CREA 0.68  --   --  0.67   *  --   --  214*   WBC  --   --   --  9.9   HGB  --   --   --  13.8   HCT  --   --   --  43.4   PLT  --   --   --  110*   INR 2.5  --  1.8  --    CHOL  --  132  --   --    TRIGL  --  81  --   --    HDL  --  40  --   --        Assessment/Plan:      Principal Problem:    Functional quadriplegia -- defer management to primary team       Active Problems:    Atrial fibrillation -- rate controlled, cont digoxin and BB, now on Coumadin with Lovenox bridge -- PharmD managing- INR 2.5 today, stop lovenox and recheck INR in AM.   INR rising fairly rapidly, may need to taper dose but defer to pharmacy for dosing.   Target INR 2.5-3.5 given CVA with possible TAVR associated clot       Rheumatoid arthritis -- defer management to primary team.         Aortic stenosis -- prior TAVR with prosthetic valve leaflets with thrombus and thickened, restenotic. She is not a candidate for redo TAVR. Cardiac CT with leaflet thrombus despite compliance with eliquis. Stopped this and converted to coumadin, INR 2.5 today. See above. Recheck CT or echo in several weeks for hopeful resolution of thrombus.        PAD (peripheral artery disease) -- stable, no symptoms presently       Intermittent confusion -- defer management to primary team/Neuro         Witnessed seizure-like activity -- defer management to primary team/Neuro, on Keppra          Elevated troponin -- suspect supply/demand imbalance in setting of known CAD. No intervention planned as no angina and very poor invasive candidate. ... Cont medical therapy.        CVA -- small anterior parietal lobe stroke -- was on Eliquis at the time, now changed to Coumadin with Lovenox bridge, f/u INRs -- defer post-CVA care to Hospitalist/Neuro       Debility -- planning to go to rehab, very debilitated and weak presently         BMP, CBC, MG IN AM    RECHECK INR, COUMADIN DOSING PER PHARMACY        FLORENCIA Khan MD  [facility] Cardiology  Pager [phone]

## 2019-08-29 NOTE — PROGRESS NOTES
Problem: Mobility Impaired (Adult and Pediatric)  Goal: *Acute Goals and Plan of Care  Description  STG:  (1.)Ms. Ninoska Andrade will move from supine to sit and sit to supine , scoot up and down and roll side to side with MODERATE ASSISTx1 within 3 treatment day(s). (2.)Ms. Ninoska Andrade will transfer from bed to chair and chair to bed with MODERATE ASSISTx1 using the least restrictive device within 3 treatment day(s). (3.)Ms. Ninoska Andrade will ambulate with MINIMAL ASSIST for 20 feet with the least restrictive device within 3 treatment day(s). LTG:  (1.)Ms. Ninoska Andrade  will move from supine to sit and sit to supine , scoot up and down and roll side to side with MINIMAL ASSIST within 7 treatment day(s). (2.)Ms. Ninoska Andrade will transfer from bed to chair and chair to bed with MINIMAL ASSIST using the least restrictive device within 7 treatment day(s). (3.)Ms. Ninoska Andrade will ambulate with MINIMAL ASSIST for 50+ feet with the least restrictive device within 7 treatment day(s). (4.)Ms. Ninoska Andrade will perform standing static and dynamic balance activities x 15 minutes with MINIMAL ASSIST to improve safety within 7 treatment day(s). (5.)Ms. Ninoska Andrade will ascend and descend 1 stairs using no hand rail(s) with MINIMAL ASSIST to improve functional mobility and safety within 7 treatment day(s). (6.)Ms. Ninoska Andrade will perform bilateral lower extremity exercises x 15 min for HEP with SUPERVISION to improve strength, endurance, and functional mobility within 7 treatment day(s).    ________________________________________________________________________________________________    PHYSICAL THERAPY: Daily Note and PM 8/29/2019  INPATIENT: PT Visit Days : 2  [place] / [place] / Product Type: Medicare /       NAME/AGE/GENDER: Terri Gaitan is a 69 y.o. female   PRIMARY DIAGNOSIS: Hypoglycemia [E16.2]  Hypoglycemia [E16.2]  Intermittent confusion [R41.0]  Functional quadriplegia (HCC) [R53.2] History of CVA (cerebrovascular accident)   History of CVA (cerebrovascular accident)         ICD-10: Treatment Diagnosis:   · Generalized Muscle Weakness (M62.81)  · Other lack of cordination (R27.8)  · Difficulty in walking, Not elsewhere classified (R26.2)  · Other abnormalities of gait and mobility (R26.89)  · History of falling (Z91.81)  · Low Back Pain (M54.5)   Precaution/Allergies:  Multaq [dronedarone]; Other medication; and Statins-hmg-coa reductase inhibitors      ASSESSMENT:     Ms. Ashok Turner presents resting in bed, agreeable to PT. Daughter in room states pt is more confused and lethargic today compared to previously. Pt's brief noted to be wet; rolling side to side several trials Min A, pt with good participation utilizing LE and UE to roll with PT cuing/instruction. Once clean, Pt able to perform supine > sit Mod A with increased time. Scooting towards edge of bed Mod A. Pt sitting EOB with good sitting balance control; BUE support on edge of bed and at bed rail. Sit <> stand Min A two trials; max cues for upright posture and preventing hip and knee flexion. Increased time and instruction required for all mobility due to decreased mobility, cognition and activity tolerance. Pt then returned sit > supine with Mod A. Pt made good progress requiring less assistance for bed mobility and transfers with improved sitting balance and activity tolerance as well. She is progressing well towards stated goals. . At end of session pt resting in bed with all needs within reach, alarm activated for safety, family in room, RN notified. Pt presents as functioning below her baseline, with deficits in mobility including transfers, gait, balance, and activity tolerance. Pt will benefit from skilled therapy services to address stated deficits to promote return to highest level of function, independence, and safety. Will continue to follow.     From Eval: Pt is a 69year old F who presented to hospital on 8/23/19 with low blood sugar, AMS, weakness. She has had a very recent, rapid decline in function over past couple weeks per family report. Pt found to have acute L1-L2 compression fractures; pt not on bed rest orders, IR consult for possible kyphoplasty is still pending. MRI of cervical spine unremarkable. EEG found abnormalities, pt started on Keppra. MRI of brain indicated small focus ischemic infarct R anterior parietal region. Prior to hospital admission pt lives alone, but with family & friends staying with her for assistance for the past year in a one story home with one step(s) to enter. Pt poor historian, daughter and granddaughter in room providing information, they endorse at least 3-4 falls in past 6 months. Prior to admission Ms. Arlyn Mcnamara uses a rollator for [employer] she was able to use until recently (past couple weeks) when she experienced a sharp functional decline and increase in difficulty walking. This section established at most recent assessment   PROBLEM LIST (Impairments causing functional limitations):  1. Decreased Strength  2. Decreased Transfer Abilities  3. Decreased Ambulation Ability/Technique  4. Decreased Balance  5. Increased Pain  6. Decreased Activity Tolerance  7. Increased Fatigue  8. Increased Shortness of Breath   INTERVENTIONS PLANNED: (Benefits and precautions of physical therapy have been discussed with the patient.)  1. Balance Exercise  2. Bed Mobility  3. Family Education  4. Gait Training  5. Home Exercise Program (HEP)  6. Neuromuscular Re-education/Strengthening  7. Range of Motion (ROM)  8. Therapeutic Activites  9. Therapeutic Exercise/Strengthening  10. Transfer Training     TREATMENT PLAN: Frequency/Duration: 3 times a week for duration of hospital stay  Rehabilitation Potential For Stated Goals: Good     REHAB RECOMMENDATIONS (at time of discharge pending progress):    Placement: It is my opinion, based on this patient's performance to date, that Ms. Arlyn Mcnamara may benefit from intensive therapy at [facility] after discharge due to the functional deficits listed above that are likely to improve with skilled rehabilitation and concerns that he/she may be unsafe to be unsupervised at home due to medical complications and mobility deficits which put her at increase risk of falling and/or functional decline . Equipment:    None at this time              HISTORY:   History of Present Injury/Illness (Reason for Referral):  Per H&P: \"Patient is a 68 yo F who presents to the ED via EMS for a blood sugar of 48 and altered mental status at home. Her son noted her blood sugar to be low this morning and called EMS as she was confused. She is presently accompanied by her daughter, Ana Austin, who provides most of the history. History of CAD s/p remote CABG, TAVR in 2016 now with severe aortic valve stenosis undergoing evaluation by cardiology, a fib on Eliquis, diabetes on insulin, RA on prednisone. Ms. Tiffanie Morales is oriented to self, location, and year, but is generally a poor historian. She does admit to having low back pain and some shortness of breath at home. Ana Austin reports patient has had problems with walking, difficulty with speaking (will yell 'help' and cannot find other words to say), and has not been eating for the last 3 weeks. Symptoms started after she feel while trying to get off the toilet at the end of July. Prior to 3 weeks ago, she was able to ambulate through her house with a rollator walker. Ana Austin states that on 8/23, she had an episode while eating breakfast where she lost consciousness and had jerking of her shoulders and arms while at the table. Event was witnessed by her son. She was brought to the ER for this. Ana Austin is very concerned as she has had a rapid decline, and they are currently unable to care for her as she has essentially become non-ambulatory.   She states patient has not complained of chest pain specifically, but she will grab at her chest occasionally. Of note, she has had recent TTE and SUZANNE showing EF of 40-45% and severe AS. She had CTA chest/abd/pelvis ordered by cardiology on 8/21 showing diff atherosclerotic disease in carotid, abdominal, and renal arteries. Also, showed acute L1 and L2 compression fracture with recommendation for possible IR evaluation. \"  MRI of cervical spine unremarkable, MRI brain indicated small focus ischemic infarct R anterior parietal region. IR consult for possible kyphoplasty is still pending this date. Past Medical History/Comorbidities:   Ms. Vincent Ortiz  has a past medical history of Acquired cyst of kidney, Anxiety, Aortic valve replaced, Atrial fibrillation, chronic ([address] CAD (coronary artery disease), Calculus of kidney, Carotid artery stenosis without cerebral infarction, Chronic pain, Gangrene associated with diabetes mellitus ([address] (5/26/2016), GERD (gastroesophageal reflux disease), Heart failure ([address] History of kidney stones, Hypercholesterolemia, Hypertension, Hypothyroidism, Ill-defined condition, Microscopic hematuria, Nausea & vomiting, Osteoarthritis, Pacemaker, PUD (peptic ulcer disease), PVD (peripheral vascular disease) ([address] Rheumatoid arthritis(714.0), Toe amputation status ([address] Type 2 diabetes mellitus ([address] (Dx 2006), and Vertigo. She also has no past medical history of Adverse effect of anesthesia, Difficult intubation, Malignant hyperthermia due to anesthesia, or Pseudocholinesterase deficiency.   Ms. Vincent Ortiz  has a past surgical history that includes hx hysterectomy (1988); hx appendectomy (1959); hx pacemaker placement; hx open cholecystectomy; hx urological (Left, 1980s); hx lithotripsy; hx pacemaker; hx orthopaedic (Left); hx orthopaedic (Left); hx amputation (Left, 2016); hx heent; pr cabg, artery-vein, four (1993); pr cardiac surg procedure unlist; hx heart catheterization; vascular surgery procedure unlist (Left, 2-17-16); hx aortic valve replacement; and vascular surgery procedure live (Right, 12/20/2017). Social History/Living Environment:   Home Environment: Private residence  # Steps to Enter: 1  One/Two Story Residence: [facility] level  # of Interior Steps: 1  Living Alone: Yes  Support Systems: Child(freddy), Family member(s)  Patient Expects to be Discharged to[de-identified] Unknown  Current DME Used/Available at Home: Walker, rollator  Prior Level of Function/Work/Activity:  Prior to hospital admission pt lives alone, but with family & friends staying with her for assistance for the past year in a one story home with one step(s) to enter. Pt poor historian, daughter and granddaughter in room providing information, they endorse at least 3-4 falls in past 6 months. Prior to admission Ms. Misael Juarez uses a rollator for [employer] she was able to use until recently (past couple weeks) when she experienced a sharp functional decline and increase in difficulty walking. Number of Personal Factors/Comorbidities that affect the Plan of Care: 3+: HIGH COMPLEXITY   EXAMINATION:   Most Recent Physical Functioning:   Gross Assessment:  AROM: Generally decreased, functional  Strength: Generally decreased, functional  Coordination: Generally decreased, functional  Sensation: Intact               Posture:  Posture (WDL): Exceptions to WDL  Posture Assessment: Forward head, Rounded shoulders  Balance:  Sitting: Impaired  Sitting - Static: Good (unsupported)  Sitting - Dynamic: Fair (occasional)  Standing: Impaired  Standing - Static: Poor  Standing - Dynamic : Poor Bed Mobility:  Rolling: Moderate assistance  Supine to Sit: Moderate assistance  Sit to Supine: Moderate assistance  Scooting: Moderate assistance  Wheelchair Mobility:     Transfers:  Sit to Stand: Minimum assistance  Stand to Sit: Minimum assistance  Gait:            Body Structures Involved:  1. Nerves  2. Heart  3. Lungs  4. Bones  5. Joints  6.  Muscles Body Functions Affected:  1. Sensory/Pain  2. Cardio  3. Respiratory  4. Neuromusculoskeletal  5. Movement Related Activities and Participation Affected:  1. General Tasks and Demands  2. Mobility   Number of elements that affect the Plan of Care: 4+: HIGH COMPLEXITY   CLINICAL PRESENTATION:   Presentation: Evolving clinical presentation with changing clinical characteristics: MODERATE COMPLEXITY   CLINICAL DECISION MAKIN [facility] Mobility Inpatient Short Form  How much difficulty does the patient currently have. .. Unable A Lot A Little None   1. Turning over in bed (including adjusting bedclothes, sheets and blankets)? ? 1   ? 2   ? 3   ? 4   2. Sitting down on and standing up from a chair with arms ( e.g., wheelchair, bedside commode, etc.)   ? 1   ? 2   ? 3   ? 4   3. Moving from lying on back to sitting on the side of the bed?   ? 1   ? 2   ? 3   ? 4   How much help from another person does the patient currently need. .. Total A Lot A Little None   4. Moving to and from a bed to a chair (including a wheelchair)? ? 1   ? 2   ? 3   ? 4   5. Need to walk in hospital room? ? 1   ? 2   ? 3   ? 4   6. Climbing 3-5 steps with a railing? ? 1   ? 2   ? 3   ? 4   © , Trustees of [address] [place] [address] [address] [id number], under license to [employer]. All rights reserved      Score:  Initial: 14 Most Recent: X (Date: -- )    Interpretation of Tool:  Represents activities that are increasingly more difficult (i.e. Bed mobility, Transfers, Gait). Medical Necessity:     · Patient is expected to demonstrate progress in strength, range of motion, balance, coordination and functional technique  ·  to improve safety during all mobility   · . Reason for Services/Other Comments:  · Patient continues to require skilled intervention due to medical complications and mobility deficits which impact her level of function, safety, and independence as indicated above   · .    Use of outcome tool(s) and clinical judgement create a POC that gives a: Questionable prediction of patient's progress: MODERATE COMPLEXITY        TREATMENT:   (In addition to Assessment/Re-Assessment sessions the following treatments were rendered)   Pre-treatment Symptoms/Complaints:  Lethargic, confused at beginning of session. Pain: Initial:   Pain Intensity 1: 0  Post Session:  No pain. More alert & smiling     Therapeutic Activity: (    31 minutes): Therapeutic activities including bed mobility, sitting balance unsupproted EOB, sit <> stand transfer training and standing balance control training to improve mobility, strength, balance and coordination. Required moderate to cues/instruction   to promote static and dynamic balance in standing and promote coordination of bilateral, lower extremity(s). Braces/Orthotics/Lines/Etc:   · O2 Device: Room Air  Treatment/Session Assessment:    · Response to Treatment:  See above; decreased assistance required. · Interdisciplinary Collaboration:   o Physical Therapist  o Registered Nurse  · After treatment position/precautions:   o Supine in bed  o Bed alarm/tab alert on  o Bed/Chair-wheels locked  o Bed in low position  o Call light within reach  o RN notified  o Family at bedside   · Compliance with Program/Exercises: Will assess as treatment progresses  · Recommendations/Intent for next treatment session: \"Next visit will focus on advancements to more challenging activities and reduction in assistance provided\".     Total Treatment Duration:  PT Patient Time In/Time Out  Time In: 1545  Time Out: [address] PT

## 2019-08-29 NOTE — DISCHARGE SUMMARY
Hospitalist Discharge Summary     Patient ID:  Misha Fitzgerald  [id number]  12 y.o.  1940  Admit date: 8/23/2019  7:48 AM  Discharge date and time: 8/29/2019  Attending: Yaya Patrick MD  PCP:  Thomas Garduno MD  Treatment Team: Attending Provider: Yaya Patrick MD; Consulting Provider: Sandip Smith DO; Consulting Provider: Dyana Maddox MD; Utilization Review: Frankie Wagner RN; Care Manager: Norman Sorensen LMSW; Consulting Provider: Gulshan Shankar MD; Physical Therapist: Rufus Dela Cruz PT; Occupational Therapy Assistant: Brian Guzman    Principal Diagnosis History of CVA (cerebrovascular accident)   Principal Problem:    History of CVA (cerebrovascular accident) (8/29/2019)    Active Problems:    Atrial fibrillation ([address] (3/15/2014)      Rheumatoid arthritis ([address] (3/15/2014)      Aortic stenosis (3/15/2014)      Debility (2/10/2016)      PAD (peripheral artery disease) ([address] (2/12/2016)      Overview: 2/17/16 (Dr Hardeep Arnold) A/o, BLR Angiogram, L PT PTA (), L SFA PTA/Stent (6x       [address] PTX)       Hypoglycemia (8/23/2019)      Functional quadriplegia ([address] (8/23/2019)      Intermittent confusion (8/23/2019)      Witnessed seizure-like activity ([address] (8/23/2019)      Closed compression fracture of L2 vertebra ([address] (8/23/2019)      Elevated troponin (8/24/2019)     Hospital Course: 70 yo who presented to the ED via EMS for a blood sugar of 48 and altered mental status at home. History of CAD s/p remote CABG, TAVR in 2016 now with severe aortic valve stenosis undergoing evaluation by cardiology, a fib on Eliquis, diabetes on insulin, and RA on prednisone. Patient also with witness seizures so Neurology consulted along to evaluate her progressive weakness over the past three weeks. Keppra started along with working up for possible CVA. EEG showed abnormal with sharply contoured waveforms in the left temporal lobe. Triglyceride 81. A1C 8.2.  CT head showed Mild white matter hypodensities are nonspecific, not unusual for age and most often chronic microvascular ischemic change. MRI brain showed small focus of ischemic infarction right anterior parietal region. MRI cervical spine showed No severe central stenoses or disc herniation or cord impingement. There are mild areas of canal narrowing due to ligamentum flavum thickening or laxity. CT head without contrast showed Stenosis at the left internal carotid artery origin of greater than 75%. Stenosis of the distal segment of the right common carotid artery of 60%. Stenosis in the supraclinoid segment of both the right and left internal carotid arteries. Vascular surgery consulted who did not think was surgical candidate but would like follow up in three weeks. Discussed with family about supraclinoid stenosis and that patient is likely a poor surgical candidate for neurovascular intervention. They agree and do not want further work up for the supraclinoid stenosis. ASA recommended to continue. Cardiology also consulted for elevated troponin level that peaked at 0.13 along with her weakness. Determined this was demand mismatch. Recent echos have shown new LV dysfunction with worsening gradients concerning for prosthetic valve stenosis. Cardiac CT performed that showed leaflet thrombus on the aortic valve. Patient was originally on Eliquis as outpatient but then changed to Coumadin on 8/27 with Lovenox bridge. INR goal is 2.5-3.5. IR today 2.5. Repeat INR tomorrow     Left LE US showed no DVT on 8/23 for noted edema    Patient still very weak so will discharge to rehab. No further seizure like activity. If worsening confusion, chest pain, or SOB, please come back to the ED. Of note, methimazole 5mg to be given Monday - Friday    Please refer to the admission H&P for details of presentation. In summary, the patient is stable for discharge to rehab.      Significant Diagnostic Studies:       Labs: Results:       Chemistry Recent Labs     08/29/19  0603 08/27/19  0628   * 214*    140   K 4.4 4.4    106   CO2 24 25   BUN 13 12   CREA 0.68 0.67   CA 8.8 8.6   AGAP 8 9      CBC w/Diff Recent Labs     08/27/19 0628   WBC 9.9   RBC 4.15   HGB 13.8   HCT 43.4   *      Cardiac Enzymes No results for input(s): CPK, CKND1, EPHRAIM in the last 72 hours. No lab exists for component: CKRMB, TROIP   Coagulation Recent Labs     08/29/19  0603 08/27/19  1857   PTP 28.1* 20.9*   INR 2.5 1.8       Lipid Panel Lab Results   Component Value Date/Time    Cholesterol, total 132 08/28/2019 06:58 AM    HDL Cholesterol 40 08/28/2019 06:58 AM    LDL, calculated 75.8 08/28/2019 06:58 AM    VLDL, calculated 16.2 08/28/2019 06:58 AM    Triglyceride 81 08/28/2019 06:58 AM    CHOL/HDL Ratio 3.3 08/28/2019 06:58 AM      BNP No results for input(s): BNPP in the last 72 hours. Liver Enzymes No results for input(s): TP, ALB, TBIL, AP, SGOT, GPT in the last 72 hours. No lab exists for component: DBIL   Thyroid Studies Lab Results   Component Value Date/Time    T4, Total 6.6 06/21/2012 10:26 AM    T3 Uptake 39 06/27/2014 10:20 AM    TSH 0.988 08/23/2019 08:26 AM            Discharge Exam:  Visit Vitals  /65 (BP 1 Location: Left arm, BP Patient Position: At rest)   Pulse 60   Temp 97.8 °F (36.6 °C)   Resp 16   Ht 5' 3\" (1.6 m)   Wt 49.5 kg (109 lb 1.6 oz)   SpO2 96%   BMI 19.33 kg/m²     General appearance: alert, no distress, frail appearing. Lungs: mild inspiratory crackles at LLL  Heart: regular rate and rhythm, S1, S2 normal  Abdomen: soft, non-tender. Bowel sounds normal. No masses,  no organomegaly  Extremities: muscle wasting. Neurologic: Able to answer questions but slow to respond and limited ROM in bed.      Disposition:Rehab   Discharge Condition: stable  Patient Instructions: As above   Current Discharge Medication List      START taking these medications    Details   warfarin (COUMADIN) 5 mg tablet Take 1 Tab by mouth every evening. Qty: 30 Tab, Refills: 0      levETIRAcetam (KEPPRA) 500 mg tablet Take 1 Tab by mouth every twelve (12) hours. Qty: 60 Tab, Refills: 0      insulin glargine (LANTUS) 100 unit/mL injection 5 units qHS  Qty: 1 Vial, Refills: 0      insulin lispro (HUMALOG) 100 unit/mL injection Less than 150 =   0 units  150 -199 =  1 units  200 -249 =   2 units  250 -299 =   3 units  300 -349 =   4 units  Before meals and at night  Qty: 1 Vial, Refills: 0      naloxone (NARCAN) 4 mg/actuation nasal spray Use 1 spray intranasally, then discard. Repeat with new spray every 2 min as needed for opioid overdose symptoms, alternating nostrils. Qty: 1 Each, Refills: 0         CONTINUE these medications which have CHANGED    Details   !! methIMAzole (TAPAZOLE) 5 mg tablet On Monday, Tuesday, Wednesday, Thursday  Indications: overactive thyroid gland, morning  Qty: 6 Tab, Refills: 0      !! methIMAzole (TAPAZOLE) 10 mg tablet On Saturday and Sunday  Qty: 4 Tab, Refills: 0      HYDROcodone-acetaminophen (NORCO) 5-325 mg per tablet Take 1 Tab by mouth every six (6) hours as needed for Pain for up to 2 days. Max Daily Amount: 4 Tabs. Qty: 12 Tab, Refills: 0    Associated Diagnoses: Lumbar strain, initial encounter      LORazepam (ATIVAN) 1 mg tablet Take 1 Tab by mouth two (2) times a day. Max Daily Amount: 2 mg. Qty: 6 Tab, Refills: 0    Associated Diagnoses: Anxiety       ! ! - Potential duplicate medications found. Please discuss with provider. CONTINUE these medications which have NOT CHANGED    Details   furosemide (LASIX) 40 mg tablet Take 1 Tab by mouth daily. Qty: 30 Tab, Refills: 1      potassium chloride (K-DUR, KLOR-CON) 20 mEq tablet Take 1 Tab by mouth two (2) times a day. Qty: 30 Tab, Refills: 1      CALCIUM CARBONATE (CALCIUM 300 PO) Take 1 Tab by mouth daily. ergocalciferol (ERGOCALCIFEROL) 50,000 unit capsule Take 50,000 Units by mouth every month.       aspirin delayed-release 81 mg tablet Take 81 mg by mouth daily. Morning         predniSONE (DELTASONE) 5 mg tablet Take 1 Tab by mouth daily (with breakfast). Qty: 30 Tab, Refills: 0      metoprolol (LOPRESSOR) 50 mg tablet Take 1 Tab by mouth two (2) times a day. Qty: 60 Tab, Refills: 0      digoxin (LANOXIN) 0.125 mg tablet Take 1 Tab by mouth daily. Qty: 30 Tab, Refills: 6      multivitamin (ONE A DAY) tablet Take 1 Tab by mouth daily. ferrous sulfate 325 mg (65 mg iron) tablet Take 1 Tab by mouth Daily (before lunch). GLUC HCL/GLUC KENNY/AC-D-GLUCOS (GLUCOSAMINE COMPLEX PO) Take 1 Tab by mouth three (3) times daily. DOCOSAHEXANOIC ACID/EPA (FISH OIL PO) Take 2 Tabs by mouth two (2) times a day. CYANOCOBALAMIN (VITAMIN B-12 PO) Take 250 mcg by mouth daily. OMEPRAZOLE (PRILOSEC PO) Take 20 mg by mouth daily. loperamide (IMODIUM) 2 mg capsule Take 2 mg by mouth four (4) times daily as needed for Diarrhea. STOP taking these medications       glipiZIDE (GLUCOTROL) 5 mg tablet Comments:   Reason for Stopping:         apixaban (ELIQUIS) 2.5 mg tablet Comments:   Reason for Stopping:         insulin degludec (TRESIBA FLEXTOUCH U-100) 100 unit/mL (3 mL) inpn Comments:   Reason for Stopping:         traMADol (ULTRAM) 50 mg tablet Comments:   Reason for Stopping:         linagliptin (TRADJENTA) 5 mg tablet Comments:   Reason for Stopping:         glipiZIDE (GLUCOTROL) 10 mg tablet Comments:   Reason for Stopping:               Activity: Up with assistance if can tolerate. Diet:Glucerna with all meals. ASA mechanical soft. Wound Care: Off loading boots to feet     Follow-up PCP in one week. Neurology in two weeks. Cardiology in one week. Vascular surgery with Dr. Tia Larios in three weeks.    ·     Time spent to discharge patient 35 minutes  Signed:  Danay Lopez DO  8/29/2019  8:39 AM

## 2019-08-29 NOTE — PROGRESS NOTES
Patient to discharge to [facility] 23 room 107A. Report line, [id number], given to TEJ. Jamaal Chaves transportation setup for [employer]. Family aware and in agreement. Care Management Interventions  PCP Verified by CM:  Yes  Transition of Care Consult (CM Consult): Discharge Planning  Physical Therapy Consult: Yes  Occupational Therapy Consult: Yes  Current Support Network: Own Home  Confirm Follow Up Transport: Family  Plan discussed with Pt/Family/Caregiver: Yes  Freedom of Choice Offered: Yes  Discharge Location  Discharge Placement: Skilled nursing facility

## 2019-08-30 PROBLEM — I47.20 V TACH: Status: ACTIVE | Noted: 2019-01-01

## 2019-08-30 NOTE — PROGRESS NOTES
Pt picking at wires and lines throughout night. Runs of Vtach reported by monitor room. MD made aware and pt agitated at times reported. Hourly rounds performed through shift, pt denies needs at this time. Bed in low position and call light/ personal items within reach. Will continue to monitor and report to day shift nurse.

## 2019-08-30 NOTE — PROGRESS NOTES
Warfarin dosing per pharmacist    Levi bAdul is a 69 y.o. female. Height: 5' 3\" (160 cm)    Weight: 48.2 kg (106 lb 3.2 oz)    Indication:  Possible leaflet thrombus, CVA while on apixaban    Goal INR:  2.5 to 3.5 per cardiology    Home dose:  New start    Risk factors or significant drug interactions:  N/A    Other anticoagulants:  Enoxaparin 50 mg SubQ Q12H bridge therapy (stopped 8/29/19)    Daily Monitoring  Date  INR     Warfarin dose HGB              Notes  8/27  1.8  5 mg  13.8    8/28  ---  5 mg  ---  8/29  2.5  4 mg  ---  Enoxaparin stopped  8/30  3.3  Hold  14.7      Pharmacy consulted to assist dosing warfarin in patient with possible leaflet thrombus and CVA while on apixaban therapy. Cardiology and Neurology together agreed to switch to warfarin and bridge with enoxaparin. INR at baseline was elevated at 1.8, likely due to residual effects from apixaban (last dose was 5 mg on 8/27/19 at 0938). Warfarin 5 mg daily initiated on 8/27/19. INR today trending up significantly, but still within therapeutic range at 3.3. Given significant upward trend from yesterday, will hold warfarin today. Based on trend in INR tomorrow, will consider resuming at lower dose. Continue daily INR for now.     Thank you,  Lorraine Natarajan, PharmD  Clinical Pharmacist  [phone]

## 2019-08-30 NOTE — PROGRESS NOTES
[facility] CARDIOLOGY PROGRESS NOTE    8/30/2019 7:54 AM    Admit Date: 8/23/2019    Admit Diagnosis: Hypoglycemia [E16.2]; Hypoglycemia [E16.2]; Intermittent confusion [R41.0]; Functional quadriplegia (HCC) [R53.2]      Subjective:   Patient with progressive weakness per family. Less responsive today. INR 3.3. Episodes of atrial fibrillation with aberrancy noted on telemetry. Not VTach. Objective:      Vitals:    08/30/19 0446 08/30/19 0505 08/30/19 0721 08/30/19 1046   BP: 127/77  111/70 114/70   Pulse: (!) 107  64 88   Resp: 16  16 16   Temp: 97.8 °F (36.6 °C)  97.6 °F (36.4 °C) 97.9 °F (36.6 °C)   SpO2: 96%  95% 93%   Weight:  48.2 kg (106 lb 3.2 oz)     Height:           Physical Exam:  Gen:  Frail WF.  Weak. No distress  Neck- supple, no JVD at 60 deg  CV- irregular rate and rhythm, 2/6 FEI RUSB  Lung- clear bilaterally apically , decreased bibasilar  Abd- soft, nontender, nondistended  Ext- no edema, boots bilaterally  Skin- warm and dry    Data Review:   Recent Labs     08/30/19  0630 08/29/19  0603 08/28/19  0658    137  --    K 4.3 4.4  --    MG 1.7*  --  2.0   BUN 14 13  --    CREA 0.70 0.68  --    * 172*  --    WBC 8.7  --   --    HGB 14.7  --   --    HCT 44.2  --   --    *  --   --    INR 3.3 2.5  --    CHOL  --   --  132   TRIGL  --   --  81   HDL  --   --  40       Assessment/Plan:      Principal Problem:    Functional quadriplegia -- defer management to primary team       Active Problems:    Atrial fibrillation --episodes of RVR this AM.  Improved now. Target INR 2.5-3.5 given CVA with possible TAVR associated clot       Rheumatoid arthritis -- defer management to primary team.         Aortic stenosis -- prior TAVR with prosthetic valve leaflets with thrombus and thickened, restenotic. She is not a candidate for redo TAVR. Cardiac CT with leaflet thrombus despite compliance with eliquis. Stopped this and converted to coumadin.  Goal INR 2.5 to 3.5.         PAD (peripheral artery disease) -- stable, no symptoms presently       Intermittent confusion -- defer management to primary team/Neuro         Witnessed seizure-like activity -- defer management to primary team/Neuro, on Keppra          Elevated troponin -- suspect supply/demand imbalance in setting of known CAD. No intervention planned as no angina and very poor invasive candidate. ... Cont medical therapy.        CVA -- on coumadin.        Debility -- Poor prognosis. May be best served with Hospice.   Dr. Rust Loud to see in AM who is primary cardiologist.      Maldonado Gray MD

## 2019-08-30 NOTE — PROGRESS NOTES
Follow-up visit with Ms. Mario Lisa and her sister to convey care and concern. I offered spiritual interventions, including empathic listening, affirmation of emotions and beverly, and assurance of prayers.      Lorraine Oppenheim, [address]  Board Certified

## 2019-08-30 NOTE — DISCHARGE INSTRUCTIONS
Continue current diet. Continue Glucerna shake (or comparable ONS)  TID at discharge to help meet needs.

## 2019-08-30 NOTE — PROGRESS NOTES
Problem: Mobility Impaired (Adult and Pediatric)  Goal: *Acute Goals and Plan of Care  Description  STG:  (1.)Ms. Reji Ying will move from supine to sit and sit to supine , scoot up and down and roll side to side with MODERATE ASSISTx1 within 3 treatment day(s). (2.)Ms. Reji Ying will transfer from bed to chair and chair to bed with MODERATE ASSISTx1 using the least restrictive device within 3 treatment day(s). (3.)Ms. Rjei Ying will ambulate with MINIMAL ASSIST for 20 feet with the least restrictive device within 3 treatment day(s). LTG:  (1.)Ms. Reji Ying  will move from supine to sit and sit to supine , scoot up and down and roll side to side with MINIMAL ASSIST within 7 treatment day(s). (2.)Ms. Reji Ying will transfer from bed to chair and chair to bed with MINIMAL ASSIST using the least restrictive device within 7 treatment day(s). (3.)Ms. Reji Ying will ambulate with MINIMAL ASSIST for 50+ feet with the least restrictive device within 7 treatment day(s). (4.)Ms. Reji Ying will perform standing static and dynamic balance activities x 15 minutes with MINIMAL ASSIST to improve safety within 7 treatment day(s). (5.)Ms. Reji Ying will ascend and descend 1 stairs using no hand rail(s) with MINIMAL ASSIST to improve functional mobility and safety within 7 treatment day(s). (6.)Ms. Reji Ying will perform bilateral lower extremity exercises x 15 min for HEP with SUPERVISION to improve strength, endurance, and functional mobility within 7 treatment day(s).    ________________________________________________________________________________________________    PHYSICAL THERAPY: Daily Note and PM 8/30/2019  INPATIENT: PT Visit Days : 3  [place] / [place] / Product Type: Medicare /       NAME/AGE/GENDER: Ilene Cartwright is a 69 y.o. female   PRIMARY DIAGNOSIS: Hypoglycemia [E16.2]  Hypoglycemia [E16.2]  Intermittent confusion [R41.0]  Functional quadriplegia (HCC) [R53.2] History of CVA (cerebrovascular accident)   History of CVA (cerebrovascular accident)         ICD-10: Treatment Diagnosis:   · Generalized Muscle Weakness (M62.81)  · Other lack of cordination (R27.8)  · Difficulty in walking, Not elsewhere classified (R26.2)  · Other abnormalities of gait and mobility (R26.89)  · History of falling (Z91.81)  · Low Back Pain (M54.5)   Precaution/Allergies:  Multaq [dronedarone]; Other medication; and Statins-hmg-coa reductase inhibitors      ASSESSMENT:     Ms. Ki Ferguson presents resting in bed, agreeable to PT, sister in room. Pt agreed to therapy after some encouragement from therapist and sister, pt stating initially she didn't feel up to it. Pt required mod assist x 2 for supine to sit and worked on sitting balance. Pt declined getting over to the chair, however did perform sit to stand twice with mod assist x 2 and scooted towards head of bed. Pt returned supine with mod assist x 2. Pt was left with sister attending and needs in reach. Slow progress today with therapy. Will continue with POC. From Eval: Pt is a 69year old F who presented to hospital on 8/23/19 with low blood sugar, AMS, weakness. She has had a very recent, rapid decline in function over past couple weeks per family report. Pt found to have acute L1-L2 compression fractures; pt not on bed rest orders, IR consult for possible kyphoplasty is still pending. MRI of cervical spine unremarkable. EEG found abnormalities, pt started on Keppra. MRI of brain indicated small focus ischemic infarct R anterior parietal region. Prior to hospital admission pt lives alone, but with family & friends staying with her for assistance for the past year in a one story home with one step(s) to enter. Pt poor historian, daughter and granddaughter in room providing information, they endorse at least 3-4 falls in past 6 months. Prior to admission Ms. Ki Ferguson uses a rollator for ucho[place] she was able to use until recently (past couple weeks) when she experienced a sharp functional decline and increase in difficulty walking. This section established at most recent assessment   PROBLEM LIST (Impairments causing functional limitations):  1. Decreased Strength  2. Decreased Transfer Abilities  3. Decreased Ambulation Ability/Technique  4. Decreased Balance  5. Increased Pain  6. Decreased Activity Tolerance  7. Increased Fatigue  8. Increased Shortness of Breath   INTERVENTIONS PLANNED: (Benefits and precautions of physical therapy have been discussed with the patient.)  1. Balance Exercise  2. Bed Mobility  3. Family Education  4. Gait Training  5. Home Exercise Program (HEP)  6. Neuromuscular Re-education/Strengthening  7. Range of Motion (ROM)  8. Therapeutic Activites  9. Therapeutic Exercise/Strengthening  10. Transfer Training     TREATMENT PLAN: Frequency/Duration: 3 times a week for duration of hospital stay  Rehabilitation Potential For Stated Goals: Good     REHAB RECOMMENDATIONS (at time of discharge pending progress):    Placement: It is my opinion, based on this patient's performance to date, that Ms. Whit Umana may benefit from intensive therapy at a [address] [place] [address] after discharge due to the functional deficits listed above that are likely to improve with skilled rehabilitation and concerns that he/she may be unsafe to be unsupervised at home due to medical complications and mobility deficits which put her at increase risk of falling and/or functional decline . Equipment:    None at this time              HISTORY:   History of Present Injury/Illness (Reason for Referral):  Per H&P: \"Patient is a 68 yo F who presents to the ED via EMS for a blood sugar of 48 and altered mental status at home. Her son noted her blood sugar to be low this morning and called EMS as she was confused. She is presently accompanied by her daughter, Jerson Reddy, who provides most of the history.   History of CAD s/p remote CABG, TAVR in 2016 now with severe aortic valve stenosis undergoing evaluation by cardiology, a fib on Eliquis, diabetes on insulin, RA on prednisone. Ms. Marco Betts is oriented to self, location, and year, but is generally a poor historian. She does admit to having low back pain and some shortness of breath at home. Shade Trejo reports patient has had problems with walking, difficulty with speaking (will yell 'help' and cannot find other words to say), and has not been eating for the last 3 weeks. Symptoms started after she feel while trying to get off the toilet at the end of July. Prior to 3 weeks ago, she was able to ambulate through her house with a rollator walker. Shade Trejo states that on 8/23, she had an episode while eating breakfast where she lost consciousness and had jerking of her shoulders and arms while at the table. Event was witnessed by her son. She was brought to the ER for this. Shade Trejo is very concerned as she has had a rapid decline, and they are currently unable to care for her as she has essentially become non-ambulatory. She states patient has not complained of chest pain specifically, but she will grab at her chest occasionally. Of note, she has had recent TTE and SUZANNE showing EF of 40-45% and severe AS. She had CTA chest/abd/pelvis ordered by cardiology on 8/21 showing diff atherosclerotic disease in carotid, abdominal, and renal arteries. Also, showed acute L1 and L2 compression fracture with recommendation for possible IR evaluation. \"  MRI of cervical spine unremarkable, MRI brain indicated small focus ischemic infarct R anterior parietal region. IR consult for possible kyphoplasty is still pending this date.   Past Medical History/Comorbidities:   Ms. Marco Betts  has a past medical history of Acquired cyst of kidney, Anxiety, Aortic valve replaced, Atrial fibrillation, chronic ([address] CAD (coronary artery disease), Calculus of kidney, Carotid artery stenosis without cerebral infarction, Chronic pain, Gangrene associated with diabetes mellitus ([facility] (5/26/2016), GERD (gastroesophageal reflux disease), Heart failure ([facility] History of kidney stones, Hypercholesterolemia, Hypertension, Hypothyroidism, Ill-defined condition, Microscopic hematuria, Nausea & vomiting, Osteoarthritis, Pacemaker, PUD (peptic ulcer disease), PVD (peripheral vascular disease) ([facility] Rheumatoid arthritis(714.0), Toe amputation status ([facility] Type 2 diabetes mellitus ([facility] (Dx 2006), and Vertigo. She also has no past medical history of Adverse effect of anesthesia, Difficult intubation, Malignant hyperthermia due to anesthesia, or Pseudocholinesterase deficiency. Ms. Jason Horton  has a past surgical history that includes hx hysterectomy (1988); hx appendectomy (1959); hx pacemaker placement; hx open cholecystectomy; hx urological (Left, 1980s); hx lithotripsy; hx pacemaker; hx orthopaedic (Left); hx orthopaedic (Left); hx amputation (Left, 2016); hx heent; pr cabg, artery-vein, four (1993); pr cardiac surg procedure unlist; hx heart catheterization; vascular surgery procedure unlist (Left, 2-17-16); hx aortic valve replacement; and vascular surgery procedure unlist (Right, 12/20/2017). Social History/Living Environment:   Home Environment: Private residence  # Steps to Enter: 1  One/Two Story Residence: Split level  # of Interior Steps: 1  Living Alone: Yes  Support Systems: Child(freddy), Family member(s)  Patient Expects to be Discharged to[de-identified] Unknown  Current DME Used/Available at Home: Walker, rollator  Prior Level of Function/Work/Activity:  Prior to hospital admission pt lives alone, but with family & friends staying with her for assistance for the past year in a one story home with one step(s) to enter. Pt poor historian, daughter and granddaughter in room providing information, they endorse at least 3-4 falls in past 6 months. Prior to admission Ms. Jason Horton uses a rollator for [employer] she was able to use until recently (past couple weeks) when she experienced a sharp functional decline and increase in difficulty walking. Number of Personal Factors/Comorbidities that affect the Plan of Care: 3+: HIGH COMPLEXITY   EXAMINATION:   Most Recent Physical Functioning:   Gross Assessment:                  Posture:     Balance:  Sitting - Static: Good (unsupported)  Sitting - Dynamic: Fair (occasional)  Standing - Static: Poor  Standing - Dynamic : Poor Bed Mobility:  Rolling: Moderate assistance  Supine to Sit: Moderate assistance;Assist x2  Sit to Supine: Moderate assistance;Assist x2  Scooting: Moderate assistance  Wheelchair Mobility:     Transfers:  Sit to Stand: Moderate assistance;Assist x2  Stand to Sit: Moderate assistance;Assist x2  Gait:            Body Structures Involved:  1. Nerves  2. Heart  3. Lungs  4. Bones  5. Joints  6. Muscles Body Functions Affected:  1. Sensory/Pain  2. Cardio  3. Respiratory  4. Neuromusculoskeletal  5. Movement Related Activities and Participation Affected:  1. General Tasks and Demands  2. Mobility   Number of elements that affect the Plan of Care: 4+: HIGH COMPLEXITY   CLINICAL PRESENTATION:   Presentation: Evolving clinical presentation with changing clinical characteristics: MODERATE COMPLEXITY   CLINICAL DECISION MAKIN [facility] Inpatient Short Form  How much difficulty does the patient currently have. .. Unable A Lot A Little None   1. Turning over in bed (including adjusting bedclothes, sheets and blankets)? ? 1   ? 2   ? 3   ? 4   2. Sitting down on and standing up from a chair with arms ( e.g., wheelchair, bedside commode, etc.)   ? 1   ? 2   ? 3   ? 4   3. Moving from lying on back to sitting on the side of the bed?   ? 1   ? 2   ? 3   ? 4   How much help from another person does the patient currently need. .. Total A Lot A Little None   4. Moving to and from a bed to a chair (including a wheelchair)? ? 1   ? 2   ? 3   ? 4   5.   Need to walk in hospital room? ? 1   ? 2   ? 3   ? 4   6. Climbing 3-5 steps with a railing? ? 1   ? 2   ? 3   ? 4   © 2007, Trustees of [address] [place] [address] [address] [id number], under license to [employer]. All rights reserved      Score:  Initial: 14 Most Recent: X (Date: -- )    Interpretation of Tool:  Represents activities that are increasingly more difficult (i.e. Bed mobility, Transfers, Gait). Medical Necessity:     · Patient is expected to demonstrate progress in strength, range of motion, balance, coordination and functional technique  ·  to improve safety during all mobility   · . Reason for Services/Other Comments:  · Patient continues to require skilled intervention due to medical complications and mobility deficits which impact her level of function, safety, and independence as indicated above   · . Use of outcome tool(s) and clinical judgement create a POC that gives a: Questionable prediction of patient's progress: MODERATE COMPLEXITY        TREATMENT:      Pre-treatment Symptoms/Complaints:  Lethargic. Pain: Initial:      Post Session:  No pain. More alert & smiling     Therapeutic Activity: (    16 minutes): Therapeutic activities including bed mobility, sitting balance unsupproted EOB, sit <> stand transfer training and standing balance control training to improve mobility, strength, balance and coordination. Required moderate to cues/instruction   to promote static and dynamic balance in standing and promote coordination of bilateral, lower extremity(s). Braces/Orthotics/Lines/Etc:   · O2 Device: Room Air  Treatment/Session Assessment:    · Response to Treatment:  See above; decreased assistance required.   · Interdisciplinary Collaboration:   o Physical Therapy Assistant  o Registered Nurse  o Rehabilitation Attendant  · After treatment position/precautions:   o Supine in bed  o Bed alarm/tab alert on  o Bed/Chair-wheels locked  o Bed in low position  o Call light within reach  o RN notified  o Family at bedside · Compliance with Program/Exercises: Will assess as treatment progresses  · Recommendations/Intent for next treatment session: \"Next visit will focus on advancements to more challenging activities and reduction in assistance provided\".     Total Treatment Duration:  PT Patient Time In/Time Out  Time In: 1510  Time Out: BRYNN Tinajero

## 2019-08-30 NOTE — PROGRESS NOTES
Pt worked with PT today. Pt is lethargic. Takes medication in applesauce. Pt pockets pills, however RN was able to adminster them. Pt received IV Magnesium for low levels.

## 2019-08-30 NOTE — PROGRESS NOTES
Nutrition Follow-up  Reason for Initial assessment: Referral received from nursing admission Malnutrition Screening Tool   Recently Lost Weight Without Trying: Unsure  Eating Poorly Due to Decreased Appetite: Yes  Assessment:   Diet: DIET CARDIAC Mechanical Soft    Food/Nutrition Patient History:  Patient seen in follow-up with no family present. She is alert and states that she feels like she has been eating better. She states that she ate well for breakfast but cannot recall what she had. Spoke with PA, Darylene Hotter, who states that patient ate ~50% of [place] toast but nothing else. She also states that she did not drink ONS this am. RD offered patient ONS as well, but patient declined. Patient states that she is drinking Glucerna, but not with every meal.  H/O CAD with prior CABG, DM, RA, CHF. Anthropometrics:Height: 5' 3\" (160 cm),  Weight: 48.2 kg (106 lb 4.8 oz), Weight Source: Bed, Body mass index is 18.83 kg/m². BMI class of underweight for age >65 years. Macronutrient needs:(48.2 kg)  EER:  2846-1675 kcal /day (25-30 kcal/kg listed BW)  EPR:  48-58 grams protein/day (1-1.2 grams/kg listed BW)  Intake/Comparative Standards: Of 10 recorded meals since last RD assessment patient averaging ~35%. This potentially meets ~44% EEn and ~65% EPN.     Nutrition Diagnosis: Predicted inadequate energy intake r/t decreased ability to consume sufficient oral intake as evidenced by pt with intermittent confusion, reported decline in PO intake 3 weeks PTA, and underweight for age.      Intervention:  Meals and snacks: Continue current diet. Nutrition Supplement Therapy: Continue glucerna shakes TID   Encouraged patient to drink ONS between meals for additional kcal and protein. Explained the importance of adequate nutrition for strength and healing. Question patient's comprehension of items discussed. Discharge nutrition plan: Continue current diet.  Continue Glucerna shake (or comparable ONS)  TID at discharge to help meet needs.     [address], [address], [address]

## 2019-08-30 NOTE — PROGRESS NOTES
Interdisciplinary Rounds completed 08/30/19. Nursing, Case Management, Physician and PT present. Plan of care reviewed and updated. Continue remote tele. Pt discharged delayed d/t respiratory status.

## 2019-08-30 NOTE — PROGRESS NOTES
Progress Note    Patient: Kirstie Bauer MRN: [id number]  SSN: [id number]    YOB: 1940  Age: 69 y.o. Sex: female      Admit Date: 8/23/2019    LOS: 7 days     Subjective:   F/U CVA, CHF exacerbation. 68 yo who presented to the ED via EMS for a blood sugar of 48 and altered mental status at home. History of CAD s/p remote CABG, TAVR in 2016 now with severe aortic valve stenosis undergoing evaluation by cardiology, a fib on Eliquis, diabetes on insulin, and RA on prednisone.      Patient also with witness seizures so Neurology consulted along to evaluate her progressive weakness over the past three weeks. Keppra started along with working up for possible CVA. EEG showed abnormal with sharply contoured waveforms in the left temporal lobe. Triglyceride 81. A1C 8.2. CT head showed Mild white matter hypodensities are nonspecific, not unusual for age and most often chronic microvascular ischemic change. MRI brain showed small focus of ischemic infarction right anterior parietal region. MRI cervical spine showed No severe central stenoses or disc herniation or cord impingement. There are mild areas of canal narrowing due to ligamentum flavum thickening or laxity. CT head without contrast showed Stenosis at the left internal carotid artery origin of greater than 75%. Stenosis of the distal segment of the right common carotid artery of 60%. Stenosis in the supraclinoid segment of both the right and left internal carotid arteries. Vascular surgery consulted who did not think was surgical candidate but would like follow up in three weeks. Discussed with family about supraclinoid stenosis and that patient is likely a poor surgical candidate for neurovascular intervention. They agree and do not want further work up for the supraclinoid stenosis. ASA recommended to continue.      Cardiology also consulted for elevated troponin level that peaked at 0.13 along with her weakness.  Determined this was demand mismatch. Recent echos have shown new LV dysfunction with worsening gradients concerning for prosthetic valve stenosis. Cardiac CT performed that showed leaflet thrombus on the aortic valve. Patient was originally on Eliquis as outpatient but then changed to Coumadin on 8/27 with Lovenox bridge. INR goal is 2.5-3.5. IR today 3.3. Patient was to be discharged on 8/29 but started to have persistent cough with increased work of breathing. Chest x ray showed bilateral pleural effusions and BNP >2,000. Lasix 40mg IV given. Cannot measure strict Is and Os well due to incontinence. Weight 48.2kg from 49.5kg. Satting well on RA and no longer with cough or SOB. Episode of V tach this AM -170s this AM. Patient agitated during this time.      Patient more lethargic and will be slow to answer my questions today    Current Facility-Administered Medications   Medication Dose Route Frequency    warfarin (COUMADIN) tablet 4 mg  4 mg Oral QPM    furosemide (LASIX) tablet 40 mg  40 mg Oral DAILY    insulin glargine (LANTUS) injection 5 Units  5 Units SubCUTAneous DAILY    levETIRAcetam (KEPPRA) tablet 500 mg  500 mg Oral Q12H    methIMAzole (TAPAZOLE) tablet 10 mg  10 mg Oral Once per day on Sun Sat    insulin lispro (HUMALOG) injection   SubCUTAneous TIDAC    aspirin delayed-release tablet 81 mg  81 mg Oral DAILY    cyanocobalamin tablet 500 mcg  500 mcg Oral DAILY    digoxin (LANOXIN) tablet 0.125 mg  0.125 mg Oral DAILY    ferrous sulfate tablet 325 mg  1 Tab Oral ACL    HYDROcodone-acetaminophen (NORCO) 5-325 mg per tablet 1 Tab  1 Tab Oral Q4H PRN    LORazepam (ATIVAN) tablet 1 mg  1 mg Oral BID    methIMAzole (TAPAZOLE) tablet 7.5 mg  7.5 mg Oral Once per day on Mon Tue Wed Thu Fri    metoprolol tartrate (LOPRESSOR) tablet 50 mg  50 mg Oral BID    pantoprazole (PROTONIX) tablet 40 mg  40 mg Oral ACB    predniSONE (DELTASONE) tablet 5 mg  5 mg Oral DAILY WITH BREAKFAST    sodium chloride (NS) flush 5-40 mL  5-40 mL IntraVENous Q8H    sodium chloride (NS) flush 5-40 mL  5-40 mL IntraVENous PRN    naloxone (NARCAN) injection 0.4 mg  0.4 mg IntraVENous PRN    ondansetron (ZOFRAN) injection 4 mg  4 mg IntraVENous Q4H PRN    dextrose 40% (GLUTOSE) oral gel 1 Tube  15 g Oral PRN    glucagon (GLUCAGEN) injection 1 mg  1 mg IntraMUSCular PRN    dextrose (D50W) injection syrg 12.5-25 g  25-50 mL IntraVENous PRN       Objective:     Vitals:    08/30/19 0446 08/30/19 0505 08/30/19 0721 08/30/19 1046   BP: 127/77  111/70 114/70   Pulse: (!) 107  64 88   Resp: 16  16 16   Temp: 97.8 °F (36.6 °C)  97.6 °F (36.4 °C) 97.9 °F (36.6 °C)   SpO2: 96%  95% 93%   Weight:  48.2 kg (106 lb 3.2 oz)     Height:             Intake and Output:  Current Shift: 08/30 0701 - 08/30 1900  In: 120 [P.O.:120]  Out: -   Last three shifts: 08/28 1901 - 08/30 0700  In: 540 [P.O.:540]  Out: -     Physical Exam:   General:  Alert when stimulated. O sign. Frail appearing. Muscle wasting. Eyes:  Conjunctivae/corneas clear. Ears:  Normal TMs and external ear canals both ears. Nose: Nares normal. Septum midline. Mouth/Throat: Wet oral mucosa. Neck:  no JVD. Back:   deferred   Lungs:   Clear to auscultation bilaterally. Heart:  Irregularly irregular    Abdomen:   Soft, non-tender. Bowel sounds normal.   Extremities: Muscle wasting. Wearing off loading boots. Limited ROM in bed. Pulses: 2+ and symmetric all extremities.    Skin: Skin color, texture, turgor normal. No rashes or lesions   Lymph nodes: Cervical, supraclavicular, and axillary nodes normal.   Neurologic: Lethargic today        Lab/Data Review:    Recent Results (from the past 24 hour(s))   GLUCOSE, POC    Collection Time: 08/29/19  4:35 PM   Result Value Ref Range    Glucose (POC) 148 (H) 65 - 100 mg/dL   GLUCOSE, POC    Collection Time: 08/29/19  8:55 PM   Result Value Ref Range    Glucose (POC) 222 (H) 65 - 100 mg/dL   PROTHROMBIN TIME + INR    Collection Time: 08/30/19  6:30 AM   Result Value Ref Range    Prothrombin time 34.2 (H) 11.7 - 14.5 sec    INR 3.3     METABOLIC PANEL, BASIC    Collection Time: 08/30/19  6:30 AM   Result Value Ref Range    Sodium 138 136 - 145 mmol/L    Potassium 4.3 3.5 - 5.1 mmol/L    Chloride 103 98 - 107 mmol/L    CO2 24 21 - 32 mmol/L    Anion gap 11 7 - 16 mmol/L    Glucose 197 (H) 65 - 100 mg/dL    BUN 14 8 - 23 MG/DL    Creatinine 0.70 0.6 - 1.0 MG/DL    GFR est AA >60 >60 ml/min/1.73m2    GFR est non-AA >60 >60 ml/min/1.73m2    Calcium 8.7 8.3 - 10.4 MG/DL   CBC W/O DIFF    Collection Time: 08/30/19  6:30 AM   Result Value Ref Range    WBC 8.7 4.3 - 11.1 K/uL    RBC 4.33 4.05 - 5.2 M/uL    HGB 14.7 11.7 - 15.4 g/dL    HCT 44.2 35.8 - 46.3 %    .1 (H) 79.6 - 97.8 FL    MCH 33.9 (H) 26.1 - 32.9 PG    MCHC 33.3 31.4 - 35.0 g/dL    RDW 14.0 11.9 - 14.6 %    PLATELET 992 (L) 734 - 450 K/uL    MPV 10.0 9.4 - 12.3 FL    ABSOLUTE NRBC 0.00 0.0 - 0.2 K/uL   GLUCOSE, POC    Collection Time: 08/30/19  7:25 AM   Result Value Ref Range    Glucose (POC) 210 (H) 65 - 100 mg/dL   GLUCOSE, POC    Collection Time: 08/30/19 10:45 AM   Result Value Ref Range    Glucose (POC) 301 (H) 65 - 100 mg/dL       Assessment/ Plan:     Principal Problem:    History of CVA (cerebrovascular accident) (8/29/2019)    Active Problems:    Atrial fibrillation (HCC) (3/15/2014)      Rheumatoid arthritis ([facility] (3/15/2014)      Aortic stenosis (3/15/2014)      Debility (2/10/2016)      PAD (peripheral artery disease) ([address] (2/12/2016)      Overview: 2/17/16 (Dr Xavier Noble) A/o, BLR Angiogram, L PT PTA (), L SFA PTA/Stent (6x       [address] PTX)       Hypoglycemia (8/23/2019)      Functional quadriplegia (HCC) (8/23/2019)      Intermittent confusion (8/23/2019)      Witnessed seizure-like activity (HCC) (8/23/2019)      Closed compression fracture of L2 vertebra (HCC) (8/23/2019)      Elevated troponin (8/24/2019)    CVA - Neurology following.  CTA head neck with results as discussed above. MRI brain showed small focus of ischemic infarction right anterior parietal region. MRI cervical spine showed no severe central stenoses or disc herniation or cord impingement. There are mild areas of canal narrowing due to ligamentum flavum thickening or laxity. On Eliquis that now has been changed to coumadin. Goal INR 2.5-3.5. Added ASA. Cannot tolerate statin.      A fib with episode of V tach - Coumadin. Digoxin. Mg low so will supplement. TSH normal. Cardiology to see today. SCHF, exacerbation - Strict Is and Os. Daily weights. Lasix 40mg daily. Will reduce to 20mg daily.      HNT- BB     Hypomagnesemia - Supplement     Seizure - Keppra 500mg BID.      Anxiety - Ativan 1mg BID.      DM type II - SS. lantus 5 units daily. Lethargic today so will not change insulin regimen.      Hyperthyroidism - methimazole      Patient on daily prednisone 5mg for RA     Possible dc tomorrow to rehab if more alert and no further episodes of V tach. Patient very frail.  Overall all long term prognosis poor.       DVT prophylaxis - Coumadin      Signed By: Jorge Pa DO     August 30, 2019

## 2019-08-31 NOTE — DISCHARGE SUMMARY
Hospitalist Discharge Summary     Patient ID:  John Bazzi  [id number]  31 y.o.  1940  Admit date: 8/23/2019  7:48 AM  Discharge date and time: 8/31/2019  Attending: Taina Faria MD  PCP:  Mahi Walter MD  Treatment Team: Attending Provider: Taina Faria MD; Consulting Provider: Ines Anaya DO; Consulting Provider: Carmella Lopez MD; Utilization Review: Nikia Lott RN; Care Manager: [facility] [place]    Principal Diagnosis History of CVA (cerebrovascular accident)   Principal Problem:    History of CVA (cerebrovascular accident) (8/29/2019)    Active Problems:    Atrial fibrillation ([address] (3/15/2014)      Rheumatoid arthritis ([address] (3/15/2014)      Aortic stenosis (3/15/2014)      CHF exacerbation ([address] (6/23/2014)      Debility (2/10/2016)      PAD (peripheral artery disease) ([address] (2/12/2016)      Overview: 2/17/16 (Dr Tia Larios) A/o, BLR Angiogram, L PT PTA (), L SFA PTA/Stent (6x       100 [address] PTX)       Hypoglycemia (8/23/2019)      Functional quadriplegia ([address] (8/23/2019)      Intermittent confusion (8/23/2019)      Witnessed seizure-like activity ([address] (8/23/2019)      Closed compression fracture of L2 vertebra ([address] (8/23/2019)      Elevated troponin (8/24/2019)    68 yo who presented to the ED via EMS for a blood sugar of 48 and altered mental status at home. History of CAD s/p remote CABG, TAVR in 2016 now with severe aortic valve stenosis undergoing evaluation by cardiology, a fib on Eliquis, diabetes on insulin, and RA on prednisone.      Patient also with witness seizures so Neurology consulted to evaluate her progressive weakness over the past three weeks. Keppra started along with working up for possible CVA. EEG showed abnormal with sharply contoured waveforms in the left temporal lobe. Triglyceride 81. A1C 8.2.  CT head showed Mild white matter hypodensities are nonspecific, not unusual for age and most often chronic microvascular ischemic change. MRI brain showed small focus of ischemic infarction right anterior parietal region. MRI cervical spine showed No severe central stenoses or disc herniation or cord impingement. There are mild areas of canal narrowing due to ligamentum flavum thickening or laxity. CT head without contrast showed Stenosis at the left internal carotid artery origin of greater than 75%. Stenosis of the distal segment of the right common carotid artery of 60%. Stenosis in the supraclinoid segment of both the right and left internal carotid arteries. Vascular surgery consulted who did not think was surgical candidate but would like follow up in three weeks. Discussed with family about supraclinoid stenosis and that patient is likely a poor surgical candidate for neurovascular intervention. They agree and do not want further work up for the supraclinoid stenosis. ASA recommended to continue.      Cardiology also consulted for elevated troponin level that peaked at 0.13 along with her weakness. Determined this was demand mismatch. Recent echos have shown new LV dysfunction with worsening gradients concerning for prosthetic valve stenosis. Cardiac CT performed that showed leaflet thrombus on the aortic valve. Patient was originally on Eliquis as outpatient but then changed to Coumadin on 8/27 with Lovenox bridge. INR goal is 2.5-3.5. IR today 3.2. Currently on Coumadin 2mg daily.      Patient was to be discharged on 8/29 but started to have persistent cough with increased work of breathing. Chest x ray showed bilateral pleural effusions and BNP >2,000. Lasix 40mg IV given and then changed to PO. Cannot measure strict Is and Os well due to incontinence. Weight 49kg today. Satting well on RA and no longer with cough or SOB. Episode of a fib with abbarency but now with controlled rate. ECHO from 2019 showed EF 40%. Patient also with dysuria and UA suspicious for early UTI. Will order urine culture to be followed by rehab.  Hx of glenys coli in urine cultures before so will start Bactrim. Patient overall very frail and with poor prognosis. Discussed potential hospice but family would like to continue care for now. Repeat INR tomorrow     If worsening AMS, chest pain , or SOB, please come back to the ED. Please refer to the admission H&P for details of presentation. In summary, the patient is stable for rehab. Significant Diagnostic Studies:       Labs: Results:       Chemistry Recent Labs     08/31/19  0706 08/30/19  0630 08/29/19  0603   * 197* 172*    138 137   K 4.3 4.3 4.4    103 105   CO2 27 24 24   BUN 17 14 13   CREA 0.74 0.70 0.68   CA 8.8 8.7 8.8   AGAP 9 11 8      CBC w/Diff Recent Labs     08/30/19  0630   WBC 8.7   RBC 4.33   HGB 14.7   HCT 44.2   *      Cardiac Enzymes No results for input(s): CPK, CKND1, EPHRAIM in the last 72 hours. No lab exists for component: CKRMB, TROIP   Coagulation Recent Labs     08/31/19  0706 08/30/19  0630   PTP 33.5* 34.2*   INR 3.2 3.3       Lipid Panel Lab Results   Component Value Date/Time    Cholesterol, total 132 08/28/2019 06:58 AM    HDL Cholesterol 40 08/28/2019 06:58 AM    LDL, calculated 75.8 08/28/2019 06:58 AM    VLDL, calculated 16.2 08/28/2019 06:58 AM    Triglyceride 81 08/28/2019 06:58 AM    CHOL/HDL Ratio 3.3 08/28/2019 06:58 AM      BNP No results for input(s): BNPP in the last 72 hours. Liver Enzymes No results for input(s): TP, ALB, TBIL, AP, SGOT, GPT in the last 72 hours.     No lab exists for component: DBIL   Thyroid Studies Lab Results   Component Value Date/Time    T4, Total 6.6 06/21/2012 10:26 AM    T3 Uptake 39 06/27/2014 10:20 AM    TSH 0.988 08/23/2019 08:26 AM            Discharge Exam:  Visit Vitals  BP 94/57 (BP 1 Location: Left arm, BP Patient Position: At rest)   Pulse 62   Temp 97.9 °F (36.6 °C)   Resp 18   Ht 5' 3\" (1.6 m)   Wt 49.1 kg (108 lb 4.8 oz)   SpO2 95%   BMI 19.18 kg/m²     General appearance: alert, no distress, frail appearing. Eating her breakfast and talking more today  Lungs: CTA bilaterally. Poor inspiratory effort  Heart: irregularly irregular   Abdomen: soft, non-tender. Bowel sounds normal. No masses,  no organomegaly  Extremities: muscle wasting. Neurologic: Limited ROM in bed.      Disposition:Rehab   Discharge Condition: stable  Patient Instructions: As above         Current Discharge Medication List       Current Discharge Medication List      START taking these medications    Details   polyethylene glycol (MIRALAX) 17 gram packet Take 1 Packet by mouth daily. Indications: constipation  Qty: 3 Packet, Refills: 0      warfarin (COUMADIN) 2 mg tablet Take 1 Tab by mouth every evening. Qty: 6 Tab, Refills: 0      trimethoprim-sulfamethoxazole (BACTRIM DS, SEPTRA DS) 160-800 mg per tablet Take 1 Tab by mouth every twelve (12) hours. Qty: 6 Tab, Refills: 0      levETIRAcetam (KEPPRA) 500 mg tablet Take 1 Tab by mouth every twelve (12) hours. Qty: 60 Tab, Refills: 0      insulin glargine (LANTUS) 100 unit/mL injection 5 units qHS  Qty: 1 Vial, Refills: 0      insulin lispro (HUMALOG) 100 unit/mL injection Less than 150 =   0 units  150 -199 =  1 units  200 -249 =   2 units  250 -299 =   3 units  300 -349 =   4 units  Before meals and at night  Qty: 1 Vial, Refills: 0      naloxone (NARCAN) 4 mg/actuation nasal spray Use 1 spray intranasally, then discard. Repeat with new spray every 2 min as needed for opioid overdose symptoms, alternating nostrils.   Qty: 1 Each, Refills: 0         CONTINUE these medications which have CHANGED    Details   furosemide (LASIX) 20 mg tablet Once daily  Qty: 3 Tab, Refills: 0      !! methIMAzole (TAPAZOLE) 5 mg tablet On Monday, Tuesday, Wednesday, Thursday  Indications: overactive thyroid gland, morning  Qty: 6 Tab, Refills: 0      !! methIMAzole (TAPAZOLE) 10 mg tablet On Saturday and Sunday  Qty: 4 Tab, Refills: 0      HYDROcodone-acetaminophen (NORCO) 5-325 mg per tablet Take 1 Tab by mouth every six (6) hours as needed for Pain for up to 2 days. Max Daily Amount: 4 Tabs. Qty: 12 Tab, Refills: 0    Associated Diagnoses: Lumbar strain, initial encounter      LORazepam (ATIVAN) 1 mg tablet Take 1 Tab by mouth two (2) times a day. Max Daily Amount: 2 mg. Qty: 6 Tab, Refills: 0    Associated Diagnoses: Anxiety       ! ! - Potential duplicate medications found. Please discuss with provider. CONTINUE these medications which have NOT CHANGED    Details   potassium chloride (K-DUR, KLOR-CON) 20 mEq tablet Take 1 Tab by mouth two (2) times a day. Qty: 30 Tab, Refills: 1      CALCIUM CARBONATE (CALCIUM 300 PO) Take 1 Tab by mouth daily. ergocalciferol (ERGOCALCIFEROL) 50,000 unit capsule Take 50,000 Units by mouth every month. aspirin delayed-release 81 mg tablet Take 81 mg by mouth daily. Morning         predniSONE (DELTASONE) 5 mg tablet Take 1 Tab by mouth daily (with breakfast). Qty: 30 Tab, Refills: 0      metoprolol (LOPRESSOR) 50 mg tablet Take 1 Tab by mouth two (2) times a day. Qty: 60 Tab, Refills: 0      digoxin (LANOXIN) 0.125 mg tablet Take 1 Tab by mouth daily. Qty: 30 Tab, Refills: 6      multivitamin (ONE A DAY) tablet Take 1 Tab by mouth daily. ferrous sulfate 325 mg (65 mg iron) tablet Take 1 Tab by mouth Daily (before lunch). GLUC HCL/GLUC KENNY/AC-D-GLUCOS (GLUCOSAMINE COMPLEX PO) Take 1 Tab by mouth three (3) times daily. DOCOSAHEXANOIC ACID/EPA (FISH OIL PO) Take 2 Tabs by mouth two (2) times a day. CYANOCOBALAMIN (VITAMIN B-12 PO) Take 250 mcg by mouth daily. OMEPRAZOLE (PRILOSEC PO) Take 20 mg by mouth daily. loperamide (IMODIUM) 2 mg capsule Take 2 mg by mouth four (4) times daily as needed for Diarrhea.          STOP taking these medications       glipiZIDE (GLUCOTROL) 5 mg tablet Comments:   Reason for Stopping:         apixaban (ELIQUIS) 2.5 mg tablet Comments:   Reason for Stopping:         insulin degludec (Carol Laboy U-100) 100 unit/mL (3 mL) inpn Comments:   Reason for Stopping:         traMADol (ULTRAM) 50 mg tablet Comments:   Reason for Stopping:         linagliptin (TRADJENTA) 5 mg tablet Comments:   Reason for Stopping:         glipiZIDE (GLUCOTROL) 10 mg tablet Comments:   Reason for Stopping:               Activity: Up with assistance if can tolerate. Diet:Glucerna with all meals. ASA mechanical soft. Wound Care: Off loading boots to feet      Follow-up PCP in one week. Neurology in two weeks. Cardiology in one week. Vascular surgery with Dr. Serena James in three weeks.    ·     Time spent to discharge patient 35 minutes  Signed:  Daniele Sheffield DO  8/31/2019  9:25 AM

## 2019-08-31 NOTE — PROGRESS NOTES
[facility] CARDIOLOGY PROGRESS NOTE    8/31/2019 9:03 AM    Admit Date: 8/23/2019    Admit Diagnosis: Hypoglycemia [E16.2]; Hypoglycemia [E16.2]; Intermittent confusion [R41.0]; Functional quadriplegia (HCC) [R53.2]      Subjective:   Stable overnight without angina, CHF, or palpitations. Vitals stable and controlled but BP marginal and very weak, debilitated. No other complaints overnight. Tolerating meds well. Objective:      Vitals:    08/31/19 0400 08/31/19 0528 08/31/19 0615 08/31/19 0751   BP:  96/58  94/57   Pulse: (!) 105 98  62   Resp:  18  18   Temp:  97.7 °F (36.5 °C)  97.9 °F (36.6 °C)   SpO2:  96%  95%   Weight:   49.1 kg (108 lb 4.8 oz)    Height:           Physical Exam:  Neck- supple, no JVD  CV- irregular rate and rhythm, 2/6 FEI RUSB  Lung- clear bilaterally anterolaterally  Abd- soft, nontender, nondistended  Ext- no edema distally, boots bilaterally  Skin- warm and dry    Data Review:   Recent Labs     08/31/19  0706 08/30/19  0630    138   K 4.3 4.3   MG 1.9 1.7*   BUN 17 14   CREA 0.74 0.70   * 197*   WBC  --  8.7   HGB  --  14.7   HCT  --  44.2   PLT  --  146*   INR 3.2 3.3       Assessment and Plan:     Principal Problem:    Functional quadriplegia -- defer management to primary team       Active Problems:    Atrial fibrillation --episodes of RVR improved now. Target INR 2.5-3.5 given CVA with possible TAVR associated clot- recheck INR daily.        Rheumatoid arthritis -- defer management to primary team.         Aortic stenosis -- prior TAVR with prosthetic valve leaflets with thrombus and thickened, restenotic.   She is not a candidate for redo TAVR.  Cardiac CT with leaflet thrombus despite compliance with eliquis. Stopped this and converted to coumadin.  Goal INR 2.5 to 3.5.         PAD (peripheral artery disease) -- stable, no symptoms presently       Intermittent confusion -- defer management to primary team/Neuro         Witnessed seizure-like activity -- defer management to primary team/Neuro, on Keppra          Elevated troponin -- suspect supply/demand imbalance in setting of known CAD. No intervention planned as no angina and very poor invasive candidate. ... Cont medical therapy.        CVA -- on coumadin.        Debility -- Poor prognosis. May be best served with Hospice. we will discuss with family and consult hospice if they are agreeable.       Leandra Holbrook MD  [address] Cardiology  Pager [phone]

## 2019-08-31 NOTE — PROGRESS NOTES
Pt c/o pain this am. Medicated per MAR. Pt suffering significant confusion this pm. Reoriented prn. Hourly rounds performed through shift, pt denies needs at this time. Bed in low position and call light/ personal items within reach. Will continue to monitor and report to day shift nurse.

## 2019-08-31 NOTE — PROGRESS NOTES
Patient will be d/c to [facility] today. Patient will be transported by Christina Tompkins family and patient agreeable to the arrangements. Patient has met all treatment goals / milestones. CM will continue to monitor. Care Management Interventions  PCP Verified by CM: Yes(Bobbi Leung III,)  Mode of Transport at Discharge:  Other (see comment)([facility] )  Transition of Care Consult (CM Consult): Discharge Planning, SNF(Referral boykin to SNF facilities)  Discharge Durable Medical Equipment: No  Physical Therapy Consult: Yes  Occupational Therapy Consult: Yes  Speech Therapy Consult: No  Current Support Network: Own Home  Confirm Follow Up Transport: Other (see comment)([facility] )  Plan discussed with Pt/Family/Caregiver: Yes  Freedom of Choice Offered: Yes  [address] Provided?: No  Discharge Location  Discharge Placement: Skilled nursing facility(Patient has been accepted at [facility] )

## 2019-08-31 NOTE — PROGRESS NOTES
Patient's IV was removed and report was called to [facility]. She is going to room 107A.

## 2019-09-02 LAB
BACTERIA SPEC CULT: NORMAL
BACTERIA SPEC CULT: NORMAL
SERVICE CMNT-IMP: NORMAL

## 2019-09-03 ENCOUNTER — PATIENT OUTREACH (OUTPATIENT)
Dept: CASE MANAGEMENT | Age: 79
End: 2019-09-03

## 2019-09-03 NOTE — PROGRESS NOTES
This note will not be viewable in [address] Patient discharged to [facility] on 8/31/19 for short to long term care. [facility] outreach not indicated at this time.

## 2021-05-02 NOTE — PROGRESS NOTES
[facility] - INPATIENT  Face to Face Encounter    Patients Name: Seamus Diallo    YOB: 1940    Ordering Physician: Ester Schmid    Primary Diagnosis: cellulititis  Foot ulcer ([facility] [address]  Foot ulcer ([facility] [address]    Date of Face to Face:   12/7/2017                                  Face to Face Encounter findings are related to primary reason for home care:   yes. 1. I certify that the patient needs intermittent care as follows: skilled nursing care:  skilled observation/assessment, patient education and wound care  physical therapy: strengthening, transfer training, balance training and pt/caregiver education    2. I certify that this patient is homebound, that is: 1) patient requires the use of a walker device, special transportation, or assistance of another to leave the home; or 2) patient's condition makes leaving the home medically contraindicated; and 3) patient has a normal inability to leave the home and leaving the home requires considerable and taxing effort. Patient may leave the home for infrequent and short duration for medical reasons, and occasional absences for non-medical reasons. Homebound status is due to the following functional limitations: Patient with strength deficits limiting the performance of all ADL's without caregiver assistance or the use of an assistive device. Patient with poor safety awareness and is at risk for falls without assistance of another person and the use of an assistive device. Patient with poor ambulation endurance limiting their safe ability to ascend/descend the required number of steps to leave the home. 3. I certify that this patient is under my care and that I, or a nurse practitioner or  [id number], or clinical nurse specialist, or certified nurse midwife, working with me, had a Face-to-Face Encounter that meets the physician Face-to-Face Encounter requirements.   The following are the clinical findings from the [address] [place] [address] encounter that support the need for skilled services and is a summary of the encounter:     See hospital chart. Solange Whitneyderic, [facility]  12/7/2017      THE FOLLOWING TO BE COMPLETED BY THE COMMUNITY PHYSICIAN:    I concur with the findings described above from the F2F encounter that this patient is homebound and in need of a skilled service.     Certifying Physician: _____________________________________      Printed Certifying Physician Name: _____________________________________    Date: _________________ Good

## 2025-01-24 NOTE — PROGRESS NOTES
Problem: Pressure Injury - Risk of  Goal: *Prevention of pressure injury  Description  Document Domenic Scale and appropriate interventions in the flowsheet. Outcome: Progressing Towards Goal  Note:   Pressure Injury Interventions:  Heels elevated on pillow prevlon boots ordered. Allevyn to sacrum. Alert-The patient is alert, awake and responds to voice. The patient is oriented to time, place, and person. The triage nurse is able to obtain subjective information.

## (undated) DEVICE — REM POLYHESIVE ADULT PATIENT RETURN ELECTRODE: Brand: VALLEYLAB

## (undated) DEVICE — COVER LT HNDL FOR OR SURG LAMP

## (undated) DEVICE — SUTURE PERMAHAND SZ 2-0 L18IN NONABSORBABLE BLK L26MM SH C012D

## (undated) DEVICE — 2000CC GUARDIAN II: Brand: GUARDIAN

## (undated) DEVICE — INTENDED FOR TISSUE SEPARATION, AND OTHER PROCEDURES THAT REQUIRE A SHARP SURGICAL BLADE TO PUNCTURE OR CUT.: Brand: BARD-PARKER ® STAINLESS STEEL BLADES

## (undated) DEVICE — CARDINAL HEALTH FLEXIBLE LIGHT HANDLE COVER: Brand: CARDINAL HEALTH

## (undated) DEVICE — MEDI-VAC YANKAUER SUCTION HANDLE W/BULBOUS TIP: Brand: CARDINAL HEALTH

## (undated) DEVICE — SUTURE VCRL SZ 3-0 L27IN ABSRB UD L26MM SH 1/2 CIR J416H

## (undated) DEVICE — AMD ANTIMICROBIAL GAUZE SPONGES,12 PLY USP TYPE VII, 0.2% POLYHEXAMETHYLENE BIGUANIDE HCI (PHMB): Brand: CURITY

## (undated) DEVICE — CONTAINER SPEC FRMLN 120ML --

## (undated) DEVICE — PRECISION THIN (9.0 X 0.38 X 31.0MM)

## (undated) DEVICE — Device

## (undated) DEVICE — STERILE HOOK LOCK LATEX FREE ELASTIC BANDAGE 4INX5YD: Brand: HOOK LOCK™

## (undated) DEVICE — SOLUTION IV 1000ML 0.9% SOD CHL

## (undated) DEVICE — BUTTON SWITCH PENCIL BLADE ELECTRODE, HOLSTER: Brand: EDGE

## (undated) DEVICE — AMD ANTIMICROBIAL BANDAGE ROLL,6 PLY: Brand: KERLIX

## (undated) DEVICE — AMD ANTIMICROBIAL SUPER SPONGES,MEDIUM: Brand: KERLIX